# Patient Record
Sex: MALE | Race: WHITE | NOT HISPANIC OR LATINO | Employment: FULL TIME | URBAN - METROPOLITAN AREA
[De-identification: names, ages, dates, MRNs, and addresses within clinical notes are randomized per-mention and may not be internally consistent; named-entity substitution may affect disease eponyms.]

---

## 2019-05-21 ENCOUNTER — OFFICE VISIT (OUTPATIENT)
Dept: FAMILY MEDICINE CLINIC | Facility: HOSPITAL | Age: 49
End: 2019-05-21
Payer: COMMERCIAL

## 2019-05-21 VITALS
HEART RATE: 80 BPM | DIASTOLIC BLOOD PRESSURE: 82 MMHG | TEMPERATURE: 98 F | BODY MASS INDEX: 39.71 KG/M2 | SYSTOLIC BLOOD PRESSURE: 132 MMHG | WEIGHT: 262 LBS | HEIGHT: 68 IN

## 2019-05-21 DIAGNOSIS — E78.1 HYPERTRIGLYCERIDEMIA: ICD-10-CM

## 2019-05-21 DIAGNOSIS — R91.1 LUNG NODULE: ICD-10-CM

## 2019-05-21 DIAGNOSIS — E11.65 UNCONTROLLED TYPE 2 DIABETES MELLITUS WITH HYPERGLYCEMIA (HCC): ICD-10-CM

## 2019-05-21 DIAGNOSIS — Z12.11 COLON CANCER SCREENING: ICD-10-CM

## 2019-05-21 DIAGNOSIS — J45.30 MILD PERSISTENT ASTHMA WITHOUT COMPLICATION: ICD-10-CM

## 2019-05-21 DIAGNOSIS — K21.9 GASTROESOPHAGEAL REFLUX DISEASE WITHOUT ESOPHAGITIS: ICD-10-CM

## 2019-05-21 DIAGNOSIS — G43.009 MIGRAINE WITHOUT AURA AND WITHOUT STATUS MIGRAINOSUS, NOT INTRACTABLE: ICD-10-CM

## 2019-05-21 DIAGNOSIS — E03.9 HYPOTHYROIDISM, UNSPECIFIED TYPE: ICD-10-CM

## 2019-05-21 DIAGNOSIS — I10 ESSENTIAL HYPERTENSION: ICD-10-CM

## 2019-05-21 DIAGNOSIS — L40.9 PSORIASIS: ICD-10-CM

## 2019-05-21 DIAGNOSIS — F41.9 ANXIETY: ICD-10-CM

## 2019-05-21 DIAGNOSIS — G47.33 OBSTRUCTIVE SLEEP APNEA SYNDROME: ICD-10-CM

## 2019-05-21 DIAGNOSIS — Z79.4 CONTROLLED TYPE 2 DIABETES MELLITUS WITHOUT COMPLICATION, WITH LONG-TERM CURRENT USE OF INSULIN (HCC): Primary | ICD-10-CM

## 2019-05-21 DIAGNOSIS — E11.9 CONTROLLED TYPE 2 DIABETES MELLITUS WITHOUT COMPLICATION, WITH LONG-TERM CURRENT USE OF INSULIN (HCC): Primary | ICD-10-CM

## 2019-05-21 DIAGNOSIS — E55.9 VITAMIN D DEFICIENCY: ICD-10-CM

## 2019-05-21 PROBLEM — J30.2 SEASONAL ALLERGIES: Status: ACTIVE | Noted: 2019-05-21

## 2019-05-21 PROBLEM — J45.909 ASTHMA: Status: ACTIVE | Noted: 2019-05-21

## 2019-05-21 PROCEDURE — 3079F DIAST BP 80-89 MM HG: CPT | Performed by: INTERNAL MEDICINE

## 2019-05-21 PROCEDURE — 3008F BODY MASS INDEX DOCD: CPT | Performed by: INTERNAL MEDICINE

## 2019-05-21 PROCEDURE — 3075F SYST BP GE 130 - 139MM HG: CPT | Performed by: INTERNAL MEDICINE

## 2019-05-21 PROCEDURE — 99204 OFFICE O/P NEW MOD 45 MIN: CPT | Performed by: INTERNAL MEDICINE

## 2019-05-21 RX ORDER — FLUOXETINE 20 MG/1
20 TABLET, FILM COATED ORAL DAILY
COMMUNITY
Start: 2019-03-27 | End: 2019-09-04 | Stop reason: SDUPTHER

## 2019-05-21 RX ORDER — ALBUTEROL SULFATE 90 UG/1
1 AEROSOL, METERED RESPIRATORY (INHALATION) EVERY 4 HOURS PRN
COMMUNITY
Start: 2018-12-10

## 2019-05-21 RX ORDER — MOMETASONE FUROATE 50 UG/1
100 SPRAY, METERED NASAL DAILY
COMMUNITY
Start: 2018-07-05 | End: 2019-09-30 | Stop reason: SDUPTHER

## 2019-05-21 RX ORDER — TRIAMCINOLONE ACETONIDE 1 MG/G
CREAM TOPICAL
COMMUNITY
Start: 2018-10-11 | End: 2021-08-12

## 2019-05-21 RX ORDER — MECLIZINE HYDROCHLORIDE 25 MG/1
25 TABLET ORAL 3 TIMES DAILY PRN
COMMUNITY
Start: 2019-03-18 | End: 2021-08-12

## 2019-05-21 RX ORDER — ALCLOMETASONE DIPROPIONATE 0.5 MG/G
CREAM TOPICAL
COMMUNITY
Start: 2018-10-11 | End: 2021-11-01

## 2019-05-21 RX ORDER — SUMATRIPTAN 100 MG/1
100 TABLET, FILM COATED ORAL ONCE AS NEEDED
COMMUNITY
End: 2021-11-01

## 2019-05-21 RX ORDER — SUCRALFATE 1 G/1
TABLET ORAL
COMMUNITY
Start: 2018-10-12 | End: 2019-05-21

## 2019-05-21 RX ORDER — IBUPROFEN 600 MG/1
600 TABLET ORAL EVERY 4 HOURS PRN
COMMUNITY
End: 2021-08-12

## 2019-05-21 RX ORDER — CETIRIZINE HYDROCHLORIDE 10 MG/1
1 TABLET ORAL DAILY PRN
COMMUNITY

## 2019-05-21 RX ORDER — ESOMEPRAZOLE MAGNESIUM 40 MG/1
40 CAPSULE, DELAYED RELEASE ORAL DAILY
COMMUNITY
Start: 2018-03-20 | End: 2019-10-11

## 2019-05-21 RX ORDER — GLIPIZIDE 10 MG/1
10 TABLET, FILM COATED, EXTENDED RELEASE ORAL DAILY
COMMUNITY
Start: 2018-08-06 | End: 2020-01-14 | Stop reason: SDUPTHER

## 2019-05-21 RX ORDER — LEVOTHYROXINE SODIUM 0.05 MG/1
50 TABLET ORAL DAILY
COMMUNITY
Start: 2018-12-13 | End: 2019-07-16 | Stop reason: SDUPTHER

## 2019-05-21 RX ORDER — BUTALBITAL, ACETAMINOPHEN AND CAFFEINE 300; 40; 50 MG/1; MG/1; MG/1
1 CAPSULE ORAL EVERY 6 HOURS PRN
COMMUNITY
Start: 2019-03-07 | End: 2019-12-27 | Stop reason: SDUPTHER

## 2019-06-15 ENCOUNTER — OFFICE VISIT (OUTPATIENT)
Dept: URGENT CARE | Facility: CLINIC | Age: 49
End: 2019-06-15
Payer: COMMERCIAL

## 2019-06-15 VITALS
RESPIRATION RATE: 16 BRPM | TEMPERATURE: 98.5 F | WEIGHT: 260 LBS | OXYGEN SATURATION: 96 % | DIASTOLIC BLOOD PRESSURE: 84 MMHG | SYSTOLIC BLOOD PRESSURE: 126 MMHG | HEART RATE: 74 BPM | HEIGHT: 68 IN | BODY MASS INDEX: 39.4 KG/M2

## 2019-06-15 DIAGNOSIS — J02.9 SORE THROAT: Primary | ICD-10-CM

## 2019-06-15 LAB — S PYO AG THROAT QL: NEGATIVE

## 2019-06-15 PROCEDURE — 99213 OFFICE O/P EST LOW 20 MIN: CPT | Performed by: NURSE PRACTITIONER

## 2019-06-15 PROCEDURE — 87880 STREP A ASSAY W/OPTIC: CPT | Performed by: NURSE PRACTITIONER

## 2019-06-15 RX ORDER — AMOXICILLIN 875 MG/1
875 TABLET, COATED ORAL 2 TIMES DAILY
Qty: 14 TABLET | Refills: 0 | Status: SHIPPED | OUTPATIENT
Start: 2019-06-15 | End: 2019-06-22

## 2019-06-17 LAB — B-HEM STREP SPEC QL CULT: NEGATIVE

## 2019-07-13 LAB
25(OH)D3+25(OH)D2 SERPL-MCNC: 31.8 NG/ML (ref 30–100)
ALBUMIN SERPL-MCNC: 4 G/DL (ref 3.5–5.5)
ALBUMIN/CREAT UR: 7.1 MG/G CREAT (ref 0–30)
ALBUMIN/GLOB SERPL: 1.5 {RATIO} (ref 1.2–2.2)
ALP SERPL-CCNC: 101 IU/L (ref 39–117)
ALT SERPL-CCNC: 35 IU/L (ref 0–44)
AST SERPL-CCNC: 32 IU/L (ref 0–40)
BASOPHILS # BLD AUTO: 0.1 X10E3/UL (ref 0–0.2)
BASOPHILS NFR BLD AUTO: 1 %
BILIRUB SERPL-MCNC: 0.4 MG/DL (ref 0–1.2)
BUN SERPL-MCNC: 13 MG/DL (ref 6–24)
BUN/CREAT SERPL: 14 (ref 9–20)
CALCIUM SERPL-MCNC: 9.1 MG/DL (ref 8.7–10.2)
CHLORIDE SERPL-SCNC: 104 MMOL/L (ref 96–106)
CO2 SERPL-SCNC: 26 MMOL/L (ref 20–29)
CREAT SERPL-MCNC: 0.9 MG/DL (ref 0.76–1.27)
CREAT UR-MCNC: 114.1 MG/DL
EOSINOPHIL # BLD AUTO: 0.2 X10E3/UL (ref 0–0.4)
EOSINOPHIL NFR BLD AUTO: 3 %
ERYTHROCYTE [DISTWIDTH] IN BLOOD BY AUTOMATED COUNT: 14.8 % (ref 12.3–15.4)
EST. AVERAGE GLUCOSE BLD GHB EST-MCNC: 154 MG/DL
GLOBULIN SER-MCNC: 2.6 G/DL (ref 1.5–4.5)
GLUCOSE SERPL-MCNC: 142 MG/DL (ref 65–99)
HBA1C MFR BLD: 7 % (ref 4.8–5.6)
HCT VFR BLD AUTO: 47.6 % (ref 37.5–51)
HGB BLD-MCNC: 15.6 G/DL (ref 13–17.7)
IMM GRANULOCYTES # BLD: 0 X10E3/UL (ref 0–0.1)
IMM GRANULOCYTES NFR BLD: 0 %
LYMPHOCYTES # BLD AUTO: 1.4 X10E3/UL (ref 0.7–3.1)
LYMPHOCYTES NFR BLD AUTO: 22 %
MCH RBC QN AUTO: 29 PG (ref 26.6–33)
MCHC RBC AUTO-ENTMCNC: 32.8 G/DL (ref 31.5–35.7)
MCV RBC AUTO: 89 FL (ref 79–97)
MICROALBUMIN UR-MCNC: 8.1 UG/ML
MONOCYTES # BLD AUTO: 0.6 X10E3/UL (ref 0.1–0.9)
MONOCYTES NFR BLD AUTO: 9 %
NEUTROPHILS # BLD AUTO: 4.4 X10E3/UL (ref 1.4–7)
NEUTROPHILS NFR BLD AUTO: 65 %
PLATELET # BLD AUTO: 298 X10E3/UL (ref 150–450)
POTASSIUM SERPL-SCNC: 4.9 MMOL/L (ref 3.5–5.2)
PROT SERPL-MCNC: 6.6 G/DL (ref 6–8.5)
PSA SERPL-MCNC: 0.1 NG/ML (ref 0–4)
RBC # BLD AUTO: 5.38 X10E6/UL (ref 4.14–5.8)
SL AMB EGFR AFRICAN AMERICAN: 114 ML/MIN/1.73
SL AMB EGFR NON AFRICAN AMERICAN: 99 ML/MIN/1.73
SL AMB REFLEX CRITERIA: NORMAL
SL AMB T4, FREE (DIRECT): 0.89 NG/DL (ref 0.82–1.77)
SODIUM SERPL-SCNC: 145 MMOL/L (ref 134–144)
TSH SERPL DL<=0.005 MIU/L-ACNC: 4.65 UIU/ML (ref 0.45–4.5)
WBC # BLD AUTO: 6.7 X10E3/UL (ref 3.4–10.8)

## 2019-07-13 PROCEDURE — 3045F PR MOST RECENT HEMOGLOBIN A1C LEVEL 7.0-9.0%: CPT | Performed by: INTERNAL MEDICINE

## 2019-07-16 ENCOUNTER — OFFICE VISIT (OUTPATIENT)
Dept: FAMILY MEDICINE CLINIC | Facility: HOSPITAL | Age: 49
End: 2019-07-16
Payer: COMMERCIAL

## 2019-07-16 VITALS
WEIGHT: 260 LBS | DIASTOLIC BLOOD PRESSURE: 88 MMHG | TEMPERATURE: 97.5 F | HEIGHT: 68 IN | HEART RATE: 82 BPM | SYSTOLIC BLOOD PRESSURE: 142 MMHG | BODY MASS INDEX: 39.4 KG/M2

## 2019-07-16 DIAGNOSIS — E03.9 HYPOTHYROIDISM, UNSPECIFIED TYPE: Primary | ICD-10-CM

## 2019-07-16 DIAGNOSIS — L40.9 PSORIASIS: ICD-10-CM

## 2019-07-16 DIAGNOSIS — E55.9 VITAMIN D DEFICIENCY: ICD-10-CM

## 2019-07-16 DIAGNOSIS — E11.65 UNCONTROLLED TYPE 2 DIABETES MELLITUS WITH HYPERGLYCEMIA (HCC): Primary | ICD-10-CM

## 2019-07-16 DIAGNOSIS — I10 ESSENTIAL HYPERTENSION: ICD-10-CM

## 2019-07-16 DIAGNOSIS — E03.9 HYPOTHYROIDISM, UNSPECIFIED TYPE: ICD-10-CM

## 2019-07-16 PROCEDURE — 3008F BODY MASS INDEX DOCD: CPT | Performed by: INTERNAL MEDICINE

## 2019-07-16 PROCEDURE — 99214 OFFICE O/P EST MOD 30 MIN: CPT | Performed by: INTERNAL MEDICINE

## 2019-07-16 RX ORDER — MELATONIN
1000 DAILY
Start: 2019-07-16

## 2019-07-16 RX ORDER — RISANKIZUMAB-RZAA 75 MG/0.83
KIT SUBCUTANEOUS
COMMUNITY
Start: 2019-06-20 | End: 2020-03-16

## 2019-07-16 RX ORDER — LEVOTHYROXINE SODIUM 0.05 MG/1
50 TABLET ORAL DAILY
Qty: 90 TABLET | Refills: 3 | Status: SHIPPED | OUTPATIENT
Start: 2019-07-16 | End: 2019-07-18 | Stop reason: SDUPTHER

## 2019-07-16 NOTE — ASSESSMENT & PLAN NOTE
TSH mildly elevated but FT4 was wnl, pt is taking his levothyroxine daily but does take with food - proper administration of med was reviewed - con't current regimen and take later in am after breakfast (1 hr) and other meds (2 hrs), recheck TFT's in 3 mos - if still elevated will need to increase levothryoxine

## 2019-07-16 NOTE — PROGRESS NOTES
Assessment/Plan:    Uncontrolled type 2 diabetes mellitus with hyperglycemia (HCC)  Lab Results   Component Value Date    HGBA1C 7 0 (H) 07/11/2019       No results for input(s): POCGLU in the last 72 hours  Blood Sugar Average: Last 72 hrs: 150's  DM type 2 - borderline uncontrolled with A1C 7 0 - encouraged to con't healthy diet/exercise/wgt loss, con't current meds, recheck BW in 3-4 mos - order given, UTD on foot exam (2/19), have to get copy of eye exam from Dr Iban Lezama as pt states he has had one recently (note sent was NOT a diabetic exam in April 2019), not on ACE/ARB or statin      Hypothyroidism  TSH mildly elevated but FT4 was wnl, pt is taking his levothyroxine daily but does take with food - proper administration of med was reviewed - con't current regimen and take later in am after breakfast (1 hr) and other meds (2 hrs), recheck TFT's in 3 mos - if still elevated will need to increase levothryoxine    Essential hypertension  Bp elevated on presentation but again better by end of appt - urged to con't watching diet and keep active and get wgt down, would benefit from ACE/ARB d/t his DM if BP was elevated    Vitamin D deficiency  Low nml - start Vit D 1000 IU 1 tab daily - recheck in 4-6 mos - will order at next appt    Psoriasis  Siliq stopped and Skyrizi started by Tamika Bridges - con't meds and f/u as per Derm - med list updated       Diagnoses and all orders for this visit:    Uncontrolled type 2 diabetes mellitus with hyperglycemia ([address] )  -     Glucose, fasting; Future  -     Hemoglobin A1C; Future  -     Glucose, fasting  -     Hemoglobin A1C    Hypothyroidism, unspecified type  -     TSH, 3rd generation with Free T4 reflex; Future  -     TSH, 3rd generation with Free T4 reflex    Vitamin D deficiency  -     cholecalciferol (VITAMIN D3) 1,000 units tablet;  Take 1 tablet (1,000 Units total) by mouth daily    Essential hypertension    Psoriasis    Other orders  -     SKYRIZI 150 DOSE 75 MG/0 83ML PSKT Pt still has not done a baseline colonoscopy -number for GI and screening recommendations were reviewed at last appt    Subjective:      Patient ID: Alexandria Wang is a 44 y o  male  HPI Pt here for follow up appt and BW results    BW results were d/w pt in detail: FBS/A1C 142/7 0, rest of CMP/CBC/urine micralbumin:cr ratio/PSA were wnl, TSH was a bit elevated at 4 650 but FT4 was wnl, Vit D low nml at 31 8  Def of controlled vs uncontrolled DM was reviewed  Diet was reviewed - wgt down 2 lbs from May 2019  He is taking his DM meds as directed  He does not check his BS daily but does check it a few times a week  He feels his average BS is 150's  He is UTD on foot (2/19) and eye exam (no record received yet)  He is NOT on a statin or and  ARB/ACE  Goal TSH was reviewed  He is taking his levothyroxine every am but usually with food  He does not take it with other meds  He notes feeling more tired recently  He has had no issues with C/D/tremor/palp/hair loss/skin changes/intolerance to heat or cold  Goal Vit d was reviewed  He is currently not taking any Vit d supplement but is taking a MVI with Vit d in it  Bp above goal on presentation but better by end of appt - pt currently not taking any BP meds  Benefit of ACE/ARB with DM even w/o HTN was reviewed  He has no microalbuminuria  Pt still has not done a baseline colonoscopy -number for GI and screening recommendations were reviewed at last appt    Review of Systems   Constitutional: Positive for fatigue  Negative for chills and fever  HENT: Negative for congestion and sinus pain  Eyes: Negative for pain and visual disturbance  Respiratory: Positive for shortness of breath  Negative for cough and chest tightness  SOB is primarily with exertion   Cardiovascular: Negative for chest pain, palpitations and leg swelling  Gastrointestinal: Negative for abdominal pain, constipation, diarrhea, nausea and vomiting  Endocrine: Negative for polydipsia and polyuria  Genitourinary: Negative for difficulty urinating and dysuria  Musculoskeletal: Positive for arthralgias  Negative for myalgias  Skin: Negative for wound  Started on new medication for his psoriasis with Derm - med list updated   Neurological: Positive for dizziness  Negative for headaches  Hematological: Does not bruise/bleed easily  Psychiatric/Behavioral: Negative for behavioral problems and confusion  Objective:    /88 (BP Location: Right arm, Patient Position: Sitting, Cuff Size: Standard)   Pulse 82   Temp 97 5 °F (36 4 °C)   Ht 5' 8" (1 727 m)   Wt 118 kg (260 lb)   BMI 39 53 kg/m²      Physical Exam   Constitutional: He appears well-developed and well-nourished  No distress  HENT:   Head: Normocephalic and atraumatic  Mouth/Throat: No oropharyngeal exudate  Eyes: Conjunctivae are normal  Right eye exhibits no discharge  Left eye exhibits no discharge  Neck: Neck supple  No tracheal deviation present  Cardiovascular: Normal rate, regular rhythm and normal heart sounds  No murmur heard  Pulmonary/Chest: Effort normal and breath sounds normal  No respiratory distress  He has no wheezes  He has no rales  Abdominal: Soft  He exhibits no distension  There is no tenderness  Musculoskeletal: He exhibits no deformity  Neurological: He is alert  He exhibits normal muscle tone  Skin: Skin is warm and dry  No rash noted  Psychiatric: He has a normal mood and affect  His behavior is normal    Nursing note and vitals reviewed

## 2019-07-16 NOTE — ASSESSMENT & PLAN NOTE
Lab Results   Component Value Date    HGBA1C 7 0 (H) 07/11/2019       No results for input(s): POCGLU in the last 72 hours      Blood Sugar Average: Last 72 hrs: 150's  DM type 2 - borderline uncontrolled with A1C 7 0 - encouraged to con't healthy diet/exercise/wgt loss, con't current meds, recheck BW in 3-4 mos - order given, UTD on foot exam (2/19), have to get copy of eye exam from Dr Robert Queen as pt states he has had one recently (note sent was NOT a diabetic exam in April 2019), not on ACE/ARB or statin

## 2019-07-16 NOTE — ASSESSMENT & PLAN NOTE
Bp elevated on presentation but again better by end of appt - urged to con't watching diet and keep active and get wgt down, would benefit from ACE/ARB d/t his DM if BP was elevated

## 2019-07-16 NOTE — ASSESSMENT & PLAN NOTE
Nba stopped and Mason Auguste started by Tamika Bridges - con't meds and f/u as per Tamika Bridges - med list updated

## 2019-07-18 ENCOUNTER — PATIENT MESSAGE (OUTPATIENT)
Dept: FAMILY MEDICINE CLINIC | Facility: HOSPITAL | Age: 49
End: 2019-07-18

## 2019-07-18 DIAGNOSIS — E03.9 HYPOTHYROIDISM, UNSPECIFIED TYPE: ICD-10-CM

## 2019-07-18 RX ORDER — LEVOTHYROXINE SODIUM 0.05 MG/1
50 TABLET ORAL DAILY
Qty: 90 TABLET | Refills: 3 | Status: SHIPPED | OUTPATIENT
Start: 2019-07-18 | End: 2020-03-03 | Stop reason: SDUPTHER

## 2019-07-18 NOTE — TELEPHONE ENCOUNTER
From: Maciej Grajeda  Sent: 7/18/2019 11:29 AM EDT  To: Aretha Hinds  Subject: RE: Prescription Question    Please let me know if this was done correctly? It should be sent to:    ePrescribe: For fastest service ask your doctor to submit prescriptions electronically to the [facility]   Online/Mobile [facility]: Log in to [facility]  [facility] or the [facility], choose the medicine you want  delivered, add it to your cart, then check out  Fax: Have your doctor call  for faxing instructions  (Faxes can only be accepted from a doctors office )  Phone: Call [employer] at the toll-free number on the back of your ID card for assistance in switching to home delivery  Mail: Complete the order form and send to [employer] along with prescriptions and payment  ----- Message -----  From: Aretha Hinds  Sent: 7/18/2019 11:19 AM EDT  To: Maciej Grajeda  Subject: RE: Prescription Question  It looks like it was sent to [facility]  in [place] on Tuesday  Is this correct?      ----- Message -----   From: Maciej Grajeda   Sent: 7/18/2019 10:32 AM EDT   To: Daily Perkins DO  Subject: Prescription Question    Have you sent the prescription to [facility] for L-THYROXINE (SYNTHROID) TABS? Please let me know  Thanks!     Sarita Salinas

## 2019-08-29 LAB
LEFT EYE DIABETIC RETINOPATHY: NORMAL
RIGHT EYE DIABETIC RETINOPATHY: NORMAL

## 2019-09-04 ENCOUNTER — PATIENT MESSAGE (OUTPATIENT)
Dept: FAMILY MEDICINE CLINIC | Facility: HOSPITAL | Age: 49
End: 2019-09-04

## 2019-09-04 DIAGNOSIS — F41.9 ANXIETY: Primary | ICD-10-CM

## 2019-09-04 RX ORDER — FLUOXETINE 20 MG/1
20 TABLET, FILM COATED ORAL DAILY
Qty: 90 TABLET | Refills: 2 | Status: SHIPPED | OUTPATIENT
Start: 2019-09-04 | End: 2020-05-12 | Stop reason: SDUPTHER

## 2019-09-04 NOTE — TELEPHONE ENCOUNTER
From: Malinda Minaya  To: Radha Phan DO  Sent: 9/4/2019 3:00 PM EDT  Subject: Prescription Question    I need a re-fill of the medicine below sent to [facility]  Thanks!     FLUoxetine 20 MG tablet

## 2019-09-30 ENCOUNTER — PATIENT MESSAGE (OUTPATIENT)
Dept: FAMILY MEDICINE CLINIC | Facility: HOSPITAL | Age: 49
End: 2019-09-30

## 2019-09-30 ENCOUNTER — IMMUNIZATIONS (OUTPATIENT)
Dept: FAMILY MEDICINE CLINIC | Facility: HOSPITAL | Age: 49
End: 2019-09-30
Payer: COMMERCIAL

## 2019-09-30 DIAGNOSIS — J30.2 SEASONAL ALLERGIES: Primary | ICD-10-CM

## 2019-09-30 DIAGNOSIS — Z23 ENCOUNTER FOR IMMUNIZATION: Primary | ICD-10-CM

## 2019-09-30 PROCEDURE — 90682 RIV4 VACC RECOMBINANT DNA IM: CPT | Performed by: FAMILY MEDICINE

## 2019-09-30 PROCEDURE — 90471 IMMUNIZATION ADMIN: CPT | Performed by: FAMILY MEDICINE

## 2019-09-30 RX ORDER — MOMETASONE FUROATE 50 UG/1
2 SPRAY, METERED NASAL DAILY
Qty: 17 G | Refills: 3 | Status: SHIPPED | OUTPATIENT
Start: 2019-09-30 | End: 2019-11-25

## 2019-09-30 NOTE — TELEPHONE ENCOUNTER
From: Nancy Boone  To: Amando Campos DO  Sent: 9/30/2019 10:14 AM EDT  Subject: Prescription Question    Please re-fill my Mometasone Furoate Ns [id number] 50mcg 90-day supply  Please send to [facility]

## 2019-10-11 ENCOUNTER — OFFICE VISIT (OUTPATIENT)
Dept: GASTROENTEROLOGY | Facility: CLINIC | Age: 49
End: 2019-10-11
Payer: COMMERCIAL

## 2019-10-11 VITALS
DIASTOLIC BLOOD PRESSURE: 88 MMHG | HEART RATE: 86 BPM | WEIGHT: 258 LBS | BODY MASS INDEX: 39.1 KG/M2 | HEIGHT: 68 IN | SYSTOLIC BLOOD PRESSURE: 128 MMHG

## 2019-10-11 DIAGNOSIS — K21.9 GASTROESOPHAGEAL REFLUX DISEASE, ESOPHAGITIS PRESENCE NOT SPECIFIED: ICD-10-CM

## 2019-10-11 DIAGNOSIS — Z12.11 COLON CANCER SCREENING: Primary | ICD-10-CM

## 2019-10-11 DIAGNOSIS — G47.33 OBSTRUCTIVE SLEEP APNEA: ICD-10-CM

## 2019-10-11 DIAGNOSIS — R10.13 EPIGASTRIC PAIN: Primary | ICD-10-CM

## 2019-10-11 DIAGNOSIS — Z12.11 COLON CANCER SCREENING: ICD-10-CM

## 2019-10-11 PROCEDURE — 99244 OFF/OP CNSLTJ NEW/EST MOD 40: CPT | Performed by: INTERNAL MEDICINE

## 2019-10-11 RX ORDER — RANITIDINE 150 MG/1
150 TABLET ORAL 2 TIMES DAILY PRN
COMMUNITY
End: 2019-11-26

## 2019-10-11 NOTE — PATIENT INSTRUCTIONS
Gastroesophageal Reflux Disease   WHAT YOU NEED TO KNOW:   Gastroesophageal reflux occurs when acid and food in the stomach back up into the esophagus  Gastroesophageal reflux disease (GERD) is reflux that occurs more than twice a week for a few weeks  It usually causes heartburn and other symptoms  GERD can cause other health problems over time if it is not treated  DISCHARGE INSTRUCTIONS:   Return to the emergency department if:   · You feel full and cannot burp or vomit  · You have severe chest pain and sudden trouble breathing  · Your bowel movements are black, bloody, or tarry-looking  · Your vomit looks like coffee grounds or has blood in it  Contact your healthcare provider if:   · You vomit large amounts, or you vomit often  · You have trouble breathing after you vomit  · You have trouble swallowing, or pain with swallowing  · You are losing weight without trying  · Your symptoms get worse or do not improve with treatment  · You have questions or concerns about your condition or care  Medicines:   · Medicines  are used to decrease stomach acid  Medicine may also be used to help your lower esophageal sphincter and stomach contract (tighten) more  · Take your medicine as directed  Contact your healthcare provider if you think your medicine is not helping or if you have side effects  Tell him of her if you are allergic to any medicine  Keep a list of the medicines, vitamins, and herbs you take  Include the amounts, and when and why you take them  Bring the list or the pill bottles to follow-up visits  Carry your medicine list with you in case of an emergency  Manage GERD:   · Do not have foods or drinks that may increase heartburn  These include chocolate, peppermint, fried or fatty foods, drinks that contain caffeine, or carbonated drinks (soda)  Other foods include spicy foods, onions, tomatoes, and tomato-based foods   Do not have foods or drinks that can irritate your esophagus, such as citrus fruits, juices, and alcohol  · Do not eat large meals  When you eat a lot of food at one time, your stomach needs more acid to digest it  Eat 6 small meals each day instead of 3 large ones, and eat slowly  Do not eat meals 2 to 3 hours before bedtime  · Elevate the head of your bed  Place 6-inch blocks under the head of your bed frame  You may also use more than one pillow under your head and shoulders while you sleep  · Maintain a healthy weight  If you are overweight, weight loss may help relieve symptoms of GERD  · Do not smoke  Smoking weakens the lower esophageal sphincter and increases the risk of GERD  Ask your healthcare provider for information if you currently smoke and need help to quit  E-cigarettes or smokeless tobacco still contain nicotine  Talk to your healthcare provider before you use these products  · Do not wear clothing that is tight around your waist   Tight clothing can put pressure on your stomach and cause or worsen GERD symptoms  Follow up with your healthcare provider as directed:  Write down your questions so you remember to ask them during your visits  © 2017 [facility] Information is for End User's use only and may not be sold, redistributed or otherwise used for commercial purposes  All illustrations and images included in CareNotes® are the copyrighted property of [employer]  or [employer]  The above information is an  only  It is not intended as medical advice for individual conditions or treatments  Talk to your doctor, nurse or pharmacist before following any medical regimen to see if it is safe and effective for you

## 2019-10-11 NOTE — PROGRESS NOTES
[facility] - Outpatient Consultation  Kinjal Raman 44 y o  male MRN: [id number]  Encounter: [id number]    ASSESSMENT AND PLAN:      1  Epigastric pain  42-year-old male referred to us as a new patient today by Dr Leona Last for reflux and colon cancer screening  Patient has been on and off Nexium for many years  Currently not taking anything  However has been having some epigastric sharp pains with eating for the past couple days  Taking Zantac as needed  Differential diagnosis includes reflux, esophagitis, peptic ulcer disease     - okay to do Zantac as needed  - limit the Motrin  - Schedule EGD @ [address] [place] [address]  2  Gastroesophageal reflux disease, esophagitis presence not specified  GERD lifestyle modifications and weight loss discussed    3  Colon cancer screening  Had a colonoscopy many decades ago  Due for colon cancer screening  We will schedule today  - Schedule colonoscopy @ [address] [place] [address]  4  Obstructive sleep apnea  Noncompliant with his CPAP  I advised that he follows up with his PCP regarding this  Followup Appointment:  3 month  ______________________________________________________________________    Chief Complaint   Patient presents with    Due for colon screening     Referred by Dr Graff Alu Epigastric Pain     Referred by Dr Leona Last       HPI:   Kinjal Raman is a 44y o  year old male who presents today as a new patient at the request of his PCP for reflux and colon cancer screening  His only real complaint today is that he has been having some epigastric discomfort for the past couple days  Patient states that he has had reflux for a long time and has been on and off Nexium for the past several years  Currently, he has not been taking any Nexium for the past several months  However, the pain kind of came on suddenly  No nausea or vomiting or dysphagia associated with it    No diarrhea or changes in bowel habits associated with it  No fevers or chills  The pain is epigastric, sharp, nonradiating, and has been improving for the past 24 hours  Appetite is good  Never had EGD for Clarke's surveillance  Regarding his colon cancer screening, he is average risk  No family history of colon polyps or colon cancer  He has no GI bleeding      Historical Information   Past Medical History:   Diagnosis Date    Anxiety 2/22/2019    Asthma 5/21/2019    Controlled type 2 diabetes mellitus without complication, with long-term current use of insulin ([address] ) 5/21/2019    Sees Dr Hannah Madrigal- Dr Latasha Rendon GERD (gastroesophageal reflux disease) 12/19/2013    Hypertension     Hypertriglyceridemia 7/8/2015    Hypothyroidism     Migraine without aura and without status migrainosus, not intractable 5/21/2019    Obstructive sleep apnea syndrome 05/21/2019    noncompliant with CPAP    Psoriasis     Seasonal allergies 5/21/2019    Dr Destiny Ortiz Vitamin D deficiency      Past Surgical History:   Procedure Laterality Date    CATARACT EXTRACTION, BILATERAL      CHOLECYSTECTOMY      UNDESCENDED TESTICLE EXPLORATION       Social History     Substance and Sexual Activity   Alcohol Use Not Currently     Social History     Substance and Sexual Activity   Drug Use Never     Social History     Tobacco Use   Smoking Status Never Smoker   Smokeless Tobacco Never Used     Family History   Problem Relation Age of Onset    Diabetes Mother     Thyroid disease Mother     Stroke Mother     Thyroid disease Father     Coronary artery disease Father     Colon cancer Neg Hx     Colon polyps Neg Hx        Meds/Allergies     Current Outpatient Medications:     albuterol (PROVENTIL HFA) 90 mcg/act inhaler    Butalbital-APAP-Caffeine -40 MG CAPS    canagliflozin (INVOKANA) 100 mg    cetirizine (ZyrTEC) 10 mg tablet    cholecalciferol (VITAMIN D3) 1,000 units tablet    FLUoxetine (PROzac) 20 MG tablet   fluticasone-salmeterol (ADVAIR DISKUS) 500-50 mcg/dose inhaler    glipiZIDE (GLUCOTROL XL) 10 mg 24 hr tablet    ibuprofen (MOTRIN) 600 mg tablet    insulin glargine (LANTUS SOLOSTAR) 100 units/mL injection pen    Insulin Pen Needle (BD PEN NEEDLE ESTHER U/F) 32G X 4 MM MISC    levothyroxine 50 mcg tablet    meclizine (ANTIVERT) 25 mg tablet    metFORMIN (GLUCOPHAGE) 1000 MG tablet    mometasone (NASONEX) 50 mcg/act nasal spray    ranitidine (ZANTAC) 150 mg tablet    SKYRIZI 150 DOSE 75 MG/0 83ML PSKT    SUMAtriptan (IMITREX) 100 mg tablet    triamcinolone (KENALOG) 0 1 % cream    alclomethasone (ACLOVATE) 0 05 % cream    No Known Allergies    PHYSICAL EXAM:    Blood pressure 128/88, pulse 86, height 5' 8" (1 727 m), weight 117 kg (258 lb)  Body mass index is 39 23 kg/m²  General Appearance: NAD, cooperative, alert, morbid obesity  Eyes: Anicteric, PERRLA, EOMI  ENT:  Normocephalic, atraumatic, normal mucosa  Mallampati airway class 1  Neck:  Supple, symmetrical, trachea midline,   Resp:  Clear to auscultation bilaterally; no rales, rhonchi or wheezing; respirations unlabored   CV:  S1 S2, Regular rate and rhythm; no murmur, rub, or gallop  GI:  Soft, non-tender, non-distended; normal bowel sounds; no masses, no organomegaly  although exam limited by body habitus  Rectal: Deferred  Musculoskeletal: No cyanosis, clubbing or edema  Normal ROM    Skin:  No jaundice, rashes, or lesions   Heme/Lymph: No palpable cervical lymphadenopathy  Psych: Normal affect, good eye contact  Neuro: No gross deficits, AAOx3    Lab Results:   Lab Results   Component Value Date    WBC 6 7 07/11/2019    HGB 15 6 07/11/2019    HCT 47 6 07/11/2019    MCV 89 07/11/2019     07/11/2019     Lab Results   Component Value Date    K 4 9 07/11/2019     07/11/2019    CO2 26 07/11/2019    BUN 13 07/11/2019    CREATININE 0 90 07/11/2019    AST 32 07/11/2019    ALT 35 07/11/2019     No results found for: IRON, TIBC, FERRITIN  No results found for: LIPASE    Radiology Results:   No results found  REVIEW OF SYSTEMS:    CONSTITUTIONAL: Denies any fever, chills, rigors, and weight loss  HEENT: No earache or tinnitus  Denies hearing loss or visual disturbances  CARDIOVASCULAR: No chest pain or palpitations  RESPIRATORY: Denies any cough, hemoptysis, shortness of breath  Occasional dyspnea on exertion  GASTROINTESTINAL: As noted in the History of Present Illness  GENITOURINARY: No problems with urination  Denies any hematuria or dysuria  NEUROLOGIC: No dizziness or vertigo, denies headaches  MUSCULOSKELETAL: Denies any muscle or joint pain  SKIN: Denies skin rashes or itching  ENDOCRINE: Denies excessive thirst  Denies intolerance to heat or cold  PSYCHOSOCIAL: Denies depression or anxiety  Denies any recent memory loss

## 2019-10-11 NOTE — LETTER
October 11, 2019     Lauri Dover, [address] [place] [address] 305  [address] [address]    Patient: Dana Tripathi   YOB: 1970   Date of Visit: 10/11/2019       Dear Dr Rm Gurrola: Thank you for referring Dana Tripathi to me for evaluation  Below are my notes for this consultation  If you have questions, please do not hesitate to call me  I look forward to following your patient along with you  Sincerely,        Milli Magaña MD        CC: No Recipients  Milli Magaña MD  10/11/2019  4:47 PM  Sign at close encounter    [facility] - Outpatient Consultation  Dana Tripathi 44 y o  male MRN: [id number]  Encounter: [id number]    ASSESSMENT AND PLAN:      1  Epigastric pain  69-year-old male referred to us as a new patient today by Dr Rm Gurrola for reflux and colon cancer screening  Patient has been on and off Nexium for many years  Currently not taking anything  However has been having some epigastric sharp pains with eating for the past couple days  Taking Zantac as needed  Differential diagnosis includes reflux, esophagitis, peptic ulcer disease     - okay to do Zantac as needed  - limit the Motrin  - Schedule EGD @ [address] [place] [address]  2  Gastroesophageal reflux disease, esophagitis presence not specified  GERD lifestyle modifications and weight loss discussed    3  Colon cancer screening  Had a colonoscopy many decades ago  Due for colon cancer screening  We will schedule today  - Schedule colonoscopy @ [address] [place] [address]  4  Obstructive sleep apnea  Noncompliant with his CPAP  I advised that he follows up with his PCP regarding this        Followup Appointment:  3 month  ______________________________________________________________________    Chief Complaint   Patient presents with    Due for colon screening     Referred by Dr Kerri Barba Epigastric Pain     Referred by Dr Rm Gurrola       HPI:   Danasusana Tripathi is a 44y o  year old male who presents today as a new patient at the request of his PCP for reflux and colon cancer screening  His only real complaint today is that he has been having some epigastric discomfort for the past couple days  Patient states that he has had reflux for a long time and has been on and off Nexium for the past several years  Currently, he has not been taking any Nexium for the past several months  However, the pain kind of came on suddenly  No nausea or vomiting or dysphagia associated with it  No diarrhea or changes in bowel habits associated with it  No fevers or chills  The pain is epigastric, sharp, nonradiating, and has been improving for the past 24 hours  Appetite is good  Never had EGD for Clarke's surveillance  Regarding his colon cancer screening, he is average risk  No family history of colon polyps or colon cancer  He has no GI bleeding      Historical Information   Past Medical History:   Diagnosis Date    Anxiety 2/22/2019    Asthma 5/21/2019    Controlled type 2 diabetes mellitus without complication, with long-term current use of insulin ([facility] [address] ) 5/21/2019    Sees Dr Mary Hilton- Dr Jay Cardona GERD (gastroesophageal reflux disease) 12/19/2013    Hypertension     Hypertriglyceridemia 7/8/2015    Hypothyroidism     Migraine without aura and without status migrainosus, not intractable 5/21/2019    Obstructive sleep apnea syndrome 05/21/2019    noncompliant with CPAP    Psoriasis     Seasonal allergies 5/21/2019    Dr Tyler Colunga Vitamin D deficiency      Past Surgical History:   Procedure Laterality Date    CATARACT EXTRACTION, BILATERAL      CHOLECYSTECTOMY      UNDESCENDED TESTICLE EXPLORATION       Social History     Substance and Sexual Activity   Alcohol Use Not Currently     Social History     Substance and Sexual Activity   Drug Use Never     Social History     Tobacco Use   Smoking Status Never Smoker   Smokeless Tobacco Never Used     Family History   Problem Relation Age of Onset    Diabetes Mother     Thyroid disease Mother     Stroke Mother     Thyroid disease Father     Coronary artery disease Father     Colon cancer Neg Hx     Colon polyps Neg Hx        Meds/Allergies     Current Outpatient Medications:     albuterol (PROVENTIL HFA) 90 mcg/act inhaler    Butalbital-APAP-Caffeine -40 MG CAPS    canagliflozin (INVOKANA) 100 mg    cetirizine (ZyrTEC) 10 mg tablet    cholecalciferol (VITAMIN D3) 1,000 units tablet    FLUoxetine (PROzac) 20 MG tablet    fluticasone-salmeterol (ADVAIR DISKUS) 500-50 mcg/dose inhaler    glipiZIDE (GLUCOTROL XL) 10 mg 24 hr tablet    ibuprofen (MOTRIN) 600 mg tablet    insulin glargine (LANTUS SOLOSTAR) 100 units/mL injection pen    Insulin Pen Needle (BD PEN NEEDLE ESTHER U/F) 32G X 4 MM MISC    levothyroxine 50 mcg tablet    meclizine (ANTIVERT) 25 mg tablet    metFORMIN (GLUCOPHAGE) 1000 MG tablet    mometasone (NASONEX) 50 mcg/act nasal spray    ranitidine (ZANTAC) 150 mg tablet    SKYRIZI 150 DOSE 75 MG/0 83ML PSKT    SUMAtriptan (IMITREX) 100 mg tablet    triamcinolone (KENALOG) 0 1 % cream    alclomethasone (ACLOVATE) 0 05 % cream    No Known Allergies    PHYSICAL EXAM:    Blood pressure 128/88, pulse 86, height 5' 8" (1 727 m), weight 117 kg (258 lb)  Body mass index is 39 23 kg/m²  General Appearance: NAD, cooperative, alert, morbid obesity  Eyes: Anicteric, PERRLA, EOMI  ENT:  Normocephalic, atraumatic, normal mucosa  Mallampati airway class 1  Neck:  Supple, symmetrical, trachea midline,   Resp:  Clear to auscultation bilaterally; no rales, rhonchi or wheezing; respirations unlabored   CV:  S1 S2, Regular rate and rhythm; no murmur, rub, or gallop  GI:  Soft, non-tender, non-distended; normal bowel sounds; no masses, no organomegaly   although exam limited by body habitus  Rectal: Deferred  Musculoskeletal: No cyanosis, clubbing or edema  Normal ROM    Skin: No jaundice, rashes, or lesions   Heme/Lymph: No palpable cervical lymphadenopathy  Psych: Normal affect, good eye contact  Neuro: No gross deficits, AAOx3    Lab Results:   Lab Results   Component Value Date    WBC 6 7 07/11/2019    HGB 15 6 07/11/2019    HCT 47 6 07/11/2019    MCV 89 07/11/2019     07/11/2019     Lab Results   Component Value Date    K 4 9 07/11/2019     07/11/2019    CO2 26 07/11/2019    BUN 13 07/11/2019    CREATININE 0 90 07/11/2019    AST 32 07/11/2019    ALT 35 07/11/2019     No results found for: IRON, TIBC, FERRITIN  No results found for: LIPASE    Radiology Results:   No results found  REVIEW OF SYSTEMS:    CONSTITUTIONAL: Denies any fever, chills, rigors, and weight loss  HEENT: No earache or tinnitus  Denies hearing loss or visual disturbances  CARDIOVASCULAR: No chest pain or palpitations  RESPIRATORY: Denies any cough, hemoptysis, shortness of breath  Occasional dyspnea on exertion  GASTROINTESTINAL: As noted in the History of Present Illness  GENITOURINARY: No problems with urination  Denies any hematuria or dysuria  NEUROLOGIC: No dizziness or vertigo, denies headaches  MUSCULOSKELETAL: Denies any muscle or joint pain  SKIN: Denies skin rashes or itching  ENDOCRINE: Denies excessive thirst  Denies intolerance to heat or cold  PSYCHOSOCIAL: Denies depression or anxiety  Denies any recent memory loss

## 2019-10-14 ENCOUNTER — TELEPHONE (OUTPATIENT)
Dept: GASTROENTEROLOGY | Facility: CLINIC | Age: 49
End: 2019-10-14

## 2019-10-23 DIAGNOSIS — K21.9 GASTROESOPHAGEAL REFLUX DISEASE, ESOPHAGITIS PRESENCE NOT SPECIFIED: Primary | ICD-10-CM

## 2019-10-23 NOTE — TELEPHONE ENCOUNTER
Post endo order  Need to clarify mail order pharmacy  Rx written [facility], in [facility] mail order is [facility] in [place]

## 2019-10-30 RX ORDER — PANTOPRAZOLE SODIUM 40 MG/1
40 TABLET, DELAYED RELEASE ORAL DAILY
Qty: 90 TABLET | Refills: 0 | Status: SHIPPED | OUTPATIENT
Start: 2019-10-30 | End: 2020-01-10

## 2019-11-10 ENCOUNTER — OFFICE VISIT (OUTPATIENT)
Dept: URGENT CARE | Facility: CLINIC | Age: 49
End: 2019-11-10
Payer: COMMERCIAL

## 2019-11-10 VITALS
OXYGEN SATURATION: 97 % | TEMPERATURE: 101.4 F | RESPIRATION RATE: 18 BRPM | SYSTOLIC BLOOD PRESSURE: 116 MMHG | HEIGHT: 68 IN | BODY MASS INDEX: 38.95 KG/M2 | WEIGHT: 257 LBS | HEART RATE: 100 BPM | DIASTOLIC BLOOD PRESSURE: 86 MMHG

## 2019-11-10 DIAGNOSIS — R30.0 DYSURIA: ICD-10-CM

## 2019-11-10 DIAGNOSIS — N30.01 ACUTE CYSTITIS WITH HEMATURIA: Primary | ICD-10-CM

## 2019-11-10 LAB
SL AMB  POCT GLUCOSE, UA: 2000
SL AMB LEUKOCYTE ESTERASE,UA: NORMAL
SL AMB POCT BILIRUBIN,UA: NORMAL
SL AMB POCT BLOOD,UA: NORMAL
SL AMB POCT CLARITY,UA: CLEAR
SL AMB POCT COLOR,UA: YELLOW
SL AMB POCT KETONES,UA: NORMAL
SL AMB POCT NITRITE,UA: NORMAL
SL AMB POCT PH,UA: 5
SL AMB POCT SPECIFIC GRAVITY,UA: 1.01
SL AMB POCT URINE PROTEIN: NORMAL
SL AMB POCT UROBILINOGEN: 0.2

## 2019-11-10 PROCEDURE — 99213 OFFICE O/P EST LOW 20 MIN: CPT | Performed by: PHYSICIAN ASSISTANT

## 2019-11-10 PROCEDURE — 81002 URINALYSIS NONAUTO W/O SCOPE: CPT | Performed by: PHYSICIAN ASSISTANT

## 2019-11-10 RX ORDER — SULFAMETHOXAZOLE AND TRIMETHOPRIM 800; 160 MG/1; MG/1
1 TABLET ORAL 2 TIMES DAILY
Qty: 14 TABLET | Refills: 0 | Status: SHIPPED | OUTPATIENT
Start: 2019-11-10 | End: 2019-11-18

## 2019-11-10 NOTE — PROGRESS NOTES
NAME: Dana Tripathi is a 44 y o  male  : 1970    MRN: [id number]      Assessment and Plan   Acute cystitis with hematuria [N30 01]  1  Acute cystitis with hematuria  sulfamethoxazole-trimethoprim (BACTRIM DS) 800-160 mg per tablet   2  Dysuria  POCT urine dip    Urine culture   Exam findings are consistent with UTI  Bactrim sent to Pharmacy  Take medication as noted  Increase fluids  If symptoms persist the next 2-3 days Pt should follow-up with PCP  Patient understands and agrees with treatment plans  Daniel Gillette was seen today for possible uti  Diagnoses and all orders for this visit:    Acute cystitis with hematuria  -     sulfamethoxazole-trimethoprim (BACTRIM DS) 800-160 mg per tablet; Take 1 tablet by mouth 2 (two) times a day for 7 days smx-tmp DS (BACTRIM) 800-160 mg tabs (1tab q12 D10)    Dysuria  -     POCT urine dip  -     Urine culture        Patient Instructions   There are no Patient Instructions on file for this visit  Proceed to ER if symptoms worsen  Chief Complaint     Chief Complaint   Patient presents with    Possible UTI     Started thursday with urinary pressure, pain, incontinence  Denies flank pain  History of Present Illness      44Year old Pt admits to possible UTI x -4  days  Admits to burning sensation, urinary frequency, urgency  Denies lower abdominal pressure  Denies hematuria  Denies fever, n/v/d/c  Denies low back pain  Denies History of UTIs  Denies Penile discharge, itching, rash  Denies concern for STDs  Review of Systems   Review of Systems   Constitutional: Negative  Respiratory: Negative  Cardiovascular: Negative  Gastrointestinal: Negative  Genitourinary: Positive for dysuria, frequency and urgency  Negative for decreased urine volume, discharge, flank pain, hematuria, penile pain, penile swelling, scrotal swelling and testicular pain           Current Medications       Current Outpatient Medications:     albuterol (PROVENTIL HFA) 90 mcg/act inhaler, Inhale 1 puff every 4 (four) hours as needed, Disp: , Rfl:     alclomethasone (ACLOVATE) 6 26 % cream, 1 APPLICATION APPLY ON THE SKIN TWICE A DAY; APPLY TO FACE AS NEEDED FOR FLARES, Disp: , Rfl:     Butalbital-APAP-Caffeine -40 MG CAPS, Take 1 capsule by mouth every 6 (six) hours as needed, Disp: , Rfl:     canagliflozin (INVOKANA) 100 mg, Take 200 mg by mouth daily, Disp: , Rfl:     cetirizine (ZyrTEC) 10 mg tablet, Take 1 tablet by mouth daily as needed, Disp: , Rfl:     cholecalciferol (VITAMIN D3) 1,000 units tablet, Take 1 tablet (1,000 Units total) by mouth daily, Disp: , Rfl:     FLUoxetine (PROzac) 20 MG tablet, Take 1 tablet (20 mg total) by mouth daily, Disp: 90 tablet, Rfl: 2    fluticasone-salmeterol (ADVAIR DISKUS) 500-50 mcg/dose inhaler, Inhale 1 puff 2 (two) times a day, Disp: , Rfl:     glipiZIDE (GLUCOTROL XL) 10 mg 24 hr tablet, Take 10 mg by mouth daily, Disp: , Rfl:     ibuprofen (MOTRIN) 600 mg tablet, Take 600 mg by mouth every 4 (four) hours as needed, Disp: , Rfl:     insulin glargine (LANTUS SOLOSTAR) 100 units/mL injection pen, Inject 20 Units under the skin daily, Disp: , Rfl:     Insulin Pen Needle (BD PEN NEEDLE ESTHER U/F) 32G X 4 MM MISC, Use daily, Disp: , Rfl:     levothyroxine 50 mcg tablet, Take 1 tablet (50 mcg total) by mouth daily, Disp: 90 tablet, Rfl: 3    meclizine (ANTIVERT) 25 mg tablet, Take 25 mg by mouth 3 (three) times a day as needed, Disp: , Rfl:     metFORMIN (GLUCOPHAGE) 1000 MG tablet, Take 1,000 mg by mouth 2 (two) times a day, Disp: , Rfl:     mometasone (NASONEX) 50 mcg/act nasal spray, 2 sprays into each nostril daily, Disp: 17 g, Rfl: 3    Na Sulfate-K Sulfate-Mg Sulf (SUPREP BOWEL PREP KIT) 17 5-3 13-1 6 GM/177ML SOLN, As directed, Disp: 2 Bottle, Rfl: 0    pantoprazole (PROTONIX) 40 mg tablet, Take 1 tablet (40 mg total) by mouth daily, Disp: 90 tablet, Rfl: 0    ranitidine (ZANTAC) 150 mg tablet, Take 150 mg by mouth 2 (two) times a day as needed, Disp: , Rfl:     SKYRIZI 150 DOSE 75 MG/0 83ML PSKT, , Disp: , Rfl:     sulfamethoxazole-trimethoprim (BACTRIM DS) 800-160 mg per tablet, Take 1 tablet by mouth 2 (two) times a day for 7 days smx-tmp DS (BACTRIM) 800-160 mg tabs (1tab q12 D10), Disp: 14 tablet, Rfl: 0    SUMAtriptan (IMITREX) 100 mg tablet, Take 100 mg by mouth once as needed, Disp: , Rfl:     triamcinolone (KENALOG) 0 1 % cream, 1 APPLICATION APPLY ON THE SKIN TWICE A DAY; APPLY TO BODY AS NEEDED FOR FLARES, Disp: , Rfl:     Current Allergies     Allergies as of 11/10/2019    (No Known Allergies)              Past Medical History:   Diagnosis Date    Anxiety 2/22/2019    Asthma 5/21/2019    Controlled type 2 diabetes mellitus without complication, with long-term current use of insulin ([facility] [address] ) 5/21/2019    Sees Dr Rigo Burton Ophthalmologist- Dr Pete Cook GERD (gastroesophageal reflux disease) 12/19/2013    Hypertension     Hypertriglyceridemia 7/8/2015    Hypothyroidism     Migraine without aura and without status migrainosus, not intractable 5/21/2019    Obstructive sleep apnea syndrome 05/21/2019    noncompliant with CPAP    Psoriasis     Seasonal allergies 5/21/2019    Dr Pema Aguirre Vitamin D deficiency        Past Surgical History:   Procedure Laterality Date    CATARACT EXTRACTION, BILATERAL      CHOLECYSTECTOMY      UNDESCENDED TESTICLE EXPLORATION         Family History   Problem Relation Age of Onset    Diabetes Mother     Thyroid disease Mother     Stroke Mother     Thyroid disease Father     Coronary artery disease Father     Colon cancer Neg Hx     Colon polyps Neg Hx          Medications have been verified      The following portions of the patient's history were reviewed and updated as appropriate: allergies, current medications, past family history, past medical history, past social history, past surgical history and problem list     Objective   /86 Pulse 100   Temp (!) 101 4 °F (38 6 °C)   Resp 18   Ht 5' 8" (1 727 m)   Wt 117 kg (257 lb)   SpO2 97%   BMI 39 08 kg/m²      Physical Exam     Physical Exam   Constitutional: He is oriented to person, place, and time  He appears well-developed  No distress  Cardiovascular: Normal rate, regular rhythm, normal heart sounds and intact distal pulses  Exam reveals no gallop and no friction rub  No murmur heard  Pulmonary/Chest: Effort normal and breath sounds normal  No stridor  No respiratory distress  He has no wheezes  He has no rales  He exhibits no tenderness  Abdominal: Soft  Bowel sounds are normal  He exhibits no distension and no mass  There is no tenderness  There is no rigidity, no rebound, no guarding and no CVA tenderness  No hernia  Neurological: He is alert and oriented to person, place, and time  Skin: He is not diaphoretic  Nursing note and vitals reviewed        Suma Calderon PA-C

## 2019-11-13 LAB
BACTERIA UR CULT: ABNORMAL
Lab: ABNORMAL
SL AMB ANTIMICROBIAL SUSCEPTIBILITY: ABNORMAL

## 2019-11-14 ENCOUNTER — TELEPHONE (OUTPATIENT)
Dept: URGENT CARE | Facility: CLINIC | Age: 49
End: 2019-11-14

## 2019-11-14 LAB
EST. AVERAGE GLUCOSE BLD GHB EST-MCNC: 183 MG/DL
GLUCOSE SERPL-MCNC: 156 MG/DL (ref 65–99)
HBA1C MFR BLD: 8 % (ref 4.8–5.6)
TSH SERPL DL<=0.005 MIU/L-ACNC: 3.85 UIU/ML (ref 0.45–4.5)

## 2019-11-14 PROCEDURE — 3052F HG A1C>EQUAL 8.0%<EQUAL 9.0%: CPT | Performed by: INTERNAL MEDICINE

## 2019-11-18 ENCOUNTER — OFFICE VISIT (OUTPATIENT)
Dept: FAMILY MEDICINE CLINIC | Facility: HOSPITAL | Age: 49
End: 2019-11-18
Payer: COMMERCIAL

## 2019-11-18 VITALS
HEART RATE: 70 BPM | BODY MASS INDEX: 39.4 KG/M2 | HEIGHT: 68 IN | TEMPERATURE: 97.8 F | SYSTOLIC BLOOD PRESSURE: 132 MMHG | WEIGHT: 260 LBS | DIASTOLIC BLOOD PRESSURE: 82 MMHG

## 2019-11-18 DIAGNOSIS — E11.65 UNCONTROLLED TYPE 2 DIABETES MELLITUS WITH HYPERGLYCEMIA (HCC): Primary | ICD-10-CM

## 2019-11-18 DIAGNOSIS — I10 ESSENTIAL HYPERTENSION: ICD-10-CM

## 2019-11-18 DIAGNOSIS — E03.9 HYPOTHYROIDISM, UNSPECIFIED TYPE: ICD-10-CM

## 2019-11-18 PROCEDURE — 4010F ACE/ARB THERAPY RXD/TAKEN: CPT | Performed by: INTERNAL MEDICINE

## 2019-11-18 PROCEDURE — 99214 OFFICE O/P EST MOD 30 MIN: CPT | Performed by: INTERNAL MEDICINE

## 2019-11-18 RX ORDER — LISINOPRIL 5 MG/1
5 TABLET ORAL DAILY
Qty: 30 TABLET | Refills: 0 | Status: SHIPPED | OUTPATIENT
Start: 2019-11-18 | End: 2019-12-13 | Stop reason: SDUPTHER

## 2019-11-18 RX ORDER — ATORVASTATIN CALCIUM 10 MG/1
10 TABLET, FILM COATED ORAL DAILY
Qty: 30 TABLET | Refills: 0 | Status: SHIPPED | OUTPATIENT
Start: 2019-11-18 | End: 2019-12-13 | Stop reason: SDUPTHER

## 2019-11-18 NOTE — ASSESSMENT & PLAN NOTE
Clinically euthyroid - con't current levothyroxine - importance of taking med ALONE first thing in am again reviewed Closed head injury, initial encounter

## 2019-11-18 NOTE — ASSESSMENT & PLAN NOTE
Lab Results   Component Value Date    HGBA1C 8 0 (H) 11/13/2019   Dm type 2 with hyperglycemia - uncontrolled with a1C up at 8 0 - urged to get on track with diet and start regular exercise and get wgt down, pt is interested in seeing DM nutritionist - order given, he has increased his Lantus himself to 34 U and still notes no symptoms of low sugars BUT IS NOT CHECKING HIS SUGARS AT HOME - urged to stay at 35 U of Lantus and start checking at least once a week, recheck BW in 3 mo - order given, start ACE and statin, UTD on foot ( 2/19) and eye exam (8/19)

## 2019-11-18 NOTE — ASSESSMENT & PLAN NOTE
Bp at goal but upper end of goal, benefit of ACE reviewed with DM and pt agreeable to start low dose Lisinopril 5 mg 1 tab PO q day - check BMP with next labs, SE reviewed - call if SE occur

## 2019-11-18 NOTE — PROGRESS NOTES
Assessment/Plan:    Uncontrolled type 2 diabetes mellitus with hyperglycemia (HCC)    Lab Results   Component Value Date    HGBA1C 8 0 (H) 11/13/2019   Dm type 2 with hyperglycemia - uncontrolled with a1C up at 8 0 - urged to get on track with diet and start regular exercise and get wgt down, pt is interested in seeing DM nutritionist - order given, he has increased his Lantus himself to 34 U and still notes no symptoms of low sugars BUT IS NOT CHECKING HIS SUGARS AT HOME - urged to stay at 35 U of Lantus and start checking at least once a week, recheck BW in 3 mo - order given, start ACE and statin, UTD on foot ( 2/19) and eye exam (8/19)    Hypothyroidism  Clinically euthyroid - con't current levothyroxine - importance of taking med ALONE first thing in am again reviewed    Essential hypertension  Bp at goal but upper end of goal, benefit of ACE reviewed with DM and pt agreeable to start low dose Lisinopril 5 mg 1 tab PO q day - check BMP with next labs, SE reviewed - call if SE occur       Diagnoses and all orders for this visit:    Uncontrolled type 2 diabetes mellitus with hyperglycemia ([facility] [address] )  -     Ambulatory referral to medical nutrition therapy for diabetes; Future  -     Hemoglobin A1C; Future  -     Basic metabolic panel; Future  -     Hemoglobin A1C  -     Basic metabolic panel  -     atorvastatin (LIPITOR) 10 mg tablet; Take 1 tablet (10 mg total) by mouth daily  -     Hepatic function panel; Future  -     Hepatic function panel    Hypothyroidism, unspecified type    Essential hypertension  -     lisinopril (ZESTRIL) 5 mg tablet; Take 1 tablet (5 mg total) by mouth daily      Colonoscopy 10/19 - 10 yrs      Subjective:      Patient ID: Js Sherman is a 44 y o  male  HPI Pt here for follow up appt and BW results    BW results were d/w pt in detail: FBS/A1C 156/8 0, TSH wnl  Def of controlled vs uncontrolled DM was reviewed  Diet was reviewed - wgt unchanged from July 2019    He does not check his sugars at home but has a glucometer  He states he has increased his carbs/fats  He has never seen DM nutritionist   He does no formal exercise  He is taking his DM meds as directed  He is UTD on foot (2/19)  and eye exam (8/19)  He is not on a statin or ACE  Nml TFT's were reviewed  He is taking his Levothyroxine in the am   He has had no significant wgt changes/fatigue/C/D/hair loss or skin changes/tremor/palp  BP at goal today and meds were reviewed and no changes have occurred  He denies missing doses of meds or SE with the meds  He does not check his BP outside the office  He notes no frequent HA's/dizziness/double vision/CP  RF for CVD were reviewed  Pt is agreeable to ACE and statin  Diet/exercise/wgt loss encouraged  Colonoscopy 10/19 - 10 yrs      Review of Systems   Constitutional: Negative for chills and fever  HENT: Negative for congestion and sinus pain  Eyes: Negative for pain and visual disturbance  Respiratory: Negative for cough and shortness of breath  Cardiovascular: Negative for chest pain, palpitations and leg swelling  Gastrointestinal: Negative for abdominal pain, constipation, diarrhea, nausea and vomiting  Endocrine: Negative for polydipsia and polyuria  Genitourinary: Negative for difficulty urinating and dysuria  Musculoskeletal: Negative for arthralgias and myalgias  Skin: Negative for rash and wound  Neurological: Negative for dizziness and headaches  Hematological: Does not bruise/bleed easily  Psychiatric/Behavioral: Negative for behavioral problems and confusion  Objective:    /82 (BP Location: Right arm, Patient Position: Sitting, Cuff Size: Standard)   Pulse 70   Temp 97 8 °F (36 6 °C)   Ht 5' 8" (1 727 m)   Wt 118 kg (260 lb)   BMI 39 53 kg/m²      Physical Exam   Constitutional: He appears well-developed and well-nourished  No distress  HENT:   Head: Normocephalic and atraumatic     Mouth/Throat: No oropharyngeal exudate  Eyes: Conjunctivae are normal  Right eye exhibits no discharge  Left eye exhibits no discharge  Neck: Neck supple  No tracheal deviation present  Cardiovascular: Normal rate, regular rhythm and normal heart sounds  No murmur heard  Pulmonary/Chest: Effort normal and breath sounds normal  No respiratory distress  He has no wheezes  He has no rales  Abdominal: Soft  He exhibits no distension  There is no tenderness  Musculoskeletal: He exhibits no deformity  Neurological: He is alert  He exhibits normal muscle tone  Skin: Skin is warm and dry  No rash noted  Psychiatric: He has a normal mood and affect  His behavior is normal    Nursing note and vitals reviewed

## 2019-11-19 ENCOUNTER — TELEPHONE (OUTPATIENT)
Dept: FAMILY MEDICINE CLINIC | Facility: HOSPITAL | Age: 49
End: 2019-11-19

## 2019-11-19 NOTE — TELEPHONE ENCOUNTER
----- Message from Sam Cardoza sent at 11/19/2019  8:29 AM EST -----  Regarding: Test Results Question  Contact: [phone]  Hi Doctor,    Attached are my test results      Darwin Primrose

## 2019-11-21 ENCOUNTER — OFFICE VISIT (OUTPATIENT)
Dept: DIABETES SERVICES | Facility: HOSPITAL | Age: 49
End: 2019-11-21
Payer: COMMERCIAL

## 2019-11-21 VITALS — BODY MASS INDEX: 39.4 KG/M2 | WEIGHT: 260 LBS | HEIGHT: 68 IN

## 2019-11-21 DIAGNOSIS — E11.65 UNCONTROLLED TYPE 2 DIABETES MELLITUS WITH HYPERGLYCEMIA (HCC): Primary | ICD-10-CM

## 2019-11-21 PROCEDURE — 97802 MEDICAL NUTRITION INDIV IN: CPT | Performed by: DIETITIAN, REGISTERED

## 2019-11-21 NOTE — PROGRESS NOTES
Medical Nutrition Therapy     Assessment    Visit Type: Initial visit  Chief complaint:  Type 2     HPI:  Met with Pako Lovett for MNT initial not testing, he tested it today 145 today  This was the first time in about a month  States A1C had been running good so no need to test, but now is 8%  Diabetes >15 years, on basal insulin  Food recall shows primary issues: no consistency in his intake of carbs  Breakfast and morning snack are very high carb, and then high carb bedtime snacking  These are the places he will target for making changes  The rest of the day looks good unless he goes to fast food for lunch, discussed better choices  Discussed the need to start some testing, I asked if he could start pair testing, before a meal and 2hr after the same meal, rotating between meals  Together we discussed what foods contain CHO, reading a food label, serving sizes, and set a carb goal of 45g CHO/meal to promote weight loss with 15g snacks  Put together sample meals for Venu's reference and evaluated Venu's current eating plan  Good understanding, Pako Lovett will call with questions or for more education  Follow up as needed if not improving  Ht Readings from Last 1 Encounters:   11/21/19 5' 8" (1 727 m)     Wt Readings from Last 3 Encounters:   11/21/19 118 kg (260 lb)   11/18/19 118 kg (260 lb)   11/10/19 117 kg (257 lb)        Body mass index is 39 53 kg/m²       Lab Results   Component Value Date    HGBA1C 8 0 (H) 11/13/2019    HGBA1C 7 0 (H) 07/11/2019       No results found for: CHOL  No results found for: HDL  No results found for: LDLCALC  No results found for: TRIG  No results found for: CHOLHDL    Weight Change: No    Medical Diagnosis/ICD 10 Code:  E11 65    Barriers to Learning: no barriers    Do you follow any special diet presently?: No- lives with spouse and 15year old  Who shops: spouse  Who cooks: spouse    Food Log: Completed via the method of food recall-      Breakfast: up around 6am, cereal today special K was on the almond and honey or raisin bran crunch, was drinking lactaid milk and just switched to almond  Coffee 1 a day with splenda and milk, eggs and toast on the weekend  Morning Snack:yogurt with fruit and yogurt parfait, bagel and cream cheese, [place]ian, and extra coffee   Lunch:12pm: comes home sandwich on wheat bread and fruit and diet iced tea  Fast food quarter pounder meal or marvin itailin hoagie classic  Hot meal at work lasagna  Afternoon Snack: not much, more coffee  Dinner: not much, they eat early  6-6:30pm  5pm  Steak veggie string bean  Meal starch veggie  Soup  Rice and beans and plantains  Evening Snack: ice cream sugar free and cereal at night  9pm eats, bed 10-11pm    Beverages: water a lot, coffee with cream and splenda at work will have flavored creamer, no mixes, no power drinks, juice sugar free cranberry, iced tea diet, milk in sugar, no soda diet  Eating out/Take out: no much once a week   Exercise ADL, nothing structured  Nutrition Diagnosis:  Food and nutrition related knowledge deficit  related to Lack of prior exposure to accurate nutrition related information as evidenced by Verbalizes inaccurate or incomplete information    Intervention: plate method, label reading, behavior modification strategies, carbohydrate counting, meal timing, individualized meal plan and monitoring portion control     Treatment Goals: Patient understands education and recommendations    Education Material Given  Jovan Hutchison was provided the Portion Booklet and Planning Healthy Meals     Monitoring and evaluation:    Term code indicator  FH 1 6 3 Carbohydrate Intake Criteria: 45g CHO per meal, 15g CHO snacks    Patients Response to Instruction:  Comprehensiongood  Motivationgood  Expected Compliancegood    Thank you for coming to the [facility] for education today  Please feel free to call with any questions or concerns      Lucia Bwoers, RD  [facility] [address] [place] [address]

## 2019-11-21 NOTE — PATIENT INSTRUCTIONS
45 carbs per meal, 15g snacks   Work on changing breakfast and morning snack, and no more cereal at night    Get more exercise!!! Start testing blood sugars

## 2019-11-25 DIAGNOSIS — J30.2 SEASONAL ALLERGIES: Primary | ICD-10-CM

## 2019-11-25 RX ORDER — FLUTICASONE PROPIONATE 50 MCG
2 SPRAY, SUSPENSION (ML) NASAL DAILY
Qty: 3 BOTTLE | Refills: 3 | Status: SHIPPED | OUTPATIENT
Start: 2019-11-25 | End: 2020-11-01

## 2019-11-26 ENCOUNTER — OFFICE VISIT (OUTPATIENT)
Dept: FAMILY MEDICINE CLINIC | Facility: HOSPITAL | Age: 49
End: 2019-11-26
Payer: COMMERCIAL

## 2019-11-26 VITALS
DIASTOLIC BLOOD PRESSURE: 80 MMHG | BODY MASS INDEX: 39.25 KG/M2 | TEMPERATURE: 97.8 F | HEIGHT: 68 IN | HEART RATE: 75 BPM | WEIGHT: 259 LBS | SYSTOLIC BLOOD PRESSURE: 130 MMHG

## 2019-11-26 DIAGNOSIS — R10.11 RIGHT UPPER QUADRANT ABDOMINAL PAIN: Primary | ICD-10-CM

## 2019-11-26 DIAGNOSIS — E11.65 UNCONTROLLED TYPE 2 DIABETES MELLITUS WITH HYPERGLYCEMIA (HCC): ICD-10-CM

## 2019-11-26 DIAGNOSIS — R19.7 DIARRHEA, UNSPECIFIED TYPE: ICD-10-CM

## 2019-11-26 DIAGNOSIS — R30.0 DYSURIA: ICD-10-CM

## 2019-11-26 LAB
SL AMB  POCT GLUCOSE, UA: 1000
SL AMB LEUKOCYTE ESTERASE,UA: ABNORMAL
SL AMB POCT BILIRUBIN,UA: ABNORMAL
SL AMB POCT BLOOD,UA: ABNORMAL
SL AMB POCT CLARITY,UA: CLEAR
SL AMB POCT COLOR,UA: YELLOW
SL AMB POCT KETONES,UA: ABNORMAL
SL AMB POCT NITRITE,UA: ABNORMAL
SL AMB POCT PH,UA: 5
SL AMB POCT SPECIFIC GRAVITY,UA: 1.01
SL AMB POCT URINE PROTEIN: ABNORMAL
SL AMB POCT UROBILINOGEN: ABNORMAL

## 2019-11-26 PROCEDURE — 99214 OFFICE O/P EST MOD 30 MIN: CPT | Performed by: INTERNAL MEDICINE

## 2019-11-26 PROCEDURE — 81002 URINALYSIS NONAUTO W/O SCOPE: CPT | Performed by: INTERNAL MEDICINE

## 2019-11-26 RX ORDER — CIPROFLOXACIN 500 MG/1
500 TABLET, FILM COATED ORAL EVERY 12 HOURS SCHEDULED
Qty: 14 TABLET | Refills: 0 | Status: SHIPPED | OUTPATIENT
Start: 2019-11-26 | End: 2019-12-03

## 2019-11-26 NOTE — ASSESSMENT & PLAN NOTE
Lab Results   Component Value Date    HGBA1C 8 0 (H) 11/13/2019   Has had benefit with nutritionist - sugars low 100's in am and 150's later in day, has DM labs ordered, do BW and con't meds as directed

## 2019-11-26 NOTE — PROGRESS NOTES
Assessment/Plan:    Uncontrolled type 2 diabetes mellitus with hyperglycemia (HCC)    Lab Results   Component Value Date    HGBA1C 8 0 (H) 11/13/2019   Has had benefit with nutritionist - sugars low 100's in am and 150's later in day, has DM labs ordered, do BW and con't meds as directed       Diagnoses and all orders for this visit:    Right upper quadrant abdominal pain  Comments: On exam has tenderness at LUQ, symptoms better today - poss related to new dairy product - cont bland diet and keep hydrated, on PPI, call with recurrent/new/wores symptoms    Diarrhea, unspecified type  Comments:  Improved with Imodium and Pepto - no current symptoms, advised bland diet and call with recurrent/new/worse symptoms/F/blood in stool    Dysuria  Comments:  Symptoms c/w UTI and urine dip with nitrites and blood but no leukocytes, previous UC reviewed - rx for Cipro sent, will send for culture, keep hydrated, call with new/worse symptoms/F/flank pain or if not better by end of abx  Orders:  -     ciprofloxacin (CIPRO) 500 mg tablet; Take 1 tablet (500 mg total) by mouth every 12 (twelve) hours for 7 days  -     Urine culture; Future  -     POCT urine dip    Uncontrolled type 2 diabetes mellitus with hyperglycemia (HCC)          Subjective:      Patient ID: James Michelle is a 44 y o  male  HPI  Pt here with with  C/o GI upset x 1 wk  He states middle of last week he started to have R upper abd discomfort  He states he felt "gasy" and abd was making a lot of noise and then had loose stools after that  He took Imodium x1 and that seemed to help  Over the weekend he seemed to have recurrent symptoms and started Pepto and it has improved as of this am  Currently he notes no abd pain/N/V/D/blood in stool/black stools/F/C  He states his appetite was slightly decreased last week but seems back to normal   He is on a PPI daily  He did see the nutritionist and did go on a low sugar different mild product    He stopped the product right after symptoms started  He has been seeing the nutritionist for his type 2 diabetes and feels she has given him a few good pointers with his diet  He states his sugars are currently averaging 150's at bedtime and low 100's in the am    Wgt down 1 lb from July 2019  He notes some symptoms of burning with urination the past week  He notes symptoms seemed more last week but have improved but not resolved  He notes no low abd pain/bladder or flank pain  He notes no F/C/N/V/blood in the urine  He had a UTI mid Nov and was tx with Bactrim  He is not sure symptoms every completely resolved  UC results reviewed  [facility] and EGD 10/19     Review of Systems   Constitutional: Negative for chills and fever  HENT: Negative for congestion and sinus pain  Eyes: Negative for pain and visual disturbance  Respiratory: Negative for cough and shortness of breath  Cardiovascular: Negative for chest pain and palpitations  Gastrointestinal: Positive for abdominal pain and diarrhea  Negative for blood in stool, constipation, nausea and vomiting  Genitourinary: Positive for dysuria  Negative for difficulty urinating and frequency  Musculoskeletal: Negative for arthralgias and myalgias  Skin: Negative for rash and wound  Neurological: Negative for dizziness and headaches  Hematological: Does not bruise/bleed easily  Psychiatric/Behavioral: Negative for behavioral problems and confusion  Objective:    /80 (BP Location: Right arm, Patient Position: Sitting, Cuff Size: Standard)   Pulse 75   Temp 97 8 °F (36 6 °C)   Ht 5' 8" (1 727 m)   Wt 117 kg (259 lb)   BMI 39 38 kg/m²      Physical Exam   Constitutional: He appears well-developed and well-nourished  No distress  HENT:   Head: Normocephalic and atraumatic  Mouth/Throat: No oropharyngeal exudate  Eyes: Conjunctivae are normal  Right eye exhibits no discharge  Left eye exhibits no discharge  Neck: Neck supple   No tracheal deviation present  Cardiovascular: Normal rate, regular rhythm and normal heart sounds  No murmur heard  Pulmonary/Chest: Effort normal and breath sounds normal  No respiratory distress  He has no wheezes  He has no rales  Abdominal: Soft  He exhibits no distension  There is no rebound and no guarding  Mild tenderness LUQ, neg CVA tenderness B/L   Musculoskeletal: He exhibits no deformity  Neurological: He is alert  He exhibits normal muscle tone  Skin: Skin is warm and dry  No rash noted  Psychiatric: He has a normal mood and affect  His behavior is normal    Nursing note and vitals reviewed

## 2019-12-02 LAB
BACTERIA UR CULT: ABNORMAL
Lab: ABNORMAL
SL AMB ANTIMICROBIAL SUSCEPTIBILITY: ABNORMAL

## 2019-12-13 DIAGNOSIS — E11.65 UNCONTROLLED TYPE 2 DIABETES MELLITUS WITH HYPERGLYCEMIA (HCC): ICD-10-CM

## 2019-12-13 DIAGNOSIS — I10 ESSENTIAL HYPERTENSION: ICD-10-CM

## 2019-12-13 PROCEDURE — 4010F ACE/ARB THERAPY RXD/TAKEN: CPT | Performed by: INTERNAL MEDICINE

## 2019-12-13 RX ORDER — ATORVASTATIN CALCIUM 10 MG/1
TABLET, FILM COATED ORAL
Qty: 30 TABLET | Refills: 5 | Status: SHIPPED | OUTPATIENT
Start: 2019-12-13 | End: 2020-12-22

## 2019-12-13 RX ORDER — LISINOPRIL 5 MG/1
TABLET ORAL
Qty: 30 TABLET | Refills: 5 | Status: SHIPPED | OUTPATIENT
Start: 2019-12-13 | End: 2020-03-16 | Stop reason: SDUPTHER

## 2019-12-16 LAB
LEFT EYE DIABETIC RETINOPATHY: NORMAL
RIGHT EYE DIABETIC RETINOPATHY: NORMAL

## 2019-12-27 ENCOUNTER — OFFICE VISIT (OUTPATIENT)
Dept: FAMILY MEDICINE CLINIC | Facility: HOSPITAL | Age: 49
End: 2019-12-27
Payer: COMMERCIAL

## 2019-12-27 VITALS
HEIGHT: 68 IN | HEART RATE: 84 BPM | SYSTOLIC BLOOD PRESSURE: 132 MMHG | BODY MASS INDEX: 38.8 KG/M2 | DIASTOLIC BLOOD PRESSURE: 82 MMHG | WEIGHT: 256 LBS | TEMPERATURE: 98 F

## 2019-12-27 DIAGNOSIS — E11.65 UNCONTROLLED TYPE 2 DIABETES MELLITUS WITH HYPERGLYCEMIA (HCC): ICD-10-CM

## 2019-12-27 DIAGNOSIS — G43.009 MIGRAINE WITHOUT AURA AND WITHOUT STATUS MIGRAINOSUS, NOT INTRACTABLE: Primary | ICD-10-CM

## 2019-12-27 PROCEDURE — 99214 OFFICE O/P EST MOD 30 MIN: CPT | Performed by: INTERNAL MEDICINE

## 2019-12-27 RX ORDER — BUTALBITAL, ACETAMINOPHEN AND CAFFEINE 300; 40; 50 MG/1; MG/1; MG/1
1 CAPSULE ORAL EVERY 8 HOURS PRN
Qty: 30 CAPSULE | Refills: 0 | Status: SHIPPED | OUTPATIENT
Start: 2019-12-27 | End: 2020-11-21 | Stop reason: SDUPTHER

## 2019-12-27 NOTE — ASSESSMENT & PLAN NOTE
Lab Results   Component Value Date    HGBA1C 8 0 (H) 11/13/2019   D/w pt that prednisone can be used if migraine does not improve but going to defer at this time d/t his uncontrolled DM, call if not better, Lantus does updated, con't meds and labs as previously directed

## 2019-12-27 NOTE — ASSESSMENT & PLAN NOTE
Currently with acute migraine episode - no known trigger noted - advised to con't Ibuprofen 600 mg every 8 hrs prn and will refill Butalbital-APAP as well - rx sent, SE reviewed with pt, red flag neuro symptoms reviewed and urged to call asap if they occur, encouraged to keep hydrated as well

## 2019-12-30 ENCOUNTER — TELEPHONE (OUTPATIENT)
Dept: OTHER | Facility: OTHER | Age: 49
End: 2019-12-30

## 2019-12-31 ENCOUNTER — OFFICE VISIT (OUTPATIENT)
Dept: FAMILY MEDICINE CLINIC | Facility: HOSPITAL | Age: 49
End: 2019-12-31
Payer: COMMERCIAL

## 2019-12-31 VITALS
HEIGHT: 68 IN | SYSTOLIC BLOOD PRESSURE: 130 MMHG | OXYGEN SATURATION: 96 % | WEIGHT: 258.4 LBS | BODY MASS INDEX: 39.16 KG/M2 | TEMPERATURE: 97.3 F | DIASTOLIC BLOOD PRESSURE: 80 MMHG | HEART RATE: 90 BPM

## 2019-12-31 DIAGNOSIS — R30.0 DYSURIA: Primary | ICD-10-CM

## 2019-12-31 LAB
SL AMB  POCT GLUCOSE, UA: 1000
SL AMB LEUKOCYTE ESTERASE,UA: ABNORMAL
SL AMB POCT BILIRUBIN,UA: ABNORMAL
SL AMB POCT BLOOD,UA: ABNORMAL
SL AMB POCT CLARITY,UA: CLEAR
SL AMB POCT COLOR,UA: YELLOW
SL AMB POCT KETONES,UA: ABNORMAL
SL AMB POCT NITRITE,UA: ABNORMAL
SL AMB POCT PH,UA: 5
SL AMB POCT SPECIFIC GRAVITY,UA: 1.01
SL AMB POCT URINE PROTEIN: ABNORMAL
SL AMB POCT UROBILINOGEN: NORMAL

## 2019-12-31 PROCEDURE — 81002 URINALYSIS NONAUTO W/O SCOPE: CPT | Performed by: NURSE PRACTITIONER

## 2019-12-31 PROCEDURE — 99213 OFFICE O/P EST LOW 20 MIN: CPT | Performed by: NURSE PRACTITIONER

## 2019-12-31 NOTE — PROGRESS NOTES
Assessment/Plan:     Dysuria with negative urine dip except trace blood which has been present in previous dips  Send urine for culture to confirm-pt is at higher risk for UTI given Invokana use  Instructed to increase water intake  Call with fever,chills, abdominal or back pain or worsening symptoms  Diagnoses and all orders for this visit:    Dysuria  -     Urine culture          Subjective:     Patient ID: Sheri Raman is a 44 y o  male  Burning with urination for 3 days  Denies frequency  Some urgency  No blood in urine  No back or abdominal pain  No fever or chills  No penile d/c or itching  On Invokana and had recent UTI  About 1 month ago  Review of Systems   Constitutional: Negative for chills and fever  Gastrointestinal: Negative for abdominal pain  Genitourinary: Positive for dysuria and urgency  Negative for difficulty urinating, discharge, flank pain, frequency and hematuria  Musculoskeletal: Negative for back pain  The following portions of the patient's history were reviewed and updated as appropriate: allergies, current medications, past family history, past medical history, past social history, past surgical history and problem list     Objective:  Vitals:    12/31/19 0739   BP: 130/80   Pulse: 90   Temp: (!) 97 3 °F (36 3 °C)   SpO2: 96%      Physical Exam   Constitutional: He appears well-developed and well-nourished  Cardiovascular: Normal rate, regular rhythm and normal heart sounds  No murmur heard  Pulses:       Carotid pulses are 2+ on the right side, and 2+ on the left side  Pulmonary/Chest: Effort normal and breath sounds normal    Abdominal: Soft  Normal appearance and bowel sounds are normal  There is no tenderness  There is no CVA tenderness  Skin: Skin is warm and dry  Capillary refill takes less than 2 seconds  Psychiatric: He has a normal mood and affect   His behavior is normal  Judgment and thought content normal

## 2020-01-03 LAB
BACTERIA UR CULT: ABNORMAL
Lab: ABNORMAL
SL AMB ANTIMICROBIAL SUSCEPTIBILITY: ABNORMAL

## 2020-01-05 DIAGNOSIS — N30.01 ACUTE CYSTITIS WITH HEMATURIA: Primary | ICD-10-CM

## 2020-01-05 RX ORDER — CEPHALEXIN 500 MG/1
500 CAPSULE ORAL EVERY 6 HOURS SCHEDULED
Qty: 28 CAPSULE | Refills: 0 | Status: SHIPPED | OUTPATIENT
Start: 2020-01-05 | End: 2020-01-12

## 2020-01-10 DIAGNOSIS — K21.9 GASTROESOPHAGEAL REFLUX DISEASE, ESOPHAGITIS PRESENCE NOT SPECIFIED: ICD-10-CM

## 2020-01-10 RX ORDER — PANTOPRAZOLE SODIUM 40 MG/1
TABLET, DELAYED RELEASE ORAL
Qty: 90 TABLET | Refills: 0 | Status: SHIPPED | OUTPATIENT
Start: 2020-01-10 | End: 2020-04-09

## 2020-01-14 DIAGNOSIS — E11.65 UNCONTROLLED TYPE 2 DIABETES MELLITUS WITH HYPERGLYCEMIA (HCC): Primary | ICD-10-CM

## 2020-01-14 RX ORDER — GLIPIZIDE 10 MG/1
10 TABLET, FILM COATED, EXTENDED RELEASE ORAL DAILY
Qty: 90 TABLET | Refills: 1 | Status: SHIPPED | OUTPATIENT
Start: 2020-01-14 | End: 2020-07-12

## 2020-03-03 DIAGNOSIS — E03.9 HYPOTHYROIDISM, UNSPECIFIED TYPE: ICD-10-CM

## 2020-03-03 RX ORDER — LEVOTHYROXINE SODIUM 0.05 MG/1
50 TABLET ORAL DAILY
Qty: 90 TABLET | Refills: 1 | Status: SHIPPED | OUTPATIENT
Start: 2020-03-03 | End: 2020-03-16 | Stop reason: SDUPTHER

## 2020-03-09 ENCOUNTER — PATIENT MESSAGE (OUTPATIENT)
Dept: FAMILY MEDICINE CLINIC | Facility: HOSPITAL | Age: 50
End: 2020-03-09

## 2020-03-12 LAB
ALBUMIN SERPL-MCNC: 3.8 G/DL (ref 3.8–4.9)
ALP SERPL-CCNC: 103 IU/L (ref 39–117)
ALT SERPL-CCNC: 21 IU/L (ref 0–44)
AST SERPL-CCNC: 20 IU/L (ref 0–40)
BILIRUB DIRECT SERPL-MCNC: 0.06 MG/DL (ref 0–0.4)
BILIRUB SERPL-MCNC: 0.3 MG/DL (ref 0–1.2)
BUN SERPL-MCNC: 15 MG/DL (ref 6–24)
BUN/CREAT SERPL: 17 (ref 9–20)
CALCIUM SERPL-MCNC: 9.3 MG/DL (ref 8.7–10.2)
CHLORIDE SERPL-SCNC: 102 MMOL/L (ref 96–106)
CO2 SERPL-SCNC: 27 MMOL/L (ref 20–29)
CREAT SERPL-MCNC: 0.9 MG/DL (ref 0.76–1.27)
EST. AVERAGE GLUCOSE BLD GHB EST-MCNC: 140 MG/DL
GLUCOSE SERPL-MCNC: 160 MG/DL (ref 65–99)
HBA1C MFR BLD: 6.5 % (ref 4.8–5.6)
POTASSIUM SERPL-SCNC: 4.6 MMOL/L (ref 3.5–5.2)
PROT SERPL-MCNC: 6.4 G/DL (ref 6–8.5)
SL AMB EGFR AFRICAN AMERICAN: 114 ML/MIN/1.73
SL AMB EGFR NON AFRICAN AMERICAN: 99 ML/MIN/1.73
SODIUM SERPL-SCNC: 141 MMOL/L (ref 134–144)

## 2020-03-14 ENCOUNTER — TELEPHONE (OUTPATIENT)
Dept: FAMILY MEDICINE CLINIC | Facility: HOSPITAL | Age: 50
End: 2020-03-14

## 2020-03-16 ENCOUNTER — OFFICE VISIT (OUTPATIENT)
Dept: FAMILY MEDICINE CLINIC | Facility: HOSPITAL | Age: 50
End: 2020-03-16
Payer: COMMERCIAL

## 2020-03-16 VITALS
DIASTOLIC BLOOD PRESSURE: 82 MMHG | TEMPERATURE: 98 F | HEART RATE: 77 BPM | HEIGHT: 68 IN | BODY MASS INDEX: 38.95 KG/M2 | SYSTOLIC BLOOD PRESSURE: 138 MMHG | WEIGHT: 257 LBS

## 2020-03-16 DIAGNOSIS — E78.1 HYPERTRIGLYCERIDEMIA: ICD-10-CM

## 2020-03-16 DIAGNOSIS — F41.9 ANXIETY: ICD-10-CM

## 2020-03-16 DIAGNOSIS — G47.33 OBSTRUCTIVE SLEEP APNEA: ICD-10-CM

## 2020-03-16 DIAGNOSIS — E11.65 UNCONTROLLED TYPE 2 DIABETES MELLITUS WITH HYPERGLYCEMIA (HCC): Primary | ICD-10-CM

## 2020-03-16 DIAGNOSIS — E66.01 SEVERE OBESITY (BMI 35.0-39.9) WITH COMORBIDITY (HCC): ICD-10-CM

## 2020-03-16 DIAGNOSIS — E03.9 HYPOTHYROIDISM, UNSPECIFIED TYPE: ICD-10-CM

## 2020-03-16 DIAGNOSIS — I10 ESSENTIAL HYPERTENSION: ICD-10-CM

## 2020-03-16 PROCEDURE — 2022F DILAT RTA XM EVC RTNOPTHY: CPT | Performed by: INTERNAL MEDICINE

## 2020-03-16 PROCEDURE — 3075F SYST BP GE 130 - 139MM HG: CPT | Performed by: INTERNAL MEDICINE

## 2020-03-16 PROCEDURE — 3008F BODY MASS INDEX DOCD: CPT | Performed by: INTERNAL MEDICINE

## 2020-03-16 PROCEDURE — 1036F TOBACCO NON-USER: CPT | Performed by: INTERNAL MEDICINE

## 2020-03-16 PROCEDURE — 3079F DIAST BP 80-89 MM HG: CPT | Performed by: INTERNAL MEDICINE

## 2020-03-16 PROCEDURE — 4010F ACE/ARB THERAPY RXD/TAKEN: CPT | Performed by: INTERNAL MEDICINE

## 2020-03-16 PROCEDURE — 99214 OFFICE O/P EST MOD 30 MIN: CPT | Performed by: INTERNAL MEDICINE

## 2020-03-16 RX ORDER — LISINOPRIL 10 MG/1
5 TABLET ORAL DAILY
Qty: 30 TABLET | Refills: 0 | Status: SHIPPED | OUTPATIENT
Start: 2020-03-16 | End: 2020-04-16

## 2020-03-16 RX ORDER — LEVOTHYROXINE SODIUM 0.05 MG/1
50 TABLET ORAL DAILY
Qty: 90 TABLET | Refills: 2 | Status: SHIPPED | OUTPATIENT
Start: 2020-03-16 | End: 2020-11-27

## 2020-03-16 NOTE — PROGRESS NOTES
Assessment/Plan:    Uncontrolled type 2 diabetes mellitus with hyperglycemia (HCC)    Lab Results   Component Value Date    HGBA1C 6 5 (H) 03/11/2020   DM type 2 with neuropathy - controlled with A1C 6 5 - home readings still 150's so encouraged to con't with healthy diet/exercise/wgt loss, con't current DM  Meds, recheck BW in 6 mo - order given, foot exam done today, eye exam done 12/19, on ACE and statin    Essential hypertension  BP not at goal today - increase lisinopril from 5 mg to 10 mg 1 tab PO q day, con't watching sodium in diet/exercise/wgt loss, recheck BP in 4 wks    Anxiety  Mood well controlled with current Prozac, con't current meds as pt wishes for no changes, call with any new/worse mood    Hypertriglyceridemia  FLP due in Sept - order given, con't current diet/exercise/wgt loss and statin    Hypothyroidism  TFT's due in Sept - order given, con't current levothyroxine for now       Diagnoses and all orders for this visit:    Uncontrolled type 2 diabetes mellitus with hyperglycemia (HCC)  -     insulin glargine (Lantus SoloStar) 100 units/mL injection pen; Inject 30 Units under the skin daily  -     CBC and differential; Future  -     Comprehensive metabolic panel; Future  -     Hemoglobin A1C; Future  -     Lipid panel; Future  -     Microalbumin / creatinine urine ratio; Future  -     TSH, 3rd generation; Future  -     T4, free; Future  -     PSA Total (Reflex To Free); Future  -     CBC and differential  -     Comprehensive metabolic panel  -     Hemoglobin A1C  -     Lipid panel  -     Microalbumin / creatinine urine ratio  -     TSH, 3rd generation  -     T4, free  -     PSA Total (Reflex To Free)    Essential hypertension  -     CBC and differential; Future  -     Comprehensive metabolic panel; Future  -     Hemoglobin A1C; Future  -     Lipid panel; Future  -     Microalbumin / creatinine urine ratio; Future  -     TSH, 3rd generation; Future  -     T4, free;  Future  -     PSA Total (Reflex To Free); Future  -     CBC and differential  -     Comprehensive metabolic panel  -     Hemoglobin A1C  -     Lipid panel  -     Microalbumin / creatinine urine ratio  -     TSH, 3rd generation  -     T4, free  -     PSA Total (Reflex To Free)  -     lisinopril (ZESTRIL) 10 mg tablet; Take 0 5 tablets (5 mg total) by mouth daily  -     Basic metabolic panel; Future  -     Basic metabolic panel    Severe obesity (BMI 35 0-39  9) with comorbidity (HCC)    BMI 39 0-39 9,adult  Comments:  Diet/exercise/wgt loss encouraged    Anxiety    Hypothyroidism, unspecified type  -     levothyroxine 50 mcg tablet; Take 1 tablet (50 mcg total) by mouth daily  -     CBC and differential; Future  -     Comprehensive metabolic panel; Future  -     Hemoglobin A1C; Future  -     Lipid panel; Future  -     Microalbumin / creatinine urine ratio; Future  -     TSH, 3rd generation; Future  -     T4, free; Future  -     PSA Total (Reflex To Free); Future  -     CBC and differential  -     Comprehensive metabolic panel  -     Hemoglobin A1C  -     Lipid panel  -     Microalbumin / creatinine urine ratio  -     TSH, 3rd generation  -     T4, free  -     PSA Total (Reflex To Free)    Hypertriglyceridemia  -     CBC and differential; Future  -     Comprehensive metabolic panel; Future  -     Hemoglobin A1C; Future  -     Lipid panel; Future  -     Microalbumin / creatinine urine ratio; Future  -     TSH, 3rd generation; Future  -     T4, free; Future  -     PSA Total (Reflex To Free); Future  -     CBC and differential  -     Comprehensive metabolic panel  -     Hemoglobin A1C  -     Lipid panel  -     Microalbumin / creatinine urine ratio  -     TSH, 3rd generation  -     T4, free  -     PSA Total (Reflex To Free)    Obstructive sleep apnea  -     CBC and differential; Future  -     Comprehensive metabolic panel; Future  -     Hemoglobin A1C; Future  -     Lipid panel; Future  -     Microalbumin / creatinine urine ratio;  Future  -     TSH, 3rd generation; Future  -     T4, free; Future  -     PSA Total (Reflex To Free); Future  -     CBC and differential  -     Comprehensive metabolic panel  -     Hemoglobin A1C  -     Lipid panel  -     Microalbumin / creatinine urine ratio  -     TSH, 3rd generation  -     T4, free  -     PSA Total (Reflex To Free)      Colonoscopy 10/19 - 10 yrs        Subjective:      Patient ID: Aracelis Vee is a 44 y o  male  HPI Pt here for follow up appt and BW results    BW results were d/w pt in detail: FBS/A1C 160/6 5, rest of BMP/LFT's were wnl  Def of controlled vs uncontrolled DM was reviewed  Diet was reviewed - wgt down 3 lbs from July 2019  He con't to state his diet is not "too bad"  He is still not exercising  He is taking his DM meds as directed  He is due for a foot exam (2/19) and is UTD on his eye exam (12/19)  He notes no numbness/tingling/pain/burning/sores/ulcers with his feet  He is on statin and ACE  BP above goal today and meds were reviewed and no changes have occurred  He denies missing doses of meds or SE with the meds  He does not check his BP outside the office  He notes no frequent Ha's/dizziness/double vision/CP  Pt notes his anxiety is OK  He is taking his Prozac daily w/o SE  He notes no down/depressed sad mood  He notes some irritability with his son but not abnormal  He is sleeping well  Colonoscopy 10/19 - 10 yrs      Review of Systems   Constitutional: Negative for chills, fatigue and fever  HENT: Negative for congestion and sinus pain  Eyes: Negative for pain and visual disturbance  Respiratory: Negative for cough and shortness of breath  Cardiovascular: Negative for chest pain, palpitations and leg swelling  Gastrointestinal: Negative for abdominal pain, blood in stool, constipation, diarrhea, nausea and vomiting  Endocrine: Negative for polydipsia and polyuria  Genitourinary: Negative for difficulty urinating and dysuria     Musculoskeletal: Negative for arthralgias and myalgias  Skin: Positive for rash  Negative for wound  Neurological: Negative for dizziness and headaches  Hematological: Does not bruise/bleed easily  Psychiatric/Behavioral: Negative for behavioral problems and confusion  Objective:    /82 (BP Location: Right arm, Patient Position: Sitting, Cuff Size: Standard)   Pulse 77   Temp 98 °F (36 7 °C)   Ht 5' 8" (1 727 m)   Wt 117 kg (257 lb)   BMI 39 08 kg/m²      Physical Exam   Constitutional: He appears well-developed and well-nourished  No distress  HENT:   Head: Normocephalic and atraumatic  Mouth/Throat: No oropharyngeal exudate  Eyes: Conjunctivae are normal  Right eye exhibits no discharge  Left eye exhibits no discharge  Neck: Neck supple  No tracheal deviation present  Cardiovascular: Normal rate, regular rhythm and normal heart sounds  Pulses are no weak pulses  No murmur heard  Pulses:       Dorsalis pedis pulses are 2+ on the right side, and 2+ on the left side  Pulmonary/Chest: Effort normal and breath sounds normal  No respiratory distress  He has no wheezes  He has no rales  Abdominal: Soft  He exhibits no distension  There is no tenderness  There is no rebound and no guarding  Musculoskeletal: He exhibits no deformity  Feet:    Feet:   Right Foot:   Skin Integrity: Negative for ulcer, skin breakdown, erythema, warmth, callus or dry skin  Left Foot:   Skin Integrity: Negative for ulcer, skin breakdown, erythema, warmth, callus or dry skin  Neurological: He is alert  He exhibits normal muscle tone  Skin: Skin is warm and dry  Diffuse rash c/w psoriasis   Psychiatric: He has a normal mood and affect  His behavior is normal    Nursing note and vitals reviewed  Patient's shoes and socks removed  Right Foot/Ankle   Right Foot Inspection  Skin Exam: skin normal and skin intact no dry skin, no warmth, no callus, no erythema, no maceration, no abnormal color, no pre-ulcer, no ulcer and no callus                          Toe Exam: ROM and strength within normal limits  Sensory   Vibration: intact    Monofilament testing: diminished  Vascular    The right DP pulse is 2+  Left Foot/Ankle  Left Foot Inspection  Skin Exam: skin normal and skin intactno dry skin, no warmth, no erythema, no maceration, normal color, no pre-ulcer, no ulcer and no callus                         Toe Exam: ROM and strength within normal limits                   Sensory   Vibration: intact    Monofilament: diminished  Vascular    The left DP pulse is 2+  Assign Risk Category:  No deformity present; No loss of protective sensation; No weak pulses       Risk: 1      BMI Counseling: Body mass index is 39 08 kg/m²  The BMI is above normal  Nutrition recommendations include reducing portion sizes, 3-5 servings of fruits/vegetables daily, consuming healthier snacks, moderation in carbohydrate intake, increasing intake of lean protein and reducing intake of saturated fat and trans fat  Exercise recommendations include exercising 3-5 times per week

## 2020-03-16 NOTE — PATIENT INSTRUCTIONS
Obesity   AMBULATORY CARE:   Obesity  is when your body mass index (BMI) is greater than 30  Your healthcare provider will use your height and weight to measure your BMI  The risks of obesity include  many health problems, such as injuries or physical disability  You may need tests to check for the following:  · Diabetes     · High blood pressure or high cholesterol     · Heart disease     · Gallbladder or liver disease     · Cancer of the colon, breast, prostate, liver, or kidney     · Sleep apnea     · Arthritis or gout  Seek care immediately if:   · You have a severe headache, confusion, or difficulty speaking  · You have weakness on one side of your body  · You have chest pain, sweating, or shortness of breath  Contact your healthcare provider if:   · You have symptoms of gallbladder or liver disease, such as pain in your upper abdomen  · You have knee or hip pain and discomfort while walking  · You have symptoms of diabetes, such as intense hunger and thirst, and frequent urination  · You have symptoms of sleep apnea, such as snoring or daytime sleepiness  · You have questions or concerns about your condition or care  Treatment for obesity  focuses on helping you lose weight to improve your health  Even a small decrease in BMI can reduce the risk for many health problems  Your healthcare provider will help you set a weight-loss goal   · Lifestyle changes  are the first step in treating obesity  These include making healthy food choices and getting regular physical activity  Your healthcare provider may suggest a weight-loss program that involves coaching, education, and therapy  · Medicine  may help you lose weight when it is used with a healthy diet and physical activity  · Surgery  can help you lose weight if you are very obese and have other health problems  There are several types of weight-loss surgery  Ask your healthcare provider for more information    Be successful losing weight:   · Set small, realistic goals  An example of a small goal is to walk for 20 minutes 5 days a week  Anther goal is to lose 5% of your body weight  · Tell friends, family members, and coworkers about your goals  and ask for their support  Ask a friend to lose weight with you, or join a weight-loss support group  · Identify foods or triggers that may cause you to overeat , and find ways to avoid them  Remove tempting high-calorie foods from your home and workplace  Place a bowl of fresh fruit on your kitchen counter  If stress causes you to eat, then find other ways to cope with stress  · Keep a diary to track what you eat and drink  Also write down how many minutes of physical activity you do each day  Weigh yourself once a week and record it in your diary  Eating changes: You will need to eat 500 to 1,000 fewer calories each day than you currently eat to lose 1 to 2 pounds a week  The following changes will help you cut calories:  · Eat smaller portions  Use small plates, no larger than 9 inches in diameter  Fill your plate half full of fruits and vegetables  Measure your food using measuring cups until you know what a serving size looks like  · Eat 3 meals and 1 or 2 snacks each day  Plan your meals in advance  Angie  and eat at home most of the time  Eat slowly  · Eat fruits and vegetables at every meal   They are low in calories and high in fiber, which makes you feel full  Do not add butter, margarine, or cream sauce to vegetables  Use herbs to season steamed vegetables  · Eat less fat and fewer fried foods  Eat more baked or grilled chicken and fish  These protein sources are lower in calories and fat than red meat  Limit fast food  Dress your salads with olive oil and vinegar instead of bottled dressing  · Limit the amount of sugar you eat  Do not drink sugary beverages  Limit alcohol  Activity changes:  Physical activity is good for your body in many ways   It helps you burn calories and build strong muscles  It decreases stress and depression, and improves your mood  It can also help you sleep better  Talk to your healthcare provider before you begin an exercise program   · Exercise for at least 30 minutes 5 days a week  Start slowly  Set aside time each day for physical activity that you enjoy and that is convenient for you  It is best to do both weight training and an activity that increases your heart rate, such as walking, bicycling, or swimming  · Find ways to be more active  Do yard work and housecleaning  Walk up the stairs instead of using elevators  Spend your leisure time going to events that require walking, such as outdoor festivals or fairs  This extra physical activity can help you lose weight and keep it off  Follow up with your healthcare provider as directed: You may need to meet with a dietitian  Write down your questions so you remember to ask them during your visits  © 2017 [employer] Information is for End User's use only and may not be sold, redistributed or otherwise used for commercial purposes  All illustrations and images included in CareNotes® are the copyrighted property of [employer]  or [employer]  The above information is an  only  It is not intended as medical advice for individual conditions or treatments  Talk to your doctor, nurse or pharmacist before following any medical regimen to see if it is safe and effective for you  Weight Management   AMBULATORY CARE:   Why it is important to manage your weight:  Being overweight increases your risk of health conditions such as heart disease, high blood pressure, type 2 diabetes, and certain types of cancer  It can also increase your risk for osteoarthritis, sleep apnea, and other respiratory problems  Aim for a slow, steady weight loss  Even a small amount of weight loss can lower your risk of health problems    How to lose weight safely:  A safe and healthy way to lose weight is to eat fewer calories and get regular exercise  You can lose up about 1 pound a week by decreasing the number of calories you eat by 500 calories each day  You can decrease calories by eating smaller portion sizes or by cutting out high-calorie foods  Read labels to find out how many calories are in the foods you eat  You can also burn calories with exercise such as walking, swimming, or biking  You will be more likely to keep weight off if you make these changes part of your lifestyle  Healthy meal plan for weight management:  A healthy meal plan includes a variety of foods, contains fewer calories, and helps you stay healthy  A healthy meal plan includes the following:  · Eat whole-grain foods more often  A healthy meal plan should contain fiber  Fiber is the part of grains, fruits, and vegetables that is not broken down by your body  Whole-grain foods are healthy and provide extra fiber in your diet  Some examples of whole-grain foods are whole-wheat breads and pastas, oatmeal, brown rice, and bulgur  · Eat a variety of vegetables every day  Include dark, leafy greens such as spinach, kale, lynne greens, and mustard greens  Eat yellow and orange vegetables such as carrots, sweet potatoes, and winter squash  · Eat a variety of fruits every day  Choose fresh or canned fruit (canned in its own juice or light syrup) instead of juice  Fruit juice has very little or no fiber  · Eat low-fat dairy foods  Drink fat-free (skim) milk or 1% milk  Eat fat-free yogurt and low-fat cottage cheese  Try low-fat cheeses such as mozzarella and other reduced-fat cheeses  · Choose meat and other protein foods that are low in fat  Choose beans or other legumes such as split peas or lentils  Choose fish, skinless poultry (chicken or turkey), or lean cuts of red meat (beef or pork)  Before you cook meat or poultry, cut off any visible fat  · Use less fat and oil  Try baking foods instead of frying them  Add less fat, such as margarine, sour cream, regular salad dressing and mayonnaise to foods  Eat fewer high-fat foods  Some examples of high-fat foods include french fries, doughnuts, ice cream, and cakes  · Eat fewer sweets  Limit foods and drinks that are high in sugar  This includes candy, cookies, regular soda, and sweetened drinks  Ways to decrease calories:   · Eat smaller portions  ¨ Use a small plate with smaller servings  ¨ Do not eat second helpings  ¨ When you eat at a restaurant, ask for a box and place half of your meal in the box before you eat  ¨ Share an entrée with someone else  · Replace high-calorie snacks with healthy, low-calorie snacks  ¨ Choose fresh fruit, vegetables, fat-free rice cakes, or air-popped popcorn instead of potato chips, nuts, or chocolate  ¨ Choose water or calorie-free drinks instead of soda or sweetened drinks  · Eat regular meals  Skipping meals can lead to overeating later in the day  Eat a healthy snack in place of a meal if you do not have time to eat a regular meal      · Do not shop for groceries when you are hungry  You may be more likely to make unhealthy food choices  Take a grocery list of healthy foods and shop after you have eaten  Exercise:  Exercise at least 30 minutes per day on most days of the week  Some examples of exercise include walking, biking, dancing, and swimming  You can also fit in more physical activity by taking the stairs instead of the elevator or parking farther away from stores  Ask your healthcare provider about the best exercise plan for you  Other things to consider as you try to lose weight:   · Be aware of situations that may give you the urge to overeat, such as eating while watching television  Find ways to avoid these situations  For example, read a book, go for a walk, or do crafts      · Meet with a weight loss support group or friends who are also trying to lose weight  This may help you stay motivated to continue working on your weight loss goals  © 2017 [employer] Information is for End User's use only and may not be sold, redistributed or otherwise used for commercial purposes  All illustrations and images included in CareNotes® are the copyrighted property of [employer]  or [employer]  The above information is an  only  It is not intended as medical advice for individual conditions or treatments  Talk to your doctor, nurse or pharmacist before following any medical regimen to see if it is safe and effective for you  Heart Healthy Diet   AMBULATORY CARE:   A heart healthy diet  is an eating plan low in total fat, unhealthy fats, and sodium (salt)  A heart healthy diet helps decrease your risk for heart disease and stroke  Limit the amount of fat you eat to 25% to 35% of your total daily calories  Limit sodium to less than 2,300 mg each day  Healthy fats:  Healthy fats can help improve cholesterol levels  The risk for heart disease is decreased when cholesterol levels are normal  Choose healthy fats, such as the following:  · Unsaturated fat  is found in foods such as soybean, canola, olive, corn, and safflower oils  It is also found in soft tub margarine that is made with liquid vegetable oil  · Omega-3 fat  is found in certain fish, such as salmon, tuna, and trout, and in walnuts and flaxseed  Unhealthy fats:  Unhealthy fats can cause unhealthy cholesterol levels in your blood and increase your risk of heart disease  Limit unhealthy fats, such as the following:  · Cholesterol  is found in animal foods, such as eggs and lobster, and in dairy products made from whole milk  Limit cholesterol to less than 300 milligrams (mg) each day  You may need to limit cholesterol to 200 mg each day if you have heart disease  · Saturated fat  is found in meats, such as cruz and hamburger   It is also found in chicken or turkey skin, whole milk, and butter  Limit saturated fat to less than 7% of your total daily calories  Limit saturated fat to less than 6% if you have heart disease or are at increased risk for it  · Trans fat  is found in packaged foods, such as potato chips and cookies  It is also in hard margarine, some fried foods, and shortening  Avoid trans fats as much as possible    Heart healthy foods and drinks to include:  Ask your dietitian or healthcare provider how many servings to have from each of the following food groups:  · Grains:      ¨ Whole-wheat breads, cereals, and pastas, and brown rice    ¨ Low-fat, low-sodium crackers and chips    · Vegetables:      ¨ Broccoli, green beans, green peas, and spinach    ¨ Collards, kale, and lima beans    ¨ Carrots, sweet potatoes, tomatoes, and peppers    ¨ Canned vegetables with no salt added    · Fruits:      ¨ Bananas, peaches, pears, and pineapple    ¨ Grapes, raisins, and dates    ¨ Oranges, tangerines, grapefruit, orange juice, and grapefruit juice    ¨ Apricots, mangoes, melons, and papaya    ¨ Raspberries and strawberries    ¨ Canned fruit with no added sugar    · Low-fat dairy products:      ¨ Nonfat (skim) milk, 1% milk, and low-fat almond, cashew, or soy milks fortified with calcium    ¨ Low-fat cheese, regular or frozen yogurt, and cottage cheese    · Meats and proteins , such as lean cuts of beef and pork (loin, leg, round), skinless chicken and turkey, legumes, soy products, egg whites, and nuts  Foods and drinks to limit or avoid:  Ask your dietitian or healthcare provider about these and other foods that are high in unhealthy fat, sodium, and sugar:  · Snack or packaged foods , such as frozen dinners, cookies, macaroni and cheese, and cereals with more than 300 mg of sodium per serving    · Canned or dry mixes  for cakes, soups, sauces, or gravies    · Vegetables with added sodium , such as instant potatoes, vegetables with added sauces, or regular canned vegetables    · Other foods high in sodium , such as ketchup, barbecue sauce, salad dressing, pickles, olives, soy sauce, and miso    · High-fat dairy foods  such as whole or 2% milk, cream cheese, or sour cream, and cheeses     · High-fat protein foods  such as high-fat cuts of beef (T-bone steaks, ribs), chicken or turkey with skin, and organ meats, such as liver    · Cured or smoked meats , such as hot dogs, cruz, and sausage    · Unhealthy fats and oils , such as butter, stick margarine, shortening, and cooking oils such as coconut or palm oil    · Food and drinks high in sugar , such as soft drinks (soda), sports drinks, sweetened tea, candy, cake, cookies, pies, and doughnuts  Other diet guidelines to follow:   · Eat more foods containing omega-3 fats  Eat fish high in omega-3 fats at least 2 times a week  · Limit alcohol  Too much alcohol can damage your heart and raise your blood pressure  Women should limit alcohol to 1 drink a day  Men should limit alcohol to 2 drinks a day  A drink of alcohol is 12 ounces of beer, 5 ounces of wine, or 1½ ounces of liquor  · Choose low-sodium foods  High-sodium foods can lead to high blood pressure  Add little or no salt to food you prepare  Use herbs and spices in place of salt  · Eat more fiber  to help lower cholesterol levels  Eat at least 5 servings of fruits and vegetables each day  Eat 3 ounces of whole-grain foods each day  Legumes (beans) are also a good source of fiber  Lifestyle guidelines:   · Do not smoke  Nicotine and other chemicals in cigarettes and cigars can cause lung and heart damage  Ask your healthcare provider for information if you currently smoke and need help to quit  E-cigarettes or smokeless tobacco still contain nicotine  Talk to your healthcare provider before you use these products  · Exercise regularly  to help you maintain a healthy weight and improve your blood pressure and cholesterol levels   Ask your healthcare provider about the best exercise plan for you  Do not start an exercise program without asking your healthcare provider  Follow up with your healthcare provider as directed:  Write down your questions so you remember to ask them during your visits  © 2017 [employer] Information is for End User's use only and may not be sold, redistributed or otherwise used for commercial purposes  All illustrations and images included in CareNotes® are the copyrighted property of [employer]  or [employer]  The above information is an  only  It is not intended as medical advice for individual conditions or treatments  Talk to your doctor, nurse or pharmacist before following any medical regimen to see if it is safe and effective for you

## 2020-03-16 NOTE — ASSESSMENT & PLAN NOTE
Lab Results   Component Value Date    HGBA1C 6 5 (H) 03/11/2020   DM type 2 with neuropathy - controlled with A1C 6 5 - home readings still 150's so encouraged to con't with healthy diet/exercise/wgt loss, con't current DM  Meds, recheck BW in 6 mo - order given, foot exam done today, eye exam done 12/19, on ACE and statin

## 2020-03-16 NOTE — ASSESSMENT & PLAN NOTE
BP not at goal today - increase lisinopril from 5 mg to 10 mg 1 tab PO q day, con't watching sodium in diet/exercise/wgt loss, recheck BP in 4 wks

## 2020-03-16 NOTE — ASSESSMENT & PLAN NOTE
Mood well controlled with current Prozac, con't current meds as pt wishes for no changes, call with any new/worse mood

## 2020-03-26 ENCOUNTER — TELEMEDICINE (OUTPATIENT)
Dept: FAMILY MEDICINE CLINIC | Facility: HOSPITAL | Age: 50
End: 2020-03-26
Payer: COMMERCIAL

## 2020-03-26 DIAGNOSIS — Z20.828 EXPOSURE TO SARS-ASSOCIATED CORONAVIRUS: ICD-10-CM

## 2020-03-26 DIAGNOSIS — J06.9 VIRAL UPPER RESPIRATORY TRACT INFECTION WITH COUGH: Primary | ICD-10-CM

## 2020-03-26 PROCEDURE — 87635 SARS-COV-2 COVID-19 AMP PRB: CPT

## 2020-03-26 PROCEDURE — 99213 OFFICE O/P EST LOW 20 MIN: CPT | Performed by: NURSE PRACTITIONER

## 2020-03-26 RX ORDER — BENZONATATE 200 MG/1
200 CAPSULE ORAL 3 TIMES DAILY PRN
Qty: 30 CAPSULE | Refills: 0 | Status: SHIPPED | OUTPATIENT
Start: 2020-03-26 | End: 2020-04-14

## 2020-03-26 NOTE — PROGRESS NOTES
Virtual Regular Visit    Problem List Items Addressed This Visit     None      Visit Diagnoses     Viral upper respiratory tract infection with cough    -  Primary    With comorbidities and travel to high incidence area for COVID-19  Send for testing  Instructions given  Pt appears well  Relevant Medications    benzonatate (TESSALON) 200 MG capsule    Exposure to SARS-associated coronavirus        Sent for testing via mobile site  Discussed continued isolation  Call with worsneing symptoms  Relevant Orders    M[facility] TEST[facility] or [facility]               Reason for visit is cough and congestion    Encounter provider HAYDEN Pan    Provider located at [address] [facility] [place]  [address] [place] [address]      Recent Visits  No visits were found meeting these conditions  Showing recent visits within past 7 days and meeting all other requirements     Today's Visits  Date [facility]   [id number]   Showing today's visits and meeting all other requirements     Future Appointments  No visits were found meeting these conditions  Showing future appointments within next 150 days and meeting all other requirements        After connecting through [employer], the patient was identified by name and date of birth  Xena Mark was informed that this is a telemedicine visit and that the visit is being conducted through [employer] and patient was informed that this is not a secure, HIPAA-complaint platform  he agrees to proceed  which may not be secure and therefore, might not be HIPAA-compliant  My office door was closed  No one else was in the room  He acknowledged consent and understanding of privacy and security of the video platform  The patient has agreed to participate and understands they can discontinue the visit at any time      Subjective  Xena Flores is a 44 y o  male  Cough and congestion for the last 4-5 days  Cough is constant  Denies sob and wheezing  Has nasal congestion and runny nose  Also with post nasal drip and sore throat on right side  Denies fever and chills  Denies any loss of taste  Has loss of smell but feels this is r/t congestion  Pt is Type 2 Diabetic and has asthma  Denies any sick contacts  No travel in last 2 weeks although commutes to [facility] for [facility] and has not done so in 1 1/2 weeks  He has not left the house for the last 1 1/2 weeks  Wife is a healthcare worker  Wife does not have any symptoms         Past Medical History:   Diagnosis Date    Anxiety 2/22/2019    Asthma 5/21/2019    Controlled type 2 diabetes mellitus without complication, [facility]    Sees Dr Jerilyn King Ophthalmologist- Dr Deni Fuentes GERD (gastroesophageal reflux disease) 12/19/2013    Hypertriglyceridemia 7/8/2015    Hypothyroidism     Migraine without aura and without status migrainosus, not intractable 5/21/2019    Psoriasis     Seasonal allergies 5/21/2019    Dr Jorge Luis Dillard Vitamin D deficiency        Past Surgical History:   Procedure Laterality Date    CATARACT EXTRACTION, BILATERAL      CHOLECYSTECTOMY      UNDESCENDED TESTICLE EXPLORATION         Current Outpatient Medications   Medication Sig Dispense Refill    albuterol (PROVENTIL HFA) 90 mcg/act inhaler Inhale 1 puff every 4 (four) hours as needed      alclomethasone (ACLOVATE) 4 32 % cream 1 APPLICATION APPLY ON THE SKIN TWICE A DAY; APPLY TO FACE AS NEEDED FOR FLARES      atorvastatin (LIPITOR) 10 mg tablet take 1 tablet by mouth once daily 30 tablet 5    benzonatate (TESSALON) 200 MG capsule Take 1 capsule (200 mg total) by mouth 3 (three) times a day as needed for cough 30 capsule 0    Butalbital-APAP-Caffeine -40 MG CAPS Take 1 capsule by mouth every 8 (eight) hours as needed (headache) 30 capsule 0    canagliflozin (INVOKANA) 100 mg Take 200 mg by mouth daily      cetirizine (ZyrTEC) 10 mg tablet Take 1 tablet by mouth daily as needed      cholecalciferol (VITAMIN D3) 1,000 units tablet Take 1 tablet (1,000 Units total) by mouth daily      ciclopirox (PENLAC) 8 % solution Penlac 8 % topical solution   APPLY TO THE AFFECTED AREA(S) BY TOPICAL ROUTE ONCE DAILY PREFERABLY AT BEDTIME OR 8 HOURS BEFORE WASHING      FLUoxetine (PROzac) 20 MG tablet Take 1 tablet (20 mg total) by mouth daily 90 tablet 2    fluticasone (FLONASE) 50 mcg/act nasal spray 2 sprays into each nostril daily 3 Bottle 3    fluticasone-salmeterol (ADVAIR DISKUS) 500-50 mcg/dose inhaler Inhale 1 puff 2 (two) times a day      glipiZIDE (GLUCOTROL XL) 10 mg 24 hr tablet Take 1 tablet (10 mg total) by mouth daily 90 tablet 1    ibuprofen (MOTRIN) 600 mg tablet Take 600 mg by mouth every 4 (four) hours as needed      insulin glargine (Lantus SoloStar) 100 units/mL injection pen Inject 30 Units under the skin daily 5 pen 3    Insulin Pen Needle (BD PEN NEEDLE ESTHER U/F) 32G X 4 MM MISC Use daily      levothyroxine 50 mcg tablet Take 1 tablet (50 mcg total) by mouth daily 90 tablet 2    lisinopril (ZESTRIL) 10 mg tablet Take 0 5 tablets (5 mg total) by mouth daily 30 tablet 0    meclizine (ANTIVERT) 25 mg tablet Take 25 mg by mouth 3 (three) times a day as needed      metFORMIN (GLUCOPHAGE) 1000 MG tablet Take 1,000 mg by mouth 2 (two) times a day      pantoprazole (PROTONIX) 40 mg tablet TAKE 1 TABLET DAILY 90 tablet 0    SUMAtriptan (IMITREX) 100 mg tablet Take 100 mg by mouth once as needed      triamcinolone (KENALOG) 0 1 % cream 1 APPLICATION APPLY ON THE SKIN TWICE A DAY; APPLY TO BODY AS NEEDED FOR FLARES       No current facility-administered medications for this visit  No Known Allergies    Review of Systems   Constitutional: Negative for chills and fever  HENT: Positive for congestion, postnasal drip, rhinorrhea and sore throat  Respiratory: Positive for cough  Negative for shortness of breath and wheezing  Physical Exam   Constitutional: He is oriented to person, place, and time  He appears well-developed and well-nourished  No distress  Neurological: He is alert and oriented to person, place, and time  Skin: He is not diaphoretic  Psychiatric: He has a normal mood and affect  His behavior is normal  Judgment and thought content normal        I spent 15 minutes with the patient during this visit

## 2020-03-29 ENCOUNTER — TELEPHONE (OUTPATIENT)
Dept: OTHER | Facility: OTHER | Age: 50
End: 2020-03-29

## 2020-03-29 ENCOUNTER — NURSE TRIAGE (OUTPATIENT)
Dept: OTHER | Facility: OTHER | Age: 50
End: 2020-03-29

## 2020-03-29 NOTE — TELEPHONE ENCOUNTER
Regarding: Hypertension   ----- Message from Iain Ku sent at 3/29/2020 11:32 AM EDT -----  "My blood pressure is 142/90 "

## 2020-03-30 NOTE — TELEPHONE ENCOUNTER
Reason for Disposition   [7] Systolic BP  >= 367 OR Diastolic >= 80 AND [3] taking BP medications    Answer Assessment - Initial Assessment Questions  1  BLOOD PRESSURE: "What is the blood pressure?" "Did you take at least two measurements 5 minutes apart?"      140/80  2  ONSET: "When did you take your blood pressure?"      Today   3  HOW: "How did you obtain the blood pressure?" (e g , visiting nurse, automatic home BP monitor)      Home BP monitor  4  HISTORY: "Do you have a history of high blood pressure?"      Yes   5  MEDICATIONS: "Are you taking any medications for blood pressure?" "Have you missed any doses recently?"      Yes , no   6  OTHER SYMPTOMS: "Do you have any symptoms?" (e g , headache, chest pain, blurred vision, difficulty breathing, weakness)       NO ,but was tested for corona Virus and is waiting for the results   7   PREGNANCY: "Is there any chance you are pregnant?" "When was your last menstrual period?"     N/A    Protocols used: HIGH BLOOD PRESSURE-ADULT-AH

## 2020-03-30 NOTE — TELEPHONE ENCOUNTER
Regarding: irregular rates  ----- Message from [facility] sent at 3/29/2020  9:30 PM EDT -----  " My  BP is currently 141/80 Blood Sugar is 109, Heart Rate is 64 and his temperature is 97 8   He has been acting more tired than usual "

## 2020-03-30 NOTE — TELEPHONE ENCOUNTER
Spoke to patient  BP  Today 129/85  Yesterday 142/90  2 days ago 153/79  No SOB  No Chest pain  No fever  No dizziness or vision changes  Cough is better  Taking medications as prescribed  No other symptoms to report  Encouraged patient to continue monitoring bp and call back if persistently high/new symptoms

## 2020-03-31 LAB — SARS-COV-2 RNA SPEC QL NAA+PROBE: NOT DETECTED

## 2020-04-09 DIAGNOSIS — K21.9 GASTROESOPHAGEAL REFLUX DISEASE, ESOPHAGITIS PRESENCE NOT SPECIFIED: ICD-10-CM

## 2020-04-09 RX ORDER — PANTOPRAZOLE SODIUM 40 MG/1
TABLET, DELAYED RELEASE ORAL
Qty: 90 TABLET | Refills: 3 | Status: SHIPPED | OUTPATIENT
Start: 2020-04-09

## 2020-04-14 ENCOUNTER — TELEMEDICINE (OUTPATIENT)
Dept: FAMILY MEDICINE CLINIC | Facility: HOSPITAL | Age: 50
End: 2020-04-14
Payer: COMMERCIAL

## 2020-04-14 VITALS
BODY MASS INDEX: 38.95 KG/M2 | SYSTOLIC BLOOD PRESSURE: 124 MMHG | WEIGHT: 257 LBS | HEIGHT: 68 IN | DIASTOLIC BLOOD PRESSURE: 80 MMHG | HEART RATE: 74 BPM | TEMPERATURE: 98.6 F

## 2020-04-14 DIAGNOSIS — R07.89 ATYPICAL CHEST PAIN: ICD-10-CM

## 2020-04-14 DIAGNOSIS — I10 ESSENTIAL HYPERTENSION: Primary | ICD-10-CM

## 2020-04-14 DIAGNOSIS — E11.65 UNCONTROLLED TYPE 2 DIABETES MELLITUS WITH HYPERGLYCEMIA (HCC): ICD-10-CM

## 2020-04-14 PROCEDURE — 99214 OFFICE O/P EST MOD 30 MIN: CPT | Performed by: INTERNAL MEDICINE

## 2020-04-14 RX ORDER — APREMILAST 30 MG/1
TABLET, FILM COATED ORAL
COMMUNITY
Start: 2020-03-19 | End: 2020-10-13

## 2020-04-16 DIAGNOSIS — I10 ESSENTIAL HYPERTENSION: ICD-10-CM

## 2020-04-16 RX ORDER — LISINOPRIL 10 MG/1
TABLET ORAL
Qty: 30 TABLET | Refills: 5 | Status: SHIPPED | OUTPATIENT
Start: 2020-04-16 | End: 2020-04-30 | Stop reason: SDUPTHER

## 2020-04-18 ENCOUNTER — NURSE TRIAGE (OUTPATIENT)
Dept: OTHER | Facility: OTHER | Age: 50
End: 2020-04-18

## 2020-04-18 DIAGNOSIS — J45.30 MILD PERSISTENT ASTHMA WITHOUT COMPLICATION: Primary | ICD-10-CM

## 2020-04-19 ENCOUNTER — PATIENT MESSAGE (OUTPATIENT)
Dept: FAMILY MEDICINE CLINIC | Facility: HOSPITAL | Age: 50
End: 2020-04-19

## 2020-04-19 DIAGNOSIS — J45.30 MILD PERSISTENT ASTHMA WITHOUT COMPLICATION: ICD-10-CM

## 2020-04-30 DIAGNOSIS — I10 ESSENTIAL HYPERTENSION: ICD-10-CM

## 2020-04-30 RX ORDER — LISINOPRIL 10 MG/1
5 TABLET ORAL DAILY
Qty: 90 TABLET | Refills: 1 | Status: SHIPPED | OUTPATIENT
Start: 2020-04-30 | End: 2020-06-25

## 2020-05-01 ENCOUNTER — OFFICE VISIT (OUTPATIENT)
Dept: FAMILY MEDICINE CLINIC | Facility: HOSPITAL | Age: 50
End: 2020-05-01
Payer: COMMERCIAL

## 2020-05-01 VITALS
HEIGHT: 68 IN | HEART RATE: 74 BPM | SYSTOLIC BLOOD PRESSURE: 126 MMHG | WEIGHT: 252.6 LBS | OXYGEN SATURATION: 96 % | BODY MASS INDEX: 38.28 KG/M2 | DIASTOLIC BLOOD PRESSURE: 84 MMHG | TEMPERATURE: 97.8 F

## 2020-05-01 DIAGNOSIS — E11.65 UNCONTROLLED TYPE 2 DIABETES MELLITUS WITH HYPERGLYCEMIA (HCC): ICD-10-CM

## 2020-05-01 DIAGNOSIS — R07.9 CHEST PAIN, UNSPECIFIED TYPE: Primary | ICD-10-CM

## 2020-05-01 DIAGNOSIS — I10 ESSENTIAL HYPERTENSION: ICD-10-CM

## 2020-05-01 PROCEDURE — 3079F DIAST BP 80-89 MM HG: CPT | Performed by: INTERNAL MEDICINE

## 2020-05-01 PROCEDURE — 2022F DILAT RTA XM EVC RTNOPTHY: CPT | Performed by: INTERNAL MEDICINE

## 2020-05-01 PROCEDURE — 3008F BODY MASS INDEX DOCD: CPT | Performed by: INTERNAL MEDICINE

## 2020-05-01 PROCEDURE — 1036F TOBACCO NON-USER: CPT | Performed by: INTERNAL MEDICINE

## 2020-05-01 PROCEDURE — 3074F SYST BP LT 130 MM HG: CPT | Performed by: INTERNAL MEDICINE

## 2020-05-01 PROCEDURE — 93000 ELECTROCARDIOGRAM COMPLETE: CPT | Performed by: INTERNAL MEDICINE

## 2020-05-01 PROCEDURE — 99214 OFFICE O/P EST MOD 30 MIN: CPT | Performed by: INTERNAL MEDICINE

## 2020-05-09 DIAGNOSIS — I10 ESSENTIAL HYPERTENSION: ICD-10-CM

## 2020-05-11 RX ORDER — LISINOPRIL 10 MG/1
TABLET ORAL
Qty: 30 TABLET | OUTPATIENT
Start: 2020-05-11

## 2020-05-12 DIAGNOSIS — F41.9 ANXIETY: ICD-10-CM

## 2020-05-12 RX ORDER — FLUOXETINE 20 MG/1
20 TABLET, FILM COATED ORAL DAILY
Qty: 90 TABLET | Refills: 1 | Status: SHIPPED | OUTPATIENT
Start: 2020-05-12 | End: 2020-06-02 | Stop reason: SDUPTHER

## 2020-05-31 ENCOUNTER — NURSE TRIAGE (OUTPATIENT)
Dept: OTHER | Facility: OTHER | Age: 50
End: 2020-05-31

## 2020-05-31 ENCOUNTER — OFFICE VISIT (OUTPATIENT)
Dept: URGENT CARE | Facility: CLINIC | Age: 50
End: 2020-05-31
Payer: COMMERCIAL

## 2020-05-31 VITALS
DIASTOLIC BLOOD PRESSURE: 57 MMHG | WEIGHT: 252 LBS | HEIGHT: 68 IN | OXYGEN SATURATION: 97 % | RESPIRATION RATE: 20 BRPM | TEMPERATURE: 96.8 F | BODY MASS INDEX: 38.19 KG/M2 | SYSTOLIC BLOOD PRESSURE: 105 MMHG | HEART RATE: 91 BPM

## 2020-05-31 DIAGNOSIS — R06.02 SOB (SHORTNESS OF BREATH): Primary | ICD-10-CM

## 2020-05-31 PROCEDURE — 99213 OFFICE O/P EST LOW 20 MIN: CPT | Performed by: PHYSICIAN ASSISTANT

## 2020-05-31 PROCEDURE — U0003 INFECTIOUS AGENT DETECTION BY NUCLEIC ACID (DNA OR RNA); SEVERE ACUTE RESPIRATORY SYNDROME CORONAVIRUS 2 (SARS-COV-2) (CORONAVIRUS DISEASE [COVID-19]), AMPLIFIED PROBE TECHNIQUE, MAKING USE OF HIGH THROUGHPUT TECHNOLOGIES AS DESCRIBED BY CMS-2020-01-R: HCPCS | Performed by: PHYSICIAN ASSISTANT

## 2020-05-31 RX ORDER — ALBUTEROL SULFATE 2.5 MG/3ML
2.5 SOLUTION RESPIRATORY (INHALATION) EVERY 6 HOURS PRN
Qty: 12 VIAL | Refills: 0 | Status: SHIPPED | OUTPATIENT
Start: 2020-05-31 | End: 2020-06-07 | Stop reason: SDUPTHER

## 2020-06-01 LAB — SARS-COV-2 RNA RESP QL NAA+PROBE: NEGATIVE

## 2020-06-02 ENCOUNTER — OFFICE VISIT (OUTPATIENT)
Dept: FAMILY MEDICINE CLINIC | Facility: HOSPITAL | Age: 50
End: 2020-06-02
Payer: COMMERCIAL

## 2020-06-02 VITALS
TEMPERATURE: 97.9 F | DIASTOLIC BLOOD PRESSURE: 76 MMHG | WEIGHT: 248.2 LBS | HEART RATE: 77 BPM | HEIGHT: 68 IN | SYSTOLIC BLOOD PRESSURE: 122 MMHG | OXYGEN SATURATION: 94 % | BODY MASS INDEX: 37.62 KG/M2

## 2020-06-02 DIAGNOSIS — K21.9 GASTROESOPHAGEAL REFLUX DISEASE, ESOPHAGITIS PRESENCE NOT SPECIFIED: ICD-10-CM

## 2020-06-02 DIAGNOSIS — F41.9 ANXIETY: ICD-10-CM

## 2020-06-02 DIAGNOSIS — R06.02 SHORTNESS OF BREATH: Primary | ICD-10-CM

## 2020-06-02 DIAGNOSIS — R05.8 PRODUCTIVE COUGH: ICD-10-CM

## 2020-06-02 DIAGNOSIS — J30.2 SEASONAL ALLERGIES: ICD-10-CM

## 2020-06-02 DIAGNOSIS — J45.30 MILD PERSISTENT ASTHMA WITHOUT COMPLICATION: ICD-10-CM

## 2020-06-02 PROCEDURE — 2022F DILAT RTA XM EVC RTNOPTHY: CPT | Performed by: INTERNAL MEDICINE

## 2020-06-02 PROCEDURE — 99214 OFFICE O/P EST MOD 30 MIN: CPT | Performed by: INTERNAL MEDICINE

## 2020-06-02 PROCEDURE — 3074F SYST BP LT 130 MM HG: CPT | Performed by: INTERNAL MEDICINE

## 2020-06-02 PROCEDURE — 3008F BODY MASS INDEX DOCD: CPT | Performed by: INTERNAL MEDICINE

## 2020-06-02 PROCEDURE — 3078F DIAST BP <80 MM HG: CPT | Performed by: INTERNAL MEDICINE

## 2020-06-02 PROCEDURE — 1036F TOBACCO NON-USER: CPT | Performed by: INTERNAL MEDICINE

## 2020-06-02 RX ORDER — MONTELUKAST SODIUM 10 MG/1
10 TABLET ORAL
Qty: 30 TABLET | Refills: 5 | Status: SHIPPED | OUTPATIENT
Start: 2020-06-02 | End: 2021-01-02

## 2020-06-02 RX ORDER — FLUOXETINE 20 MG/1
20 TABLET, FILM COATED ORAL DAILY
Qty: 90 TABLET | Refills: 1 | Status: SHIPPED | OUTPATIENT
Start: 2020-06-02 | End: 2020-11-29

## 2020-06-07 ENCOUNTER — HOSPITAL ENCOUNTER (EMERGENCY)
Facility: HOSPITAL | Age: 50
Discharge: HOME | End: 2020-06-07
Attending: EMERGENCY MEDICINE | Admitting: EMERGENCY MEDICINE
Payer: COMMERCIAL

## 2020-06-07 ENCOUNTER — APPOINTMENT (EMERGENCY)
Dept: RADIOLOGY | Facility: HOSPITAL | Age: 50
End: 2020-06-07
Payer: COMMERCIAL

## 2020-06-07 VITALS
RESPIRATION RATE: 18 BRPM | OXYGEN SATURATION: 93 % | HEIGHT: 68 IN | WEIGHT: 249.12 LBS | DIASTOLIC BLOOD PRESSURE: 61 MMHG | HEART RATE: 91 BPM | BODY MASS INDEX: 37.76 KG/M2 | SYSTOLIC BLOOD PRESSURE: 119 MMHG | TEMPERATURE: 97.7 F

## 2020-06-07 DIAGNOSIS — R06.02 SOB (SHORTNESS OF BREATH): ICD-10-CM

## 2020-06-07 DIAGNOSIS — R06.02 SHORTNESS OF BREATH: ICD-10-CM

## 2020-06-07 DIAGNOSIS — J45.901 ASTHMA EXACERBATION: Primary | ICD-10-CM

## 2020-06-07 LAB
ATRIAL RATE: 90 BPM
P AXIS: 58 DEGREES
PR INTERVAL: 140 MS
QRS AXIS: 87 DEGREES
QRSD INTERVAL: 86 MS
QT INTERVAL: 366 MS
QTC INTERVAL: 447 MS
SARS-COV-2 RNA RESP QL NAA+PROBE: NEGATIVE
T WAVE AXIS: 62 DEGREES
VENTRICULAR RATE: 90 BPM

## 2020-06-07 PROCEDURE — 99284 EMERGENCY DEPT VISIT MOD MDM: CPT | Performed by: EMERGENCY MEDICINE

## 2020-06-07 PROCEDURE — 71045 X-RAY EXAM CHEST 1 VIEW: CPT

## 2020-06-07 PROCEDURE — 87635 SARS-COV-2 COVID-19 AMP PRB: CPT | Performed by: EMERGENCY MEDICINE

## 2020-06-07 PROCEDURE — 93005 ELECTROCARDIOGRAM TRACING: CPT

## 2020-06-07 PROCEDURE — 99285 EMERGENCY DEPT VISIT HI MDM: CPT

## 2020-06-07 PROCEDURE — 93010 ELECTROCARDIOGRAM REPORT: CPT | Performed by: INTERNAL MEDICINE

## 2020-06-07 RX ORDER — PREDNISONE 20 MG/1
40 TABLET ORAL ONCE
Status: COMPLETED | OUTPATIENT
Start: 2020-06-07 | End: 2020-06-07

## 2020-06-07 RX ORDER — ALBUTEROL SULFATE 90 UG/1
2 AEROSOL, METERED RESPIRATORY (INHALATION) ONCE
Status: COMPLETED | OUTPATIENT
Start: 2020-06-07 | End: 2020-06-07

## 2020-06-07 RX ORDER — PREDNISONE 20 MG/1
40 TABLET ORAL DAILY
Qty: 8 TABLET | Refills: 0 | Status: SHIPPED | OUTPATIENT
Start: 2020-06-08 | End: 2020-06-12

## 2020-06-07 RX ORDER — PREDNISONE 20 MG/1
40 TABLET ORAL DAILY
Qty: 4 TABLET | Refills: 0 | Status: SHIPPED | OUTPATIENT
Start: 2020-06-08 | End: 2020-06-07 | Stop reason: SDUPTHER

## 2020-06-07 RX ORDER — ALBUTEROL SULFATE 2.5 MG/3ML
2.5 SOLUTION RESPIRATORY (INHALATION) EVERY 6 HOURS PRN
Qty: 12 VIAL | Refills: 0 | Status: SHIPPED | OUTPATIENT
Start: 2020-06-07 | End: 2020-06-17 | Stop reason: SDUPTHER

## 2020-06-07 RX ADMIN — ALBUTEROL SULFATE 2 PUFF: 90 AEROSOL, METERED RESPIRATORY (INHALATION) at 15:12

## 2020-06-07 RX ADMIN — PREDNISONE 40 MG: 20 TABLET ORAL at 16:04

## 2020-06-07 RX ADMIN — IPRATROPIUM BROMIDE 2 PUFF: 17 AEROSOL, METERED RESPIRATORY (INHALATION) at 15:59

## 2020-06-09 DIAGNOSIS — G47.33 OBSTRUCTIVE SLEEP APNEA: Primary | ICD-10-CM

## 2020-06-10 ENCOUNTER — VBI (OUTPATIENT)
Dept: FAMILY MEDICINE CLINIC | Facility: HOSPITAL | Age: 50
End: 2020-06-10

## 2020-06-12 ENCOUNTER — NURSE TRIAGE (OUTPATIENT)
Dept: OTHER | Facility: OTHER | Age: 50
End: 2020-06-12

## 2020-06-12 ENCOUNTER — TELEPHONE (OUTPATIENT)
Dept: FAMILY MEDICINE CLINIC | Facility: HOSPITAL | Age: 50
End: 2020-06-12

## 2020-06-15 DIAGNOSIS — R05.9 COUGH: Primary | ICD-10-CM

## 2020-06-15 RX ORDER — BENZONATATE 100 MG/1
100 CAPSULE ORAL 3 TIMES DAILY PRN
Qty: 20 CAPSULE | Refills: 0 | Status: SHIPPED | OUTPATIENT
Start: 2020-06-15 | End: 2020-08-11

## 2020-06-17 ENCOUNTER — TELEPHONE (OUTPATIENT)
Dept: SLEEP CENTER | Facility: CLINIC | Age: 50
End: 2020-06-17

## 2020-06-17 DIAGNOSIS — R06.02 SOB (SHORTNESS OF BREATH): ICD-10-CM

## 2020-06-17 RX ORDER — ALBUTEROL SULFATE 2.5 MG/3ML
2.5 SOLUTION RESPIRATORY (INHALATION) EVERY 6 HOURS PRN
Qty: 12 VIAL | Refills: 0 | Status: SHIPPED | OUTPATIENT
Start: 2020-06-17 | End: 2020-06-19 | Stop reason: SDUPTHER

## 2020-06-18 ENCOUNTER — TRANSCRIBE ORDERS (OUTPATIENT)
Dept: SLEEP CENTER | Facility: CLINIC | Age: 50
End: 2020-06-18

## 2020-06-18 DIAGNOSIS — G47.33 OBSTRUCTIVE SLEEP APNEA: Primary | ICD-10-CM

## 2020-06-19 ENCOUNTER — HOSPITAL ENCOUNTER (OUTPATIENT)
Dept: SLEEP CENTER | Facility: HOSPITAL | Age: 50
Discharge: HOME | End: 2020-06-19
Payer: COMMERCIAL

## 2020-06-19 DIAGNOSIS — R06.02 SOB (SHORTNESS OF BREATH): ICD-10-CM

## 2020-06-19 DIAGNOSIS — G47.33 OBSTRUCTIVE SLEEP APNEA: ICD-10-CM

## 2020-06-19 PROCEDURE — G0399 HOME SLEEP TEST/TYPE 3 PORTA: HCPCS

## 2020-06-19 RX ORDER — ALBUTEROL SULFATE 2.5 MG/3ML
2.5 SOLUTION RESPIRATORY (INHALATION) EVERY 6 HOURS PRN
Qty: 12 VIAL | Refills: 0 | Status: SHIPPED | OUTPATIENT
Start: 2020-06-19 | End: 2020-06-20 | Stop reason: SDUPTHER

## 2020-06-20 DIAGNOSIS — R06.02 SOB (SHORTNESS OF BREATH): ICD-10-CM

## 2020-06-20 RX ORDER — ALBUTEROL SULFATE 2.5 MG/3ML
2.5 SOLUTION RESPIRATORY (INHALATION) EVERY 6 HOURS PRN
Qty: 120 VIAL | Refills: 2 | Status: SHIPPED | OUTPATIENT
Start: 2020-06-20 | End: 2020-06-22 | Stop reason: SDUPTHER

## 2020-06-22 ENCOUNTER — TELEPHONE (OUTPATIENT)
Dept: SLEEP CENTER | Facility: CLINIC | Age: 50
End: 2020-06-22

## 2020-06-22 DIAGNOSIS — I10 ESSENTIAL HYPERTENSION: ICD-10-CM

## 2020-06-22 DIAGNOSIS — R06.02 SOB (SHORTNESS OF BREATH): ICD-10-CM

## 2020-06-22 RX ORDER — ALBUTEROL SULFATE 2.5 MG/3ML
2.5 SOLUTION RESPIRATORY (INHALATION) EVERY 6 HOURS PRN
Qty: 120 VIAL | Refills: 2 | OUTPATIENT
Start: 2020-06-22

## 2020-06-22 RX ORDER — LISINOPRIL 10 MG/1
5 TABLET ORAL DAILY
Qty: 90 TABLET | Refills: 1 | OUTPATIENT
Start: 2020-06-22

## 2020-06-22 RX ORDER — ALBUTEROL SULFATE 2.5 MG/3ML
2.5 SOLUTION RESPIRATORY (INHALATION) EVERY 6 HOURS PRN
Qty: 120 VIAL | Refills: 2 | Status: SHIPPED | OUTPATIENT
Start: 2020-06-22 | End: 2020-06-23 | Stop reason: SDUPTHER

## 2020-06-23 DIAGNOSIS — R06.02 SOB (SHORTNESS OF BREATH): ICD-10-CM

## 2020-06-23 RX ORDER — ALBUTEROL SULFATE 2.5 MG/3ML
2.5 SOLUTION RESPIRATORY (INHALATION) EVERY 6 HOURS PRN
Qty: 120 VIAL | Refills: 2 | OUTPATIENT
Start: 2020-06-23

## 2020-06-23 RX ORDER — ALBUTEROL SULFATE 2.5 MG/3ML
2.5 SOLUTION RESPIRATORY (INHALATION) EVERY 6 HOURS PRN
Qty: 120 VIAL | Refills: 2 | Status: SHIPPED | OUTPATIENT
Start: 2020-06-23 | End: 2021-01-15 | Stop reason: SDUPTHER

## 2020-06-24 ENCOUNTER — TELEPHONE (OUTPATIENT)
Dept: FAMILY MEDICINE CLINIC | Facility: HOSPITAL | Age: 50
End: 2020-06-24

## 2020-06-24 ENCOUNTER — TRANSCRIBE ORDERS (OUTPATIENT)
Dept: ADMINISTRATIVE | Facility: HOSPITAL | Age: 50
End: 2020-06-24

## 2020-06-24 DIAGNOSIS — G47.33 OBSTRUCTIVE SLEEP APNEA (ADULT) (PEDIATRIC): Primary | ICD-10-CM

## 2020-06-24 NOTE — TELEPHONE ENCOUNTER
Pt aware  Scheduled for tomorrow, says he has a cough, but says you are aware of that with his current situation  Are you ok for him to come in to office?

## 2020-06-24 NOTE — TELEPHONE ENCOUNTER
Patient saw Dr Loan Castillo today  He was told to talk to Dr Skyler Boone about getting another round of prednisone for his asthma  He is still having trouble and doesn't have an appointment with the pulmonologist until July 27th  His oxygen levels are low, sleep study said his oxygen went down to 72 during the night  Aware Dr Holland Payment not in until Thursday, ok to wait for an answer from her

## 2020-06-24 NOTE — TELEPHONE ENCOUNTER
Honestly I'm not sure this is truly asthma - he has been tx with steroids and did not have great benefit - his sugars with his diabetes go up significantly with the steroids as well - he needs to be seen in office for re-eval

## 2020-06-25 ENCOUNTER — TELEPHONE (OUTPATIENT)
Dept: SLEEP CENTER | Facility: CLINIC | Age: 50
End: 2020-06-25

## 2020-06-25 ENCOUNTER — OFFICE VISIT (OUTPATIENT)
Dept: FAMILY MEDICINE CLINIC | Facility: HOSPITAL | Age: 50
End: 2020-06-25
Payer: COMMERCIAL

## 2020-06-25 VITALS
BODY MASS INDEX: 36.53 KG/M2 | SYSTOLIC BLOOD PRESSURE: 130 MMHG | DIASTOLIC BLOOD PRESSURE: 78 MMHG | HEART RATE: 80 BPM | OXYGEN SATURATION: 95 % | HEIGHT: 68 IN | WEIGHT: 241 LBS | TEMPERATURE: 98.5 F

## 2020-06-25 DIAGNOSIS — R13.10 DYSPHAGIA, UNSPECIFIED TYPE: ICD-10-CM

## 2020-06-25 DIAGNOSIS — R63.4 UNINTENTIONAL WEIGHT LOSS: ICD-10-CM

## 2020-06-25 DIAGNOSIS — R06.02 SOB (SHORTNESS OF BREATH): ICD-10-CM

## 2020-06-25 DIAGNOSIS — I10 ESSENTIAL HYPERTENSION: ICD-10-CM

## 2020-06-25 DIAGNOSIS — Z20.822 ENCOUNTER FOR LABORATORY TESTING FOR COVID-19 VIRUS: Primary | ICD-10-CM

## 2020-06-25 DIAGNOSIS — G47.33 OBSTRUCTIVE SLEEP APNEA: ICD-10-CM

## 2020-06-25 DIAGNOSIS — R05.9 COUGH: Primary | ICD-10-CM

## 2020-06-25 PROCEDURE — 3078F DIAST BP <80 MM HG: CPT | Performed by: INTERNAL MEDICINE

## 2020-06-25 PROCEDURE — 1036F TOBACCO NON-USER: CPT | Performed by: INTERNAL MEDICINE

## 2020-06-25 PROCEDURE — 3075F SYST BP GE 130 - 139MM HG: CPT | Performed by: INTERNAL MEDICINE

## 2020-06-25 PROCEDURE — 3008F BODY MASS INDEX DOCD: CPT | Performed by: INTERNAL MEDICINE

## 2020-06-25 PROCEDURE — 99215 OFFICE O/P EST HI 40 MIN: CPT | Performed by: INTERNAL MEDICINE

## 2020-06-25 PROCEDURE — 2022F DILAT RTA XM EVC RTNOPTHY: CPT | Performed by: INTERNAL MEDICINE

## 2020-06-29 ENCOUNTER — PATIENT MESSAGE (OUTPATIENT)
Dept: FAMILY MEDICINE CLINIC | Facility: HOSPITAL | Age: 50
End: 2020-06-29

## 2020-06-29 DIAGNOSIS — E11.65 UNCONTROLLED TYPE 2 DIABETES MELLITUS WITH HYPERGLYCEMIA (HCC): Primary | ICD-10-CM

## 2020-07-06 ENCOUNTER — TELEPHONE (OUTPATIENT)
Dept: SLEEP CENTER | Facility: CLINIC | Age: 50
End: 2020-07-06

## 2020-07-11 ENCOUNTER — NURSE TRIAGE (OUTPATIENT)
Dept: OTHER | Facility: OTHER | Age: 50
End: 2020-07-11

## 2020-07-12 DIAGNOSIS — E11.65 UNCONTROLLED TYPE 2 DIABETES MELLITUS WITH HYPERGLYCEMIA (HCC): ICD-10-CM

## 2020-07-12 RX ORDER — GLIPIZIDE 10 MG/1
TABLET, FILM COATED, EXTENDED RELEASE ORAL
Qty: 90 TABLET | Refills: 3 | Status: SHIPPED | OUTPATIENT
Start: 2020-07-12 | End: 2021-06-08

## 2020-07-12 NOTE — TELEPHONE ENCOUNTER
Regarding: questions about restarting Lisinopril  ----- Message from Chanda Smith sent at 7/11/2020 10:18 PM EDT -----  "I was taken off my Lisinopril for coughing a few days ago and my BP seems to be elevating so I may need to go back on them "

## 2020-07-12 NOTE — TELEPHONE ENCOUNTER
Reason for Disposition   Caller wants to use a complementary or alternative medicine    Answer Assessment - Initial Assessment Questions  1  NAME of MEDICATION: "What medicine are you calling about?"       Lisinopril  2  QUESTION: "What is your question?"      Should I go back on it  3  PRESCRIBING HCP: "Who prescribed it?" Reason: if prescribed by specialist, call should be referred to that group  PCP  4  SYMPTOMS: "Do you have any symptoms?"      No , cough is better  5   SEVERITY: If symptoms are present, ask "Are they mild, moderate or severe?"       -155/47-80 , denies any Symptoms    Protocols used: MEDICATION QUESTION CALL-ADULT-

## 2020-07-13 DIAGNOSIS — E11.65 UNCONTROLLED TYPE 2 DIABETES MELLITUS WITH HYPERGLYCEMIA (HCC): Primary | ICD-10-CM

## 2020-07-13 DIAGNOSIS — I10 ESSENTIAL HYPERTENSION: ICD-10-CM

## 2020-07-13 RX ORDER — OLMESARTAN MEDOXOMIL 20 MG/1
20 TABLET ORAL DAILY
Qty: 30 TABLET | Refills: 0 | Status: SHIPPED | OUTPATIENT
Start: 2020-07-13 | End: 2020-08-05

## 2020-07-13 NOTE — TELEPHONE ENCOUNTER
rx for Olmesartan 20 mg 1 tab PO q day sent to local pharmacy - if able to tolerate will then send to mail order - needs appt for BP check in 3-4 wks    TY

## 2020-07-13 NOTE — TELEPHONE ENCOUNTER
Pt states his BP has gone up  150/80 last time he took it  Can he start another BP med in place of the lisinopril?

## 2020-07-28 ENCOUNTER — TRANSCRIBE ORDERS (OUTPATIENT)
Dept: ADMINISTRATIVE | Facility: HOSPITAL | Age: 50
End: 2020-07-28

## 2020-07-28 ENCOUNTER — TELEPHONE (OUTPATIENT)
Dept: FAMILY MEDICINE CLINIC | Facility: HOSPITAL | Age: 50
End: 2020-07-28

## 2020-07-28 DIAGNOSIS — J45.40 MODERATE PERSISTENT ASTHMA, UNCOMPLICATED: Primary | ICD-10-CM

## 2020-07-28 NOTE — TELEPHONE ENCOUNTER
NEEDS SUPPORTING DOCUMENTATION FOR SLEEP STUDY/CPAP ORDER - PLEASE FAX -[phone]    PCB WITH QUESTIONS

## 2020-08-04 ENCOUNTER — PATIENT MESSAGE (OUTPATIENT)
Dept: FAMILY MEDICINE CLINIC | Facility: HOSPITAL | Age: 50
End: 2020-08-04

## 2020-08-04 DIAGNOSIS — E11.65 UNCONTROLLED TYPE 2 DIABETES MELLITUS WITH HYPERGLYCEMIA (HCC): ICD-10-CM

## 2020-08-05 DIAGNOSIS — I10 ESSENTIAL HYPERTENSION: ICD-10-CM

## 2020-08-05 DIAGNOSIS — E11.65 UNCONTROLLED TYPE 2 DIABETES MELLITUS WITH HYPERGLYCEMIA (HCC): ICD-10-CM

## 2020-08-05 RX ORDER — OLMESARTAN MEDOXOMIL 20 MG/1
TABLET ORAL
Qty: 30 TABLET | Refills: 0 | Status: SHIPPED | OUTPATIENT
Start: 2020-08-05 | End: 2020-08-06 | Stop reason: SDUPTHER

## 2020-08-05 RX ORDER — INSULIN GLARGINE 100 [IU]/ML
20 INJECTION, SOLUTION SUBCUTANEOUS DAILY
Qty: 5 PEN | Refills: 3 | Status: SHIPPED | OUTPATIENT
Start: 2020-08-05 | End: 2021-05-14

## 2020-08-05 NOTE — TELEPHONE ENCOUNTER
From: Chiquita Lehman  To: Yun Fletcher DO  Sent: 8/4/2020 10:31 PM EDT  Subject: Non-Urgent Medical Question    I need [facility] at [facility]  Thank you

## 2020-08-06 ENCOUNTER — PATIENT MESSAGE (OUTPATIENT)
Dept: FAMILY MEDICINE CLINIC | Facility: HOSPITAL | Age: 50
End: 2020-08-06

## 2020-08-06 DIAGNOSIS — I10 ESSENTIAL HYPERTENSION: ICD-10-CM

## 2020-08-06 DIAGNOSIS — E11.65 UNCONTROLLED TYPE 2 DIABETES MELLITUS WITH HYPERGLYCEMIA (HCC): ICD-10-CM

## 2020-08-06 RX ORDER — OLMESARTAN MEDOXOMIL 20 MG/1
20 TABLET ORAL DAILY
Qty: 90 TABLET | Refills: 0 | Status: SHIPPED | OUTPATIENT
Start: 2020-08-06 | End: 2020-10-19

## 2020-08-06 NOTE — TELEPHONE ENCOUNTER
From: Alli Dumont  To: Guillermina Morrison DO  Sent: 8/6/2020 9:51 AM EDT  Subject: Non-Urgent Medical Question    I need a refill on Olmesartan Medoxoml 20 mg  [facility]  Thanks!

## 2020-08-11 ENCOUNTER — OFFICE VISIT (OUTPATIENT)
Dept: FAMILY MEDICINE CLINIC | Facility: HOSPITAL | Age: 50
End: 2020-08-11
Payer: COMMERCIAL

## 2020-08-11 VITALS
SYSTOLIC BLOOD PRESSURE: 128 MMHG | HEIGHT: 68 IN | DIASTOLIC BLOOD PRESSURE: 68 MMHG | HEART RATE: 80 BPM | WEIGHT: 245.6 LBS | BODY MASS INDEX: 37.22 KG/M2 | TEMPERATURE: 97.5 F

## 2020-08-11 DIAGNOSIS — I10 ESSENTIAL HYPERTENSION: Primary | ICD-10-CM

## 2020-08-11 DIAGNOSIS — G47.33 OBSTRUCTIVE SLEEP APNEA: ICD-10-CM

## 2020-08-11 DIAGNOSIS — J45.40 MODERATE PERSISTENT ASTHMA WITHOUT COMPLICATION: ICD-10-CM

## 2020-08-11 PROCEDURE — 1036F TOBACCO NON-USER: CPT | Performed by: INTERNAL MEDICINE

## 2020-08-11 PROCEDURE — 2022F DILAT RTA XM EVC RTNOPTHY: CPT | Performed by: INTERNAL MEDICINE

## 2020-08-11 PROCEDURE — 3008F BODY MASS INDEX DOCD: CPT | Performed by: INTERNAL MEDICINE

## 2020-08-11 PROCEDURE — 3074F SYST BP LT 130 MM HG: CPT | Performed by: INTERNAL MEDICINE

## 2020-08-11 PROCEDURE — 3078F DIAST BP <80 MM HG: CPT | Performed by: INTERNAL MEDICINE

## 2020-08-11 PROCEDURE — 99214 OFFICE O/P EST MOD 30 MIN: CPT | Performed by: INTERNAL MEDICINE

## 2020-08-11 RX ORDER — IPRATROPIUM BROMIDE AND ALBUTEROL SULFATE 2.5; .5 MG/3ML; MG/3ML
SOLUTION RESPIRATORY (INHALATION)
COMMUNITY
Start: 2020-07-08 | End: 2021-12-23

## 2020-08-11 RX ORDER — TIOTROPIUM BROMIDE INHALATION SPRAY 1.56 UG/1
2 SPRAY, METERED RESPIRATORY (INHALATION) DAILY
COMMUNITY
Start: 2020-07-22 | End: 2021-05-14

## 2020-08-11 NOTE — ASSESSMENT & PLAN NOTE
Cough and breathing better after repeat Prednisone for a longer duration, has PFT's tomorrow, had Spiriva added to Advair by Pulm as well, call with new/worse resp symptoms

## 2020-08-11 NOTE — ASSESSMENT & PLAN NOTE
Has been using CPAP for a few weeks - built up to 4 hrs at night w/some benefit - less fatigued during day, con't regular use and f/u with Pulm/sleep medicine

## 2020-08-11 NOTE — PROGRESS NOTES
Assessment/Plan:    Essential hypertension  Bp much better with addition of Olmesartan, cough resolved with D/C of lisinopril and/or longer dose of Prednisone, will keep off ACE for now    Asthma  Cough and breathing better after repeat Prednisone for a longer duration, has PFT's tomorrow, had Spiriva added to Advair by Pulm as well, call with new/worse resp symptoms    Obstructive sleep apnea  Has been using CPAP for a few weeks - built up to 4 hrs at night w/some benefit - less fatigued during day, con't regular use and f/u with Pulm/sleep medicine       Diagnoses and all orders for this visit:    Essential hypertension    Moderate persistent asthma without complication    Obstructive sleep apnea    Other orders  -     Spiriva Respimat 1 25 MCG/ACT AERS inhaler; Inhale 2 puffs daily  -     ipratropium-albuterol (DUO-NEB) 0 5-2 5 mg/3 mL nebulizer solution; inhale contents of 1 vial ( 3 milliliters ) in nebulizer by mouth     (REFER TO PRESCRIPTION NOTES)  Colonoscopy 10/19 - 10 yrs    DM labs 3/20 - has lab order for 9/20  DM eye exam 12/19  DM foot exam 3/20      Subjective:      Patient ID: Tomasa Zapata is a 44 y o  male  HPI Pt here for follow up appt    Pts lisinopril was switched to Olmesartan 20 mg 7/13/20  BP at goal today and meds were reviewed and med list is UTD  He denies missing doses of meds or SE with the meds  He does check his BP outside the office and average since starting the Olmesartan mid 120's/60's  He notes no frequent Ha's/dizziness/double vision/CP  He has seen Pulm since last appt  He had a longer course of Prednisone given and that seemed to kick his cough  He is not sure if stopping the lisinopril helped that all with the cough  He had Spiriva added to his Advair  He has Duo-Neb prn but has not had to use it the past few days  He has PFT's tomorrow  He has obtained his sleep apnea machine and is building up and is at approx 4 hrs of use now    He thinks he is more alert in the am with using the mask  Colonoscopy 10/19 - 10 yrs    DM labs 3/20 - has lab order for 9/20  DM eye exam 12/19  DM foot exam 3/20      Review of Systems   Constitutional: Negative for chills and fever  HENT: Negative for congestion and sinus pain  Eyes: Negative for pain and visual disturbance  Respiratory: Negative for cough, chest tightness and shortness of breath  Cardiovascular: Negative for chest pain and palpitations  Gastrointestinal: Negative for abdominal pain, diarrhea, nausea and vomiting  Endocrine: Negative for polydipsia and polyuria  Genitourinary: Negative for difficulty urinating and dysuria  Musculoskeletal: Negative for arthralgias and myalgias  Skin: Negative for wound  Psoriasis at baseline   Neurological: Negative for dizziness and headaches  Hematological: Does not bruise/bleed easily  Psychiatric/Behavioral: Negative for behavioral problems and confusion  Objective:    /68   Pulse 80   Temp 97 5 °F (36 4 °C)   Ht 5' 8" (1 727 m)   Wt 111 kg (245 lb 9 6 oz)   BMI 37 34 kg/m²      Physical Exam  Vitals signs and nursing note reviewed  Constitutional:       General: He is not in acute distress  Appearance: He is well-developed  He is not ill-appearing  HENT:      Head: Normocephalic and atraumatic  Eyes:      General:         Right eye: No discharge  Left eye: No discharge  Conjunctiva/sclera: Conjunctivae normal    Neck:      Musculoskeletal: Neck supple  Trachea: No tracheal deviation  Cardiovascular:      Rate and Rhythm: Normal rate and regular rhythm  Heart sounds: No murmur  Pulmonary:      Effort: Pulmonary effort is normal  No respiratory distress  Breath sounds: Normal breath sounds  No wheezing, rhonchi or rales  Abdominal:      General: There is no distension  Palpations: Abdomen is soft  Tenderness: There is no abdominal tenderness   There is no guarding or rebound  Musculoskeletal:         General: No deformity  Skin:     General: Skin is warm and dry  Comments: Diffuse psoriatic plaques   Neurological:      Mental Status: He is alert  Motor: No abnormal muscle tone  Psychiatric:         Mood and Affect: Mood normal          Behavior: Behavior normal          Thought Content:  Thought content normal          Judgment: Judgment normal

## 2020-08-11 NOTE — ASSESSMENT & PLAN NOTE
Bp much better with addition of Olmesartan, cough resolved with D/C of lisinopril and/or longer dose of Prednisone, will keep off ACE for now

## 2020-08-12 ENCOUNTER — HOSPITAL ENCOUNTER (OUTPATIENT)
Dept: PULMONOLOGY | Facility: HOSPITAL | Age: 50
Discharge: HOME | End: 2020-08-12
Attending: SPECIALIST
Payer: COMMERCIAL

## 2020-08-12 DIAGNOSIS — J45.40 MODERATE PERSISTENT ASTHMA, UNCOMPLICATED: ICD-10-CM

## 2020-08-12 PROCEDURE — 94729 DIFFUSING CAPACITY: CPT

## 2020-08-12 PROCEDURE — 94729 DIFFUSING CAPACITY: CPT | Performed by: INTERNAL MEDICINE

## 2020-08-12 PROCEDURE — 94760 N-INVAS EAR/PLS OXIMETRY 1: CPT

## 2020-08-12 PROCEDURE — 94010 BREATHING CAPACITY TEST: CPT

## 2020-08-12 PROCEDURE — 94010 BREATHING CAPACITY TEST: CPT | Performed by: INTERNAL MEDICINE

## 2020-08-12 PROCEDURE — 94726 PLETHYSMOGRAPHY LUNG VOLUMES: CPT | Performed by: INTERNAL MEDICINE

## 2020-08-12 PROCEDURE — 94726 PLETHYSMOGRAPHY LUNG VOLUMES: CPT

## 2020-09-09 LAB — HCV AB SER-ACNC: <0.1

## 2020-09-12 ENCOUNTER — NURSE TRIAGE (OUTPATIENT)
Dept: OTHER | Facility: OTHER | Age: 50
End: 2020-09-12

## 2020-09-12 NOTE — TELEPHONE ENCOUNTER
Regarding: Upper back pain  ----- Message from Ivy Fernando sent at 9/12/2020  9:31 AM EDT -----  "I have been having pain in my upper back from shoulder to shoulder primarily when I get up   It has been going on for a week now "

## 2020-09-12 NOTE — TELEPHONE ENCOUNTER
Reason for Disposition   Pain also in shoulder(s) or arm(s) or jaw    Answer Assessment - Initial Assessment Questions  1  ONSET: "When did the pain begin?"       1 week ago  2  LOCATION: "Where does it hurt?" (upper, mid or lower back)      Chest upper back bilateral   3  SEVERITY: "How bad is the pain?"  (e g , Scale 1-10; mild, moderate, or severe)    - MILD (1-3): doesn't interfere with normal activities     - MODERATE (4-7): interferes with normal activities or awakens from sleep     - SEVERE (8-10): excruciating pain, unable to do any normal activities       5/10  4  PATTERN: "Is the pain constant?" (e g , yes, no; constant, intermittent)       intermittent  5  RADIATION: "Does the pain shoot into your legs or elsewhere?"      Does not radiate   6  CAUSE:  "What do you think is causing the back pain?"       Unknown   7  BACK OVERUSE:  "Any recent lifting of heavy objects, strenuous work or exercise?"      No   8  MEDICATIONS: "What have you taken so far for the pain?" (e g , nothing, acetaminophen, NSAIDS)      Talking tylenol no relief  9  NEUROLOGIC SYMPTOMS: "Do you have any weakness, numbness, or problems with bowel/bladder control?"      Denies   10  OTHER SYMPTOMS: "Do you have any other symptoms?" (e g , fever, abdominal pain, burning with urination, blood in urine)        No   11  PREGNANCY: "Is there any chance you are pregnant?" (e g , yes, no; LMP)        n/a    Answer Assessment - Initial Assessment Questions  1  LOCATION: "Where does it hurt?"        Mid chest  2  RADIATION: "Does the pain go anywhere else?" (e g , into neck, jaw, arms, back)      Upper back   3  ONSET: "When did the chest pain begin?" (Minutes, hours or days)       1 week ago   4  PATTERN "Does the pain come and go, or has it been constant since it started?"  "Does it get worse with exertion?"       Intermittent pt states yesterday he tried to cough and couldn't because of the pain   5   DURATION: "How long does it last" (e g , seconds, minutes, hours)      *No Answer*  6  SEVERITY: "How bad is the pain?"  (e g , Scale 1-10; mild, moderate, or severe)     - MILD (1-3): doesn't interfere with normal activities      - MODERATE (4-7): interferes with normal activities or awakens from sleep     - SEVERE (8-10): excruciating pain, unable to do any normal activities        5/10  7  CARDIAC RISK FACTORS: "Do you have any history of heart problems or risk factors for heart disease?" (e g , angina, prior heart attack; diabetes, high blood pressure, high cholesterol, smoker, or strong family history of heart disease)      Hx hypertension  8  PULMONARY RISK FACTORS: "Do you have any history of lung disease?"  (e g , blood clots in lung, asthma, emphysema, birth control pills)      *No Answer*  9  CAUSE: "What do you think is causing the chest pain?"      *No Answer*  10  OTHER SYMPTOMS: "Do you have any other symptoms?" (e g , dizziness, nausea, vomiting, sweating, fever, difficulty breathing, cough)        Had cough yesterday c/o also back pain   11   PREGNANCY: "Is there any chance you are pregnant?" "When was your last menstrual period?"        n/a    Protocols used: CHEST PAIN-ADULT-AH, BACK PAIN-ADULT-AH

## 2020-09-13 NOTE — TELEPHONE ENCOUNTER
Please call and check on pt and see if symptoms that he called service for over the weekend (arm and chest pain) have improved, he was referred to ED by the service but never went - needs 40 min eval in office if still persisting

## 2020-10-07 ENCOUNTER — IMMUNIZATIONS (OUTPATIENT)
Dept: FAMILY MEDICINE CLINIC | Facility: HOSPITAL | Age: 50
End: 2020-10-07
Payer: COMMERCIAL

## 2020-10-07 DIAGNOSIS — Z23 ENCOUNTER FOR IMMUNIZATION: ICD-10-CM

## 2020-10-07 PROCEDURE — 90471 IMMUNIZATION ADMIN: CPT | Performed by: FAMILY MEDICINE

## 2020-10-07 PROCEDURE — 90682 RIV4 VACC RECOMBINANT DNA IM: CPT | Performed by: FAMILY MEDICINE

## 2020-10-08 LAB
BUN SERPL-MCNC: 17 MG/DL (ref 6–24)
BUN/CREAT SERPL: 19 (ref 9–20)
CALCIUM SERPL-MCNC: 9.4 MG/DL (ref 8.7–10.2)
CHLORIDE SERPL-SCNC: 99 MMOL/L (ref 96–106)
CO2 SERPL-SCNC: 26 MMOL/L (ref 20–29)
CREAT SERPL-MCNC: 0.89 MG/DL (ref 0.76–1.27)
GLUCOSE SERPL-MCNC: 120 MG/DL (ref 65–99)
POTASSIUM SERPL-SCNC: 4.4 MMOL/L (ref 3.5–5.2)
SL AMB EGFR AFRICAN AMERICAN: 114 ML/MIN/1.73
SL AMB EGFR NON AFRICAN AMERICAN: 98 ML/MIN/1.73
SODIUM SERPL-SCNC: 140 MMOL/L (ref 134–144)

## 2020-10-09 LAB
ALBUMIN SERPL-MCNC: 4.2 G/DL (ref 3.8–4.9)
ALBUMIN/CREAT UR: 6 MG/G CREAT (ref 0–29)
ALBUMIN/GLOB SERPL: 1.7 {RATIO} (ref 1.2–2.2)
ALP SERPL-CCNC: 114 IU/L (ref 39–117)
ALT SERPL-CCNC: 26 IU/L (ref 0–44)
AST SERPL-CCNC: 21 IU/L (ref 0–40)
BASOPHILS # BLD AUTO: 0.1 X10E3/UL (ref 0–0.2)
BASOPHILS NFR BLD AUTO: 1 %
BILIRUB SERPL-MCNC: 0.5 MG/DL (ref 0–1.2)
BUN SERPL-MCNC: 17 MG/DL (ref 6–24)
BUN/CREAT SERPL: 19 (ref 9–20)
CALCIUM SERPL-MCNC: 9.2 MG/DL (ref 8.7–10.2)
CHLORIDE SERPL-SCNC: 103 MMOL/L (ref 96–106)
CHOLEST SERPL-MCNC: 117 MG/DL (ref 100–199)
CHOLEST/HDLC SERPL: 2.3 RATIO (ref 0–5)
CO2 SERPL-SCNC: 27 MMOL/L (ref 20–29)
CREAT SERPL-MCNC: 0.88 MG/DL (ref 0.76–1.27)
CREAT UR-MCNC: 145.2 MG/DL
EOSINOPHIL # BLD AUTO: 0.2 X10E3/UL (ref 0–0.4)
EOSINOPHIL NFR BLD AUTO: 3 %
ERYTHROCYTE [DISTWIDTH] IN BLOOD BY AUTOMATED COUNT: 13.7 % (ref 11.6–15.4)
EST. AVERAGE GLUCOSE BLD GHB EST-MCNC: 151 MG/DL
GLOBULIN SER-MCNC: 2.5 G/DL (ref 1.5–4.5)
GLUCOSE SERPL-MCNC: 147 MG/DL (ref 65–99)
HBA1C MFR BLD: 6.9 % (ref 4.8–5.6)
HCT VFR BLD AUTO: 46.4 % (ref 37.5–51)
HDLC SERPL-MCNC: 51 MG/DL
HGB BLD-MCNC: 15.7 G/DL (ref 13–17.7)
IMM GRANULOCYTES # BLD: 0 X10E3/UL (ref 0–0.1)
IMM GRANULOCYTES NFR BLD: 0 %
LDLC SERPL CALC-MCNC: 43 MG/DL (ref 0–99)
LYMPHOCYTES # BLD AUTO: 1.3 X10E3/UL (ref 0.7–3.1)
LYMPHOCYTES NFR BLD AUTO: 18 %
MCH RBC QN AUTO: 28.4 PG (ref 26.6–33)
MCHC RBC AUTO-ENTMCNC: 33.8 G/DL (ref 31.5–35.7)
MCV RBC AUTO: 84 FL (ref 79–97)
MICROALBUMIN UR-MCNC: 8.2 UG/ML
MONOCYTES # BLD AUTO: 0.8 X10E3/UL (ref 0.1–0.9)
MONOCYTES NFR BLD AUTO: 10 %
NEUTROPHILS # BLD AUTO: 5 X10E3/UL (ref 1.4–7)
NEUTROPHILS NFR BLD AUTO: 68 %
PLATELET # BLD AUTO: 295 X10E3/UL (ref 150–450)
POTASSIUM SERPL-SCNC: 4.7 MMOL/L (ref 3.5–5.2)
PROT SERPL-MCNC: 6.7 G/DL (ref 6–8.5)
PSA SERPL-MCNC: 0.2 NG/ML (ref 0–4)
RBC # BLD AUTO: 5.52 X10E6/UL (ref 4.14–5.8)
SL AMB EGFR AFRICAN AMERICAN: 114 ML/MIN/1.73
SL AMB EGFR NON AFRICAN AMERICAN: 99 ML/MIN/1.73
SL AMB REFLEX CRITERIA: NORMAL
SL AMB VLDL CHOLESTEROL CALC: 23 MG/DL (ref 5–40)
SODIUM SERPL-SCNC: 143 MMOL/L (ref 134–144)
T4 FREE SERPL-MCNC: 0.94 NG/DL (ref 0.82–1.77)
TRIGL SERPL-MCNC: 129 MG/DL (ref 0–149)
TSH SERPL DL<=0.005 MIU/L-ACNC: 3.1 UIU/ML (ref 0.45–4.5)
WBC # BLD AUTO: 7.3 X10E3/UL (ref 3.4–10.8)

## 2020-10-13 ENCOUNTER — OFFICE VISIT (OUTPATIENT)
Dept: FAMILY MEDICINE CLINIC | Facility: HOSPITAL | Age: 50
End: 2020-10-13
Payer: COMMERCIAL

## 2020-10-13 VITALS
SYSTOLIC BLOOD PRESSURE: 134 MMHG | HEIGHT: 68 IN | DIASTOLIC BLOOD PRESSURE: 84 MMHG | BODY MASS INDEX: 38.83 KG/M2 | HEART RATE: 68 BPM | WEIGHT: 256.2 LBS | TEMPERATURE: 96.2 F

## 2020-10-13 DIAGNOSIS — E11.9 CONTROLLED TYPE 2 DIABETES MELLITUS WITHOUT COMPLICATION, WITH LONG-TERM CURRENT USE OF INSULIN (HCC): Primary | ICD-10-CM

## 2020-10-13 DIAGNOSIS — I10 ESSENTIAL HYPERTENSION: ICD-10-CM

## 2020-10-13 DIAGNOSIS — E03.9 HYPOTHYROIDISM, UNSPECIFIED TYPE: ICD-10-CM

## 2020-10-13 DIAGNOSIS — E78.1 HYPERTRIGLYCERIDEMIA: ICD-10-CM

## 2020-10-13 DIAGNOSIS — G47.33 OSA ON CPAP: ICD-10-CM

## 2020-10-13 DIAGNOSIS — Z79.4 CONTROLLED TYPE 2 DIABETES MELLITUS WITHOUT COMPLICATION, WITH LONG-TERM CURRENT USE OF INSULIN (HCC): Primary | ICD-10-CM

## 2020-10-13 DIAGNOSIS — Z99.89 OSA ON CPAP: ICD-10-CM

## 2020-10-13 PROBLEM — E11.65 UNCONTROLLED TYPE 2 DIABETES MELLITUS WITH HYPERGLYCEMIA (HCC): Status: RESOLVED | Noted: 2019-05-21 | Resolved: 2020-10-13

## 2020-10-13 PROCEDURE — 99214 OFFICE O/P EST MOD 30 MIN: CPT | Performed by: INTERNAL MEDICINE

## 2020-10-13 RX ORDER — FLUOROMETHOLONE 0.1 %
SUSPENSION, DROPS(FINAL DOSAGE FORM)(ML) OPHTHALMIC (EYE)
COMMUNITY
Start: 2020-10-02 | End: 2021-11-01

## 2020-10-13 RX ORDER — RISANKIZUMAB-RZAA 75 MG/0.83
KIT SUBCUTANEOUS
COMMUNITY
Start: 2020-09-21

## 2020-10-17 DIAGNOSIS — E11.65 UNCONTROLLED TYPE 2 DIABETES MELLITUS WITH HYPERGLYCEMIA (HCC): ICD-10-CM

## 2020-10-17 DIAGNOSIS — I10 ESSENTIAL HYPERTENSION: ICD-10-CM

## 2020-10-19 RX ORDER — OLMESARTAN MEDOXOMIL 20 MG/1
TABLET ORAL
Qty: 90 TABLET | Refills: 1 | Status: SHIPPED | OUTPATIENT
Start: 2020-10-19 | End: 2021-04-17

## 2020-10-21 ENCOUNTER — PATIENT MESSAGE (OUTPATIENT)
Dept: FAMILY MEDICINE CLINIC | Facility: HOSPITAL | Age: 50
End: 2020-10-21

## 2020-10-21 DIAGNOSIS — E11.9 CONTROLLED TYPE 2 DIABETES MELLITUS WITHOUT COMPLICATION, WITH LONG-TERM CURRENT USE OF INSULIN (HCC): Primary | ICD-10-CM

## 2020-10-21 DIAGNOSIS — Z79.4 CONTROLLED TYPE 2 DIABETES MELLITUS WITHOUT COMPLICATION, WITH LONG-TERM CURRENT USE OF INSULIN (HCC): Primary | ICD-10-CM

## 2020-10-26 ENCOUNTER — TRANSCRIBE ORDERS (OUTPATIENT)
Dept: ADMINISTRATIVE | Facility: HOSPITAL | Age: 50
End: 2020-10-26

## 2020-10-26 DIAGNOSIS — R06.09 OTHER FORMS OF DYSPNEA: Primary | ICD-10-CM

## 2020-10-30 ENCOUNTER — HOSPITAL ENCOUNTER (OUTPATIENT)
Dept: CT IMAGING | Facility: HOSPITAL | Age: 50
Discharge: HOME | End: 2020-10-30
Attending: SPECIALIST
Payer: COMMERCIAL

## 2020-10-30 DIAGNOSIS — R06.09 OTHER FORMS OF DYSPNEA: ICD-10-CM

## 2020-10-30 PROCEDURE — G1004 CDSM NDSC: HCPCS

## 2020-10-30 PROCEDURE — 71250 CT THORAX DX C-: CPT

## 2020-11-01 DIAGNOSIS — J30.2 SEASONAL ALLERGIES: ICD-10-CM

## 2020-11-01 RX ORDER — FLUTICASONE PROPIONATE 50 MCG
SPRAY, SUSPENSION (ML) NASAL
Qty: 48 G | Refills: 3 | Status: SHIPPED | OUTPATIENT
Start: 2020-11-01 | End: 2021-10-27

## 2020-11-21 DIAGNOSIS — J45.30 MILD PERSISTENT ASTHMA WITHOUT COMPLICATION: ICD-10-CM

## 2020-11-21 DIAGNOSIS — G43.009 MIGRAINE WITHOUT AURA AND WITHOUT STATUS MIGRAINOSUS, NOT INTRACTABLE: ICD-10-CM

## 2020-11-22 DIAGNOSIS — G43.009 MIGRAINE WITHOUT AURA AND WITHOUT STATUS MIGRAINOSUS, NOT INTRACTABLE: ICD-10-CM

## 2020-11-22 RX ORDER — BUTALBITAL, ACETAMINOPHEN AND CAFFEINE 300; 40; 50 MG/1; MG/1; MG/1
1 CAPSULE ORAL EVERY 8 HOURS PRN
Qty: 30 CAPSULE | Refills: 0 | Status: SHIPPED | OUTPATIENT
Start: 2020-11-22 | End: 2022-06-29 | Stop reason: SDUPTHER

## 2020-11-22 RX ORDER — BUTALBITAL, ACETAMINOPHEN AND CAFFEINE 300; 40; 50 MG/1; MG/1; MG/1
1 CAPSULE ORAL EVERY 8 HOURS PRN
Qty: 30 CAPSULE | Refills: 0 | Status: SHIPPED | OUTPATIENT
Start: 2020-11-22 | End: 2020-11-22 | Stop reason: SDUPTHER

## 2020-11-27 DIAGNOSIS — E03.9 HYPOTHYROIDISM, UNSPECIFIED TYPE: ICD-10-CM

## 2020-11-27 RX ORDER — LEVOTHYROXINE SODIUM 0.05 MG/1
TABLET ORAL
Qty: 90 TABLET | Refills: 3 | Status: SHIPPED | OUTPATIENT
Start: 2020-11-27 | End: 2021-10-27

## 2020-11-29 DIAGNOSIS — F41.9 ANXIETY: ICD-10-CM

## 2020-11-29 RX ORDER — FLUOXETINE 20 MG/1
TABLET, FILM COATED ORAL
Qty: 90 TABLET | Refills: 3 | Status: SHIPPED | OUTPATIENT
Start: 2020-11-29 | End: 2021-10-27

## 2020-12-22 DIAGNOSIS — E11.65 UNCONTROLLED TYPE 2 DIABETES MELLITUS WITH HYPERGLYCEMIA (HCC): ICD-10-CM

## 2020-12-22 RX ORDER — ATORVASTATIN CALCIUM 10 MG/1
TABLET, FILM COATED ORAL
Qty: 30 TABLET | Refills: 5 | Status: SHIPPED | OUTPATIENT
Start: 2020-12-22 | End: 2021-01-04 | Stop reason: SDUPTHER

## 2020-12-26 DIAGNOSIS — E11.65 UNCONTROLLED TYPE 2 DIABETES MELLITUS WITH HYPERGLYCEMIA (HCC): ICD-10-CM

## 2020-12-26 RX ORDER — PEN NEEDLE, DIABETIC 32GX 5/32"
NEEDLE, DISPOSABLE MISCELLANEOUS
Qty: 90 EACH | Refills: 3 | Status: SHIPPED | OUTPATIENT
Start: 2020-12-26 | End: 2020-12-29 | Stop reason: SDUPTHER

## 2020-12-29 DIAGNOSIS — E11.65 UNCONTROLLED TYPE 2 DIABETES MELLITUS WITH HYPERGLYCEMIA (HCC): ICD-10-CM

## 2020-12-29 RX ORDER — PEN NEEDLE, DIABETIC 32GX 5/32"
NEEDLE, DISPOSABLE MISCELLANEOUS
Qty: 90 EACH | Refills: 3 | Status: SHIPPED | OUTPATIENT
Start: 2020-12-29 | End: 2021-11-29

## 2021-01-02 DIAGNOSIS — J45.30 MILD PERSISTENT ASTHMA WITHOUT COMPLICATION: ICD-10-CM

## 2021-01-02 RX ORDER — MONTELUKAST SODIUM 10 MG/1
TABLET ORAL
Qty: 30 TABLET | Refills: 5 | Status: SHIPPED | OUTPATIENT
Start: 2021-01-02

## 2021-01-03 ENCOUNTER — PATIENT MESSAGE (OUTPATIENT)
Dept: FAMILY MEDICINE CLINIC | Facility: HOSPITAL | Age: 51
End: 2021-01-03

## 2021-01-03 DIAGNOSIS — E11.65 UNCONTROLLED TYPE 2 DIABETES MELLITUS WITH HYPERGLYCEMIA (HCC): ICD-10-CM

## 2021-01-04 RX ORDER — ATORVASTATIN CALCIUM 10 MG/1
10 TABLET, FILM COATED ORAL DAILY
Qty: 90 TABLET | Refills: 1 | Status: SHIPPED | OUTPATIENT
Start: 2021-01-04 | End: 2021-06-15

## 2021-01-04 NOTE — TELEPHONE ENCOUNTER
From: Trista Schulte  To: Eileen Tate DO  Sent: 1/3/2021 11:25 AM EST  Subject: Prescription Question    Can you re-fill my prescription Atorvastatin 10 mg? [facility]  Thanks!

## 2021-01-15 DIAGNOSIS — R06.02 SOB (SHORTNESS OF BREATH): ICD-10-CM

## 2021-01-15 RX ORDER — ALBUTEROL SULFATE 2.5 MG/3ML
2.5 SOLUTION RESPIRATORY (INHALATION) EVERY 6 HOURS PRN
Qty: 120 VIAL | Refills: 2 | Status: SHIPPED | OUTPATIENT
Start: 2021-01-15

## 2021-01-21 ENCOUNTER — TELEPHONE (OUTPATIENT)
Dept: FAMILY MEDICINE CLINIC | Facility: HOSPITAL | Age: 51
End: 2021-01-21

## 2021-01-21 NOTE — TELEPHONE ENCOUNTER
Pharmacist at [facility] wants to clarify if he should be on both albuterol 0 083% nebulizer solution, as well as the duo-neb  He just had the Duo-neb filled at his local pharmacy, but we just sent the albuterol to [facility]      Please advise,       Call back [facility] at [phone] invoice # [id number]

## 2021-01-28 LAB
LEFT EYE DIABETIC RETINOPATHY: NORMAL
RIGHT EYE DIABETIC RETINOPATHY: NORMAL

## 2021-03-08 ENCOUNTER — TELEPHONE (OUTPATIENT)
Dept: FAMILY MEDICINE CLINIC | Facility: HOSPITAL | Age: 51
End: 2021-03-08

## 2021-03-08 NOTE — TELEPHONE ENCOUNTER
Complete labs were just done in Oct and only need to be done once a year - just FBS/A1C need to be done as ordered

## 2021-03-08 NOTE — TELEPHONE ENCOUNTER
Does pt need any more labs ordered than already are? Pt needs labs to be drawn before next apt   They would like copies mailed to them please

## 2021-03-10 DIAGNOSIS — Z23 ENCOUNTER FOR IMMUNIZATION: ICD-10-CM

## 2021-03-12 DIAGNOSIS — E11.9 CONTROLLED TYPE 2 DIABETES MELLITUS WITHOUT COMPLICATION, WITH LONG-TERM CURRENT USE OF INSULIN (HCC): ICD-10-CM

## 2021-03-12 DIAGNOSIS — Z79.4 CONTROLLED TYPE 2 DIABETES MELLITUS WITHOUT COMPLICATION, WITH LONG-TERM CURRENT USE OF INSULIN (HCC): ICD-10-CM

## 2021-03-12 RX ORDER — CANAGLIFLOZIN 100 MG/1
TABLET, FILM COATED ORAL
Qty: 180 TABLET | Refills: 3 | Status: SHIPPED | OUTPATIENT
Start: 2021-03-12 | End: 2021-08-02 | Stop reason: SDUPTHER

## 2021-04-04 DIAGNOSIS — K21.9 GASTROESOPHAGEAL REFLUX DISEASE: ICD-10-CM

## 2021-04-07 RX ORDER — PANTOPRAZOLE SODIUM 40 MG/1
TABLET, DELAYED RELEASE ORAL
Qty: 90 TABLET | Refills: 3 | OUTPATIENT
Start: 2021-04-07

## 2021-04-17 DIAGNOSIS — I10 ESSENTIAL HYPERTENSION: ICD-10-CM

## 2021-04-17 DIAGNOSIS — E11.65 UNCONTROLLED TYPE 2 DIABETES MELLITUS WITH HYPERGLYCEMIA (HCC): ICD-10-CM

## 2021-04-17 RX ORDER — OLMESARTAN MEDOXOMIL 20 MG/1
TABLET ORAL
Qty: 90 TABLET | Refills: 3 | Status: SHIPPED | OUTPATIENT
Start: 2021-04-17 | End: 2022-04-12

## 2021-05-11 LAB
EST. AVERAGE GLUCOSE BLD GHB EST-MCNC: 203 MG/DL
GLUCOSE SERPL-MCNC: 174 MG/DL (ref 65–99)
HBA1C MFR BLD: 8.7 % (ref 4.8–5.6)

## 2021-05-14 ENCOUNTER — OFFICE VISIT (OUTPATIENT)
Dept: FAMILY MEDICINE CLINIC | Facility: HOSPITAL | Age: 51
End: 2021-05-14
Payer: COMMERCIAL

## 2021-05-14 VITALS
SYSTOLIC BLOOD PRESSURE: 132 MMHG | TEMPERATURE: 97.1 F | WEIGHT: 269 LBS | HEIGHT: 68 IN | DIASTOLIC BLOOD PRESSURE: 86 MMHG | BODY MASS INDEX: 40.77 KG/M2 | HEART RATE: 75 BPM

## 2021-05-14 DIAGNOSIS — J45.40 MODERATE PERSISTENT ASTHMA WITHOUT COMPLICATION: ICD-10-CM

## 2021-05-14 DIAGNOSIS — I10 ESSENTIAL HYPERTENSION: ICD-10-CM

## 2021-05-14 DIAGNOSIS — E11.65 UNCONTROLLED TYPE 2 DIABETES MELLITUS WITH HYPERGLYCEMIA (HCC): ICD-10-CM

## 2021-05-14 DIAGNOSIS — E11.65 UNCONTROLLED TYPE 2 DIABETES MELLITUS WITH HYPERGLYCEMIA (HCC): Primary | ICD-10-CM

## 2021-05-14 DIAGNOSIS — G47.33 OSA ON CPAP: ICD-10-CM

## 2021-05-14 DIAGNOSIS — Z99.89 OSA ON CPAP: ICD-10-CM

## 2021-05-14 DIAGNOSIS — Z23 ENCOUNTER FOR IMMUNIZATION: ICD-10-CM

## 2021-05-14 PROBLEM — Z79.4 CONTROLLED TYPE 2 DIABETES MELLITUS WITH HYPERGLYCEMIA, WITH LONG-TERM CURRENT USE OF INSULIN (HCC): Status: RESOLVED | Noted: 2019-05-21 | Resolved: 2021-05-14

## 2021-05-14 PROCEDURE — 99214 OFFICE O/P EST MOD 30 MIN: CPT | Performed by: INTERNAL MEDICINE

## 2021-05-14 PROCEDURE — 90732 PPSV23 VACC 2 YRS+ SUBQ/IM: CPT

## 2021-05-14 PROCEDURE — 90471 IMMUNIZATION ADMIN: CPT

## 2021-05-14 RX ORDER — FLUTICASONE FUROATE, UMECLIDINIUM BROMIDE AND VILANTEROL TRIFENATATE 100; 62.5; 25 UG/1; UG/1; UG/1
1 POWDER RESPIRATORY (INHALATION) DAILY
Start: 2021-05-14

## 2021-05-14 RX ORDER — INSULIN GLARGINE 100 [IU]/ML
20 INJECTION, SOLUTION SUBCUTANEOUS DAILY
Qty: 15 ML | Refills: 3 | Status: SHIPPED | OUTPATIENT
Start: 2021-05-14 | End: 2021-05-14

## 2021-05-14 RX ORDER — INSULIN GLARGINE 100 [IU]/ML
30 INJECTION, SOLUTION SUBCUTANEOUS DAILY
Qty: 15 ML | Refills: 3
Start: 2021-05-14 | End: 2021-07-21 | Stop reason: SDUPTHER

## 2021-05-14 NOTE — PATIENT INSTRUCTIONS
Obesity   AMBULATORY CARE:   Obesity  is when your body mass index (BMI) is greater than 30  Your healthcare provider will use your height and weight to measure your BMI  The risks of obesity include  many health problems, such as injuries or physical disability  You may need tests to check for the following:  · Diabetes    · High blood pressure or high cholesterol    · Heart disease    · Gallbladder or liver disease    · Cancer of the colon, breast, prostate, liver, or kidney    · Sleep apnea    · Arthritis or gout    Seek care immediately if:   · You have a severe headache, confusion, or difficulty speaking  · You have weakness on one side of your body  · You have chest pain, sweating, or shortness of breath  Contact your healthcare provider if:   · You have symptoms of gallbladder or liver disease, such as pain in your upper abdomen  · You have knee or hip pain and discomfort while walking  · You have symptoms of diabetes, such as intense hunger and thirst, and frequent urination  · You have symptoms of sleep apnea, such as snoring or daytime sleepiness  · You have questions or concerns about your condition or care  Treatment for obesity  focuses on helping you lose weight to improve your health  Even a small decrease in BMI can reduce the risk for many health problems  Your healthcare provider will help you set a weight-loss goal   · Lifestyle changes  are the first step in treating obesity  These include making healthy food choices and getting regular physical activity  Your healthcare provider may suggest a weight-loss program that involves coaching, education, and therapy  · Medicine  may help you lose weight when it is used with a healthy diet and physical activity  · Surgery  can help you lose weight if you are very obese and have other health problems  There are several types of weight-loss surgery  Ask your healthcare provider for more information      Be successful losing weight:   · Set small, realistic goals  An example of a small goal is to walk for 20 minutes 5 days a week  Anther goal is to lose 5% of your body weight  · Tell friends, family members, and coworkers about your goals  and ask for their support  Ask a friend to lose weight with you, or join a weight-loss support group  · Identify foods or triggers that may cause you to overeat , and find ways to avoid them  Remove tempting high-calorie foods from your home and workplace  Place a bowl of fresh fruit on your kitchen counter  If stress causes you to eat, then find other ways to cope with stress  · Keep a diary to track what you eat and drink  Also write down how many minutes of physical activity you do each day  Weigh yourself once a week and record it in your diary  Eating changes: You will need to eat 500 to 1,000 fewer calories each day than you currently eat to lose 1 to 2 pounds a week  The following changes will help you cut calories:  · Eat smaller portions  Use small plates, no larger than 9 inches in diameter  Fill your plate half full of fruits and vegetables  Measure your food using measuring cups until you know what a serving size looks like  · Eat 3 meals and 1 or 2 snacks each day  Plan your meals in advance  Saundra Knox and eat at home most of the time  Eat slowly  Do not skip meals  Skipping meals can lead to overeating later in the day  This can make it harder for you to lose weight  Talk with a dietitian to help you make a meal plan and schedule that is right for you  · Eat fruits and vegetables at every meal   They are low in calories and high in fiber, which makes you feel full  Do not add butter, margarine, or cream sauce to vegetables  Use herbs to season steamed vegetables  · Eat less fat and fewer fried foods  Eat more baked or grilled chicken and fish  These protein sources are lower in calories and fat than red meat  Limit fast food   Dress your salads with olive oil and vinegar instead of bottled dressing  · Limit the amount of sugar you eat  Do not drink sugary beverages  Limit alcohol  Activity changes:  Physical activity is good for your body in many ways  It helps you burn calories and build strong muscles  It decreases stress and depression, and improves your mood  It can also help you sleep better  Talk to your healthcare provider before you begin an exercise program   · Exercise for at least 30 minutes 5 days a week  Start slowly  Set aside time each day for physical activity that you enjoy and that is convenient for you  It is best to do both weight training and an activity that increases your heart rate, such as walking, bicycling, or swimming  · Find ways to be more active  Do yard work and housecleaning  Walk up the stairs instead of using elevators  Spend your leisure time going to events that require walking, such as outdoor festivals or fairs  This extra physical activity can help you lose weight and keep it off  Follow up with your healthcare provider as directed: You may need to meet with a dietitian  Write down your questions so you remember to ask them during your visits  © Copyright [address] [place] [address] Drive Information is for End User's use only and may not be sold, redistributed or otherwise used for commercial purposes  All illustrations and images included in CareNotes® are the copyrighted property of A D [employer] M [employer]  or [address] [place] [address]  The above information is an  only  It is not intended as medical advice for individual conditions or treatments  Talk to your doctor, nurse or pharmacist before following any medical regimen to see if it is safe and effective for you  Heart Healthy Diet   AMBULATORY CARE:   A heart healthy diet  is an eating plan low in unhealthy fats and sodium (salt)  The plan is high in healthy fats and fiber   A heart healthy diet helps improve your cholesterol levels and lowers your risk for heart disease and stroke  A dietitian will teach you how to read and understand food labels  Heart healthy diet guidelines to follow:   · Choose foods that contain healthy fats  ? Unsaturated fats  include monounsaturated and polyunsaturated fats  Unsaturated fat is found in foods such as soybean, canola, olive, corn, and safflower oils  It is also found in soft tub margarine that is made with liquid vegetable oil  ? Omega-3 fat  is found in certain fish, such as salmon, tuna, and trout, and in walnuts and flaxseed  Eat fish high in omega-3 fats at least 2 times a week  · Get 20 to 30 grams of fiber each day  Fruits, vegetables, whole-grain foods, and legumes (cooked beans) are good sources of fiber  · Limit or do not have unhealthy fats  ? Cholesterol  is found in animal foods, such as eggs and lobster, and in dairy products made from whole milk  Limit cholesterol to less than 200 mg each day  ? Saturated fat  is found in meats, such as cruz and hamburger  It is also found in chicken or turkey skin, whole milk, and butter  Limit saturated fat to less than 7% of your total daily calories  ? Trans fat  is found in packaged foods, such as potato chips and cookies  It is also in hard margarine, some fried foods, and shortening  Do not eat foods that contain trans fats  · Limit sodium as directed  You may be told to limit sodium to 2,000 to 2,300 mg each day  Choose low-sodium or no-salt-added foods  Add little or no salt to food you prepare  Use herbs and spices in place of salt  Include the following in your heart healthy plan:  Ask your dietitian or healthcare provider how many servings to have from each of the following food groups:  · Grains:      ? Whole-wheat breads, cereals, and pastas, and brown rice    ? Low-fat, low-sodium crackers and chips    · Vegetables:      ? Broccoli, green beans, green peas, and spinach    ? Collards, kale, and lima beans    ?  Carrots, sweet potatoes, tomatoes, and peppers    ? Canned vegetables with no salt added    · Fruits:      ? Bananas, peaches, pears, and pineapple    ? Grapes, raisins, and dates    ? Oranges, tangerines, grapefruit, orange juice, and grapefruit juice    ? Apricots, mangoes, melons, and papaya    ? Raspberries and strawberries    ? Canned fruit with no added sugar    · Low-fat dairy:      ? Nonfat (skim) milk, 1% milk, and low-fat almond, cashew, or soy milks fortified with calcium    ? Low-fat cheese, regular or frozen yogurt, and cottage cheese    · Meats and proteins:      ? Lean cuts of beef and pork (loin, leg, round), skinless chicken and turkey    ? Legumes, soy products, egg whites, or nuts    Limit or do not include the following in your heart healthy plan:   · Unhealthy fats and oils:      ? Whole or 2% milk, cream cheese, sour cream, or cheese    ? High-fat cuts of beef (T-bone steaks, ribs), chicken or turkey with skin, and organ meats such as liver    ? Butter, stick margarine, shortening, and cooking oils such as coconut or palm oil    · Foods and liquids high in sodium:      ? Packaged foods, such as frozen dinners, cookies, macaroni and cheese, and cereals with more than 300 mg of sodium per serving    ? Vegetables with added sodium, such as instant potatoes, vegetables with added sauces, or regular canned vegetables    ? Cured or smoked meats, such as hot dogs, cruz, and sausage    ? High-sodium ketchup, barbecue sauce, salad dressing, pickles, olives, soy sauce, or miso    · Foods and liquids high in sugar:      ? Candy, cake, cookies, pies, or doughnuts    ? Soft drinks (soda), sports drinks, or sweetened tea    ? Canned or dry mixes for cakes, soups, sauces, or gravies    Other healthy heart guidelines:   · Do not smoke  Nicotine and other chemicals in cigarettes and cigars can cause lung and heart damage  Ask your healthcare provider for information if you currently smoke and need help to quit   E-cigarettes or smokeless tobacco still contain nicotine  Talk to your healthcare provider before you use these products  · Limit or do not drink alcohol as directed  Alcohol can damage your heart and raise your blood pressure  Your healthcare provider may give you specific daily and weekly limits  The general recommended limit is 1 drink a day for women 21 or older and for men 72 or older  Do not have more than 3 drinks in a day or 7 in a week  The recommended limit is 2 drinks a day for men 24to 59years of age  Do not have more than 4 drinks in a day or 14 in a week  A drink of alcohol is 12 ounces of beer, 5 ounces of wine, or 1½ ounces of liquor  · Exercise regularly  Exercise can help you maintain a healthy weight and improve your blood pressure and cholesterol levels  Regular exercise can also decrease your risk for heart problems  Ask your healthcare provider about the best exercise plan for you  Do not start an exercise program without asking your healthcare provider  Follow up with your doctor or cardiologist as directed:  Write down your questions so you remember to ask them during your visits  © Copyright [address] Information is for End User's use only and may not be sold, redistributed or otherwise used for commercial purposes  All illustrations and images included in CareNotes® are the copyrighted property of [employer] M [employer] Inc  or [address] [place] [address]  The above information is an  only  It is not intended as medical advice for individual conditions or treatments  Talk to your doctor, nurse or pharmacist before following any medical regimen to see if it is safe and effective for you

## 2021-05-14 NOTE — PROGRESS NOTES
Assessment/Plan:    Uncontrolled type 2 diabetes mellitus with hyperglycemia (HCC)    Lab Results   Component Value Date    HGBA1C 8 7 (H) 05/10/2021   DM type 2 with hyperglycemia and neuropathy - uncontrolled with A1C 8 7 - urged to get on track with diet and get back to exercise and get wgt down, increase Lantus from 26 U to 30 U, start check BS 2 times daily and call with readings in 2 wks, on ARB and statin, foot exam done today, UTD on eye exam (3/21)    Asthma  No recent exacerbations, Saw Pulm and had Advair and Spiriva changed to Trelegy, med list updated today, notes improved symptom control with new inhaler, con't meds and f/u as per Pulm    DIANA on CPAP  On CPAP 9 cm H20, admits to not using nightly but has started back on more of a regular basis recently, importance of DIANA tx reviewed, diet/exercise/wgt loss encouraged    Essential hypertension  BP at goal, con't current meds, diet/exercise/wgt loss encouraged       Diagnoses and all orders for this visit:    Uncontrolled type 2 diabetes mellitus with hyperglycemia (HCC)  -     insulin glargine (Lantus SoloStar) 100 units/mL injection pen; Inject 30 Units under the skin daily  -     Comprehensive metabolic panel; Future  -     Hemoglobin A1C; Future  -     Comprehensive metabolic panel  -     Hemoglobin A1C    Moderate persistent asthma without complication  -     fluticasone-umeclidinium-vilanterol (Trelegy Ellipta) 100-62 5-25 MCG/INH inhaler; Inhale 1 puff daily Rinse mouth after use  DIANA on CPAP    Essential hypertension  -     Comprehensive metabolic panel; Future  -     Hemoglobin A1C; Future  -     Comprehensive metabolic panel  -     Hemoglobin A1C      Colonoscopy 10/19 - 10 yrs    FLP 10/20  PSA 10/20    He has had both COVID vaccine    He is agreeable to pneumonia vaccine        Subjective:      Patient ID: Charito Delcid is a 44 y o  male      HPI Pt here for follow up appt and BW results    BW results were d/w pt in detail: FBS/A1c 174/8 7  Def of controlled vs uncontrolled DM was reviewed  Diet was reviewed - wgt up 12 lbs  BMI reviewed  He is taking his DM meds as directed - he admits he increased his Lantus from 20 U to 26 U a few mos ago  He notes no low readings  He admits he is not checking his sugars at home regularly  He is UTD on his eye exam (1/21) but is overdue for his foot exam (3/20)  Upon review of chart he had a foot exam with podiatry 3/21  He is on statin and ARB  Pt saw Pulm (Dr Niels Medellin) in March for f/u asthma and DIANA - OV note reviewed  Pt was told to stop his Advair and Spiriva and was switched to Trelegy  He is taking the Trelegy and feels it is working well  He notes some SOB with exertion  He has a very rare mild cough in the am   He notes no wheezing recently  He was told to con't his PAP tx at 9 cm H20  He is using is more regularly now "I was off it for a little while"  He was told to f/u in Oct 2021  BP at goal today and meds were reviewed and no changes have occurred  He denies missing doses of meds or SE with the meds  He does not check his BP outside the office  He notes no frequent Ha's/dizziness/double vision/CP  Colonoscopy 10/19 - 10 yrs    FLP 10/20  PSA 10/20    He has had both COVID vaccine    He is agreeable to pneumonia vaccine      Review of Systems   Constitutional: Negative for chills and fever  HENT: Negative for congestion and sinus pain  Eyes: Negative for pain and visual disturbance  Respiratory: Positive for cough and shortness of breath  Cardiovascular: Negative for chest pain, palpitations and leg swelling  Gastrointestinal: Negative for abdominal pain, blood in stool, constipation, diarrhea, nausea and vomiting  Endocrine: Negative for polydipsia and polyuria  Genitourinary: Negative for difficulty urinating and dysuria  Musculoskeletal: Positive for arthralgias  Negative for myalgias  Skin: Positive for rash  Negative for wound  Neurological: Negative for dizziness and headaches  Hematological: Does not bruise/bleed easily  Psychiatric/Behavioral: Negative for behavioral problems and confusion  Objective:    /86   Pulse 75   Temp (!) 97 1 °F (36 2 °C)   Ht 5' 8" (1 727 m)   Wt 122 kg (269 lb)   BMI 40 90 kg/m²      Physical Exam  Vitals signs and nursing note reviewed  Constitutional:       General: He is not in acute distress  Appearance: He is well-developed  He is obese  He is not ill-appearing  HENT:      Head: Normocephalic and atraumatic  Eyes:      General:         Right eye: No discharge  Left eye: No discharge  Conjunctiva/sclera: Conjunctivae normal    Neck:      Musculoskeletal: Neck supple  Trachea: No tracheal deviation  Cardiovascular:      Rate and Rhythm: Normal rate and regular rhythm  Pulses: no weak pulses          Dorsalis pedis pulses are 2+ on the right side and 2+ on the left side  Heart sounds: No murmur  Pulmonary:      Effort: Pulmonary effort is normal  No respiratory distress  Breath sounds: Normal breath sounds  No wheezing, rhonchi or rales  Abdominal:      General: There is no distension  Palpations: Abdomen is soft  Tenderness: There is no abdominal tenderness  There is no guarding or rebound  Musculoskeletal:         General: No tenderness or deformity  Feet:    Feet:      Right foot:      Skin integrity: Callus present  No ulcer, skin breakdown, erythema, warmth or dry skin  Left foot:      Skin integrity: Callus present  No ulcer, skin breakdown, erythema, warmth or dry skin  Skin:     General: Skin is warm and dry  Coloration: Skin is not pale  Comments: Diffuse generalized psoriatic plaque   Neurological:      General: No focal deficit present  Mental Status: He is alert  Mental status is at baseline  Motor: No abnormal muscle tone        Gait: Gait normal    Psychiatric:         Mood and Affect: Mood normal          Behavior: Behavior normal          Thought Content: Thought content normal          Judgment: Judgment normal        Patient's shoes and socks removed  Right Foot/Ankle   Right Foot Inspection  Skin Exam: skin normal, skin intact, callus and callus no dry skin, no warmth, no erythema, no maceration, no abnormal color, no pre-ulcer and no ulcer                          Toe Exam: ROM and strength within normal limits  Sensory   Vibration: intact    Monofilament testing: diminished  Vascular    The right DP pulse is 2+  Left Foot/Ankle  Left Foot Inspection  Skin Exam: skin normal, skin intact and callusno dry skin, no warmth, no erythema, no maceration, normal color, no pre-ulcer and no ulcer                         Toe Exam: ROM and strength within normal limits                   Sensory   Vibration: intact    Monofilament: diminished  Vascular    The left DP pulse is 2+  Assign Risk Category:  No deformity present; No loss of protective sensation; No weak pulses       Risk: 0        BMI Counseling: Body mass index is 40 9 kg/m²  The BMI is above normal  Nutrition recommendations include reducing portion sizes, 3-5 servings of fruits/vegetables daily, consuming healthier snacks, moderation in carbohydrate intake, increasing intake of lean protein and reducing intake of saturated fat and trans fat  Exercise recommendations include exercising 3-5 times per week

## 2021-06-08 DIAGNOSIS — E11.65 UNCONTROLLED TYPE 2 DIABETES MELLITUS WITH HYPERGLYCEMIA (HCC): ICD-10-CM

## 2021-06-08 RX ORDER — GLIPIZIDE 10 MG/1
TABLET, FILM COATED, EXTENDED RELEASE ORAL
Qty: 90 TABLET | Refills: 3 | Status: SHIPPED | OUTPATIENT
Start: 2021-06-08 | End: 2022-06-03

## 2021-06-15 DIAGNOSIS — E11.65 UNCONTROLLED TYPE 2 DIABETES MELLITUS WITH HYPERGLYCEMIA (HCC): ICD-10-CM

## 2021-06-15 RX ORDER — ATORVASTATIN CALCIUM 10 MG/1
TABLET, FILM COATED ORAL
Qty: 90 TABLET | Refills: 3 | Status: SHIPPED | OUTPATIENT
Start: 2021-06-15 | End: 2022-06-10

## 2021-06-27 ENCOUNTER — NURSE TRIAGE (OUTPATIENT)
Dept: OTHER | Facility: OTHER | Age: 51
End: 2021-06-27

## 2021-06-27 NOTE — TELEPHONE ENCOUNTER
Regarding: covid/ Symptomatic/ Cough  ----- Message from Brayan Bullock sent at 6/27/2021 10:29 AM EDT -----  "Lucero Weiss been having a cough for a few days, I also have a head ache, and asthma flare up "

## 2021-06-27 NOTE — TELEPHONE ENCOUNTER
Reason for Disposition   [1] Continuous (nonstop) coughing interferes with work or school AND [2] no improvement using cough treatment per protocol    Answer Assessment - Initial Assessment Questions  1  ONSET: "When did the cough begin?"       A week ago   2  SEVERITY: "How bad is the cough today?"       Persisted cough today   3  SPUTUM: "Describe the color of your sputum" (none, dry cough; clear, white, yellow, green)      Yellow color   4  HEMOPTYSIS: "Are you coughing up any blood?" If so ask: "How much?" (flecks, streaks, tablespoons, etc )      No   5  DIFFICULTY BREATHING: "Are you having difficulty breathing?" If Yes, ask: "How bad is it?" (e g , mild, moderate, severe)     - MILD: No SOB at rest, mild SOB with walking, speaks normally in sentences, can lay down, no retractions, pulse < 100      - MODERATE: SOB at rest, SOB with minimal exertion and prefers to sit, cannot lie down flat, speaks in phrases, mild retractions, audible wheezing, pulse 100-120      - SEVERE: Very SOB at rest, speaks in single words, struggling to breathe, sitting hunched forward, retractions, pulse > 120      Mild, patient has hx of asthma, using albuterol nebulizer   6  FEVER: "Do you have a fever?" If Yes, ask: "What is your temperature, how was it measured, and when did it start?"      No   7  CARDIAC HISTORY: "Do you have any history of heart disease?" (e g , heart attack, congestive heart failure)       No   8  LUNG HISTORY: "Do you have any history of lung disease?"  (e g , pulmonary embolus, asthma, emphysema)     Asthma   9  PE RISK FACTORS: "Do you have a history of blood clots?" (or: recent major surgery, recent prolonged travel, bedridden)      No   10  OTHER SYMPTOMS: "Do you have any other symptoms?" (e g , runny nose, wheezing, chest pain)        Sight wheezing relived with nebulizer treatment     12   TRAVEL: "Have you traveled out of the country in the last month?" (e g , travel history, exposures) No    Protocols used: COUGH - ACUTE NON-PRODUCTIVE-ADULT-AH

## 2021-06-28 ENCOUNTER — OFFICE VISIT (OUTPATIENT)
Dept: FAMILY MEDICINE CLINIC | Facility: HOSPITAL | Age: 51
End: 2021-06-28
Payer: COMMERCIAL

## 2021-06-28 ENCOUNTER — OFFICE VISIT (OUTPATIENT)
Dept: PODIATRY | Facility: CLINIC | Age: 51
End: 2021-06-28
Payer: COMMERCIAL

## 2021-06-28 VITALS
DIASTOLIC BLOOD PRESSURE: 70 MMHG | WEIGHT: 265.4 LBS | OXYGEN SATURATION: 95 % | SYSTOLIC BLOOD PRESSURE: 118 MMHG | HEIGHT: 68 IN | HEART RATE: 82 BPM | BODY MASS INDEX: 40.22 KG/M2 | TEMPERATURE: 98.2 F

## 2021-06-28 VITALS
HEIGHT: 68 IN | DIASTOLIC BLOOD PRESSURE: 78 MMHG | SYSTOLIC BLOOD PRESSURE: 115 MMHG | BODY MASS INDEX: 39.92 KG/M2 | HEART RATE: 78 BPM | WEIGHT: 263.4 LBS

## 2021-06-28 DIAGNOSIS — B96.89 ACUTE BACTERIAL BRONCHITIS: Primary | ICD-10-CM

## 2021-06-28 DIAGNOSIS — E11.65 UNCONTROLLED TYPE 2 DIABETES MELLITUS WITH HYPERGLYCEMIA (HCC): ICD-10-CM

## 2021-06-28 DIAGNOSIS — M21.42 PES PLANUS OF BOTH FEET: ICD-10-CM

## 2021-06-28 DIAGNOSIS — J45.40 MODERATE PERSISTENT ASTHMA WITHOUT COMPLICATION: ICD-10-CM

## 2021-06-28 DIAGNOSIS — M21.41 PES PLANUS OF BOTH FEET: ICD-10-CM

## 2021-06-28 DIAGNOSIS — J20.8 ACUTE BACTERIAL BRONCHITIS: Primary | ICD-10-CM

## 2021-06-28 DIAGNOSIS — M77.41 METATARSALGIA OF BOTH FEET: ICD-10-CM

## 2021-06-28 DIAGNOSIS — M77.42 METATARSALGIA OF BOTH FEET: ICD-10-CM

## 2021-06-28 DIAGNOSIS — E11.65 UNCONTROLLED TYPE 2 DIABETES MELLITUS WITH HYPERGLYCEMIA (HCC): Primary | ICD-10-CM

## 2021-06-28 PROCEDURE — 99204 OFFICE O/P NEW MOD 45 MIN: CPT | Performed by: PODIATRIST

## 2021-06-28 PROCEDURE — 99214 OFFICE O/P EST MOD 30 MIN: CPT | Performed by: FAMILY MEDICINE

## 2021-06-28 RX ORDER — AZITHROMYCIN 250 MG/1
TABLET, FILM COATED ORAL
Qty: 6 TABLET | Refills: 0 | Status: SHIPPED | OUTPATIENT
Start: 2021-06-28 | End: 2021-07-03

## 2021-06-28 RX ORDER — ESOMEPRAZOLE MAGNESIUM 40 MG/1
CAPSULE, DELAYED RELEASE ORAL
COMMUNITY
End: 2021-11-17 | Stop reason: ALTCHOICE

## 2021-06-28 RX ORDER — FLUTICASONE FUROATE, UMECLIDINIUM BROMIDE AND VILANTEROL TRIFENATATE 100; 62.5; 25 UG/1; UG/1; UG/1
POWDER RESPIRATORY (INHALATION)
COMMUNITY
End: 2021-06-28 | Stop reason: SDUPTHER

## 2021-06-28 RX ORDER — AZITHROMYCIN 250 MG/1
TABLET, FILM COATED ORAL
Qty: 6 TABLET | Refills: 0 | Status: SHIPPED | OUTPATIENT
Start: 2021-06-28 | End: 2021-06-28 | Stop reason: SDUPTHER

## 2021-06-28 NOTE — PROGRESS NOTES
Assessment/Plan:      Problem List Items Addressed This Visit        Endocrine    Uncontrolled type 2 diabetes mellitus with hyperglycemia (HCC)       Respiratory    Asthma    Relevant Medications    azithromycin (ZITHROMAX) 250 mg tablet      Other Visit Diagnoses     Acute bacterial bronchitis    -  Primary    Relevant Medications    azithromycin (ZITHROMAX) 250 mg tablet           Plan/Discussion:  Patient with initial improvement then recurrence o f sytmpoms consistent with acute bronchitis  With underlying diabetes and asthma  Will treat as bacterial bronchitis  Given azithromycin  Monitor for exacerbation of asthma  He will call later this week if needed  Will need to consider short course of prednisone  Diabetes  Uncontrolled but improving  Asthma  No exacerbation but need to monitor  Continue with same inhalers and prn abluterol  Subjective:   Chief Complaint   Patient presents with    Cough    Asthma        Patient ID: Martha Kang is a 46 y o  male  Patient with sick members of the household  Son tested negative for covid  He has had covid vaccine x 2  Known uncontrolled Dm but improving and moderate persistent Asthma  Started cough and cold sytmpoms > 1 week  Initial improvement  With recurrence over the past 3 days  Increase coughing and sputum production  Ongoing headache  Some sinus congestion  Mild sore throat and PND  No significant wheezing  No worsening of sob  No chest pain  The following portions of the patient's history were reviewed and updated as appropriate: allergies, current medications, past family history, past medical history, past social history, past surgical history and problem list     Review of Systems   Constitutional: Positive for activity change  Negative for appetite change, chills and diaphoresis  HENT: Positive for congestion and sinus pressure  Negative for dental problem      Respiratory: Positive for cough  Negative for apnea, chest tightness, shortness of breath and wheezing  Cardiovascular: Negative  Negative for chest pain, palpitations and leg swelling  Gastrointestinal: Negative  Negative for abdominal distention, abdominal pain, constipation, diarrhea and nausea  Genitourinary: Negative  Negative for difficulty urinating, dysuria and frequency  Objective:  Vitals:    06/28/21 1025   BP: 118/70   Pulse: 82   Temp: 98 2 °F (36 8 °C)   SpO2: 95%   Weight: 120 kg (265 lb 6 4 oz)   Height: 5' 8" (1 727 m)     BP Readings from Last 6 Encounters:   06/28/21 118/70   05/14/21 132/86   10/13/20 134/84   08/11/20 128/68   06/25/20 130/78   06/07/20 119/61      Wt Readings from Last 6 Encounters:   06/28/21 120 kg (265 lb 6 4 oz)   05/14/21 122 kg (269 lb)   10/13/20 116 kg (256 lb 3 2 oz)   08/11/20 111 kg (245 lb 9 6 oz)   06/25/20 109 kg (241 lb)   06/07/20 113 kg (249 lb 1 9 oz)             Physical Exam  Vitals and nursing note reviewed  Constitutional:       General: He is not in acute distress  Appearance: He is well-developed  He is obese  He is not ill-appearing  HENT:      Head: Normocephalic and atraumatic  Right Ear: Tympanic membrane, ear canal and external ear normal       Left Ear: Tympanic membrane, ear canal and external ear normal       Nose: Mucosal edema, congestion and rhinorrhea present  Right Sinus: No maxillary sinus tenderness or frontal sinus tenderness  Left Sinus: No maxillary sinus tenderness or frontal sinus tenderness  Mouth/Throat:      Lips: Pink  Mouth: Mucous membranes are moist       Pharynx: Oropharynx is clear  No oropharyngeal exudate, posterior oropharyngeal erythema or uvula swelling  Tonsils: No tonsillar exudate  Eyes:      Extraocular Movements: Extraocular movements intact  Conjunctiva/sclera: Conjunctivae normal       Pupils: Pupils are equal, round, and reactive to light     Cardiovascular:      Rate and Rhythm: Normal rate and regular rhythm  Heart sounds: Normal heart sounds  No murmur heard  No friction rub  No gallop  Pulmonary:      Effort: Pulmonary effort is normal  No respiratory distress  Breath sounds: Normal breath sounds  No wheezing or rales  Chest:      Chest wall: No tenderness  Abdominal:      General: Bowel sounds are normal  There is no distension  Palpations: Abdomen is soft  There is no mass  Tenderness: There is no abdominal tenderness  There is no guarding or rebound  Musculoskeletal:         General: Normal range of motion  Cervical back: Normal range of motion and neck supple  Skin:     General: Skin is warm  Capillary Refill: Capillary refill takes less than 2 seconds  Neurological:      Mental Status: He is alert and oriented to person, place, and time     Psychiatric:         Mood and Affect: Mood normal          Behavior: Behavior normal

## 2021-06-28 NOTE — PROGRESS NOTES
PATIENT:  Andrea Alvarado    1970    ASSESSMENT:     1  Uncontrolled type 2 diabetes mellitus with hyperglycemia (HCC)  Diabetic Shoe    Diabetic Shoe Inserts    Device prior authorization   2  Metatarsalgia of both feet  Diabetic Shoe    Diabetic Shoe Inserts    Device prior authorization   3  Pes planus of both feet  Diabetic Shoe    Diabetic Shoe Inserts    Device prior authorization         PLAN:  1  Patient was counseled on the condition and diagnosis  2   Educated disease prevention and risks related to diabetes  3   Educated proper daily foot care and exam   Instructed proper skin care / protection and footwear  Instructed to identify any signs of infection and related foot problem  4   Reviewed the recent note from PCP  The recent blood work was reviewed and the last HbA1c was 8 7  Discussed proper blood glucose control with diet and exercise  5   He has metatarsalgia which could be due to MPJ arthritis  It is possibly related to psoriasis  Recommended diabetic shoes  Referred him to  for the shoes  6   Will also check his insurance for possible orthotic coverage  7   The patient will return in 2 months for diabetic foot exam       Subjective:        HPI  The patient presents for diabetic foot evaluation  The patient has diabetes for 17 years  The blood glucose was up from the last blood work  It has been better  The patient denied any history of diabetic foot ulcer, related foot infection, or amputation  No significant numbness or paresthesia  Denied weakness or significant functional deficit  He complained of foot pain, left worse than right for about a couple of months  Pain is around the ball of his feet  Pain increases depending on the activity and shoes  He denies any injury to his feet    He has psoriasis and sees a dermatologist         The following portions of the patient's history were reviewed and updated as appropriate: allergies, current medications, past family history, past medical history, past social history, past surgical history and problem list   All pertinent labs and images were reviewed  Past Medical History  Past Medical History:   Diagnosis Date    Anxiety 2/22/2019    Asthma 5/21/2019    GERD (gastroesophageal reflux disease) 12/19/2013    Hypertriglyceridemia 7/8/2015    Hypothyroidism     Migraine without aura and without status migrainosus, not intractable 5/21/2019    Psoriasis     Seasonal allergies 5/21/2019    Dr Ayala Lyle Vitamin D deficiency        Past Surgical History  Past Surgical History:   Procedure Laterality Date    CATARACT EXTRACTION, BILATERAL      CHOLECYSTECTOMY      UNDESCENDED TESTICLE EXPLORATION          Allergies:  Patient has no known allergies  Medications:  Current Outpatient Medications   Medication Sig Dispense Refill    albuterol (2 5 mg/3 mL) 0 083 % nebulizer solution Take 1 vial (2 5 mg total) by nebulization every 6 (six) hours as needed for wheezing or shortness of breath 120 vial 2    albuterol (PROVENTIL HFA) 90 mcg/act inhaler Inhale 1 puff every 4 (four) hours as needed      alclomethasone (ACLOVATE) 6 37 % cream 1 APPLICATION APPLY ON THE SKIN TWICE A DAY; APPLY TO FACE AS NEEDED FOR FLARES      atorvastatin (LIPITOR) 10 mg tablet TAKE 1 TABLET DAILY 90 tablet 3    azithromycin (ZITHROMAX) 250 mg tablet Take 2 tablets (500 mg total) by mouth every 24 hours for 1 day, THEN 1 tablet (250 mg total) every 24 hours for 4 days   6 tablet 0    Butalbital-APAP-Caffeine -40 MG CAPS Take 1 capsule by mouth every 8 (eight) hours as needed (headache) 30 capsule 0    cetirizine (ZyrTEC) 10 mg tablet Take 1 tablet by mouth daily as needed      cholecalciferol (VITAMIN D3) 1,000 units tablet Take 1 tablet (1,000 Units total) by mouth daily      esomeprazole (NexIUM) 40 MG capsule esomeprazole magnesium 40 mg capsule,delayed release      FLUoxetine (PROzac) 20 MG tablet TAKE 1 TABLET DAILY 90 tablet 3    fluticasone (FLONASE) 50 mcg/act nasal spray USE 2 SPRAYS IN EACH NOSTRIL DAILY 48 g 3    fluticasone-umeclidinium-vilanterol (Trelegy Ellipta) 100-62 5-25 MCG/INH inhaler Inhale 1 puff daily Rinse mouth after use   glipiZIDE (GLUCOTROL XL) 10 mg 24 hr tablet TAKE 1 TABLET DAILY 90 tablet 3    insulin glargine (Lantus SoloStar) 100 units/mL injection pen Inject 30 Units under the skin daily 15 mL 3    Insulin Pen Needle (BD Pen Needle Shanelle U/F) 32G X 4 MM MISC Use to inject under the skin daily  90 each 3    Invokana 100 MG TAKE 2 TABLETS DAILY 180 tablet 3    ipratropium-albuterol (DUO-NEB) 0 5-2 5 mg/3 mL nebulizer solution inhale contents of 1 vial ( 3 milliliters ) in nebulizer by mouth     (REFER TO PRESCRIPTION NOTES)   levothyroxine 50 mcg tablet TAKE 1 TABLET DAILY 90 tablet 3    metFORMIN (GLUCOPHAGE) 1000 MG tablet Take 1,000 mg by mouth 2 (two) times a day      methotrexate 2 5 mg tablet take 3 tablets by mouth every week      montelukast (SINGULAIR) 10 mg tablet take 1 tablet by mouth at bedtime 30 tablet 5    olmesartan (BENICAR) 20 mg tablet TAKE 1 TABLET DAILY 90 tablet 3    pantoprazole (PROTONIX) 40 mg tablet TAKE 1 TABLET DAILY 90 tablet 3    Skyrizi, 150 MG Dose, 75 MG/0 83ML PSKT       ciclopirox (PENLAC) 8 % solution Penlac 8 % topical solution   APPLY TO THE AFFECTED AREA(S) BY TOPICAL ROUTE ONCE DAILY PREFERABLY AT BEDTIME OR 8 HOURS BEFORE WASHING (Patient not taking: Reported on 6/28/2021)      fluorometholone (FML) 0 1 % ophthalmic suspension INSTILL 1 DROP IN BOTH EYES 4 TIMES DAILY FOR 1 WEEK; 2 TIMES ALLISON     (REFER TO PRESCRIPTION NOTES)   (Patient not taking: Reported on 6/28/2021)      ibuprofen (MOTRIN) 600 mg tablet Take 600 mg by mouth every 4 (four) hours as needed (Patient not taking: Reported on 6/28/2021)      meclizine (ANTIVERT) 25 mg tablet Take 25 mg by mouth 3 (three) times a day as needed (Patient not taking: Reported on 6/28/2021)      SUMAtriptan (IMITREX) 100 mg tablet Take 100 mg by mouth once as needed (Patient not taking: Reported on 6/28/2021)      triamcinolone (KENALOG) 0 1 % cream 1 APPLICATION APPLY ON THE SKIN TWICE A DAY; APPLY TO BODY AS NEEDED FOR FLARES (Patient not taking: Reported on 6/28/2021)       No current facility-administered medications for this visit  Social History:  Social History     Socioeconomic History    Marital status: /Civil Union     Spouse name: None    Number of children: None    Years of education: None    Highest education level: None   Occupational History    None   Tobacco Use    Smoking status: Never Smoker    Smokeless tobacco: Never Used   Vaping Use    Vaping Use: Never used   Substance and Sexual Activity    Alcohol use: Not Currently    Drug use: Never    Sexual activity: None   Other Topics Concern    None   Social History Narrative    , full time employment as      Social Determinants of Health     Financial Resource Strain:     Difficulty of Paying Living Expenses:    Food Insecurity:     Worried About [address] in the Last Year:     [address] [employer] in the Last Year:    Transportation Needs:     Lack of Transportation (Medical):  Lack of Transportation (Non-Medical):    Physical Activity:     Days of Exercise per Week:     Minutes of Exercise per Session:    Stress:     Feeling of Stress :    Social Connections:     Frequency of Communication with Friends and Family:     Frequency of Social Gatherings with Friends and Family:     Attends Buddhist Services:     Active Member of Clubs or Organizations:     Attends Club or Organization Meetings:     Marital Status:    Intimate Partner Violence:     Fear of Current or Ex-Partner:     Emotionally Abused:     Physically Abused:     Sexually Abused:         Review of Systems   Constitutional: Negative for appetite change, chills and fever     HENT: Negative for sore throat  Eyes: Negative for visual disturbance  Respiratory: Negative for cough and shortness of breath  Cardiovascular: Negative for chest pain  Gastrointestinal: Negative for diarrhea, nausea and vomiting  Musculoskeletal: Negative for joint swelling  Skin: Positive for rash  Negative for wound  Neurological: Negative for weakness and numbness  Hematological: Negative  Psychiatric/Behavioral: Negative for behavioral problems and confusion  Objective:      /78   Pulse 78   Ht 5' 8" (1 727 m) Comment: verbal  Wt 119 kg (263 lb 6 4 oz)   BMI 40 05 kg/m²          Physical Exam  Vitals reviewed  Constitutional:       General: He is not in acute distress  Appearance: He is obese  He is not toxic-appearing  HENT:      Head: Normocephalic and atraumatic  Eyes:      Extraocular Movements: Extraocular movements intact  Cardiovascular:      Rate and Rhythm: Normal rate and regular rhythm  Pulses: Normal pulses  no weak pulses          Dorsalis pedis pulses are 2+ on the right side and 2+ on the left side  Posterior tibial pulses are 2+ on the right side and 2+ on the left side  Pulmonary:      Effort: Pulmonary effort is normal  No respiratory distress  Musculoskeletal:         General: Deformity present  No signs of injury  Cervical back: Normal range of motion and neck supple  Right foot: No Charcot foot or foot drop  Left foot: No Charcot foot or foot drop  Comments: Pes planus presents, left worse than right  Feet:      Right foot:      Protective Sensation: 10 sites tested  10 sites sensed  Skin integrity: Erythema present  No ulcer, skin breakdown, warmth, callus or dry skin  Left foot:      Protective Sensation: 10 sites tested  Skin integrity: Erythema present  No ulcer, skin breakdown, warmth, callus or dry skin  Skin:     General: Skin is warm        Capillary Refill: Capillary refill takes less than 2 seconds  Coloration: Skin is not cyanotic or mottled  Findings: Rash present  No abscess, ecchymosis or wound  Nails: There is no clubbing  Comments: Rashes in LE and UE from psoriasis  Neurological:      General: No focal deficit present  Mental Status: He is alert and oriented to person, place, and time  Cranial Nerves: No cranial nerve deficit  Sensory: No sensory deficit  Motor: No weakness  Coordination: Coordination normal    Psychiatric:         Mood and Affect: Mood normal          Behavior: Behavior normal          Thought Content: Thought content normal          Judgment: Judgment normal              Diabetic Foot Exam    Patient's shoes and socks removed  Right Foot/Ankle   Right Foot Inspection  Skin Exam: skin normal, skin intact and erythema no dry skin, no warmth, no callus, no maceration, no abnormal color, no pre-ulcer, no ulcer and no callus                          Toe Exam: no swelling, no tenderness, erythema and  no right toe deformity  Sensory   Vibration: intact  Proprioception: intact   Monofilament testing: intact  Vascular  Capillary refills: < 3 seconds  The right DP pulse is 2+  The right PT pulse is 2+  Left Foot/Ankle  Left Foot Inspection  Skin Exam: skin normal, skin intact and erythemano dry skin, no warmth, no maceration, normal color, no pre-ulcer, no ulcer and no callus                         Toe Exam: no swelling, no tenderness, no erythema and no left toe deformity                   Sensory   Vibration: intact  Proprioception: intact  Monofilament: intact  Vascular  Capillary refills: < 3 seconds  The left DP pulse is 2+  The left PT pulse is 2+  Assign Risk Category:  No deformity present; No loss of protective sensation;  No weak pulses       Risk: 0

## 2021-06-28 NOTE — PATIENT INSTRUCTIONS
Foot Care for People with Diabetes   AMBULATORY CARE:   What you need to know about foot care:   · Foot care helps protect your feet and prevent foot ulcers or sores  Long-term high blood sugar levels can damage the blood vessels and nerves in your legs and feet  This damage makes it hard to feel pressure, pain, temperature, and touch  You may not be able to feel a cut or sore, or shoes that are too tight  Foot care is needed to prevent serious problems, such as an infection or amputation  · Diabetes may cause your toes to become crooked or curved under  These changes may affect the way you walk and can lead to increased pressure on your foot  The pressure can decrease blood flow to your feet  Lack of blood flow increases your risk for a foot ulcer  Do not ignore small problems, such as dry skin or small wounds  These can become life-threatening over time without proper care  Call your care team provider if:   · Your feet become numb, weak, or hard to move  · You have pus draining from a sore on your foot  · You have a wound on your foot that gets bigger, deeper, or does not heal      · You see blisters, cuts, scratches, calluses, or sores on your foot  · You have a fever, and your feet become red, warm, and swollen  · Your toenails become thick, curled, or yellow  · You find it hard to check your feet because your vision is poor  · You have questions or concerns about your condition or care  How to care for your feet:   · Check your feet each day  Look at your whole foot, including the bottom, and between and under your toes  Check for wounds, corns, and calluses  Use a mirror to see the bottom of your feet  The skin on your feet may be shiny, tight, or darker than normal  Your feet may also be cold and pale  Feel your feet by running your hands along the tops, bottoms, sides, and between your toes   Redness, swelling, and warmth are signs of blood flow problems that can lead to a foot ulcer  Do not try to remove corns or calluses yourself  · Wash your feet each day with soap and warm water  Do not use hot water, because this can injure your foot  Dry your feet gently with a towel after you wash them  Dry between and under your toes  · Apply lotion or a moisturizer on your dry feet  Ask your care team provider what lotions are best to use  Do not put lotion or moisturizer between your toes  Moisture between your toes could lead to skin breakdown  · Cut your toenails correctly  File or cut your toenails straight across  Use a soft brush to clean around your toenails  If your toenails are very thick, you may need to have a care team provider or specialist cut them  · Protect your feet  Do not walk barefoot or wear your shoes without socks  Check your shoes for rocks or other objects that can hurt your feet  Wear cotton socks to help keep your feet dry  Wear socks without toe seams, or wear them with the seams inside out  Change your socks each day  Do not wear socks that are dirty or damp  · Wear shoes that fit well  Wear shoes that do not rub against any area of your feet  Your shoes should be ½ to ¾ inch (1 to 2 centimeters) longer than your feet  Your shoes should also have extra space around the widest part of your feet  Walking or athletic shoes with laces or straps that adjust are best  Ask your care team provider for help to choose shoes that fit you best  Ask him or her if you need to wear an insert, orthotic, or bandage on your feet  · Go to your follow-up visits  Your care team provider will do a foot exam at least once a year  You may need a foot exam more often if you have nerve damage, foot deformities, or ulcers  He will check for nerve damage and how well you can feel your feet  He will check your shoes to see if they fit well  · Do not smoke  Smoking can damage your blood vessels and put you at increased risk for foot ulcers   Ask your care team provider for information if you currently smoke and need help to quit  E-cigarettes or smokeless tobacco still contain nicotine  Talk to your care team provider before you use these products  Follow up with your diabetes care team provider or foot specialist as directed: You will need to have your feet checked at least once a year  You may need a foot exam more often if you have nerve damage, foot deformities, or ulcers  Write down your questions so you remember to ask them during your visits  © Copyright [address] Information is for End User's use only and may not be sold, redistributed or otherwise used for commercial purposes  All illustrations and images included in CareNotes® are the copyrighted property of A D [employer] M [employer]  or [address] [place], [place] [address]yadira   The above information is an  only  It is not intended as medical advice for individual conditions or treatments  Talk to your doctor, nurse or pharmacist before following any medical regimen to see if it is safe and effective for you

## 2021-07-21 DIAGNOSIS — E11.65 UNCONTROLLED TYPE 2 DIABETES MELLITUS WITH HYPERGLYCEMIA (HCC): ICD-10-CM

## 2021-07-21 RX ORDER — INSULIN GLARGINE 100 [IU]/ML
30 INJECTION, SOLUTION SUBCUTANEOUS DAILY
Qty: 15 ML | Refills: 3
Start: 2021-07-21 | End: 2021-08-12 | Stop reason: SDUPTHER

## 2021-08-01 ENCOUNTER — PATIENT MESSAGE (OUTPATIENT)
Dept: FAMILY MEDICINE CLINIC | Facility: HOSPITAL | Age: 51
End: 2021-08-01

## 2021-08-01 DIAGNOSIS — E11.9 CONTROLLED TYPE 2 DIABETES MELLITUS WITHOUT COMPLICATION, WITH LONG-TERM CURRENT USE OF INSULIN (HCC): ICD-10-CM

## 2021-08-01 DIAGNOSIS — Z79.4 CONTROLLED TYPE 2 DIABETES MELLITUS WITHOUT COMPLICATION, WITH LONG-TERM CURRENT USE OF INSULIN (HCC): ICD-10-CM

## 2021-08-12 ENCOUNTER — TELEPHONE (OUTPATIENT)
Dept: FAMILY MEDICINE CLINIC | Facility: HOSPITAL | Age: 51
End: 2021-08-12

## 2021-08-12 ENCOUNTER — OFFICE VISIT (OUTPATIENT)
Dept: ENDOCRINOLOGY | Facility: HOSPITAL | Age: 51
End: 2021-08-12
Payer: COMMERCIAL

## 2021-08-12 VITALS
SYSTOLIC BLOOD PRESSURE: 110 MMHG | HEART RATE: 66 BPM | HEIGHT: 68 IN | BODY MASS INDEX: 39.16 KG/M2 | DIASTOLIC BLOOD PRESSURE: 70 MMHG | WEIGHT: 258.4 LBS

## 2021-08-12 DIAGNOSIS — E78.1 HYPERTRIGLYCERIDEMIA: ICD-10-CM

## 2021-08-12 DIAGNOSIS — E11.65 UNCONTROLLED TYPE 2 DIABETES MELLITUS WITH HYPERGLYCEMIA (HCC): Primary | ICD-10-CM

## 2021-08-12 DIAGNOSIS — I10 ESSENTIAL HYPERTENSION: ICD-10-CM

## 2021-08-12 DIAGNOSIS — E11.42 DIABETIC POLYNEUROPATHY ASSOCIATED WITH TYPE 2 DIABETES MELLITUS (HCC): ICD-10-CM

## 2021-08-12 DIAGNOSIS — E03.9 ACQUIRED HYPOTHYROIDISM: ICD-10-CM

## 2021-08-12 PROCEDURE — 99205 OFFICE O/P NEW HI 60 MIN: CPT | Performed by: INTERNAL MEDICINE

## 2021-08-12 RX ORDER — CANAGLIFLOZIN 300 MG/1
300 TABLET, FILM COATED ORAL DAILY
Qty: 90 TABLET | Refills: 3 | Status: SHIPPED | OUTPATIENT
Start: 2021-08-12 | End: 2022-06-15 | Stop reason: ALTCHOICE

## 2021-08-12 RX ORDER — INSULIN GLARGINE 100 [IU]/ML
INJECTION, SOLUTION SUBCUTANEOUS
Qty: 45 ML | Refills: 3
Start: 2021-08-12 | End: 2021-08-24 | Stop reason: SDUPTHER

## 2021-08-12 NOTE — PROGRESS NOTES
8/12/2021    Assessment/Plan      Diagnoses and all orders for this visit:    Uncontrolled type 2 diabetes mellitus with hyperglycemia (HCC)  -     Canagliflozin (Invokana) 300 MG TABS; Take 1 tablet (300 mg total) by mouth daily  -     insulin glargine (Lantus SoloStar) 100 units/mL injection pen; 35 units daily, may need to use up to 50 units daily  -     HEMOGLOBIN A1C W/ EAG ESTIMATION Lab Collect; Future  -     Comprehensive metabolic panel Lab Collect; Future  -     TSH, 3rd generation Lab Collect; Future  -     T4, free Lab Collect; Future  -     Microalbumin / creatinine urine ratio Lab Collect; Future  -     CBC and differential Lab Collect; Future  -     Lipid Panel with Direct LDL reflex Lab Collect; Future  -     HEMOGLOBIN A1C W/ EAG ESTIMATION Lab Collect  -     Comprehensive metabolic panel Lab Collect  -     TSH, 3rd generation Lab Collect  -     T4, free Lab Collect  -     Microalbumin / creatinine urine ratio Lab Collect  -     CBC and differential Lab Collect  -     Lipid Panel with Direct LDL reflex Lab Collect    Acquired hypothyroidism  -     HEMOGLOBIN A1C W/ EAG ESTIMATION Lab Collect; Future  -     Comprehensive metabolic panel Lab Collect; Future  -     TSH, 3rd generation Lab Collect; Future  -     T4, free Lab Collect; Future  -     Microalbumin / creatinine urine ratio Lab Collect; Future  -     CBC and differential Lab Collect; Future  -     Lipid Panel with Direct LDL reflex Lab Collect; Future  -     HEMOGLOBIN A1C W/ EAG ESTIMATION Lab Collect  -     Comprehensive metabolic panel Lab Collect  -     TSH, 3rd generation Lab Collect  -     T4, free Lab Collect  -     Microalbumin / creatinine urine ratio Lab Collect  -     CBC and differential Lab Collect  -     Lipid Panel with Direct LDL reflex Lab Collect    Essential hypertension  -     HEMOGLOBIN A1C W/ EAG ESTIMATION Lab Collect; Future  -     Comprehensive metabolic panel Lab Collect;  Future  -     TSH, 3rd generation Lab Collect; Future  -     T4, free Lab Collect; Future  -     Microalbumin / creatinine urine ratio Lab Collect; Future  -     CBC and differential Lab Collect; Future  -     Lipid Panel with Direct LDL reflex Lab Collect; Future  -     HEMOGLOBIN A1C W/ EAG ESTIMATION Lab Collect  -     Comprehensive metabolic panel Lab Collect  -     TSH, 3rd generation Lab Collect  -     T4, free Lab Collect  -     Microalbumin / creatinine urine ratio Lab Collect  -     CBC and differential Lab Collect  -     Lipid Panel with Direct LDL reflex Lab Collect    Hypertriglyceridemia  -     HEMOGLOBIN A1C W/ EAG ESTIMATION Lab Collect; Future  -     Comprehensive metabolic panel Lab Collect; Future  -     TSH, 3rd generation Lab Collect; Future  -     T4, free Lab Collect; Future  -     Microalbumin / creatinine urine ratio Lab Collect; Future  -     CBC and differential Lab Collect; Future  -     Lipid Panel with Direct LDL reflex Lab Collect; Future  -     HEMOGLOBIN A1C W/ EAG ESTIMATION Lab Collect  -     Comprehensive metabolic panel Lab Collect  -     TSH, 3rd generation Lab Collect  -     T4, free Lab Collect  -     Microalbumin / creatinine urine ratio Lab Collect  -     CBC and differential Lab Collect  -     Lipid Panel with Direct LDL reflex Lab Collect    Diabetic polyneuropathy associated with type 2 diabetes mellitus (HCC)  -     HEMOGLOBIN A1C W/ EAG ESTIMATION Lab Collect; Future  -     Comprehensive metabolic panel Lab Collect; Future  -     TSH, 3rd generation Lab Collect; Future  -     T4, free Lab Collect; Future  -     Microalbumin / creatinine urine ratio Lab Collect; Future  -     CBC and differential Lab Collect; Future  -     Lipid Panel with Direct LDL reflex Lab Collect;  Future  -     HEMOGLOBIN A1C W/ EAG ESTIMATION Lab Collect  -     Comprehensive metabolic panel Lab Collect  -     TSH, 3rd generation Lab Collect  -     T4, free Lab Collect  -     Microalbumin / creatinine urine ratio Lab Collect  -     CBC and differential Lab Collect  -     Lipid Panel with Direct LDL reflex Lab Collect        Assessment/Plan:    1  Type 2 diabetes  Most recent hemoglobin A1c is 8 7%  This has been higher than in the past and demonstrates uncontrolled type 2 diabetes  It may be worse now because he was recently on a months worth of prednisone  Due to this, I would hold on repeating hemoglobin A1c now  Based on his blood sugar record, I have asked him to increase his Lantus insulin to 35 units a day  I will also maximize his Invokana to 300 mg a day  For now, he will continue the same metformin and glipizide  We do have some room to increase his glipizide and there is a possibility of adding a GLP 1 inhibitor  This could all be done before insulin therapy  He will continue to test his blood sugars up to 3 times a day, before and 2 hours after a meal but vary the meals and 1 other time of day and these sugars were asked to be called in in 2-3 weeks  He was going to work on increasing his activity more consistently and getting back on track with diet as that may have contributed some to his increase in hemoglobin A1c several months ago  He has been Diabetes Education within the last year and half  2  Diabetic neuropathy  He denies neuropathic symptoms but does have some neuropathy on exam as diabetic foot exam was performed in the office today  3  Hypothyroidism  Last thyroid function tests were normal and he was biochemically euthyroid  He is clinically euthyroid and will continue the same levothyroxine 50 mcg daily  I will repeat his thyroid function tests with his next visit  4  Hypertension  Blood pressure is under good control on his current dose of olmesartan 20 mg daily  5  Hyperlipidemia  He will continue the same atorvastatin 10 mg daily  I will repeat his lipids with his next visit          I have asked him to follow up in 3 months with preceding hemoglobin A1c, CMP, CBC, lipid panel, TSH, free T4, and urine microalbumin to creatinine ratio  CC: Diabetes Consult    History of Present Illness     HPI: Ziyad Pugh is a 46y o  year old male with type 2 diabetes, insulin requiring for  About 17 years, hypothyroidism, hyperlipidemia, hypertension for evaluation/consult  He was placed on metformin at 1st and at some point glipizide and Invokana was added any knows he has been on Lantus insulin for many years with an increase in dose to 30 units in spring 2021  He is on oral agents and insulin at home and takes lantus insulin 30 units daily, glipizide XL 10 mg daily, metformin 1000 mg twice a day,  and invokana 200 mg daily  He admits to some polyuria, polydipsia when sugars are over 300, and nocturia once a night  He denies polyphagia and blurry vision  He denies numbness or tingling of the feet  He does have some upper  Sternal chest pain  He denies shortness of breath at present as his asthma exacerbation is treated  He denies nephropathy, retinopathy, heart attack, stroke and claudication but does admit to neuropathy  Never went to DM classes but went to nutrition last year  Tries to follow DM diet  Hypoglycemic episodes: No never  H/o of hypoglycemia causing hospitalization or intervention such as glucagon injection  or ambulance call  No   Hypoglycemia symptoms: lightheaded  Treatment of hypoglycemia: would eat food with sugar in it  Glucagon:No   Medic alert tag: recommended,Yes  The patient's last eye exam was in jan 2021 with no retinopathy  The patient's last foot exam was in June 2021 at podiatry office visit  Sees podiatry regularly, last visit June 2021  Last A1C was   Lab Results   Component Value Date    HGBA1C 8 7 (H) 05/10/2021     Blood Sugar/Glucometer/Pump/CGM review: checks blood sugars 1-2 times a day recently, was not doing much previously  Was on prednisone for 4 weeks recently and sugars very high, stopped last week   Was on this for asthma flare up  Sugars were over 300-400  Feels recent  Very high hgba1c was poor eating caused, working on doing better  Has been walking at night 10-15 min every night  Before breakfast: 130 pre prednisone  Before lunch:   Before dinner:   Bedtime: 180-200 pre prednisone    He has hypothyroidism and takes levothyroxine 50 mcg daily  His hypothyroidism was diagnosed about 5 years ago on routine abnormal blood work  He has some difficulty falling asleep and psoriasis causing dry skin but no brittle nails or hair loss  He is fatigued  Weight is stable  He denies palpitation, tremors, anxiety or depression, diarrhea or constipation, heat or cold intolerance  He has hyperlipidemia and takes atorvastatin 10 mg daily  He denies shortness of breath but has some upper sternal chest pain  He has hypertension and takes olmesatan 20 mg daily  He denies stroke-like symptoms  He does get some headaches  Review of Systems   Constitutional: Positive for fatigue  Negative for unexpected weight change  Fatigue by late afternoon, can nap at times  HENT: Negative for hearing loss, tinnitus and trouble swallowing  No XRT to the head or neck in the past    Eyes: Negative for visual disturbance  No diplopia  Uses reading glasses  Respiratory: Negative for chest tightness and shortness of breath  Cardiovascular: Positive for chest pain  Negative for palpitations and leg swelling  Pains upper chest at times  Gastrointestinal: Positive for constipation  Negative for abdominal pain, diarrhea and nausea  Some constipation recently  Endocrine: Positive for polydipsia and polyuria  Negative for cold intolerance, heat intolerance and polyphagia  Some polyuria  Nocturia once a night  thirst with high sugars near 300   Musculoskeletal: Positive for arthralgias  Has bilateral knee and hip pain  Skin: Positive for rash  Negative for wound  Has psoriasis with dry skin   No brittle nails  No hair loss  Neurological: Positive for headaches  Negative for dizziness, tremors, weakness, light-headedness and numbness  Occasional headaches  Psychiatric/Behavioral: Positive for sleep disturbance  Negative for dysphoric mood  The patient is not nervous/anxious  Some trouble falling asleep         Historical Information   Past Medical History:   Diagnosis Date    Anxiety 2/22/2019    Asthma 5/21/2019    GERD (gastroesophageal reflux disease) 12/19/2013    Hypertriglyceridemia 7/8/2015    Hypothyroidism     Migraine without aura and without status migrainosus, not intractable 5/21/2019    Psoriasis     Seasonal allergies 5/21/2019    Dr Melecio Mgcregor    Sleep apnea     Vitamin D deficiency      Past Surgical History:   Procedure Laterality Date    CATARACT EXTRACTION, BILATERAL  2017    CHOLECYSTECTOMY  2000    lap    UNDESCENDED TESTICLE EXPLORATION Bilateral     as a child     Social History   Social History     Substance and Sexual Activity   Alcohol Use Not Currently     Social History     Substance and Sexual Activity   Drug Use Never     Social History     Tobacco Use   Smoking Status Never Smoker   Smokeless Tobacco Never Used     Family History:   Family History   Problem Relation Age of Onset    Diabetes Mother     Thyroid disease Mother         post thyroidectomy    Stroke Mother     Diabetes type II Mother     Thyroid disease Father         post thyroidectomy    Coronary artery disease Father     No Known Problems Brother     No Known Problems Brother     Colon cancer Neg Hx     Colon polyps Neg Hx        Meds/Allergies   Current Outpatient Medications   Medication Sig Dispense Refill    albuterol (2 5 mg/3 mL) 0 083 % nebulizer solution Take 1 vial (2 5 mg total) by nebulization every 6 (six) hours as needed for wheezing or shortness of breath 120 vial 2    albuterol (PROVENTIL HFA) 90 mcg/act inhaler Inhale 1 puff every 4 (four) hours as needed  atorvastatin (LIPITOR) 10 mg tablet TAKE 1 TABLET DAILY 90 tablet 3    Butalbital-APAP-Caffeine -40 MG CAPS Take 1 capsule by mouth every 8 (eight) hours as needed (headache) 30 capsule 0    cetirizine (ZyrTEC) 10 mg tablet Take 1 tablet by mouth daily as needed      cholecalciferol (VITAMIN D3) 1,000 units tablet Take 1 tablet (1,000 Units total) by mouth daily      esomeprazole (NexIUM) 40 MG capsule esomeprazole magnesium 40 mg capsule,delayed release      FLUoxetine (PROzac) 20 MG tablet TAKE 1 TABLET DAILY 90 tablet 3    fluticasone (FLONASE) 50 mcg/act nasal spray USE 2 SPRAYS IN EACH NOSTRIL DAILY 48 g 3    fluticasone-umeclidinium-vilanterol (Trelegy Ellipta) 100-62 5-25 MCG/INH inhaler Inhale 1 puff daily Rinse mouth after use   glipiZIDE (GLUCOTROL XL) 10 mg 24 hr tablet TAKE 1 TABLET DAILY 90 tablet 3    insulin glargine (Lantus SoloStar) 100 units/mL injection pen 35 units daily, may need to use up to 50 units daily 45 mL 3    Insulin Pen Needle (BD Pen Needle Shanelle U/F) 32G X 4 MM MISC Use to inject under the skin daily  90 each 3    ipratropium-albuterol (DUO-NEB) 0 5-2 5 mg/3 mL nebulizer solution inhale contents of 1 vial ( 3 milliliters ) in nebulizer by mouth     (REFER TO PRESCRIPTION NOTES)        levothyroxine 50 mcg tablet TAKE 1 TABLET DAILY 90 tablet 3    metFORMIN (GLUCOPHAGE) 1000 MG tablet Take 1,000 mg by mouth 2 (two) times a day      methotrexate 2 5 mg tablet take 3 tablets by mouth every week      montelukast (SINGULAIR) 10 mg tablet take 1 tablet by mouth at bedtime 30 tablet 5    olmesartan (BENICAR) 20 mg tablet TAKE 1 TABLET DAILY 90 tablet 3    pantoprazole (PROTONIX) 40 mg tablet TAKE 1 TABLET DAILY 90 tablet 3    Skyrizi, 150 MG Dose, 75 MG/0 83ML PSKT Every 3 months      alclomethasone (ACLOVATE) 1 19 % cream 1 APPLICATION APPLY ON THE SKIN TWICE A DAY; APPLY TO FACE AS NEEDED FOR FLARES (Patient not taking: Reported on 8/12/2021)      Canagliflozin (Invokana) 300 MG TABS Take 1 tablet (300 mg total) by mouth daily 90 tablet 3    fluorometholone (FML) 0 1 % ophthalmic suspension INSTILL 1 DROP IN BOTH EYES 4 TIMES DAILY FOR 1 WEEK; 2 TIMES ALLISON     (REFER TO PRESCRIPTION NOTES)  (Patient not taking: Reported on 6/28/2021)      SUMAtriptan (IMITREX) 100 mg tablet Take 100 mg by mouth once as needed (Patient not taking: Reported on 6/28/2021)       No current facility-administered medications for this visit  No Known Allergies    Objective   Vitals: Blood pressure 110/70, pulse 66, height 5' 8" (1 727 m), weight 117 kg (258 lb 6 4 oz)  Invasive Devices     None                 Physical Exam  Vitals reviewed  Constitutional:       Appearance: Normal appearance  He is well-developed  He is obese  HENT:      Head: Normocephalic and atraumatic  Eyes:      Extraocular Movements: Extraocular movements intact  Conjunctiva/sclera: Conjunctivae normal       Comments: No lid lag, stare, proptosis, or periorbital edema  Neck:      Thyroid: No thyromegaly  Vascular: No carotid bruit  Comments: Thyroid normal in size  No palpable thyroid nodules  No bruits over the thyroid gland  Cardiovascular:      Rate and Rhythm: Normal rate and regular rhythm  Pulses: Normal pulses  no weak pulses          Dorsalis pedis pulses are 2+ on the right side and 2+ on the left side  Posterior tibial pulses are 2+ on the right side and 2+ on the left side  Heart sounds: Normal heart sounds  No murmur heard  Pulmonary:      Effort: Pulmonary effort is normal       Breath sounds: Normal breath sounds  No wheezing  Abdominal:      General: Bowel sounds are normal       Palpations: Abdomen is soft  Tenderness: There is no abdominal tenderness  Musculoskeletal:         General: No deformity  Normal range of motion  Cervical back: Normal range of motion and neck supple  Right lower leg: No edema        Left lower leg: No edema  Comments: No ulcerations of the feet  No tremor of the outstretched hands  No spinous process tenderness  No CVA tenderness  Feet:      Right foot:      Skin integrity: No ulcer, skin breakdown, erythema, warmth, callus or dry skin  Left foot:      Skin integrity: No ulcer, skin breakdown, erythema, warmth, callus or dry skin  Lymphadenopathy:      Cervical: No cervical adenopathy  Skin:     General: Skin is warm and dry  Findings: Rash present  No erythema  Comments: Body wide Psoriasis rash  Neurological:      Mental Status: He is alert and oriented to person, place, and time  Deep Tendon Reflexes: Reflexes are normal and symmetric  Comments: Vibration sensation diminished to the 1st toe DIP joint bilaterally  Microfilament sensation intact bilateral except to the heels  Deep tendon reflexes normal      Patient's shoes and socks removed  Right Foot/Ankle   Right Foot Inspection  Skin Exam: skin normal and skin intact no dry skin, no warmth, no callus, no erythema, no maceration, no abnormal color, no pre-ulcer, no ulcer and no callus                          Toe Exam: ROM and strength within normal limitsno swelling and  no right toe deformity  Sensory   Vibration: diminished    Monofilament testing: intact  Vascular  Capillary refills: < 3 seconds  The right DP pulse is 2+  The right PT pulse is 2+  Left Foot/Ankle  Left Foot Inspection  Skin Exam: skin normal and skin intactno dry skin, no warmth, no erythema, no maceration, normal color, no pre-ulcer, no ulcer and no callus                         Toe Exam: ROM and strength within normal limitsno swelling and no left toe deformity                   Sensory   Vibration: diminished    Monofilament: intact  Vascular  Capillary refills: < 3 seconds  The left DP pulse is 2+  The left PT pulse is 2+  Assign Risk Category:  No deformity present; Loss of protective sensation;  No weak pulses       Risk: 1        The history was obtained from the review of the chart and from the patient and wife      Lab Results:    Most recent Alc is  Lab Results   Component Value Date    HGBA1C 8 7 (H) 05/10/2021             Lab Results   Component Value Date    CREATININE 0 88 10/08/2020    CREATININE 0 89 10/08/2020    CREATININE 0 90 03/11/2020    BUN 17 10/08/2020    BUN 17 10/08/2020    K 4 7 10/08/2020    K 4 4 10/08/2020     10/08/2020    CL 99 10/08/2020    CO2 27 10/08/2020    CO2 26 10/08/2020         Lab Results   Component Value Date    HDL 51 10/08/2020    TRIG 129 10/08/2020    CHOLHDL 2 3 10/08/2020       Lab Results   Component Value Date    ALT 26 10/08/2020    AST 21 10/08/2020       Lab Results   Component Value Date    TSH 3 100 10/08/2020    FREET4 0 94 10/08/2020             Future Appointments   Date Time Provider hospitals   8/26/2021  9:00 AM DO [facility]   8/30/2021  4:00 PM Albert Mcintyre DPM [facility]   11/17/2021  3:00 PM Denzel Herring MD [facility]

## 2021-08-12 NOTE — TELEPHONE ENCOUNTER
Patient has started seeing endo for dm management    Asking if he should still keep his 8/26 appt w/ you?    pcb

## 2021-08-12 NOTE — TELEPHONE ENCOUNTER
If has appt with Endo and they are taking over his DM management then he can cancel appt later this month but should reschedule for Nov as he should still be seen 2 x's a year/every 6 mos for BP and other health maintenance

## 2021-08-12 NOTE — PATIENT INSTRUCTIONS
The last hgba1c is 8 7%  this is higher than int he past and uncontrolled  I think it will be worse now since you had prednisone  Increase the lantus 35 units daily  Let's increase the invokana to 300 mg daily  Continue the same metformin and glipizide  Continue to test blood sugars up to 3 times a day,  Before and 2 hours after a meal, vary the meals  call in the sugars in 2-3 weeks  Continue to work on activity increase and diet  Follow up in 3 months with blood work

## 2021-08-18 ENCOUNTER — TELEPHONE (OUTPATIENT)
Dept: PODIATRY | Facility: CLINIC | Age: 51
End: 2021-08-18

## 2021-08-24 DIAGNOSIS — E11.65 UNCONTROLLED TYPE 2 DIABETES MELLITUS WITH HYPERGLYCEMIA (HCC): ICD-10-CM

## 2021-08-24 RX ORDER — INSULIN GLARGINE 100 [IU]/ML
INJECTION, SOLUTION SUBCUTANEOUS
Qty: 60 ML | Refills: 1 | Status: SHIPPED | OUTPATIENT
Start: 2021-08-24 | End: 2022-02-02

## 2021-08-31 ENCOUNTER — TELEPHONE (OUTPATIENT)
Dept: FAMILY MEDICINE CLINIC | Facility: HOSPITAL | Age: 51
End: 2021-08-31

## 2021-08-31 NOTE — TELEPHONE ENCOUNTER
He does qualify as he is >77 and may certainly get the shingles vaccine - 2 doses given 2 to 4 mos apart   TY

## 2021-08-31 NOTE — TELEPHONE ENCOUNTER
Patient wife called because they got a letter from [facility] in the mail about patient getting shingles shot   Patients wife wants to know from the dr if its ok if he gets the shingles shot    [phone]

## 2021-09-03 NOTE — TELEPHONE ENCOUNTER
Being on Methotrexate makes him eligible, he may sing on to MyChart and sign up for 3 dose of COVID vaccine

## 2021-09-23 ENCOUNTER — IMMUNIZATIONS (OUTPATIENT)
Dept: FAMILY MEDICINE CLINIC | Facility: HOSPITAL | Age: 51
End: 2021-09-23
Payer: COMMERCIAL

## 2021-09-23 DIAGNOSIS — Z23 ENCOUNTER FOR IMMUNIZATION: Primary | ICD-10-CM

## 2021-09-23 PROCEDURE — 90682 RIV4 VACC RECOMBINANT DNA IM: CPT

## 2021-09-23 PROCEDURE — 90471 IMMUNIZATION ADMIN: CPT

## 2021-11-01 ENCOUNTER — OFFICE VISIT (OUTPATIENT)
Dept: FAMILY MEDICINE CLINIC | Facility: HOSPITAL | Age: 51
End: 2021-11-01
Payer: COMMERCIAL

## 2021-11-01 VITALS
DIASTOLIC BLOOD PRESSURE: 72 MMHG | HEIGHT: 68 IN | TEMPERATURE: 97.2 F | WEIGHT: 265.2 LBS | HEART RATE: 82 BPM | BODY MASS INDEX: 40.19 KG/M2 | SYSTOLIC BLOOD PRESSURE: 122 MMHG

## 2021-11-01 DIAGNOSIS — J45.40 MODERATE PERSISTENT ASTHMA WITHOUT COMPLICATION: ICD-10-CM

## 2021-11-01 DIAGNOSIS — Z12.5 PROSTATE CANCER SCREENING: ICD-10-CM

## 2021-11-01 DIAGNOSIS — I10 ESSENTIAL HYPERTENSION: ICD-10-CM

## 2021-11-01 DIAGNOSIS — Z00.00 ANNUAL PHYSICAL EXAM: Primary | ICD-10-CM

## 2021-11-01 DIAGNOSIS — E11.42 DIABETIC POLYNEUROPATHY ASSOCIATED WITH TYPE 2 DIABETES MELLITUS (HCC): ICD-10-CM

## 2021-11-01 PROCEDURE — 3074F SYST BP LT 130 MM HG: CPT | Performed by: INTERNAL MEDICINE

## 2021-11-01 PROCEDURE — 3078F DIAST BP <80 MM HG: CPT | Performed by: INTERNAL MEDICINE

## 2021-11-01 PROCEDURE — 99396 PREV VISIT EST AGE 40-64: CPT | Performed by: INTERNAL MEDICINE

## 2021-11-11 LAB
PSA SERPL-MCNC: 0.2 NG/ML (ref 0–4)
SL AMB REFLEX CRITERIA: NORMAL

## 2021-11-12 LAB
ALBUMIN SERPL-MCNC: 3.9 G/DL (ref 3.8–4.9)
ALBUMIN/CREAT UR: 6 MG/G CREAT (ref 0–29)
ALBUMIN/GLOB SERPL: 1.4 {RATIO} (ref 1.2–2.2)
ALP SERPL-CCNC: 101 IU/L (ref 44–121)
ALT SERPL-CCNC: 36 IU/L (ref 0–44)
AST SERPL-CCNC: 27 IU/L (ref 0–40)
BASOPHILS # BLD AUTO: 0.1 X10E3/UL (ref 0–0.2)
BASOPHILS NFR BLD AUTO: 1 %
BILIRUB SERPL-MCNC: 0.3 MG/DL (ref 0–1.2)
BUN SERPL-MCNC: 20 MG/DL (ref 6–24)
BUN/CREAT SERPL: 22 (ref 9–20)
CALCIUM SERPL-MCNC: 9.4 MG/DL (ref 8.7–10.2)
CHLORIDE SERPL-SCNC: 102 MMOL/L (ref 96–106)
CHOLEST SERPL-MCNC: 147 MG/DL (ref 100–199)
CO2 SERPL-SCNC: 27 MMOL/L (ref 20–29)
CREAT SERPL-MCNC: 0.9 MG/DL (ref 0.76–1.27)
CREAT UR-MCNC: 85.7 MG/DL
EOSINOPHIL # BLD AUTO: 0.5 X10E3/UL (ref 0–0.4)
EOSINOPHIL NFR BLD AUTO: 6 %
ERYTHROCYTE [DISTWIDTH] IN BLOOD BY AUTOMATED COUNT: 13.5 % (ref 11.6–15.4)
EST. AVERAGE GLUCOSE BLD GHB EST-MCNC: 186 MG/DL
GLOBULIN SER-MCNC: 2.8 G/DL (ref 1.5–4.5)
GLUCOSE SERPL-MCNC: 155 MG/DL (ref 65–99)
HBA1C MFR BLD: 8.1 % (ref 4.8–5.6)
HCT VFR BLD AUTO: 46.6 % (ref 37.5–51)
HDLC SERPL-MCNC: 47 MG/DL
HGB BLD-MCNC: 15.6 G/DL (ref 13–17.7)
IMM GRANULOCYTES # BLD: 0 X10E3/UL (ref 0–0.1)
IMM GRANULOCYTES NFR BLD: 0 %
LDLC SERPL CALC-MCNC: 77 MG/DL (ref 0–99)
LDLC/HDLC SERPL: 1.6 RATIO (ref 0–3.6)
LYMPHOCYTES # BLD AUTO: 1.9 X10E3/UL (ref 0.7–3.1)
LYMPHOCYTES NFR BLD AUTO: 23 %
MCH RBC QN AUTO: 28.9 PG (ref 26.6–33)
MCHC RBC AUTO-ENTMCNC: 33.5 G/DL (ref 31.5–35.7)
MCV RBC AUTO: 86 FL (ref 79–97)
MICROALBUMIN UR-MCNC: 5.1 UG/ML
MONOCYTES # BLD AUTO: 0.7 X10E3/UL (ref 0.1–0.9)
MONOCYTES NFR BLD AUTO: 9 %
NEUTROPHILS # BLD AUTO: 4.9 X10E3/UL (ref 1.4–7)
NEUTROPHILS NFR BLD AUTO: 61 %
PLATELET # BLD AUTO: 307 X10E3/UL (ref 150–450)
POTASSIUM SERPL-SCNC: 5.2 MMOL/L (ref 3.5–5.2)
PROT SERPL-MCNC: 6.7 G/DL (ref 6–8.5)
RBC # BLD AUTO: 5.4 X10E6/UL (ref 4.14–5.8)
SL AMB EGFR AFRICAN AMERICAN: 112 ML/MIN/1.73
SL AMB EGFR NON AFRICAN AMERICAN: 97 ML/MIN/1.73
SL AMB VLDL CHOLESTEROL CALC: 23 MG/DL (ref 5–40)
SODIUM SERPL-SCNC: 140 MMOL/L (ref 134–144)
T4 FREE SERPL-MCNC: 0.97 NG/DL (ref 0.82–1.77)
TRIGL SERPL-MCNC: 132 MG/DL (ref 0–149)
TSH SERPL DL<=0.005 MIU/L-ACNC: 3.58 UIU/ML (ref 0.45–4.5)
WBC # BLD AUTO: 8 X10E3/UL (ref 3.4–10.8)

## 2021-11-12 PROCEDURE — 3061F NEG MICROALBUMINURIA REV: CPT | Performed by: INTERNAL MEDICINE

## 2021-11-12 PROCEDURE — 3052F HG A1C>EQUAL 8.0%<EQUAL 9.0%: CPT | Performed by: INTERNAL MEDICINE

## 2021-11-17 ENCOUNTER — OFFICE VISIT (OUTPATIENT)
Dept: ENDOCRINOLOGY | Facility: HOSPITAL | Age: 51
End: 2021-11-17
Payer: COMMERCIAL

## 2021-11-17 VITALS
SYSTOLIC BLOOD PRESSURE: 120 MMHG | WEIGHT: 266 LBS | HEIGHT: 68 IN | BODY MASS INDEX: 40.32 KG/M2 | HEART RATE: 78 BPM | DIASTOLIC BLOOD PRESSURE: 62 MMHG

## 2021-11-17 DIAGNOSIS — I10 ESSENTIAL HYPERTENSION: ICD-10-CM

## 2021-11-17 DIAGNOSIS — E78.1 HYPERTRIGLYCERIDEMIA: ICD-10-CM

## 2021-11-17 DIAGNOSIS — E11.65 UNCONTROLLED TYPE 2 DIABETES MELLITUS WITH HYPERGLYCEMIA (HCC): Primary | ICD-10-CM

## 2021-11-17 DIAGNOSIS — E11.42 DIABETIC POLYNEUROPATHY ASSOCIATED WITH TYPE 2 DIABETES MELLITUS (HCC): ICD-10-CM

## 2021-11-17 DIAGNOSIS — E03.9 ACQUIRED HYPOTHYROIDISM: ICD-10-CM

## 2021-11-17 PROCEDURE — 99215 OFFICE O/P EST HI 40 MIN: CPT | Performed by: INTERNAL MEDICINE

## 2021-11-17 PROCEDURE — 3008F BODY MASS INDEX DOCD: CPT | Performed by: INTERNAL MEDICINE

## 2021-11-28 DIAGNOSIS — E11.65 UNCONTROLLED TYPE 2 DIABETES MELLITUS WITH HYPERGLYCEMIA (HCC): ICD-10-CM

## 2021-11-29 RX ORDER — PEN NEEDLE, DIABETIC 32GX 5/32"
NEEDLE, DISPOSABLE MISCELLANEOUS
Qty: 90 EACH | Refills: 3 | Status: SHIPPED | OUTPATIENT
Start: 2021-11-29

## 2021-12-10 ENCOUNTER — OFFICE VISIT (OUTPATIENT)
Dept: FAMILY MEDICINE CLINIC | Facility: HOSPITAL | Age: 51
End: 2021-12-10
Payer: COMMERCIAL

## 2021-12-10 VITALS
HEIGHT: 68 IN | WEIGHT: 266.2 LBS | BODY MASS INDEX: 40.35 KG/M2 | DIASTOLIC BLOOD PRESSURE: 80 MMHG | HEART RATE: 91 BPM | SYSTOLIC BLOOD PRESSURE: 118 MMHG | TEMPERATURE: 97.6 F

## 2021-12-10 DIAGNOSIS — S90.122A CONTUSION OF TOE OF LEFT FOOT, UNSPECIFIED TOE, INITIAL ENCOUNTER: Primary | ICD-10-CM

## 2021-12-10 PROCEDURE — 99213 OFFICE O/P EST LOW 20 MIN: CPT | Performed by: FAMILY MEDICINE

## 2021-12-20 ENCOUNTER — TELEMEDICINE (OUTPATIENT)
Dept: FAMILY MEDICINE CLINIC | Facility: HOSPITAL | Age: 51
End: 2021-12-20
Payer: COMMERCIAL

## 2021-12-20 VITALS — BODY MASS INDEX: 40.16 KG/M2 | WEIGHT: 265 LBS | HEIGHT: 68 IN

## 2021-12-20 DIAGNOSIS — R20.2 NUMBNESS AND TINGLING OF LEG: Primary | ICD-10-CM

## 2021-12-20 DIAGNOSIS — R09.81 NASAL CONGESTION: ICD-10-CM

## 2021-12-20 DIAGNOSIS — R20.0 NUMBNESS AND TINGLING OF LEG: Primary | ICD-10-CM

## 2021-12-20 PROCEDURE — 99213 OFFICE O/P EST LOW 20 MIN: CPT | Performed by: NURSE PRACTITIONER

## 2021-12-21 ENCOUNTER — OFFICE VISIT (OUTPATIENT)
Dept: FAMILY MEDICINE CLINIC | Facility: HOSPITAL | Age: 51
End: 2021-12-21
Payer: COMMERCIAL

## 2021-12-21 ENCOUNTER — TELEPHONE (OUTPATIENT)
Dept: FAMILY MEDICINE CLINIC | Facility: HOSPITAL | Age: 51
End: 2021-12-21

## 2021-12-21 VITALS
OXYGEN SATURATION: 95 % | SYSTOLIC BLOOD PRESSURE: 120 MMHG | TEMPERATURE: 97.4 F | HEIGHT: 68 IN | HEART RATE: 77 BPM | DIASTOLIC BLOOD PRESSURE: 65 MMHG | WEIGHT: 265.2 LBS | BODY MASS INDEX: 40.19 KG/M2

## 2021-12-21 DIAGNOSIS — E11.42 DIABETIC POLYNEUROPATHY ASSOCIATED WITH TYPE 2 DIABETES MELLITUS (HCC): ICD-10-CM

## 2021-12-21 DIAGNOSIS — R20.2 NUMBNESS AND TINGLING OF BOTH LEGS: Primary | ICD-10-CM

## 2021-12-21 DIAGNOSIS — R20.0 NUMBNESS AND TINGLING OF BOTH LEGS: Primary | ICD-10-CM

## 2021-12-21 PROCEDURE — 3008F BODY MASS INDEX DOCD: CPT | Performed by: FAMILY MEDICINE

## 2021-12-21 PROCEDURE — 3074F SYST BP LT 130 MM HG: CPT | Performed by: NURSE PRACTITIONER

## 2021-12-21 PROCEDURE — 3078F DIAST BP <80 MM HG: CPT | Performed by: NURSE PRACTITIONER

## 2021-12-21 PROCEDURE — 99214 OFFICE O/P EST MOD 30 MIN: CPT | Performed by: NURSE PRACTITIONER

## 2021-12-23 ENCOUNTER — TELEMEDICINE (OUTPATIENT)
Dept: FAMILY MEDICINE CLINIC | Facility: CLINIC | Age: 51
End: 2021-12-23
Payer: COMMERCIAL

## 2021-12-23 DIAGNOSIS — J06.9 VIRAL URI: Primary | ICD-10-CM

## 2021-12-23 PROCEDURE — 87636 SARSCOV2 & INF A&B AMP PRB: CPT | Performed by: FAMILY MEDICINE

## 2021-12-23 PROCEDURE — 99213 OFFICE O/P EST LOW 20 MIN: CPT | Performed by: FAMILY MEDICINE

## 2022-01-21 DIAGNOSIS — E11.65 UNCONTROLLED TYPE 2 DIABETES MELLITUS WITH HYPERGLYCEMIA (HCC): Primary | ICD-10-CM

## 2022-01-21 RX ORDER — EMPAGLIFLOZIN 25 MG/1
25 TABLET, FILM COATED ORAL EVERY MORNING
Qty: 90 TABLET | Refills: 1 | Status: SHIPPED | OUTPATIENT
Start: 2022-01-21 | End: 2022-07-05

## 2022-01-21 NOTE — TELEPHONE ENCOUNTER
I received a fax from [phone] stating that the Vinnie Cirri is no longer covered under this patients insurance plan  The covered alternatives are Elgie Reel, Metformin, Steglatro, or Jardiance  Is it ok to sent in a prescription for one of those medications?

## 2022-01-25 DIAGNOSIS — F41.9 ANXIETY: ICD-10-CM

## 2022-01-25 DIAGNOSIS — E03.9 HYPOTHYROIDISM, UNSPECIFIED TYPE: ICD-10-CM

## 2022-01-25 DIAGNOSIS — J30.2 SEASONAL ALLERGIES: ICD-10-CM

## 2022-01-25 RX ORDER — FLUTICASONE PROPIONATE 50 MCG
SPRAY, SUSPENSION (ML) NASAL
Qty: 48 G | Refills: 3 | Status: SHIPPED | OUTPATIENT
Start: 2022-01-25

## 2022-01-25 RX ORDER — LEVOTHYROXINE SODIUM 0.05 MG/1
TABLET ORAL
Qty: 90 TABLET | Refills: 3 | Status: SHIPPED | OUTPATIENT
Start: 2022-01-25

## 2022-01-25 RX ORDER — FLUOXETINE 20 MG/1
TABLET, FILM COATED ORAL
Qty: 90 TABLET | Refills: 3 | Status: SHIPPED | OUTPATIENT
Start: 2022-01-25 | End: 2022-02-25 | Stop reason: SDUPTHER

## 2022-02-02 DIAGNOSIS — E11.65 UNCONTROLLED TYPE 2 DIABETES MELLITUS WITH HYPERGLYCEMIA (HCC): ICD-10-CM

## 2022-02-02 RX ORDER — INSULIN GLARGINE 100 [IU]/ML
INJECTION, SOLUTION SUBCUTANEOUS
Qty: 60 ML | Refills: 3 | Status: SHIPPED | OUTPATIENT
Start: 2022-02-02

## 2022-02-19 LAB
ALBUMIN SERPL-MCNC: 4.2 G/DL (ref 3.8–4.9)
ALBUMIN/GLOB SERPL: 1.6 {RATIO} (ref 1.2–2.2)
ALP SERPL-CCNC: 96 IU/L (ref 44–121)
ALT SERPL-CCNC: 23 IU/L (ref 0–44)
AST SERPL-CCNC: 25 IU/L (ref 0–40)
BILIRUB SERPL-MCNC: 0.4 MG/DL (ref 0–1.2)
BUN SERPL-MCNC: 17 MG/DL (ref 6–24)
BUN/CREAT SERPL: 17 (ref 9–20)
CALCIUM SERPL-MCNC: 9 MG/DL (ref 8.7–10.2)
CHLORIDE SERPL-SCNC: 103 MMOL/L (ref 96–106)
CO2 SERPL-SCNC: 26 MMOL/L (ref 20–29)
CREAT SERPL-MCNC: 1.01 MG/DL (ref 0.76–1.27)
EST. AVERAGE GLUCOSE BLD GHB EST-MCNC: 157 MG/DL
GLOBULIN SER-MCNC: 2.6 G/DL (ref 1.5–4.5)
GLUCOSE SERPL-MCNC: 158 MG/DL (ref 65–99)
HBA1C MFR BLD: 7.1 % (ref 4.8–5.6)
POTASSIUM SERPL-SCNC: 4.5 MMOL/L (ref 3.5–5.2)
PROT SERPL-MCNC: 6.8 G/DL (ref 6–8.5)
SL AMB EGFR AFRICAN AMERICAN: 98 ML/MIN/1.73
SL AMB EGFR NON AFRICAN AMERICAN: 85 ML/MIN/1.73
SODIUM SERPL-SCNC: 144 MMOL/L (ref 134–144)
T4 FREE SERPL-MCNC: 0.89 NG/DL (ref 0.82–1.77)
TSH SERPL DL<=0.005 MIU/L-ACNC: 6.76 UIU/ML (ref 0.45–4.5)

## 2022-02-19 PROCEDURE — 3051F HG A1C>EQUAL 7.0%<8.0%: CPT | Performed by: INTERNAL MEDICINE

## 2022-02-23 ENCOUNTER — OFFICE VISIT (OUTPATIENT)
Dept: ENDOCRINOLOGY | Facility: HOSPITAL | Age: 52
End: 2022-02-23
Payer: COMMERCIAL

## 2022-02-23 VITALS
DIASTOLIC BLOOD PRESSURE: 70 MMHG | SYSTOLIC BLOOD PRESSURE: 120 MMHG | WEIGHT: 263.2 LBS | HEIGHT: 68 IN | HEART RATE: 64 BPM | BODY MASS INDEX: 39.89 KG/M2

## 2022-02-23 DIAGNOSIS — I10 ESSENTIAL HYPERTENSION: ICD-10-CM

## 2022-02-23 DIAGNOSIS — E78.1 HYPERTRIGLYCERIDEMIA: ICD-10-CM

## 2022-02-23 DIAGNOSIS — E03.9 ACQUIRED HYPOTHYROIDISM: ICD-10-CM

## 2022-02-23 DIAGNOSIS — E11.65 UNCONTROLLED TYPE 2 DIABETES MELLITUS WITH HYPERGLYCEMIA (HCC): Primary | ICD-10-CM

## 2022-02-23 DIAGNOSIS — E11.42 DIABETIC POLYNEUROPATHY ASSOCIATED WITH TYPE 2 DIABETES MELLITUS (HCC): ICD-10-CM

## 2022-02-23 PROCEDURE — 3008F BODY MASS INDEX DOCD: CPT | Performed by: INTERNAL MEDICINE

## 2022-02-23 PROCEDURE — 3078F DIAST BP <80 MM HG: CPT | Performed by: INTERNAL MEDICINE

## 2022-02-23 PROCEDURE — 3074F SYST BP LT 130 MM HG: CPT | Performed by: INTERNAL MEDICINE

## 2022-02-23 PROCEDURE — 99215 OFFICE O/P EST HI 40 MIN: CPT | Performed by: INTERNAL MEDICINE

## 2022-02-23 NOTE — PATIENT INSTRUCTIONS
The hgba1c is 7 1%, this is much better and probably partly due to the ozempic  Since you are having nausea on the ozempic, try decreasing back to 0 25 mg once a week  Give it 4 weeks and call if still nauseated  Continue the same metformin, lantus insulin, and glipizide  Switch to jardiance when out off invokana  Continue to work on diet and exercise  Take the thyroid medicine on an empty stomach  Wait 30-60 min to eat  Make sure to keep the protonix or stomach medicine and vitamins 3-4 hours away  Follow up in 3 ,months with blood work

## 2022-02-23 NOTE — PROGRESS NOTES
Valerie Ford 46 y o  male MRN: [id number]    Encounter: [id number]      Assessment/Plan     Assessment/Plan: This is a 46y o -year-old male with type 2 DM with neuropathy, hypothyroidism, HLD, HTN who presents today for 3 months follow up  Endorses no immediate concerns  His neuropathy improved, and denies any worsening tingling or numbness in BLE  Patient has not been checking BG regularly but has been compliant with his medications  He will follow up with his dermatologist in the next couple weeks to discuss new treatment as he self d/c Methotrexate 1 month ago  He was advised that if he starts back on Methotrexate he will need to supplement with Folic acid 1 mg daily  1  Type 2 diabetes with long term use of insulin   HbA1c goal less than 7  Currently Hemoglobin A1c is improving and trending down from 8 1--> 7 1% on most recent blood work from February 18th  Will continue Metformin 1000 mg BID, Invokana 300 mg, glipizide XL 10 mg, and Lantus 35 U HS  Given Gi sx, will lower Ozempic from 0 5 mg-->0 25 mg once a week  Patient will robert the office in the next couple weeks to inform us if he still has GI sx and wants to try a different GP-1 agonist such as Trulicity  If he cannot at all tolerate GLP-1 agonists then will need to trial him on mealtime insulin   Renal function reviewed from February 18 th 2022 and stable  Adhere to a diabetic diet and exercise  Patient will also need to start checking his blood sugars at least once a day and bring them to next visit  Blood work to be completed prior to next appointment in 3 months      2  Diabetic Neuropathy  Stable, improving, on no pharmacotherapy  Diabetic foot exams are up-to-date      3  Hypothyroidism  Not at goal  Reviewed most recent thyroid function test from February 18th 2022: elevated TSH 6 76, and T4 0 89  Patient was counseled and educated at length to take Levothyroxine 50 mcg at least 30 min before breakfast on empty stomach   He is to avoid Fe, fiber supplements, PPI at least 3-4 hours after he takes his thyroid medication to ensure there is thyroid medication absorption   Given he is clinically euthyroid will continue with same dosage and repeat blood work in 3 months to decide on the next step for therapy        4  Hypertension  BP goal per JNC-8 criteria <140/90  Well controlled on his current dose of Olmesartan 20 mg daily  Adhere to low salt diet and exercise     5  Hyperlipidemia  Reviewed most recent lipid profile from November 2021, and it is at goal (LDL 77, )  Continue the same Atorvastatin 10 mg daily  Adhere to low fat diet and exercise       CC: Diabetes, hypothyroidism, hypertension and hyperlipidemia follow up  History of Present Illness     HPI:    History of Present Illness:   Nan Banks is a 46 y o  M with PMH of type 2 diabetes with long term use of insulin who presents today to the office for follow up  Admits to peripheral neuropathy (for 17 yrs) which has been improving recently  Denies complications of CKD, retinopathy, hx foot ulcer/PVD  Denies recent illness or hospitalizations  Denies recent severe hypoglycemic or severe hyperglycemic episodes  Compliant with current regimen  Home glucose monitoring: are not performed regularly  In the lst 3 months he checked BG 3x total       Home blood glucose readings:   Before lunch: 145     Current regimen: Metformin 1000 mg BID, Invokana 300 mg, Glipizide 10 mg XL, Ozempic 0 5 mg/weekly (on Mondays), and Lantus 35 HS  Reports sensation of vomiting with 0 5 mg Ozempic which was not as significant with 0 25 mg  He is waiting to finish Invokana until he starts on Jardiance  Last Eye Exam: January 2021  Last Foot Exam: 9 months ago  Has hypertension: Taking Olmesartan 20 mg daily  Has hyperlipidemia: Taking Lipitor 10 mg daily  Hypothyroidism  Patient takes Levothyroxine after breakfast together with all his medications   He has been also taking a multivitamin and PPI together with his thyroid medication  He mentions taking his thyroid medications this way for a long time even when his labs were normal    Patient denies increasing fatigue, double vision, hair loss, trouble or painful swallowing, brittle nails, poor energy, heat/cold intolerance, palpitations, tremors, diarrhea or constipation, anxiety or depression, or insomnia  Review of Systems   Constitutional: Positive for fatigue (at baseline, usually in the evening)  Negative for activity change, appetite change, chills, fever and unexpected weight change  HENT: Negative for sore throat and trouble swallowing  Eyes: Negative for visual disturbance  Denies double vision   Respiratory: Negative  Negative for cough and shortness of breath  Cardiovascular: Negative for chest pain, palpitations and leg swelling  Gastrointestinal: Negative  Negative for abdominal pain, blood in stool, constipation, diarrhea, nausea and vomiting  Endocrine: Negative for cold intolerance, heat intolerance, polydipsia and polyphagia  Genitourinary: Negative  Negative for dysuria and hematuria  Musculoskeletal: Positive for arthralgias  Skin: Negative for rash  Neurological: Negative for dizziness, tremors, numbness (reports improving tingling and numbness in legs) and headaches  Psychiatric/Behavioral: The patient is not nervous/anxious          Historical Information   Past Medical History:   Diagnosis Date    Anxiety 2/22/2019    Asthma 5/21/2019    GERD (gastroesophageal reflux disease) 12/19/2013    Hypertriglyceridemia 7/8/2015    Hypothyroidism     Migraine without aura and without status migrainosus, not intractable 5/21/2019    Psoriasis     Seasonal allergies 5/21/2019    Dr Yordan Horn    Sleep apnea     Vitamin D deficiency      Past Surgical History:   Procedure Laterality Date    CATARACT EXTRACTION, BILATERAL  2017    CHOLECYSTECTOMY  2000    lap    UNDESCENDED TESTICLE EXPLORATION Bilateral     as a child     Social History   Social History     Substance and Sexual Activity   Alcohol Use Not Currently     Social History     Substance and Sexual Activity   Drug Use Never     Social History     Tobacco Use   Smoking Status Never Smoker   Smokeless Tobacco Never Used     Family History:   Family History   Problem Relation Age of Onset    Diabetes Mother     Thyroid disease Mother         post thyroidectomy    Stroke Mother     Diabetes type II Mother     Thyroid disease Father         post thyroidectomy    Coronary artery disease Father     No Known Problems Brother     No Known Problems Brother     Colon cancer Neg Hx     Colon polyps Neg Hx        Meds/Allergies   Current Outpatient Medications   Medication Sig Dispense Refill    BD Pen Needle Shanelle U/F 32G X 4 MM MISC USE TO INJECT UNDER THE SKIN DAILY 90 each 3    albuterol (2 5 mg/3 mL) 0 083 % nebulizer solution Take 1 vial (2 5 mg total) by nebulization every 6 (six) hours as needed for wheezing or shortness of breath 120 vial 2    albuterol (PROVENTIL HFA) 90 mcg/act inhaler Inhale 1 puff every 4 (four) hours as needed      atorvastatin (LIPITOR) 10 mg tablet TAKE 1 TABLET DAILY 90 tablet 3    Butalbital-APAP-Caffeine -40 MG CAPS Take 1 capsule by mouth every 8 (eight) hours as needed (headache) 30 capsule 0    Canagliflozin (Invokana) 300 MG TABS Take 1 tablet (300 mg total) by mouth daily 90 tablet 3    cetirizine (ZyrTEC) 10 mg tablet Take 1 tablet by mouth daily as needed      cholecalciferol (VITAMIN D3) 1,000 units tablet Take 1 tablet (1,000 Units total) by mouth daily      Empagliflozin (Jardiance) 25 MG TABS Take 1 tablet (25 mg total) by mouth every morning 90 tablet 1    FLUoxetine (PROzac) 20 MG tablet TAKE 1 TABLET DAILY 90 tablet 3    fluticasone (FLONASE) 50 mcg/act nasal spray USE 2 SPRAYS IN EACH NOSTRIL DAILY 48 g 3    fluticasone-umeclidinium-vilanterol (Trelegy Ellipta) 100-62 5-25 MCG/INH inhaler Inhale 1 puff daily Rinse mouth after use   glipiZIDE (GLUCOTROL XL) 10 mg 24 hr tablet TAKE 1 TABLET DAILY 90 tablet 3    insulin glargine (Lantus SoloStar) 100 units/mL injection pen INJECT UP TO 60 UNITS DAILY (DOSE INCREASE) 60 mL 3    levothyroxine 50 mcg tablet TAKE 1 TABLET DAILY 90 tablet 3    metFORMIN (GLUCOPHAGE) 1000 MG tablet Take 1,000 mg by mouth 2 (two) times a day      methotrexate 2 5 mg tablet take 3 tablets by mouth every week      montelukast (SINGULAIR) 10 mg tablet take 1 tablet by mouth at bedtime 30 tablet 5    olmesartan (BENICAR) 20 mg tablet TAKE 1 TABLET DAILY 90 tablet 3    pantoprazole (PROTONIX) 40 mg tablet TAKE 1 TABLET DAILY 90 tablet 3    Skyrizi, 150 MG Dose, 75 MG/0 83ML PSKT Every 3 months       No current facility-administered medications for this visit  No Known Allergies    Objective   Vitals: Height 5' 8" (1 727 m)  Physical Exam  Vitals and nursing note reviewed  Constitutional:       General: He is not in acute distress  Appearance: Normal appearance  He is well-developed  He is obese  He is not ill-appearing, toxic-appearing or diaphoretic  HENT:      Head: Normocephalic and atraumatic  Nose: Nose normal    Eyes:      General:         Right eye: No discharge  Left eye: No discharge  Extraocular Movements: Extraocular movements intact  Neck:      Comments: No thyromegaly, thyroid nodules or bruits ausculted  Cardiovascular:      Rate and Rhythm: Normal rate and regular rhythm  Heart sounds: Normal heart sounds  Pulmonary:      Effort: Pulmonary effort is normal  No respiratory distress  Breath sounds: Normal breath sounds  Abdominal:      Palpations: Abdomen is soft  Tenderness: There is no abdominal tenderness  Musculoskeletal:         General: No tenderness  Normal range of motion  Cervical back: Normal range of motion and neck supple  Right lower leg: No edema  Left lower leg: No edema  Skin:     General: Skin is warm  Findings: Erythema and rash (generalized plaque like rash consistent with hx of psoriasis) present  Neurological:      Mental Status: He is alert and oriented to person, place, and time  Mental status is at baseline  Deep Tendon Reflexes:      Reflex Scores:       Patellar reflexes are 2+ on the right side and 2+ on the left side  Psychiatric:         Mood and Affect: Mood normal          Behavior: Behavior normal          Thought Content: Thought content normal          Judgment: Judgment normal          The history was obtained from the review of the chart, patient  Lab Results:   Lab Results   Component Value Date/Time    Hemoglobin A1C 7 1 (H) 02/18/2022 07:44 AM    Hemoglobin A1C 8 1 (H) 11/11/2021 07:53 AM    Hemoglobin A1C 8 7 (H) 05/10/2021 06:57 AM    White Blood Cell Count 8 0 11/11/2021 07:53 AM    Hemoglobin 15 6 11/11/2021 07:53 AM    HCT 46 6 11/11/2021 07:53 AM    MCV 86 11/11/2021 07:53 AM    Platelet Count 770 57/67/2269 07:53 AM    BUN 17 02/18/2022 07:44 AM    BUN 20 11/11/2021 07:53 AM    Potassium 4 5 02/18/2022 07:44 AM    Potassium 5 2 11/11/2021 07:53 AM    Chloride 103 02/18/2022 07:44 AM    Chloride 102 11/11/2021 07:53 AM    CO2 26 02/18/2022 07:44 AM    CO2 27 11/11/2021 07:53 AM    Creatinine 1 01 02/18/2022 07:44 AM    Creatinine 0 90 11/11/2021 07:53 AM    AST 25 02/18/2022 07:44 AM    AST 27 11/11/2021 07:53 AM    ALT 23 02/18/2022 07:44 AM    ALT 36 11/11/2021 07:53 AM    Albumin 4 2 02/18/2022 07:44 AM    Albumin 3 9 11/11/2021 07:53 AM    Globulin, Total 2 6 02/18/2022 07:44 AM    Globulin, Total 2 8 11/11/2021 07:53 AM    HDL 47 11/11/2021 07:53 AM    Triglycerides 132 11/11/2021 07:53 AM       Imaging Studies: I have personally reviewed pertinent reports  Portions of the record may have been created with voice recognition software   Occasional wrong word or "sound a like" substitutions may have occurred due to the inherent limitations of voice recognition software  Read the chart carefully and recognize, using context, where substitutions have occurred

## 2022-02-25 ENCOUNTER — TELEPHONE (OUTPATIENT)
Dept: FAMILY MEDICINE CLINIC | Facility: HOSPITAL | Age: 52
End: 2022-02-25

## 2022-02-25 NOTE — TELEPHONE ENCOUNTER
I don't usually like to adjust mood meds over the phone but will do this once - increase Prozac from 20 mg 1 tab daily to 20 mg 2 tab daily to equal total of 40 mg a day    Needs to make an appt in 4-5 wks for a mood/med check

## 2022-03-08 ENCOUNTER — PROCEDURE VISIT (OUTPATIENT)
Dept: NEUROLOGY | Facility: CLINIC | Age: 52
End: 2022-03-08
Payer: COMMERCIAL

## 2022-03-08 DIAGNOSIS — R20.0 NUMBNESS AND TINGLING OF BOTH LEGS: ICD-10-CM

## 2022-03-08 DIAGNOSIS — R20.2 NUMBNESS AND TINGLING OF BOTH LEGS: ICD-10-CM

## 2022-03-08 PROCEDURE — 95909 NRV CNDJ TST 5-6 STUDIES: CPT | Performed by: PHYSICAL MEDICINE & REHABILITATION

## 2022-03-08 PROCEDURE — 95886 MUSC TEST DONE W/N TEST COMP: CPT | Performed by: PHYSICAL MEDICINE & REHABILITATION

## 2022-03-08 NOTE — PROGRESS NOTES
EMG 2 limb lower extremity     Date/Time 3/8/2022 1:36 PM     Performed by  Vishnu Abarca MD     Authorized by Rashaun Cano [place]                [facility] AT [address]  [phone]    Electromyography & Nerve Conduction Studies Report          Full Name: Anamika Weller Gender: Male  MRN: [id number] YOB: 1970      Visit Date: 3/8/2022 1:09 PM  Age: 46 Years  Examining Physician: Dr CRISTI King  Referring Physician: Dr Elen Armstrong History: 46 y old with insulin dependent DM presents with shooting pains in both thighs on the outer aspect, more pronounced on the right  States his symptoms improved since his visit to the doctor  Denies back pain  Denies any tingling or numbness in both legs  Temperature 32 degrees  Sensory Nerve Conduction Study       Nerve / Sites Rec  Site Onset Lat Peak Lat  Amp Segments Distance Velocity     ms ms µV  cm m/s   R Sural - (Antidromic)      Calf Ankle 2 2 2 9 4 0 Calf - Ankle 14 63      Ref  ?4 4 ? 6 0 Ref  ?40   R Superficial peroneal - (Antidromic)      Lat leg Ankle 3 2 4 0 6 3 Lat leg - Ankle 14 44      Ref  ?4 4 ? 6 0 Ref  ?40       Motor Nerve Conduction Study       Nerve / Sites Muscle Latency Ref  Amplitude Ref  Segments Distance Lat Diff Velocity Ref  ms ms mV mV  cm ms m/s m/s   R Peroneal - EDB      Ankle EDB 4 9 ?6 5 3 5 ?2 0 Ankle - EDB 9         B  Fib Head EDB 11 7  4 8  B  Fib Head - Ankle 32 6 79 47 ?44      A  Fib Head EDB 13 8  4 5  A  Fib Head - B  Fib Head 10 2 08 48 ?44   R Tibial - AH      Ankle AH 5 3 ?5 8 12 9 ?4 0 Ankle - AH 9         Knee AH 13 3  11 0  Knee - Ankle 36 7 96 45 ?41       F Waves       Nerve F Latency Ref  ms ms   R Peroneal - EDB 53 1 ? 56 0   R Tibial - AH 48 4 ?56 0       H Reflex       Nerve H Latency    ms   R Tibial - Soleus 33 4   L Tibial - Soleus 34  0       EMG Summary Table     Spontaneous MUAP Recruitment Muscle Nerve Roots IA Fib PSW Fasc H F  Dur  Amp PPP Config Pattern   L  Extensor digitorum brevis Tibial L5-S1 NL None None None None NL NL None NL NL   L  Tibialis anterior Deep peroneal (Fibular) L4-L5 NL None None None None NL NL None NL NL   L  Lumbar paraspinals Spinal L1-L5 NL None None None None NL NL None NL NL   L  Gastrocnemius (Medial head) Tibial S1-S2 NL None None None None NL NL None NL NL   L  Gluteus medius Superior gluteal L4-S1 NL None None None None NL NL None NL NL   L  Quadriceps Femoral L2-L4 NL None None None None NL NL None NL NL                       Summary        Motor and sensory conduction studies were performed on the right peroneal, tibial and sural nerves  The distal motor latencies were normal  Motor action potential amplitudes were normal  Motor conduction studies were normal including conduction of the peroneal nerve across the fibular head  The peroneal and tibial F waves were normal     The  sural distal sensory latency was normal with a low sensory action potential amplitude  The superficial peroneal sensory action potential was normal     H  reflexes were normal bilaterally  Patient refused testing of the left lower extremity  Concentric needle EMG was performed on the right EDB, tibialis anterior, medial gastrocnemius, vastus lateralis, gluteus medius and the lumbar paraspinal region  There was no evidence of spontaneous activity seen  The compound motor unit potentials were of normal configuration and interference patterns were full were full for effort           Impression:        Normal study of the right lower extremity  Patient refused testing of the left lower extremity  The low sural sensory amplitude on the right is of questionable significance in the absence of other findings  It may be secondary to a technical error

## 2022-03-25 ENCOUNTER — OFFICE VISIT (OUTPATIENT)
Dept: FAMILY MEDICINE CLINIC | Facility: HOSPITAL | Age: 52
End: 2022-03-25
Payer: COMMERCIAL

## 2022-03-25 VITALS
HEIGHT: 68 IN | WEIGHT: 259.4 LBS | HEART RATE: 78 BPM | TEMPERATURE: 97.3 F | BODY MASS INDEX: 39.31 KG/M2 | SYSTOLIC BLOOD PRESSURE: 122 MMHG | DIASTOLIC BLOOD PRESSURE: 78 MMHG

## 2022-03-25 DIAGNOSIS — E78.1 HYPERTRIGLYCERIDEMIA: ICD-10-CM

## 2022-03-25 DIAGNOSIS — E66.01 SEVERE OBESITY (BMI 35.0-39.9) WITH COMORBIDITY (HCC): ICD-10-CM

## 2022-03-25 DIAGNOSIS — Z79.899 ON METHOTREXATE THERAPY: ICD-10-CM

## 2022-03-25 DIAGNOSIS — F41.9 ANXIETY: ICD-10-CM

## 2022-03-25 DIAGNOSIS — E11.65 UNCONTROLLED TYPE 2 DIABETES MELLITUS WITH HYPERGLYCEMIA (HCC): Primary | ICD-10-CM

## 2022-03-25 DIAGNOSIS — E03.9 ACQUIRED HYPOTHYROIDISM: ICD-10-CM

## 2022-03-25 DIAGNOSIS — J45.40 MODERATE PERSISTENT ASTHMA WITHOUT COMPLICATION: ICD-10-CM

## 2022-03-25 PROCEDURE — 3078F DIAST BP <80 MM HG: CPT | Performed by: INTERNAL MEDICINE

## 2022-03-25 PROCEDURE — 99214 OFFICE O/P EST MOD 30 MIN: CPT | Performed by: INTERNAL MEDICINE

## 2022-03-25 PROCEDURE — 3008F BODY MASS INDEX DOCD: CPT | Performed by: INTERNAL MEDICINE

## 2022-03-25 PROCEDURE — 3074F SYST BP LT 130 MM HG: CPT | Performed by: INTERNAL MEDICINE

## 2022-03-25 PROCEDURE — 3725F SCREEN DEPRESSION PERFORMED: CPT | Performed by: INTERNAL MEDICINE

## 2022-03-25 RX ORDER — FOLIC ACID 1 MG/1
1 TABLET ORAL DAILY
Qty: 90 TABLET | Refills: 2 | Status: SHIPPED | OUTPATIENT
Start: 2022-03-25

## 2022-03-25 RX ORDER — FOLIC ACID 1 MG/1
1 TABLET ORAL DAILY
COMMUNITY
End: 2022-03-25 | Stop reason: SDUPTHER

## 2022-03-25 NOTE — ASSESSMENT & PLAN NOTE
No recent flares during winter mos, on Trelegy as directed by [facility], call with resp complaints

## 2022-03-25 NOTE — ASSESSMENT & PLAN NOTE
Mood better with increase in Prozac, could still be better but deferring med changes for now, interested in seeing a therapist, urged to reach out to insurance and get a list of in-network providers and to call, if has ANY difficulty urged to call and we can try and assist, call with new/worse mood, re-eval in in 6 mos

## 2022-03-25 NOTE — PROGRESS NOTES
Assessment/Plan:    Uncontrolled type 2 diabetes mellitus with hyperglycemia (HCC)    Lab Results   Component Value Date    HGBA1C 7 1 (H) 02/18/2022   DM type 2 with hyperglycemia and polyneuropathy - uncontrolled but A1C improved at 7 1 - con't meds and labs and f/u as per Endo, diet/exercise/wgt loss encouraged, UTD on foot exam (8/21) and eye exam (1/21 - 2 yrs), on ARB and statin ,will follow as well    Hypothyroidism  TSH was elevated -  Pt was taking thyroid meds with food - now taking before eating in am, cont labs/meds as per Endo    Anxiety  Mood better with increase in Prozac, could still be better but deferring med changes for now, interested in seeing a therapist, urged to reach out to insurance and get a list of in-network providers and to call, if has ANY difficulty urged to call and we can try and assist, call with new/worse mood, re-eval in in 6 mos    Hypertriglyceridemia  FLP at goal, con't current statin, diet/exercise/wgt loss encouraged    Asthma  No recent flares during winter mos, on Trelegy as directed by Pulm, call with resp complaints       Diagnoses and all orders for this visit:    Uncontrolled type 2 diabetes mellitus with hyperglycemia ([facility] )  -     Ambulatory Referral to Podiatry; Future    Hypertriglyceridemia    Acquired hypothyroidism    Anxiety    On methotrexate therapy  Comments:  Counseled on taking folic acid - rx sent  Orders:  -     folic acid (FOLVITE) 1 mg tablet; Take 1 tablet (1 mg total) by mouth daily    Moderate persistent asthma without complication    Severe obesity (BMI 35 0-39  9) with comorbidity ([facility] )  Comments:  Diet/exercise/wgt loss encouraged    BMI 39 0-39 9,adult    Other orders  -     Discontinue: folic acid (FOLVITE) 1 mg tablet; Take 1 mg by mouth daily      Colonoscopy 10/19 - 10 yrs        Subjective:      Patient ID: Gardenia Schlatter is a 46 y o  male      HPI  Pt here for follow up appt    He had DM labs done for Endo in Feb - results reviewed by myself in detail  FBS/A1C 158/7 1, TSH was a bit elevated but FT4 and FLP was wnl     Def of controlled vs uncontrolled DM was reviewed  Diet was reviewed - wgt down 6 lb from Nov 2021  He is taking his DM meds as directed  He saw Endo 11/17/21 and 2/23/22 - OV note reviewed  His Ozempic was decreased d/t nausea  He is UTD on DM foot exam (8/21) and eye exam (1/21 - 2 yrs)  He is on statin and ARB  Goal FLP was d/w pt in detail  Diet/exercise reviewed as noted above  He is taking his Atorvastatin daily as directed w/o Se  He notes no stroke/TIA symptoms/CP  Pt is taking their thyroid medication daily but was eating when took meds  Endo counseled on appropriate administration of the thyroid medication  No meds were adjusted at that visit in Feb   They deny any significant wgt changes/fatigue/C/D/tremor/palp/hair loss or skin changes  Pt notes anxiety is better with the increase in Prozoc to 40 mg a day  Rx was increased 2/25/22  He feels it has helped but mood could still be better  He notes sometimes feeling down and sad  He feels anxious and irritable but it has improved still  He is sleeping well  He notes no SI/panic attacks  He notes no issues with his asthma over the winter mos  He is using Trelegy every day as directed  He has not had to use his rescue inhaler or nebulizer recently  He notes no chronic cough/SOB/wheezing  Review of Systems   Constitutional: Negative for chills and fever  HENT: Negative for congestion and sore throat  Eyes: Negative for pain and visual disturbance  Respiratory: Negative for cough, shortness of breath and wheezing  Cardiovascular: Negative for chest pain and palpitations  Gastrointestinal: Negative for abdominal pain, diarrhea, nausea and vomiting  Genitourinary: Negative for difficulty urinating and dysuria  Musculoskeletal: Negative for arthralgias and myalgias  Skin: Negative for rash and wound     Neurological: Negative for dizziness and headaches  Hematological: Does not bruise/bleed easily  Psychiatric/Behavioral: Positive for dysphoric mood  The patient is nervous/anxious  Objective:    /78   Pulse 78   Temp (!) 97 3 °F (36 3 °C) (Tympanic)   Ht 5' 8" (1 727 m)   Wt 118 kg (259 lb 6 4 oz)   BMI 39 44 kg/m²      Physical Exam  Vitals and nursing note reviewed  Constitutional:       General: He is not in acute distress  Appearance: He is well-developed  He is obese  He is not ill-appearing  HENT:      Head: Normocephalic and atraumatic  Eyes:      General:         Right eye: No discharge  Left eye: No discharge  Conjunctiva/sclera: Conjunctivae normal    Neck:      Trachea: No tracheal deviation  Cardiovascular:      Rate and Rhythm: Normal rate and regular rhythm  Heart sounds: No murmur heard  Pulmonary:      Effort: Pulmonary effort is normal  No respiratory distress  Breath sounds: Normal breath sounds  No wheezing, rhonchi or rales  Abdominal:      General: There is no distension  Palpations: Abdomen is soft  Tenderness: There is no abdominal tenderness  There is no guarding or rebound  Musculoskeletal:         General: No deformity or signs of injury  Cervical back: Neck supple  Lymphadenopathy:      Cervical: No cervical adenopathy  Skin:     General: Skin is warm and dry  Coloration: Skin is not pale  Findings: No bruising  Neurological:      General: No focal deficit present  Mental Status: He is alert  Mental status is at baseline  Motor: No abnormal muscle tone  Gait: Gait normal    Psychiatric:         Mood and Affect: Mood normal          Behavior: Behavior normal          Thought Content: Thought content normal          Judgment: Judgment normal          BMI Counseling: Body mass index is 39 44 kg/m²   The BMI is above normal  Nutrition recommendations include reducing portion sizes, 3-5 servings of fruits/vegetables daily, consuming healthier snacks, moderation in carbohydrate intake, increasing intake of lean protein and reducing intake of saturated fat and trans fat  Exercise recommendations include exercising 3-5 times per week

## 2022-03-25 NOTE — ASSESSMENT & PLAN NOTE
Lab Results   Component Value Date    HGBA1C 7 1 (H) 02/18/2022   DM type 2 with hyperglycemia and polyneuropathy - uncontrolled but A1C improved at 7 1 - con't meds and labs and f/u as per Endo, diet/exercise/wgt loss encouraged, UTD on foot exam (8/21) and eye exam (1/21 - 2 yrs), on ARB and statin ,will follow as well

## 2022-03-25 NOTE — ASSESSMENT & PLAN NOTE
TSH was elevated -  Pt was taking thyroid meds with food - now taking before eating in am, cont labs/meds as per Endo

## 2022-03-25 NOTE — PATIENT INSTRUCTIONS
Obesity   AMBULATORY CARE:   Obesity  means your body mass index (BMI) is greater than 30  Your healthcare provider will use your height and weight to measure your BMI  The risks of obesity include  many health problems, including injuries or physical disability  · Diabetes (high blood sugar level)    · High blood pressure or high cholesterol    · Heart disease    · Stroke    · Gallbladder or liver disease    · Cancer of the colon, breast, prostate, liver, or kidney    · Sleep apnea    · Arthritis or gout    Screening  is done to check for health conditions before you have signs or symptoms  If you are 28to 79years old, your blood sugar level may be checked every 3 years for signs of prediabetes or diabetes  Your healthcare provider will check your blood pressure at each visit  High blood pressure can lead to a stroke or other problems  Your provider may check for signs of heart disease, cancer, or other health problems  Seek care immediately if:   · You have a severe headache, confusion, or difficulty speaking  · You have weakness on one side of your body  · You have chest pain, sweating, or shortness of breath  Call your doctor if:   · You have symptoms of gallbladder or liver disease, such as pain in your upper abdomen  · You have knee or hip pain and discomfort while walking  · You have symptoms of diabetes, such as intense hunger and thirst, and frequent urination  · You have symptoms of sleep apnea, such as snoring or daytime sleepiness  · You have questions or concerns about your condition or care  Treatment for obesity  focuses on helping you lose weight to improve your health  Even a small decrease in BMI can reduce the risk for many health problems  Your healthcare provider will help you set a weight-loss goal   · Lifestyle changes  are the first step in treating obesity  These include making healthy food choices and getting regular physical activity   Your healthcare provider may suggest a weight-loss program that involves coaching, education, and therapy  · Medicine  may help you lose weight when it is used with a healthy foods and physical activity  · Surgery  can help you lose weight if you are very obese and have other health problems  There are several types of weight-loss surgery  Ask your healthcare provider for more information  Tips for safe weight loss:   · Set small, realistic goals  An example of a small goal is to walk for 20 minutes 5 days a week  Anther goal is to lose 5% of your body weight  · Tell friends, family members, and coworkers about your goals  and ask for their support  Ask a friend to lose weight with you, or join a weight-loss support group  · Identify foods or triggers that may cause you to overeat , and find ways to avoid them  Remove tempting high-calorie foods from your home and workplace  Place a bowl of fresh fruit on your kitchen counter  If stress causes you to eat, then find other ways to cope with stress  A counselor or therapist may be able to help you  · Keep a diary to track what you eat and drink  Also write down how many minutes of physical activity you do each day  Weigh yourself once a week and record it in your diary  Eating changes: You will need to eat 500 to 1,000 fewer calories each day than you currently eat to lose 1 to 2 pounds a week  The following changes will help you cut calories:  · Eat smaller portions  Use small plates, no larger than 9 inches in diameter  Fill your plate half full of fruits and vegetables  Measure your food using measuring cups until you know what a serving size looks like  · Eat 3 meals and 1 or 2 snacks each day  Plan your meals in advance  [place] and eat at home most of the time  Eat slowly  Do not skip meals  Skipping meals can lead to overeating later in the day  This can make it harder for you to lose weight   Talk with a dietitian to help you make a meal plan and schedule that is right for you  · Eat fruits and vegetables at every meal   They are low in calories and high in fiber, which makes you feel full  Do not add butter, margarine, or cream sauce to vegetables  Use herbs to season steamed vegetables  · Eat less fat and fewer fried foods  Eat more baked or grilled chicken and fish  These protein sources are lower in calories and fat than red meat  Limit fast food  Dress your salads with olive oil and vinegar instead of bottled dressing  · Limit the amount of sugar you eat  Do not drink sugary beverages  Limit alcohol  Activity changes:  Physical activity is good for your body in many ways  It helps you burn calories and build strong muscles  It decreases stress and depression, and improves your mood  It can also help you sleep better  Talk to your healthcare provider before you begin an exercise program   · Exercise for at least 30 minutes 5 days a week  Start slowly  Set aside time each day for physical activity that you enjoy and that is convenient for you  It is best to do both weight training and an activity that increases your heart rate, such as walking, bicycling, or swimming  · Find ways to be more active  Do yard work and housecleaning  Walk up the stairs instead of using elevators  Spend your leisure time going to events that require walking, such as outdoor festivals or fairs  This extra physical activity can help you lose weight and keep it off  Follow up with your doctor as directed: You may need to meet with a dietitian  Write down your questions so you remember to ask them during your visits  © Copyright [employer] 2022 Information is for End User's use only and may not be sold, redistributed or otherwise used for commercial purposes  All illustrations and images included in CareNotes® are the copyrighted property of A D A M [employer] Inc  or Haim Rey   The above information is an  only   It is not intended as medical advice for individual conditions or treatments  Talk to your doctor, nurse or pharmacist before following any medical regimen to see if it is safe and effective for you  Heart Healthy Diet   AMBULATORY CARE:   A heart healthy diet  is an eating plan low in unhealthy fats and sodium (salt)  The plan is high in healthy fats and fiber  A heart healthy diet helps improve your cholesterol levels and lowers your risk for heart disease and stroke  A dietitian will teach you how to read and understand food labels  Heart healthy diet guidelines to follow:   · Choose foods that contain healthy fats  ? Unsaturated fats  include monounsaturated and polyunsaturated fats  Unsaturated fat is found in foods such as soybean, canola, olive, corn, and safflower oils  It is also found in soft tub margarine that is made with liquid vegetable oil  ? Omega-3 fat  is found in certain fish, such as salmon, tuna, and trout, and in walnuts and flaxseed  Eat fish high in omega-3 fats at least 2 times a week  · Get 20 to 30 grams of fiber each day  Fruits, vegetables, whole-grain foods, and legumes (cooked beans) are good sources of fiber  · Limit or do not have unhealthy fats  ? Cholesterol  is found in animal foods, such as eggs and lobster, and in dairy products made from whole milk  Limit cholesterol to less than 200 mg each day  ? Saturated fat  is found in meats, such as cruz and hamburger  It is also found in chicken or turkey skin, whole milk, and butter  Limit saturated fat to less than 7% of your total daily calories  ? Trans fat  is found in packaged foods, such as potato chips and cookies  It is also in hard margarine, some fried foods, and shortening  Do not eat foods that contain trans fats  · Limit sodium as directed  You may be told to limit sodium to 2,000 to 2,300 mg each day  Choose low-sodium or no-salt-added foods  Add little or no salt to food you prepare   Use herbs and spices in place of salt  Include the following in your heart healthy plan:  Ask your dietitian or healthcare provider how many servings to have from each of the following food groups:  · Grains:      ? Whole-wheat breads, cereals, and pastas, and brown rice    ? Low-fat, low-sodium crackers and chips    · Vegetables:      ? Broccoli, green beans, green peas, and spinach    ? Collards, kale, and lima beans    ? Carrots, sweet potatoes, tomatoes, and peppers    ? Canned vegetables with no salt added    · Fruits:      ? Bananas, peaches, pears, and pineapple    ? Grapes, raisins, and dates    ? Oranges, tangerines, grapefruit, orange juice, and grapefruit juice    ? Apricots, mangoes, melons, and papaya    ? Raspberries and strawberries    ? Canned fruit with no added sugar    · Low-fat dairy:      ? Nonfat (skim) milk, 1% milk, and low-fat almond, cashew, or soy milks fortified with calcium    ? Low-fat cheese, regular or frozen yogurt, and cottage cheese    · Meats and proteins:      ? Lean cuts of beef and pork (loin, leg, round), skinless chicken and turkey    ? Legumes, soy products, egg whites, or nuts    Limit or do not include the following in your heart healthy plan:   · Unhealthy fats and oils:      ? Whole or 2% milk, cream cheese, sour cream, or cheese    ? High-fat cuts of beef (T-bone steaks, ribs), chicken or turkey with skin, and organ meats such as liver    ? Butter, stick margarine, shortening, and cooking oils such as coconut or palm oil    · Foods and liquids high in sodium:      ? Packaged foods, such as frozen dinners, cookies, macaroni and cheese, and cereals with more than 300 mg of sodium per serving    ? Vegetables with added sodium, such as instant potatoes, vegetables with added sauces, or regular canned vegetables    ? Cured or smoked meats, such as hot dogs, cruz, and sausage    ?  High-sodium ketchup, barbecue sauce, salad dressing, pickles, olives, soy sauce, or miso    · Foods and liquids high in sugar:      ? Candy, cake, cookies, pies, or doughnuts    ? Soft drinks (soda), sports drinks, or sweetened tea    ? Canned or dry mixes for cakes, soups, sauces, or gravies    Other healthy heart guidelines:   · Do not smoke  Nicotine and other chemicals in cigarettes and cigars can cause lung and heart damage  Ask your healthcare provider for information if you currently smoke and need help to quit  E-cigarettes or smokeless tobacco still contain nicotine  Talk to your healthcare provider before you use these products  · Limit or do not drink alcohol as directed  Alcohol can damage your heart and raise your blood pressure  Your healthcare provider may give you specific daily and weekly limits  The general recommended limit is 1 drink a day for women 21 or older and for men 72 or older  Do not have more than 3 drinks in a day or 7 in a week  The recommended limit is 2 drinks a day for men 24to 59years of age  Do not have more than 4 drinks in a day or 14 in a week  A drink of alcohol is 12 ounces of beer, 5 ounces of wine, or 1½ ounces of liquor  · Exercise regularly  Exercise can help you maintain a healthy weight and improve your blood pressure and cholesterol levels  Regular exercise can also decrease your risk for heart problems  Ask your healthcare provider about the best exercise plan for you  Do not start an exercise program without asking your healthcare provider  Follow up with your doctor or cardiologist as directed:  Write down your questions so you remember to ask them during your visits  © Copyright [employer] 2022 Information is for End User's use only and may not be sold, redistributed or otherwise used for commercial purposes  All illustrations and images included in CareNotes® are the copyrighted property of [employer]  or [facility] Aaliyah Rey   The above information is an  only   It is not intended as medical advice for individual conditions or treatments  Talk to your doctor, nurse or pharmacist before following any medical regimen to see if it is safe and effective for you

## 2022-04-12 DIAGNOSIS — I10 ESSENTIAL HYPERTENSION: ICD-10-CM

## 2022-04-12 DIAGNOSIS — E11.65 UNCONTROLLED TYPE 2 DIABETES MELLITUS WITH HYPERGLYCEMIA (HCC): ICD-10-CM

## 2022-04-12 PROCEDURE — 4010F ACE/ARB THERAPY RXD/TAKEN: CPT | Performed by: INTERNAL MEDICINE

## 2022-04-12 RX ORDER — OLMESARTAN MEDOXOMIL 20 MG/1
TABLET ORAL
Qty: 90 TABLET | Refills: 3 | Status: SHIPPED | OUTPATIENT
Start: 2022-04-12

## 2022-05-02 DIAGNOSIS — E11.65 UNCONTROLLED TYPE 2 DIABETES MELLITUS WITH HYPERGLYCEMIA (HCC): Primary | ICD-10-CM

## 2022-06-03 DIAGNOSIS — E11.65 UNCONTROLLED TYPE 2 DIABETES MELLITUS WITH HYPERGLYCEMIA (HCC): ICD-10-CM

## 2022-06-03 RX ORDER — GLIPIZIDE 10 MG/1
TABLET, FILM COATED, EXTENDED RELEASE ORAL
Qty: 90 TABLET | Refills: 3 | Status: SHIPPED | OUTPATIENT
Start: 2022-06-03

## 2022-06-10 DIAGNOSIS — E11.65 UNCONTROLLED TYPE 2 DIABETES MELLITUS WITH HYPERGLYCEMIA (HCC): ICD-10-CM

## 2022-06-10 RX ORDER — ATORVASTATIN CALCIUM 10 MG/1
TABLET, FILM COATED ORAL
Qty: 90 TABLET | Refills: 3 | Status: SHIPPED | OUTPATIENT
Start: 2022-06-10

## 2022-06-11 LAB
ALBUMIN SERPL-MCNC: 4.2 G/DL (ref 3.8–4.9)
ALBUMIN/GLOB SERPL: 1.7 {RATIO} (ref 1.2–2.2)
ALP SERPL-CCNC: 103 IU/L (ref 44–121)
ALT SERPL-CCNC: 29 IU/L (ref 0–44)
AST SERPL-CCNC: 26 IU/L (ref 0–40)
BILIRUB SERPL-MCNC: 0.5 MG/DL (ref 0–1.2)
BUN SERPL-MCNC: 17 MG/DL (ref 6–24)
BUN/CREAT SERPL: 20 (ref 9–20)
CALCIUM SERPL-MCNC: 9.5 MG/DL (ref 8.7–10.2)
CHLORIDE SERPL-SCNC: 101 MMOL/L (ref 96–106)
CO2 SERPL-SCNC: 25 MMOL/L (ref 20–29)
CREAT SERPL-MCNC: 0.86 MG/DL (ref 0.76–1.27)
EGFR: 103 ML/MIN/1.73
EST. AVERAGE GLUCOSE BLD GHB EST-MCNC: 160 MG/DL
GLOBULIN SER-MCNC: 2.5 G/DL (ref 1.5–4.5)
GLUCOSE SERPL-MCNC: 130 MG/DL (ref 65–99)
HBA1C MFR BLD: 7.2 % (ref 4.8–5.6)
POTASSIUM SERPL-SCNC: 4.8 MMOL/L (ref 3.5–5.2)
PROT SERPL-MCNC: 6.7 G/DL (ref 6–8.5)
SODIUM SERPL-SCNC: 141 MMOL/L (ref 134–144)
T4 FREE SERPL-MCNC: 1.05 NG/DL (ref 0.82–1.77)
TSH SERPL DL<=0.005 MIU/L-ACNC: 3.81 UIU/ML (ref 0.45–4.5)

## 2022-06-11 PROCEDURE — 3051F HG A1C>EQUAL 7.0%<8.0%: CPT | Performed by: NURSE PRACTITIONER

## 2022-06-15 ENCOUNTER — OFFICE VISIT (OUTPATIENT)
Dept: ENDOCRINOLOGY | Facility: HOSPITAL | Age: 52
End: 2022-06-15
Payer: COMMERCIAL

## 2022-06-15 VITALS
SYSTOLIC BLOOD PRESSURE: 134 MMHG | WEIGHT: 258 LBS | HEIGHT: 68 IN | HEART RATE: 82 BPM | BODY MASS INDEX: 39.1 KG/M2 | DIASTOLIC BLOOD PRESSURE: 92 MMHG

## 2022-06-15 DIAGNOSIS — E03.9 ACQUIRED HYPOTHYROIDISM: ICD-10-CM

## 2022-06-15 DIAGNOSIS — E78.1 HYPERTRIGLYCERIDEMIA: ICD-10-CM

## 2022-06-15 DIAGNOSIS — I10 ESSENTIAL HYPERTENSION: ICD-10-CM

## 2022-06-15 DIAGNOSIS — E11.65 UNCONTROLLED TYPE 2 DIABETES MELLITUS WITH HYPERGLYCEMIA (HCC): Primary | ICD-10-CM

## 2022-06-15 DIAGNOSIS — E11.42 DIABETIC POLYNEUROPATHY ASSOCIATED WITH TYPE 2 DIABETES MELLITUS (HCC): ICD-10-CM

## 2022-06-15 PROCEDURE — 99215 OFFICE O/P EST HI 40 MIN: CPT | Performed by: INTERNAL MEDICINE

## 2022-06-15 NOTE — PROGRESS NOTES
6/15/2022    Assessment/Plan      Diagnoses and all orders for this visit:    Uncontrolled type 2 diabetes mellitus with hyperglycemia ([facility] [address] )  -     HEMOGLOBIN A1C W/ EAG ESTIMATION Lab Collect; Future  -     Comprehensive metabolic panel Lab Collect; Future  -     CBC and differential Lab Collect; Future  -     TSH, 3rd generation Lab Collect; Future  -     T4, free Lab Collect; Future  -     HEMOGLOBIN A1C W/ EAG ESTIMATION Lab Collect  -     Comprehensive metabolic panel Lab Collect  -     CBC and differential Lab Collect  -     TSH, 3rd generation Lab Collect  -     T4, free Lab Collect    Diabetic polyneuropathy associated with type 2 diabetes mellitus (HCC)  -     HEMOGLOBIN A1C W/ EAG ESTIMATION Lab Collect; Future  -     Comprehensive metabolic panel Lab Collect; Future  -     CBC and differential Lab Collect; Future  -     TSH, 3rd generation Lab Collect; Future  -     T4, free Lab Collect; Future  -     HEMOGLOBIN A1C W/ EAG ESTIMATION Lab Collect  -     Comprehensive metabolic panel Lab Collect  -     CBC and differential Lab Collect  -     TSH, 3rd generation Lab Collect  -     T4, free Lab Collect    Acquired hypothyroidism  -     HEMOGLOBIN A1C W/ EAG ESTIMATION Lab Collect; Future  -     Comprehensive metabolic panel Lab Collect; Future  -     CBC and differential Lab Collect; Future  -     TSH, 3rd generation Lab Collect; Future  -     T4, free Lab Collect; Future  -     HEMOGLOBIN A1C W/ EAG ESTIMATION Lab Collect  -     Comprehensive metabolic panel Lab Collect  -     CBC and differential Lab Collect  -     TSH, 3rd generation Lab Collect  -     T4, free Lab Collect    Essential hypertension  -     HEMOGLOBIN A1C W/ EAG ESTIMATION Lab Collect; Future  -     Comprehensive metabolic panel Lab Collect; Future  -     CBC and differential Lab Collect; Future  -     TSH, 3rd generation Lab Collect; Future  -     T4, free Lab Collect;  Future  -     HEMOGLOBIN A1C W/ EAG ESTIMATION Lab Collect  - Comprehensive metabolic panel Lab Collect  -     CBC and differential Lab Collect  -     TSH, 3rd generation Lab Collect  -     T4, free Lab Collect    Hypertriglyceridemia  -     HEMOGLOBIN A1C W/ EAG ESTIMATION Lab Collect; Future  -     Comprehensive metabolic panel Lab Collect; Future  -     CBC and differential Lab Collect; Future  -     TSH, 3rd generation Lab Collect; Future  -     T4, free Lab Collect; Future  -     HEMOGLOBIN A1C W/ EAG ESTIMATION Lab Collect  -     Comprehensive metabolic panel Lab Collect  -     CBC and differential Lab Collect  -     TSH, 3rd generation Lab Collect  -     T4, free Lab Collect        Assessment/Plan:  1  Type 2 diabetes  Hemoglobin A1c is 7 2%  This is slightly higher than last visit but still quite good  He will continue the same metformin, Jardiance, glipizide, and Lantus insulin  He is going to try slightly increasing the Ozempic dosage to a notch between 0 25 and 0 5 mg once a week  He will continue to work on diet, exercise, and weight loss  2  Diabetic neuropathy  He denies neuropathic symptoms  Diabetic foot exams are up-to-date  3  Hypothyroidism  Thyroid function tests are normal   He is biochemically euthyroid and will continue the same levothyroxine 50 mcg daily  4  Hypertension  He is close to normal blood pressure in the office on his current dose of olmesartan daily  5  Hyperlipidemia  He will continue the same atorvastatin 10 mg daily  I have asked him to follow up in 3 months with preceding hemoglobin A1c, CMP, CBC, TSH, and free T4       CC: Diabetes 2, thyroid, blood pressure, lipid follow-up    History of Present Illness     HPI: Nicole Segovia is a 45y o  year old male with type 2 diabetes, insulin requiring with neuropathy for about 17 years, hypothyroidism, hypertension, hyperlipidemia for follow-up visit    He is on oral agents and insulin at home and takes Jardiance 25 mg daily, metformin 1000 mg twice a day, Ozempic 0 25 mg once a week, glipizide XL 10 mg daily, and Lantus insulin 35 units daily  Ozempic was decreased last visit for significant nausea  Nausea is improved with lower dose ozempic  He denies any polyuria, polydipsia, nocturia, polyphagia, and blurry vision  He denies numbness or tingling of the feet  He denies chest pain or shortness of breath  He denies nephropathy, retinopathy, heart attack, stroke and claudication but does admit to neuropathy  Hypoglycemic episodes: No never  The patient's last eye exam was in jan 2021  The patient's last foot exam was in April 2022 at PCP  Last A1C was   Lab Results   Component Value Date    HGBA1C 7 2 (H) 06/10/2022     Blood Sugar/Glucometer/Pump/CGM review: Does not check blood sugars  He has hyperlipidemia hyper triglyceridemia and takes atorvastatin 10 mg daily  He denies chest pain or shortness of breath  He has hypertension and takes olmesartan 20 mg daily  He denies headache or stroke-like symptoms but can have occasional lightheadedness  He has hypothyroidism and takes levothyroxine 50 mcg daily  He denies heat or cold intolerance, palpitation, tremors, fatigue  Weight is 5 lb less than February 2022  He denies insomnia, diarrhea or constipation  Review of Systems   Constitutional: Negative for fatigue and unexpected weight change  Weight 5 lbs less than feb 2022  HENT: Negative for trouble swallowing  Eyes: Negative for visual disturbance  Respiratory: Negative for chest tightness and shortness of breath  Cardiovascular: Negative for chest pain and palpitations  Gastrointestinal: Negative for abdominal pain, constipation, diarrhea and nausea  Endocrine: Negative for cold intolerance, heat intolerance, polydipsia, polyphagia and polyuria  Nocturia if wife wakes up  Some episodes of feeling colder than in the past     Skin: Positive for rash  Negative for wound  Neurological: Positive for light-headedness  Negative for dizziness, tremors, weakness, numbness and headaches  Occasionally lightheaded and headaches  Psychiatric/Behavioral: Negative for sleep disturbance         Historical Information   Past Medical History:   Diagnosis Date    Anxiety 2/22/2019    Asthma 5/21/2019    GERD (gastroesophageal reflux disease) 12/19/2013    Hypertriglyceridemia 7/8/2015    Hypothyroidism     Migraine without aura and without status migrainosus, not intractable 5/21/2019    Psoriasis     Seasonal allergies 5/21/2019    Dr Ham Isabel    Sleep apnea     Vitamin D deficiency      Past Surgical History:   Procedure Laterality Date    CATARACT EXTRACTION, BILATERAL  2017    CHOLECYSTECTOMY  2000    lap    UNDESCENDED TESTICLE EXPLORATION Bilateral     as a child     Social History   Social History     Substance and Sexual Activity   Alcohol Use Not Currently     Social History     Substance and Sexual Activity   Drug Use Never     Social History     Tobacco Use   Smoking Status Never Smoker   Smokeless Tobacco Never Used     Family History:   Family History   Problem Relation Age of Onset    Diabetes Mother     Thyroid disease Mother         post thyroidectomy    Stroke Mother     Diabetes type II Mother     Thyroid disease Father         post thyroidectomy    Coronary artery disease Father     No Known Problems Brother     No Known Problems Brother     Colon cancer Neg Hx     Colon polyps Neg Hx        Meds/Allergies   Current Outpatient Medications   Medication Sig Dispense Refill    albuterol (2 5 mg/3 mL) 0 083 % nebulizer solution Take 1 vial (2 5 mg total) by nebulization every 6 (six) hours as needed for wheezing or shortness of breath 120 vial 2    albuterol (PROVENTIL HFA,VENTOLIN HFA) 90 mcg/act inhaler Inhale 1 puff every 4 (four) hours as needed      atorvastatin (LIPITOR) 10 mg tablet TAKE 1 TABLET DAILY 90 tablet 3    BD Pen Needle Shanelle U/F 32G X 4 MM MISC USE TO INJECT UNDER THE SKIN DAILY 90 each 3    Butalbital-APAP-Caffeine -40 MG CAPS Take 1 capsule by mouth every 8 (eight) hours as needed (headache) 30 capsule 0    cetirizine (ZyrTEC) 10 mg tablet Take 1 tablet by mouth daily as needed      cholecalciferol (VITAMIN D3) 1,000 units tablet Take 1 tablet (1,000 Units total) by mouth daily      Empagliflozin (Jardiance) 25 MG TABS Take 1 tablet (25 mg total) by mouth every morning 90 tablet 1    FLUoxetine (PROzac) 20 MG tablet Take 2 tablets (40 mg total) by mouth daily 180 tablet 0    fluticasone (FLONASE) 50 mcg/act nasal spray USE 2 SPRAYS IN EACH NOSTRIL DAILY 48 g 3    fluticasone-umeclidinium-vilanterol (Trelegy Ellipta) 100-62 5-25 MCG/INH inhaler Inhale 1 puff daily Rinse mouth after use   folic acid (FOLVITE) 1 mg tablet Take 1 tablet (1 mg total) by mouth daily 90 tablet 2    glipiZIDE (GLUCOTROL XL) 10 mg 24 hr tablet TAKE 1 TABLET DAILY 90 tablet 3    insulin glargine (Lantus SoloStar) 100 units/mL injection pen INJECT UP TO 60 UNITS DAILY (DOSE INCREASE) (Patient taking differently: INJECT UP TO 35 UNITS DAILY (DOSE INCREASE)) 60 mL 3    levothyroxine 50 mcg tablet TAKE 1 TABLET DAILY 90 tablet 3    metFORMIN (GLUCOPHAGE) 1000 MG tablet Take 1,000 mg by mouth 2 (two) times a day      methotrexate 2 5 mg tablet take 3 tablets by mouth every week      montelukast (SINGULAIR) 10 mg tablet take 1 tablet by mouth at bedtime 30 tablet 5    olmesartan (BENICAR) 20 mg tablet TAKE 1 TABLET DAILY 90 tablet 3    pantoprazole (PROTONIX) 40 mg tablet TAKE 1 TABLET DAILY 90 tablet 3    Semaglutide,0 25 or 0 5MG/DOS, 2 MG/1 5ML SOPN Inject 0 25 mg under the skin once a week 4 5 mL 1    Skyrizi, 150 MG Dose, 75 MG/0 83ML PSKT Every 3 months       No current facility-administered medications for this visit  No Known Allergies    Objective   Vitals: Blood pressure 134/92, pulse 82, height 5' 8" (1 727 m), weight 117 kg (258 lb)    Invasive Devices  Report    None Physical Exam  Vitals reviewed  Constitutional:       Appearance: Normal appearance  He is well-developed  He is obese  HENT:      Head: Normocephalic and atraumatic  Eyes:      Extraocular Movements: Extraocular movements intact  Conjunctiva/sclera: Conjunctivae normal       Comments: No lid lag, stare, proptosis, or periorbital edema  Neck:      Thyroid: No thyromegaly  Vascular: No carotid bruit  Comments: Thyroid normal in size  No palpable thyroid nodules  Cardiovascular:      Rate and Rhythm: Normal rate and regular rhythm  Heart sounds: Normal heart sounds  No murmur heard  Pulmonary:      Effort: Pulmonary effort is normal       Breath sounds: Normal breath sounds  No wheezing  Abdominal:      Palpations: Abdomen is soft  Musculoskeletal:         General: No deformity  Normal range of motion  Cervical back: Normal range of motion and neck supple  Right lower leg: No edema  Left lower leg: No edema  Comments: No tremor of the outstretched hands  Lymphadenopathy:      Cervical: No cervical adenopathy  Skin:     General: Skin is warm and dry  Findings: Rash present  No erythema  Comments: Psoriatic rash extensive on the skin  Neurological:      Mental Status: He is alert and oriented to person, place, and time  Deep Tendon Reflexes: Reflexes are normal and symmetric  The history was obtained from the review of the chart and from the patient      Lab Results:    Most recent Alc is  Lab Results   Component Value Date    HGBA1C 7 2 (H) 06/10/2022           Blood work done on 06/10/2022 showed a CMP with a glucose of 130 fasting but was otherwise normal     Lab Results   Component Value Date    CREATININE 0 86 06/10/2022    CREATININE 1 01 02/18/2022    CREATININE 0 90 11/11/2021    BUN 17 06/10/2022    K 4 8 06/10/2022     06/10/2022    CO2 25 06/10/2022     eGFR   Date Value Ref Range Status   06/10/2022 103 >59 mL/min/1 73 Final       Lab Results   Component Value Date    HDL 47 11/11/2021    TRIG 132 11/11/2021    CHOLHDL 2 3 10/08/2020       Lab Results   Component Value Date    ALT 29 06/10/2022    AST 26 06/10/2022       Lab Results   Component Value Date    TSH 3 810 06/10/2022    FREET4 1 05 06/10/2022       Future Appointments   Date Time Provider [facility]   9/22/2022  4:20 PM Dilma Verdugo DO [facility] [address] [facility]   10/13/2022  3:20 PM Agapito Whitley MD [facility]

## 2022-06-15 NOTE — PATIENT INSTRUCTIONS
Hgba1c is 7 2%  this is still good  It is ok to try higher ozempic dose  Continue all the other drugs the same  Keep working on [employer], exercise, and weight loss  The thyroid is normal/     Continue the same levothyroxine 50 mcg daily  Follow up in 3 months with blood work

## 2022-06-21 DIAGNOSIS — Z79.4 TYPE 2 DIABETES MELLITUS WITH HYPERGLYCEMIA, WITH LONG-TERM CURRENT USE OF INSULIN (HCC): Primary | ICD-10-CM

## 2022-06-21 DIAGNOSIS — E11.65 TYPE 2 DIABETES MELLITUS WITH HYPERGLYCEMIA, WITH LONG-TERM CURRENT USE OF INSULIN (HCC): Primary | ICD-10-CM

## 2022-06-28 ENCOUNTER — TELEPHONE (OUTPATIENT)
Dept: FAMILY MEDICINE CLINIC | Facility: HOSPITAL | Age: 52
End: 2022-06-28

## 2022-06-28 ENCOUNTER — OFFICE VISIT (OUTPATIENT)
Dept: FAMILY MEDICINE CLINIC | Facility: HOSPITAL | Age: 52
End: 2022-06-28
Payer: COMMERCIAL

## 2022-06-28 VITALS
OXYGEN SATURATION: 98 % | HEART RATE: 76 BPM | HEIGHT: 68 IN | TEMPERATURE: 97.9 F | BODY MASS INDEX: 39.74 KG/M2 | SYSTOLIC BLOOD PRESSURE: 140 MMHG | DIASTOLIC BLOOD PRESSURE: 80 MMHG | WEIGHT: 262.2 LBS

## 2022-06-28 DIAGNOSIS — J01.40 ACUTE NON-RECURRENT PANSINUSITIS: Primary | ICD-10-CM

## 2022-06-28 DIAGNOSIS — G43.009 MIGRAINE WITHOUT AURA AND WITHOUT STATUS MIGRAINOSUS, NOT INTRACTABLE: ICD-10-CM

## 2022-06-28 PROCEDURE — 3008F BODY MASS INDEX DOCD: CPT | Performed by: NURSE PRACTITIONER

## 2022-06-28 PROCEDURE — 99214 OFFICE O/P EST MOD 30 MIN: CPT | Performed by: NURSE PRACTITIONER

## 2022-06-28 PROCEDURE — 3077F SYST BP >= 140 MM HG: CPT | Performed by: NURSE PRACTITIONER

## 2022-06-28 PROCEDURE — 3079F DIAST BP 80-89 MM HG: CPT | Performed by: NURSE PRACTITIONER

## 2022-06-28 RX ORDER — AMOXICILLIN AND CLAVULANATE POTASSIUM 875; 125 MG/1; MG/1
1 TABLET, FILM COATED ORAL EVERY 12 HOURS SCHEDULED
Qty: 20 TABLET | Refills: 0 | Status: SHIPPED | OUTPATIENT
Start: 2022-06-28 | End: 2022-07-08

## 2022-06-28 NOTE — PROGRESS NOTES
Assessment/Plan:     Given longevity of symptoms along with comorbidity will treat with antibiotic  Call if not improving or worsening  Diagnoses and all orders for this visit:    Acute non-recurrent pansinusitis  -     amoxicillin-clavulanate (AUGMENTIN) 875-125 mg per tablet; Take 1 tablet by mouth every 12 (twelve) hours for 10 days          Subjective:     Patient ID: Sheri Raman is a 45 y o  male  Feeling lousy  Headaches, eyes watering  Right sided facial discomfort  Stress in shoulder  Has been sneezing, runny nose  Started 2 weeks ago  Comes and goes but hasn't been getting any better  Has sinus pressure  No fever or chills  No cough  Took COVID test today and was negative  Vaccinated and booster  Taking antihistamine daily  Feels allergies have been good up to this point  Has asthma  Has not needed nebulizer  Slight wheezing  Review of Systems   Constitutional: Positive for fatigue  HENT: Positive for congestion, rhinorrhea, sinus pressure, sinus pain and sneezing  Eyes: Positive for discharge  Respiratory: Positive for wheezing  Negative for cough and shortness of breath  Musculoskeletal: Positive for myalgias  Neurological: Positive for headaches  The following portions of the patient's history were reviewed and updated as appropriate: allergies, current medications, past family history, past medical history, past social history, past surgical history and problem list     Objective:  Vitals:    06/28/22 1839   BP: 140/80   Pulse: 76   Temp: 97 9 °F (36 6 °C)   SpO2: 98%      Physical Exam  Vitals reviewed  Constitutional:       General: He is not in acute distress  Appearance: Normal appearance  He is obese  He is not ill-appearing  HENT:      Right Ear: There is impacted cerumen  Left Ear: There is impacted cerumen  Mouth/Throat:      Mouth: Mucous membranes are moist       Pharynx: Oropharynx is clear     Cardiovascular:      Rate and Rhythm: Normal rate and regular rhythm  Heart sounds: Normal heart sounds  No murmur heard  Pulmonary:      Effort: Pulmonary effort is normal       Breath sounds: Normal breath sounds  Skin:     General: Skin is warm and dry  Neurological:      Mental Status: He is alert and oriented to person, place, and time  Psychiatric:         Mood and Affect: Mood normal          Behavior: Behavior normal          Thought Content:  Thought content normal          Judgment: Judgment normal

## 2022-06-28 NOTE — TELEPHONE ENCOUNTER
Achy shoulders, runny nose, head pressure, advised to take covid test  Pt is going to call back with results

## 2022-06-28 NOTE — TELEPHONE ENCOUNTER
PATIENT STATES THAT HIS ALLERGIES ARE REALLY BAD RIGHT NOW - HIS SINUSES ARE KILLING HIM - ASKING TO BE SEEN - VIRTUAL OR IN PERSON?   PCB

## 2022-06-29 DIAGNOSIS — G43.009 MIGRAINE WITHOUT AURA AND WITHOUT STATUS MIGRAINOSUS, NOT INTRACTABLE: ICD-10-CM

## 2022-06-29 RX ORDER — BUTALBITAL, ACETAMINOPHEN AND CAFFEINE 300; 40; 50 MG/1; MG/1; MG/1
1 CAPSULE ORAL EVERY 8 HOURS PRN
Qty: 30 CAPSULE | Refills: 0 | Status: SHIPPED | OUTPATIENT
Start: 2022-06-29

## 2022-07-04 DIAGNOSIS — E11.65 UNCONTROLLED TYPE 2 DIABETES MELLITUS WITH HYPERGLYCEMIA (HCC): ICD-10-CM

## 2022-07-05 RX ORDER — EMPAGLIFLOZIN 25 MG/1
TABLET, FILM COATED ORAL
Qty: 90 TABLET | Refills: 3 | Status: SHIPPED | OUTPATIENT
Start: 2022-07-05

## 2022-08-10 LAB
LEFT EYE DIABETIC RETINOPATHY: NORMAL
RIGHT EYE DIABETIC RETINOPATHY: NORMAL

## 2022-08-10 PROCEDURE — 2023F DILAT RTA XM W/O RTNOPTHY: CPT | Performed by: REGISTERED NURSE

## 2022-08-22 DIAGNOSIS — K21.9 GASTROESOPHAGEAL REFLUX DISEASE: ICD-10-CM

## 2022-08-22 RX ORDER — PANTOPRAZOLE SODIUM 40 MG/1
40 TABLET, DELAYED RELEASE ORAL DAILY
Qty: 90 TABLET | Refills: 1 | Status: SHIPPED | OUTPATIENT
Start: 2022-08-22

## 2022-08-26 ENCOUNTER — CONSULT (OUTPATIENT)
Dept: GASTROENTEROLOGY | Facility: CLINIC | Age: 52
End: 2022-08-26
Payer: COMMERCIAL

## 2022-08-26 VITALS
DIASTOLIC BLOOD PRESSURE: 80 MMHG | BODY MASS INDEX: 39.25 KG/M2 | HEIGHT: 68 IN | WEIGHT: 259 LBS | SYSTOLIC BLOOD PRESSURE: 128 MMHG | HEART RATE: 65 BPM

## 2022-08-26 DIAGNOSIS — Z12.11 SCREENING FOR COLON CANCER: ICD-10-CM

## 2022-08-26 DIAGNOSIS — K21.9 GASTROESOPHAGEAL REFLUX DISEASE, UNSPECIFIED WHETHER ESOPHAGITIS PRESENT: Primary | ICD-10-CM

## 2022-08-26 PROCEDURE — 3074F SYST BP LT 130 MM HG: CPT | Performed by: REGISTERED NURSE

## 2022-08-26 PROCEDURE — 99213 OFFICE O/P EST LOW 20 MIN: CPT | Performed by: REGISTERED NURSE

## 2022-08-26 PROCEDURE — 3079F DIAST BP 80-89 MM HG: CPT | Performed by: REGISTERED NURSE

## 2022-08-26 NOTE — PROGRESS NOTES
[id number] - Outpatient Follow-up Note  Ina Hodgkin 45 y o  male MRN: [id number]  Encounter: [id number]    ASSESSMENT AND PLAN:      1  Gastroesophageal reflux disease, unspecified whether esophagitis present  Increase in heartburn over the past week or 2  He did have pretty significant asthma and was treated with steroids and antibiotics  Significant cough and possible bronchitis as well  I believe this increase in his symptoms is likely related to irritation from the cough  We discussed restarting PPI  If no improvement in 2 weeks he will give me a call  If minimal improvement at follow-up office visit will schedule EGD  -pantoprazole 40 mg daily    2  Screening for colon cancer  Previous colonoscopy in 2019 with a 10 year recall  Next colonoscopy due in 2029  Followup Appointment: 2 months  ______________________________________________________________________    Chief Complaint   Patient presents with    GERD     HPI:  57-year-old male presents with complaints of GERD  He states that over the past 3 or 4 weeks he was having severe asthma and was treated with 2 courses of prednisone as well as antibiotics and albuterol nebulizer Q 4 p r n     The asthma improved however he was still coughing he was told he had possible bronchitis and was given a 2nd antibiotic  Cough has still been improving however is still there  Now, over the past week or 2 he has had increase in heartburn symptoms  He has been taking Gaviscon almost daily after dinner  Symptoms do not present until later in the day any has associated bloating  Denies any dysphagia or water brash  Denies any nausea or vomiting  He did have an EGD in 2019 that showed grade a esophagitis as well as nonerosive gastritis  He had been on a PPI however has not taken in over a year  He states now that he is feeling better his appetite is improved and his weight has remained stable    Denies any lower GI complaints such as change in bowel habits or rectal bleeding      Historical Information   Past Medical History:   Diagnosis Date    Anxiety 2/22/2019    Asthma 5/21/2019    GERD (gastroesophageal reflux disease) 12/19/2013    Hypertriglyceridemia 7/8/2015    Hypothyroidism     Migraine without aura and without status migrainosus, not intractable 5/21/2019    Psoriasis     Seasonal allergies 5/21/2019    Dr Fco Champagne    Sleep apnea     Vitamin D deficiency      Past Surgical History:   Procedure Laterality Date    CATARACT EXTRACTION, BILATERAL  2017    CHOLECYSTECTOMY  2000    lap    UNDESCENDED TESTICLE EXPLORATION Bilateral     as a child     Social History     Substance and Sexual Activity   Alcohol Use Not Currently     Social History     Substance and Sexual Activity   Drug Use Never     Social History     Tobacco Use   Smoking Status Never Smoker   Smokeless Tobacco Never Used     Family History   Problem Relation Age of Onset    Diabetes Mother     Thyroid disease Mother         post thyroidectomy    Stroke Mother     Diabetes type II Mother     Thyroid disease Father         post thyroidectomy    Coronary artery disease Father     No Known Problems Brother     No Known Problems Brother     Colon cancer Neg Hx     Colon polyps Neg Hx          Current Outpatient Medications:     albuterol (2 5 mg/3 mL) 0 083 % nebulizer solution    albuterol (PROVENTIL HFA,VENTOLIN HFA) 90 mcg/act inhaler    atorvastatin (LIPITOR) 10 mg tablet    BD Pen Needle Shanelle U/F 32G X 4 MM MISC    Butalbital-APAP-Caffeine -40 MG CAPS    cetirizine (ZyrTEC) 10 mg tablet    cholecalciferol (VITAMIN D3) 1,000 units tablet    FLUoxetine (PROzac) 20 MG tablet    fluticasone (FLONASE) 50 mcg/act nasal spray    fluticasone-umeclidinium-vilanterol (Trelegy Ellipta) 100-62 5-25 MCG/INH inhaler    folic acid (FOLVITE) 1 mg tablet    glipiZIDE (GLUCOTROL XL) 10 mg 24 hr tablet    insulin glargine (Lantus SoloStar) 100 units/mL injection pen    Jardiance 25 MG TABS    levothyroxine 50 mcg tablet    metFORMIN (GLUCOPHAGE) 1000 MG tablet    methotrexate 2 5 mg tablet    montelukast (SINGULAIR) 10 mg tablet    olmesartan (BENICAR) 20 mg tablet    pantoprazole (PROTONIX) 40 mg tablet    Skyrizi, 150 MG Dose, 75 MG/0 83ML PSKT    Semaglutide,0 25 or 0 5MG/DOS, 2 MG/1 5ML SOPN  No Known Allergies  Reviewed medications and allergies and updated as indicated    PHYSICAL EXAM:    Blood pressure 128/80, pulse 65, height 5' 8" (1 727 m), weight 117 kg (259 lb)  Body mass index is 39 38 kg/m²  General Appearance: NAD, cooperative, alert  Eyes: Anicteric, PERRLA, EOMI  ENT:  Normocephalic, atraumatic, normal mucosa  Neck:  Supple, symmetrical, trachea midline  Resp:  Clear to auscultation bilaterally; no rales, rhonchi or wheezing; respirations unlabored   CV:  S1 S2, Regular rate and rhythm; no murmur, rub, or gallop  GI:  Soft, non-tender, non-distended; normal bowel sounds; no masses, no organomegaly   Rectal: Deferred  Musculoskeletal: No cyanosis, clubbing or edema  Normal ROM  Skin:  No jaundice, rashes, or lesions   Heme/Lymph: No palpable cervical lymphadenopathy  Psych: Normal affect, good eye contact  Neuro: No gross deficits, AAOx3    Lab Results:   Lab Results   Component Value Date    WBC 8 0 11/11/2021    HGB 15 6 11/11/2021    HCT 46 6 11/11/2021    MCV 86 11/11/2021     11/11/2021     Lab Results   Component Value Date    K 4 8 06/10/2022     06/10/2022    CO2 25 06/10/2022    BUN 17 06/10/2022    CREATININE 0 86 06/10/2022    AST 26 06/10/2022    ALT 29 06/10/2022    EGFR 103 06/10/2022     No results found for: IRON, TIBC, FERRITIN  No results found for: LIPASE    Radiology Results:   No results found

## 2022-10-06 ENCOUNTER — NURSE TRIAGE (OUTPATIENT)
Dept: OTHER | Facility: OTHER | Age: 52
End: 2022-10-06

## 2022-10-07 ENCOUNTER — OFFICE VISIT (OUTPATIENT)
Dept: FAMILY MEDICINE CLINIC | Facility: HOSPITAL | Age: 52
End: 2022-10-07
Payer: COMMERCIAL

## 2022-10-07 VITALS
SYSTOLIC BLOOD PRESSURE: 136 MMHG | BODY MASS INDEX: 39.31 KG/M2 | TEMPERATURE: 96.5 F | HEIGHT: 68 IN | HEART RATE: 62 BPM | WEIGHT: 259.4 LBS | DIASTOLIC BLOOD PRESSURE: 94 MMHG

## 2022-10-07 DIAGNOSIS — Z12.5 PROSTATE CANCER SCREENING: ICD-10-CM

## 2022-10-07 DIAGNOSIS — E11.65 UNCONTROLLED TYPE 2 DIABETES MELLITUS WITH HYPERGLYCEMIA (HCC): ICD-10-CM

## 2022-10-07 DIAGNOSIS — E78.1 HYPERTRIGLYCERIDEMIA: ICD-10-CM

## 2022-10-07 DIAGNOSIS — M54.2 CERVICALGIA: Primary | ICD-10-CM

## 2022-10-07 PROCEDURE — 99214 OFFICE O/P EST MOD 30 MIN: CPT | Performed by: INTERNAL MEDICINE

## 2022-10-07 RX ORDER — CYCLOBENZAPRINE HCL 5 MG
5 TABLET ORAL 3 TIMES DAILY PRN
Qty: 30 TABLET | Refills: 0 | Status: SHIPPED | OUTPATIENT
Start: 2022-10-07

## 2022-10-07 NOTE — TELEPHONE ENCOUNTER
Reason for Disposition   [1] MODERATE neck pain (e g , interferes with normal activities AND [2] present > 3 days    Answer Assessment - Initial Assessment Questions  1  ONSET: "When did the pain begin?"       Started over the last few weeks; lifted some bottled water and now the pain on his neck is worse  Patient stated that there is no pain when sitting still, but when turning head or neck it hurts  2  LOCATION: "Where does it hurt?"      Right side of neck  3  PATTERN "Does the pain come and go, or has it been constant since it started?"       Constant since lifting the water  4  SEVERITY: "How bad is the pain?"  (Scale 1-10; or mild, moderate, severe)    - NO PAIN (0): no pain or only slight stiffness     - MILD (1-3): doesn't interfere with normal activities     - MODERATE (4-7): interferes with normal activities or awakens from sleep     - SEVERE (8-10):  excruciating pain, unable to do any normal activities       Between 6-10 depending on movement  5  RADIATION: "Does the pain go anywhere else, shoot into your arms?"      Denies  6  CORD SYMPTOMS: "Any weakness or numbness of the arms or legs?"      Denies  7  CAUSE: "What do you think is causing the neck pain?"      Unsure  8  NECK OVERUSE: "Any recent activities that involved turning or twisting the neck?"      Unsure if due to lifting water  9  OTHER SYMPTOMS: "Do you have any other symptoms?" (e g , headache, fever, chest pain, difficulty breathing, neck swelling)      Denies headache, denies fever, over the last few weeks has been wheezing; has been using albuterol inhaler (patient stated he has had symptoms for that in the past and uses inhaler)  Denies any swelling to neck      Protocols used: NECK PAIN OR STIFFNESS-ADULT-

## 2022-10-07 NOTE — TELEPHONE ENCOUNTER
Patient declined ED visit tonight; wanted to manage and home and have appointment with PCP tomorrow  Scheduled patient appt with PCP; went over home care advice and when to seek ED treatment  Patient verbalized understanding  Asked patient to call back with any further questions or concerns

## 2022-10-07 NOTE — PROGRESS NOTES
Name: Adama Esparza      : 1970      MRN: [id number]  Encounter Provider: Nan Argueta DO  Encounter Date: 10/7/2022   Encounter department: [phone]     Assessment & Plan     1  Cervicalgia  Comments:  Reassured pt that symptoms/location/exam c/w muscular etiology /cervicalgia, no indication for imaging at this time, urged heat/massage/stretches and PT, con't ibuprofen prn, call with persistent symptoms after 4-6 wks OR with new/worse symptoms, radicular symptoms reviewed and urged to call if they occur  Orders:  -     Ambulatory Referral to Physical Therapy; Future  -     cyclobenzaprine (FLEXERIL) 5 mg tablet; Take 1 tablet (5 mg total) by mouth 3 (three) times a day as needed for muscle spasms    2  Hypertriglyceridemia  Comments:  FLP due in Nov - order given, will follow  Orders:  -     Lipid panel    3  Uncontrolled type 2 diabetes mellitus with hyperglycemia ([facility] [address] )  Comments:  has appt with Endo in Nov, needs urine micro  in Nov - order given today  Orders:  -     Microalbumin / creatinine urine ratio    4  Prostate cancer screening  Comments:  Due in Nov - order placed  Orders:  -     PSA Total (Reflex To Free); Future  -     PSA Total (Reflex To Free)           Subjective      HPI Pt here for an acute visit    Pt noting 3-4 weeks of chronic R sided neck pain  Pain was mild and intermittent and he relates no known provoking fall/injury/new activity  Yesterday he was lifting a case of water and felt sudden sharp pain in the R lower neck region  The pain was sharp and radiated up the neck  Pain has been intermittent since then and was better when he went to bed last night  Pain is worse with moving or if puts pressure and pushes up off his arms to stand  Pain does not radiate into the UE and he notes no weakness/dropping objects/numbness or tingling  He notes no h/o neck problems or surgery  He has tried OTC Ibuprofen w/o great benefit    He notes no actual R shoulder pain and denies redness/warmth/swelling  Seeing Endo in Nov  Asking if any additional labs need to be ordered  Due for PSA/FLP/urine microalb in Nov - BW order given      Review of Systems   Constitutional: Negative for chills and fever  Respiratory: Positive for wheezing  Negative for cough and shortness of breath  Gastrointestinal: Negative for diarrhea, nausea and vomiting  Musculoskeletal: Positive for neck pain  Skin: Positive for rash  Negative for color change  Neurological: Negative for dizziness and headaches  Current Outpatient Medications on File Prior to Visit   Medication Sig    albuterol (2 5 mg/3 mL) 0 083 % nebulizer solution Take 1 vial (2 5 mg total) by nebulization every 6 (six) hours as needed for wheezing or shortness of breath    albuterol (PROVENTIL HFA,VENTOLIN HFA) 90 mcg/act inhaler Inhale 1 puff every 4 (four) hours as needed    atorvastatin (LIPITOR) 10 mg tablet TAKE 1 TABLET DAILY    BD Pen Needle Shanelle U/F 32G X 4 MM MISC USE TO INJECT UNDER THE SKIN DAILY    Butalbital-APAP-Caffeine -40 MG CAPS Take 1 capsule by mouth every 8 (eight) hours as needed (headache)    cetirizine (ZyrTEC) 10 mg tablet Take 1 tablet by mouth daily as needed    cholecalciferol (VITAMIN D3) 1,000 units tablet Take 1 tablet (1,000 Units total) by mouth daily    FLUoxetine (PROzac) 20 MG tablet Take 2 tablets (40 mg total) by mouth daily    fluticasone (FLONASE) 50 mcg/act nasal spray USE 2 SPRAYS IN EACH NOSTRIL DAILY    fluticasone-umeclidinium-vilanterol (Trelegy Ellipta) 100-62 5-25 MCG/INH inhaler Inhale 1 puff daily Rinse mouth after use      folic acid (FOLVITE) 1 mg tablet Take 1 tablet (1 mg total) by mouth daily    glipiZIDE (GLUCOTROL XL) 10 mg 24 hr tablet TAKE 1 TABLET DAILY    insulin glargine (Lantus SoloStar) 100 units/mL injection pen INJECT UP TO 60 UNITS DAILY (DOSE INCREASE) (Patient taking differently: INJECT UP TO 35 UNITS DAILY (DOSE INCREASE))    Jardiance 25 MG TABS TAKE 1 TABLET DAILY IN THE MORNING    levothyroxine 50 mcg tablet TAKE 1 TABLET DAILY    metFORMIN (GLUCOPHAGE) 1000 MG tablet Take 1 tablet (1,000 mg total) by mouth 2 (two) times a day    methotrexate 2 5 mg tablet take 3 tablets by mouth every week    montelukast (SINGULAIR) 10 mg tablet take 1 tablet by mouth at bedtime    olmesartan (BENICAR) 20 mg tablet TAKE 1 TABLET DAILY    pantoprazole (PROTONIX) 40 mg tablet Take 1 tablet (40 mg total) by mouth daily    Semaglutide,0 25 or 0 5MG/DOS, 2 MG/1 5ML SOPN Inject 0 25 mg under the skin once a week (Patient not taking: No sig reported)    Skyrizi, 150 MG Dose, 75 MG/0 83ML PSKT Every 3 months       Objective     /94   Pulse 62   Temp (!) 96 5 °F (35 8 °C) (Tympanic)   Ht 5' 8" (1 727 m)   Wt 118 kg (259 lb 6 4 oz)   BMI 39 44 kg/m²     Physical Exam  Vitals and nursing note reviewed  Constitutional:       General: He is not in acute distress  Appearance: He is well-developed  He is obese  He is not ill-appearing  HENT:      Head: Normocephalic and atraumatic  Eyes:      General:         Right eye: No discharge  Left eye: No discharge  Conjunctiva/sclera: Conjunctivae normal    Neck:      Trachea: No tracheal deviation  Cardiovascular:      Rate and Rhythm: Normal rate and regular rhythm  Heart sounds: No murmur heard  Pulmonary:      Effort: Pulmonary effort is normal  No respiratory distress  Breath sounds: Wheezing present  No rhonchi or rales  Comments: Faint wheeze RUL  Musculoskeletal:         General: No swelling, tenderness, deformity or signs of injury  Cervical back: Neck supple        Comments: Cervical spine: no pain with palp of spinous processes of cervical spine, some hypertonicity to R trapezius/paraspinal musculature, nml flex/ext of C-spine, decrease in rotation B/L, 5/5 B/L UE muscle strength   Lymphadenopathy:      Cervical: No cervical adenopathy  Skin:     General: Skin is warm and dry  Coloration: Skin is not pale  Findings: Rash present  Comments: Wide spread psoriatic plaques   Neurological:      General: No focal deficit present  Mental Status: He is alert  Sensory: No sensory deficit  Motor: No weakness or abnormal muscle tone  Gait: Gait normal    Psychiatric:         Mood and Affect: Mood normal          Behavior: Behavior normal          Thought Content:  Thought content normal          Judgment: Judgment normal        Rankin Angelucci, DO

## 2022-10-07 NOTE — TELEPHONE ENCOUNTER
Regarding: neck pain   ----- Message from Jeff Francis sent at 10/6/2022  9:05 PM EDT -----  "I've had pain in the right side of my neck, today I lifted something heavy and it made it worse negra   When I try to move my left arm"

## 2022-10-18 ENCOUNTER — IMMUNIZATIONS (OUTPATIENT)
Dept: FAMILY MEDICINE CLINIC | Facility: HOSPITAL | Age: 52
End: 2022-10-18
Payer: COMMERCIAL

## 2022-10-18 DIAGNOSIS — Z23 ENCOUNTER FOR IMMUNIZATION: Primary | ICD-10-CM

## 2022-10-18 PROCEDURE — 90682 RIV4 VACC RECOMBINANT DNA IM: CPT | Performed by: INTERNAL MEDICINE

## 2022-10-18 PROCEDURE — 90471 IMMUNIZATION ADMIN: CPT | Performed by: INTERNAL MEDICINE

## 2022-11-12 LAB
ALBUMIN SERPL-MCNC: 4.2 G/DL (ref 3.8–4.9)
ALBUMIN/CREAT UR: 5 MG/G CREAT (ref 0–29)
ALBUMIN/GLOB SERPL: 2 {RATIO} (ref 1.2–2.2)
ALP SERPL-CCNC: 102 IU/L (ref 44–121)
ALT SERPL-CCNC: 27 IU/L (ref 0–44)
AST SERPL-CCNC: 23 IU/L (ref 0–40)
BASOPHILS # BLD AUTO: 0.1 X10E3/UL (ref 0–0.2)
BASOPHILS NFR BLD AUTO: 1 %
BILIRUB SERPL-MCNC: 0.3 MG/DL (ref 0–1.2)
BUN SERPL-MCNC: 15 MG/DL (ref 6–24)
BUN/CREAT SERPL: 18 (ref 9–20)
CALCIUM SERPL-MCNC: 9.3 MG/DL (ref 8.7–10.2)
CHLORIDE SERPL-SCNC: 101 MMOL/L (ref 96–106)
CHOLEST SERPL-MCNC: 126 MG/DL (ref 100–199)
CHOLEST/HDLC SERPL: 2.9 RATIO (ref 0–5)
CO2 SERPL-SCNC: 25 MMOL/L (ref 20–29)
CREAT SERPL-MCNC: 0.82 MG/DL (ref 0.76–1.27)
CREAT UR-MCNC: 170.7 MG/DL
EGFR: 104 ML/MIN/1.73
EOSINOPHIL # BLD AUTO: 0.3 X10E3/UL (ref 0–0.4)
EOSINOPHIL NFR BLD AUTO: 4 %
ERYTHROCYTE [DISTWIDTH] IN BLOOD BY AUTOMATED COUNT: 13 % (ref 11.6–15.4)
EST. AVERAGE GLUCOSE BLD GHB EST-MCNC: 163 MG/DL
GLOBULIN SER-MCNC: 2.1 G/DL (ref 1.5–4.5)
GLUCOSE SERPL-MCNC: 169 MG/DL (ref 70–99)
HBA1C MFR BLD: 7.3 % (ref 4.8–5.6)
HCT VFR BLD AUTO: 45.2 % (ref 37.5–51)
HDLC SERPL-MCNC: 43 MG/DL
HGB BLD-MCNC: 15.5 G/DL (ref 13–17.7)
IMM GRANULOCYTES # BLD: 0 X10E3/UL (ref 0–0.1)
IMM GRANULOCYTES NFR BLD: 0 %
LDLC SERPL CALC-MCNC: 59 MG/DL (ref 0–99)
LYMPHOCYTES # BLD AUTO: 1.9 X10E3/UL (ref 0.7–3.1)
LYMPHOCYTES NFR BLD AUTO: 26 %
MCH RBC QN AUTO: 29.2 PG (ref 26.6–33)
MCHC RBC AUTO-ENTMCNC: 34.3 G/DL (ref 31.5–35.7)
MCV RBC AUTO: 85 FL (ref 79–97)
MICROALBUMIN UR-MCNC: 8.7 UG/ML
MONOCYTES # BLD AUTO: 0.6 X10E3/UL (ref 0.1–0.9)
MONOCYTES NFR BLD AUTO: 8 %
NEUTROPHILS # BLD AUTO: 4.4 X10E3/UL (ref 1.4–7)
NEUTROPHILS NFR BLD AUTO: 61 %
PLATELET # BLD AUTO: 291 X10E3/UL (ref 150–450)
POTASSIUM SERPL-SCNC: 5 MMOL/L (ref 3.5–5.2)
PROT SERPL-MCNC: 6.3 G/DL (ref 6–8.5)
PSA SERPL-MCNC: 0.2 NG/ML (ref 0–4)
RBC # BLD AUTO: 5.3 X10E6/UL (ref 4.14–5.8)
SL AMB REFLEX CRITERIA: NORMAL
SL AMB VLDL CHOLESTEROL CALC: 24 MG/DL (ref 5–40)
SODIUM SERPL-SCNC: 142 MMOL/L (ref 134–144)
T4 FREE SERPL-MCNC: 0.92 NG/DL (ref 0.82–1.77)
TRIGL SERPL-MCNC: 135 MG/DL (ref 0–149)
TSH SERPL DL<=0.005 MIU/L-ACNC: 4.8 UIU/ML (ref 0.45–4.5)
WBC # BLD AUTO: 7.3 X10E3/UL (ref 3.4–10.8)

## 2022-11-17 ENCOUNTER — OFFICE VISIT (OUTPATIENT)
Dept: ENDOCRINOLOGY | Facility: HOSPITAL | Age: 52
End: 2022-11-17

## 2022-11-17 VITALS
HEART RATE: 75 BPM | HEIGHT: 68 IN | SYSTOLIC BLOOD PRESSURE: 130 MMHG | DIASTOLIC BLOOD PRESSURE: 82 MMHG | WEIGHT: 264 LBS | BODY MASS INDEX: 40.01 KG/M2 | OXYGEN SATURATION: 96 %

## 2022-11-17 DIAGNOSIS — E11.65 TYPE 2 DIABETES MELLITUS WITH HYPERGLYCEMIA, WITH LONG-TERM CURRENT USE OF INSULIN (HCC): Primary | ICD-10-CM

## 2022-11-17 DIAGNOSIS — Z79.4 TYPE 2 DIABETES MELLITUS WITH HYPERGLYCEMIA, WITH LONG-TERM CURRENT USE OF INSULIN (HCC): Primary | ICD-10-CM

## 2022-11-17 DIAGNOSIS — I10 ESSENTIAL HYPERTENSION: ICD-10-CM

## 2022-11-17 DIAGNOSIS — E78.1 HYPERTRIGLYCERIDEMIA: ICD-10-CM

## 2022-11-17 DIAGNOSIS — E03.9 ACQUIRED HYPOTHYROIDISM: ICD-10-CM

## 2022-11-17 DIAGNOSIS — E11.42 DIABETIC POLYNEUROPATHY ASSOCIATED WITH TYPE 2 DIABETES MELLITUS (HCC): ICD-10-CM

## 2022-11-17 NOTE — PATIENT INSTRUCTIONS
Be mindful of diet  Stay active and stay hydrated  Increase Ozempic to full 0 5 mg weekly  Otherwise continue current regimen  Check blood sugars regularly, twice daily at alternating times  Continue levothyroxine at current dose  Please take your vitamins after lunch or in the evening  Wait at least 30 minutes to eat after Levothyroxine

## 2022-11-17 NOTE — PROGRESS NOTES
Jordy Hallmark 45 y o  male MRN: [id number]    Encounter: [id number]      Assessment/Plan     Assessment: This is a 45y o -year-old male with type 2 diabetes with neuropathy, hypothyroidism, hypertension and hyperlipidemia  Plan:  1  Type 2 diabetes  Hemoglobin A1c is 7 3%  This is slightly higher than last visit but still quite good  He will continue the same metformin, Jardiance, glipizide, and Lantus insulin  Will increase his Ozempic to a full 0 5 mg dose once a week  He will continue to work on diet, exercise, and weight loss  I have asked him to check his blood sugars twice daily at alternating times and send a record to the office in 2 weeks for review  Check hemoglobin A1c prior to next visit      2  Diabetic neuropathy  He denies neuropathic symptoms  Diabetic foot exams are up-to-date      3  Hypothyroidism  TSH is slightly elevated with a normal free T4  Discussed the proper process for taking his levothyroxine as he is taking it with his vitamins in the morning, missing doses at times and eating within 15 minutes  He will rectify these activities and recheck his TSH and free T4 in 6 weeks to reassess      4  Hypertension  He is normotensive in the office today  Continue current regimen  Check comprehensive metabolic panel prior to next visit      5  Hyperlipidemia  Stable  Continue  atorvastatin 10 mg daily  CC:  Type 2 diabetes follow-up    History of Present Illness     HPI:  45y o  year old male with type 2 diabetes, insulin requiring with neuropathy for about 17 years, hypothyroidism, hypertension, hyperlipidemia for follow-up visit  He is on oral agents and insulin at home and takes Jardiance 25 mg daily, metformin 1000 mg twice a day, Ozempic 0 25 mg once a week, glipizide XL 10 mg daily, and Lantus insulin 35 units daily  His most recent hemoglobin A1c from November 11, 2022 is 7 3  Ozempic was decreased last visit for significant nausea   Nausea is improved with lower dose ozempic  He denies any polyuria, polydipsia, nocturia, polyphagia, and blurry vision  He denies numbness or tingling of the feet  He denies chest pain or shortness of breath  He denies nephropathy, retinopathy, heart attack, stroke and claudication but does admit to neuropathy        Hypoglycemic episodes:  none recently      The patient's last eye exam was in jan 2021  The patient's last foot exam was in April 2022 at PCP       Blood Sugar/Glucometer/Pump/CGM review: Does not check blood sugars regularly      He has hyperlipidemia hyper triglyceridemia and takes atorvastatin 10 mg daily  He denies chest pain or shortness of breath        He has hypertension and takes olmesartan 20 mg daily  He denies headache or stroke-like symptoms but can have occasional lightheadedness      He has hypothyroidism and takes levothyroxine 50 mcg daily  His most recent TSH from November 11, 2022 is 4 800 with free T4 of 0 92  He denies heat or cold intolerance, palpitation, tremors, fatigue  Weight is 5 lb less than February 2022  He denies insomnia, diarrhea or constipation  Review of Systems   Constitutional: Negative  Negative for chills, fatigue and fever  HENT: Negative  Negative for trouble swallowing and voice change  Eyes: Negative for photophobia, pain, discharge, redness, itching and visual disturbance  Respiratory: Negative for cough and shortness of breath  Cardiovascular: Negative for chest pain and palpitations  Gastrointestinal: Negative for abdominal pain, constipation, diarrhea, nausea and vomiting  Endocrine: Negative for cold intolerance, heat intolerance, polydipsia, polyphagia and polyuria  Genitourinary: Negative  Musculoskeletal: Negative  Skin: Negative  Allergic/Immunologic: Negative  Neurological: Negative for dizziness, syncope, light-headedness and headaches  Hematological: Negative  Psychiatric/Behavioral: Negative      All other systems reviewed and are negative        Historical Information   Past Medical History:   Diagnosis Date   • Anxiety 2/22/2019   • Asthma 5/21/2019   • GERD (gastroesophageal reflux disease) 12/19/2013   • Hypertriglyceridemia 7/8/2015   • Hypothyroidism    • Migraine without aura and without status migrainosus, not intractable 5/21/2019   • Psoriasis    • Seasonal allergies 5/21/2019    Dr Praful Copeland   • Vitamin D deficiency      Past Surgical History:   Procedure Laterality Date   • CATARACT EXTRACTION, BILATERAL  2017   • CHOLECYSTECTOMY  2000    lap   • UNDESCENDED TESTICLE EXPLORATION Bilateral     as a child     Social History   Social History     Substance and Sexual Activity   Alcohol Use Not Currently     Social History     Substance and Sexual Activity   Drug Use Never     Social History     Tobacco Use   Smoking Status Never   Smokeless Tobacco Never     Family History:   Family History   Problem Relation Age of Onset   • Diabetes Mother    • Thyroid disease Mother         post thyroidectomy   • Stroke Mother    • Diabetes type II Mother    • Thyroid disease Father         post thyroidectomy   • Coronary artery disease Father    • No Known Problems Brother    • No Known Problems Brother    • Colon cancer Neg Hx    • Colon polyps Neg Hx        Meds/Allergies   Current Outpatient Medications   Medication Sig Dispense Refill   • albuterol (2 5 mg/3 mL) 0 083 % nebulizer solution Take 1 vial (2 5 mg total) by nebulization every 6 (six) hours as needed for wheezing or shortness of breath 120 vial 2   • albuterol (PROVENTIL HFA,VENTOLIN HFA) 90 mcg/act inhaler Inhale 1 puff every 4 (four) hours as needed     • atorvastatin (LIPITOR) 10 mg tablet TAKE 1 TABLET DAILY 90 tablet 3   • BD Pen Needle Shanelle U/F 32G X 4 MM MISC USE TO INJECT UNDER THE SKIN DAILY 90 each 3   • Butalbital-APAP-Caffeine -40 MG CAPS Take 1 capsule by mouth every 8 (eight) hours as needed (headache) 30 capsule 0   • cetirizine (ZyrTEC) 10 mg tablet Take 1 tablet by mouth daily as needed     • cholecalciferol (VITAMIN D3) 1,000 units tablet Take 1 tablet (1,000 Units total) by mouth daily     • cyclobenzaprine (FLEXERIL) 5 mg tablet Take 1 tablet (5 mg total) by mouth 3 (three) times a day as needed for muscle spasms 30 tablet 0   • FLUoxetine (PROzac) 20 MG tablet Take 2 tablets (40 mg total) by mouth daily 180 tablet 0   • fluticasone (FLONASE) 50 mcg/act nasal spray USE 2 SPRAYS IN EACH NOSTRIL DAILY 48 g 3   • fluticasone-umeclidinium-vilanterol (Trelegy Ellipta) 100-62 5-25 MCG/INH inhaler Inhale 1 puff daily Rinse mouth after use  • folic acid (FOLVITE) 1 mg tablet Take 1 tablet (1 mg total) by mouth daily 90 tablet 2   • glipiZIDE (GLUCOTROL XL) 10 mg 24 hr tablet TAKE 1 TABLET DAILY 90 tablet 3   • insulin glargine (Lantus SoloStar) 100 units/mL injection pen INJECT UP TO 60 UNITS DAILY (DOSE INCREASE) (Patient taking differently: INJECT UP TO 35 UNITS DAILY (DOSE INCREASE)) 60 mL 3   • Jardiance 25 MG TABS TAKE 1 TABLET DAILY IN THE MORNING 90 tablet 3   • levothyroxine 50 mcg tablet TAKE 1 TABLET DAILY 90 tablet 3   • metFORMIN (GLUCOPHAGE) 1000 MG tablet Take 1 tablet (1,000 mg total) by mouth 2 (two) times a day 180 tablet 1   • methotrexate 2 5 mg tablet take 3 tablets by mouth every week     • montelukast (SINGULAIR) 10 mg tablet take 1 tablet by mouth at bedtime 30 tablet 5   • olmesartan (BENICAR) 20 mg tablet TAKE 1 TABLET DAILY 90 tablet 3   • pantoprazole (PROTONIX) 40 mg tablet Take 1 tablet (40 mg total) by mouth daily 90 tablet 1   • Semaglutide,0 25 or 0 5MG/DOS, 2 MG/1 5ML SOPN Inject 0 25 mg under the skin once a week 4 5 mL 1   • Skyrizi, 150 MG Dose, 75 MG/0 83ML PSKT Every 3 months       No current facility-administered medications for this visit  No Known Allergies    Objective   Vitals: Blood pressure 130/82, pulse 75, height 5' 8" (1 727 m), weight 120 kg (264 lb), SpO2 96 %  Physical Exam  Vitals reviewed  Constitutional:       Appearance: He is well-developed and well-nourished  HENT:      Head: Normocephalic and atraumatic  Nose: Nose normal       Mouth/Throat:      Mouth: Oropharynx is clear and moist    Eyes:      Extraocular Movements: EOM normal       Conjunctiva/sclera: Conjunctivae normal       Pupils: Pupils are equal, round, and reactive to light  Cardiovascular:      Rate and Rhythm: Normal rate and regular rhythm  Pulses: Intact distal pulses  Heart sounds: Normal heart sounds  Pulmonary:      Effort: Pulmonary effort is normal       Breath sounds: Normal breath sounds  Abdominal:      General: Bowel sounds are normal       Palpations: Abdomen is soft  Musculoskeletal:         General: Normal range of motion  Cervical back: Normal range of motion and neck supple  Skin:     General: Skin is warm and dry  Neurological:      Mental Status: He is alert and oriented to person, place, and time  Psychiatric:         Mood and Affect: Mood and affect normal          Behavior: Behavior normal          Thought Content:  Thought content normal          Judgment: Judgment normal        Lab Results:   Lab Results   Component Value Date/Time    Hemoglobin A1C 7 3 (H) 11/11/2022 08:11 AM    Hemoglobin A1C 7 2 (H) 06/10/2022 08:05 AM    Hemoglobin A1C 7 1 (H) 02/18/2022 07:44 AM    White Blood Cell Count 7 3 11/11/2022 08:11 AM    Hemoglobin 15 5 11/11/2022 08:11 AM    HCT 45 2 11/11/2022 08:11 AM    MCV 85 11/11/2022 08:11 AM    Platelet Count 950 36/36/4068 08:11 AM    BUN 15 11/11/2022 08:11 AM    BUN 17 06/10/2022 08:05 AM    BUN 17 02/18/2022 07:44 AM    Potassium 5 0 11/11/2022 08:11 AM    Potassium 4 8 06/10/2022 08:05 AM    Potassium 4 5 02/18/2022 07:44 AM    Chloride 101 11/11/2022 08:11 AM    Chloride 101 06/10/2022 08:05 AM    Chloride 103 02/18/2022 07:44 AM    CO2 25 11/11/2022 08:11 AM    CO2 25 06/10/2022 08:05 AM    CO2 26 02/18/2022 07:44 AM    Creatinine 0 82 11/11/2022 08:11 AM    Creatinine 0 86 06/10/2022 08:05 AM    Creatinine 1 01 02/18/2022 07:44 AM    AST 23 11/11/2022 08:11 AM    AST 26 06/10/2022 08:05 AM    AST 25 02/18/2022 07:44 AM    ALT 27 11/11/2022 08:11 AM    ALT 29 06/10/2022 08:05 AM    ALT 23 02/18/2022 07:44 AM    Albumin 4 2 11/11/2022 08:11 AM    Albumin 4 2 06/10/2022 08:05 AM    Albumin 4 2 02/18/2022 07:44 AM    Globulin, Total 2 1 11/11/2022 08:11 AM    Globulin, Total 2 5 06/10/2022 08:05 AM    Globulin, Total 2 6 02/18/2022 07:44 AM    HDL 43 11/11/2022 08:12 AM    Triglycerides 135 11/11/2022 08:12 AM     Portions of the record may have been created with voice recognition software  Occasional wrong word or "sound a like" substitutions may have occurred due to the inherent limitations of voice recognition software  Read the chart carefully and recognize, using context, where substitutions have occurred

## 2022-11-23 DIAGNOSIS — E11.65 UNCONTROLLED TYPE 2 DIABETES MELLITUS WITH HYPERGLYCEMIA (HCC): ICD-10-CM

## 2022-11-23 RX ORDER — PEN NEEDLE, DIABETIC 32GX 5/32"
NEEDLE, DISPOSABLE MISCELLANEOUS
Qty: 90 EACH | Refills: 3 | Status: SHIPPED | OUTPATIENT
Start: 2022-11-23

## 2022-11-28 ENCOUNTER — TELEPHONE (OUTPATIENT)
Dept: FAMILY MEDICINE CLINIC | Facility: HOSPITAL | Age: 52
End: 2022-11-28

## 2022-11-28 DIAGNOSIS — J01.00 ACUTE MAXILLARY SINUSITIS, RECURRENCE NOT SPECIFIED: Primary | ICD-10-CM

## 2022-11-28 RX ORDER — DOXYCYCLINE HYCLATE 100 MG
100 TABLET ORAL 2 TIMES DAILY
Qty: 14 TABLET | Refills: 0 | Status: SHIPPED | OUTPATIENT
Start: 2022-11-28 | End: 2022-12-05

## 2022-11-28 NOTE — TELEPHONE ENCOUNTER
Pts son was sick last week with the same symptoms  Pt states he started with this on Wednesday  Pt is asking if there is anything we can send in for pt for a possible sinus infection  Please advise

## 2022-12-02 DIAGNOSIS — Z79.631 ON METHOTREXATE THERAPY: ICD-10-CM

## 2022-12-02 RX ORDER — FOLIC ACID 1 MG/1
TABLET ORAL
Qty: 90 TABLET | Refills: 3 | Status: SHIPPED | OUTPATIENT
Start: 2022-12-02

## 2022-12-19 DIAGNOSIS — E11.65 TYPE 2 DIABETES MELLITUS WITH HYPERGLYCEMIA, WITH LONG-TERM CURRENT USE OF INSULIN (HCC): ICD-10-CM

## 2022-12-19 DIAGNOSIS — Z79.4 TYPE 2 DIABETES MELLITUS WITH HYPERGLYCEMIA, WITH LONG-TERM CURRENT USE OF INSULIN (HCC): ICD-10-CM

## 2023-01-20 DIAGNOSIS — E03.9 HYPOTHYROIDISM, UNSPECIFIED TYPE: ICD-10-CM

## 2023-01-20 RX ORDER — LEVOTHYROXINE SODIUM 0.05 MG/1
TABLET ORAL
Qty: 90 TABLET | Refills: 3 | Status: SHIPPED | OUTPATIENT
Start: 2023-01-20

## 2023-01-23 DIAGNOSIS — Z79.4 TYPE 2 DIABETES MELLITUS WITH HYPERGLYCEMIA, WITH LONG-TERM CURRENT USE OF INSULIN (HCC): Primary | ICD-10-CM

## 2023-01-23 DIAGNOSIS — E11.65 TYPE 2 DIABETES MELLITUS WITH HYPERGLYCEMIA, WITH LONG-TERM CURRENT USE OF INSULIN (HCC): Primary | ICD-10-CM

## 2023-01-23 NOTE — TELEPHONE ENCOUNTER
Received fax request from [facility] requesting script for covered alternative of Semglee in place of Lantus, is this okay to order?

## 2023-01-24 RX ORDER — INSULIN GLARGINE 100 [IU]/ML
INJECTION, SOLUTION SUBCUTANEOUS
Qty: 60 ML | Refills: 2 | Status: SHIPPED | OUTPATIENT
Start: 2023-01-24

## 2023-02-17 LAB
ALBUMIN SERPL-MCNC: 4 G/DL (ref 3.8–4.9)
ALBUMIN/GLOB SERPL: 1.6 {RATIO} (ref 1.2–2.2)
ALP SERPL-CCNC: 91 IU/L (ref 44–121)
ALT SERPL-CCNC: 23 IU/L (ref 0–44)
AST SERPL-CCNC: 24 IU/L (ref 0–40)
BASOPHILS # BLD AUTO: 0.1 X10E3/UL (ref 0–0.2)
BASOPHILS NFR BLD AUTO: 1 %
BILIRUB SERPL-MCNC: 0.5 MG/DL (ref 0–1.2)
BUN SERPL-MCNC: 12 MG/DL (ref 6–24)
BUN/CREAT SERPL: 14 (ref 9–20)
CALCIUM SERPL-MCNC: 9.1 MG/DL (ref 8.7–10.2)
CHLORIDE SERPL-SCNC: 102 MMOL/L (ref 96–106)
CO2 SERPL-SCNC: 27 MMOL/L (ref 20–29)
CREAT SERPL-MCNC: 0.85 MG/DL (ref 0.76–1.27)
EGFR: 103 ML/MIN/1.73
EOSINOPHIL # BLD AUTO: 0.3 X10E3/UL (ref 0–0.4)
EOSINOPHIL NFR BLD AUTO: 5 %
ERYTHROCYTE [DISTWIDTH] IN BLOOD BY AUTOMATED COUNT: 13.5 % (ref 11.6–15.4)
EST. AVERAGE GLUCOSE BLD GHB EST-MCNC: 174 MG/DL
GLOBULIN SER-MCNC: 2.5 G/DL (ref 1.5–4.5)
GLUCOSE SERPL-MCNC: 112 MG/DL (ref 70–99)
HBA1C MFR BLD: 7.7 % (ref 4.8–5.6)
HCT VFR BLD AUTO: 46 % (ref 37.5–51)
HGB BLD-MCNC: 15.6 G/DL (ref 13–17.7)
IMM GRANULOCYTES # BLD: 0 X10E3/UL (ref 0–0.1)
IMM GRANULOCYTES NFR BLD: 0 %
LYMPHOCYTES # BLD AUTO: 1.9 X10E3/UL (ref 0.7–3.1)
LYMPHOCYTES NFR BLD AUTO: 27 %
MCH RBC QN AUTO: 28.7 PG (ref 26.6–33)
MCHC RBC AUTO-ENTMCNC: 33.9 G/DL (ref 31.5–35.7)
MCV RBC AUTO: 85 FL (ref 79–97)
MONOCYTES # BLD AUTO: 0.7 X10E3/UL (ref 0.1–0.9)
MONOCYTES NFR BLD AUTO: 9 %
NEUTROPHILS # BLD AUTO: 4 X10E3/UL (ref 1.4–7)
NEUTROPHILS NFR BLD AUTO: 58 %
PLATELET # BLD AUTO: 273 X10E3/UL (ref 150–450)
POTASSIUM SERPL-SCNC: 4.7 MMOL/L (ref 3.5–5.2)
PROT SERPL-MCNC: 6.5 G/DL (ref 6–8.5)
RBC # BLD AUTO: 5.44 X10E6/UL (ref 4.14–5.8)
SODIUM SERPL-SCNC: 142 MMOL/L (ref 134–144)
T4 FREE SERPL-MCNC: 0.92 NG/DL (ref 0.82–1.77)
TSH SERPL DL<=0.005 MIU/L-ACNC: 7.34 UIU/ML (ref 0.45–4.5)
WBC # BLD AUTO: 6.9 X10E3/UL (ref 3.4–10.8)

## 2023-02-20 DIAGNOSIS — K21.9 GASTROESOPHAGEAL REFLUX DISEASE: ICD-10-CM

## 2023-02-20 RX ORDER — PANTOPRAZOLE SODIUM 40 MG/1
TABLET, DELAYED RELEASE ORAL
Qty: 90 TABLET | Refills: 3 | Status: SHIPPED | OUTPATIENT
Start: 2023-02-20

## 2023-02-21 ENCOUNTER — OFFICE VISIT (OUTPATIENT)
Dept: ENDOCRINOLOGY | Facility: HOSPITAL | Age: 53
End: 2023-02-21

## 2023-02-21 VITALS
SYSTOLIC BLOOD PRESSURE: 126 MMHG | BODY MASS INDEX: 39.1 KG/M2 | HEART RATE: 82 BPM | HEIGHT: 68 IN | WEIGHT: 258 LBS | DIASTOLIC BLOOD PRESSURE: 70 MMHG

## 2023-02-21 DIAGNOSIS — E03.9 ACQUIRED HYPOTHYROIDISM: ICD-10-CM

## 2023-02-21 DIAGNOSIS — I10 ESSENTIAL HYPERTENSION: ICD-10-CM

## 2023-02-21 DIAGNOSIS — E11.65 UNCONTROLLED TYPE 2 DIABETES MELLITUS WITH HYPERGLYCEMIA (HCC): ICD-10-CM

## 2023-02-21 DIAGNOSIS — Z79.4 TYPE 2 DIABETES MELLITUS WITH HYPERGLYCEMIA, WITH LONG-TERM CURRENT USE OF INSULIN (HCC): Primary | ICD-10-CM

## 2023-02-21 DIAGNOSIS — E11.42 DIABETIC POLYNEUROPATHY ASSOCIATED WITH TYPE 2 DIABETES MELLITUS (HCC): ICD-10-CM

## 2023-02-21 DIAGNOSIS — E11.65 TYPE 2 DIABETES MELLITUS WITH HYPERGLYCEMIA, WITH LONG-TERM CURRENT USE OF INSULIN (HCC): Primary | ICD-10-CM

## 2023-02-21 DIAGNOSIS — E78.1 HYPERTRIGLYCERIDEMIA: ICD-10-CM

## 2023-02-21 NOTE — PATIENT INSTRUCTIONS
Be mindful of diet  Stay active and stay hydrated  Increase Ozempic to 1 mg weekly  Otherwise continue current regimen  Check blood sugars regularly, twice daily at alternating times  Increase levothyroxine to 75 mcg daily (1 5 tablets)  Please take your vitamins after lunch or in the evening  Wait at least 30 minutes to eat after Levothyroxine

## 2023-02-21 NOTE — PROGRESS NOTES
Charito Man 45 y o  male MRN: [id number]    Encounter: [id number]      Assessment/Plan     Assessment: This is a 45y o -year-old male with type 2 diabetes with neuropathy, hypothyroidism, hypertension and hyperlipidemia  Plan:  1  Type 2 diabetes   Hemoglobin A1c is elevated to 7 7%   He will continue the same metformin, Jardiance, glipizide, and Lantus insulin  Will increase his Ozempic to 1 mg dose once a week  Clemente Prakash will continue to work on diet, exercise, and weight loss  I have asked him to check his blood sugars twice daily at alternating times and send a record to the office in 2 weeks for review  Check hemoglobin A1c prior to next visit      2  Diabetic neuropathy   He denies neuropathic symptoms   Diabetic foot exams are up-to-date      3  Hypothyroidism  TSH is elevated with a normal free T4  He will increase his Levothyroxine to 75 mcg daily  Discussed the proper process for taking his levothyroxine as he is taking it with his vitamins in the morning, missing doses at times and eating within 15 minutes  Recheck his TSH and free T4 in 6 weeks to reassess      4  Hypertension  He is normotensive in the office today  Continue current regimen  Check comprehensive metabolic panel prior to next visit      5  Hyperlipidemia  Continue atorvastatin 10 mg daily  Check fasting lipid panel prior to next visit  CC: Type II diabetes follow-up    History of Present Illness     HPI:  45 y  o  year old male with type 2 diabetes, insulin requiring with neuropathy for about 17 years, hypothyroidism, hypertension, hyperlipidemia for follow-up visit   He is on oral agents and insulin at home and takes Jardiance 25 mg daily, metformin 1000 mg twice a day, Ozempic 0 5 mg once a week, glipizide XL 10 mg daily, and Lantus insulin 35 units daily    His most recent hemoglobin A1c from February 16, 2023 is 7 7   He denies any polyuria, polydipsia, nocturia, polyphagia, and blurry vision   He denies numbness or tingling of the feet   He denies chest pain or shortness of breath   He denies nephropathy, retinopathy, heart attack, stroke and claudication but does admit to neuropathy        Hypoglycemic episodes:  none recently      The patient's last eye exam was in jan 2021  [facility] patient's last foot exam was in April 2022 at PCP       Blood Sugar/Glucometer/Pump/CGM review: Does not check blood sugars regularly      He has hyperlipidemia hyper triglyceridemia and takes atorvastatin 10 mg daily   He denies chest pain or shortness of breath        He has hypertension and takes olmesartan 20 mg daily   He denies headache or stroke-like symptoms but can have occasional lightheadedness      He has hypothyroidism and takes levothyroxine 50 mcg daily  His most recent TSH from February 16, 2023 is 7 340 with free T4 of 0 92  He denies heat or cold intolerance, palpitation, tremors, fatigue   He denies insomnia, diarrhea or constipation  Review of Systems   Constitutional: Negative  Negative for chills, fatigue and fever  HENT: Negative  Negative for trouble swallowing and voice change  Eyes: Negative for photophobia, pain, discharge, redness, itching and visual disturbance  Respiratory: Negative for cough and shortness of breath  Cardiovascular: Negative for chest pain and palpitations  Gastrointestinal: Negative for abdominal pain, constipation, diarrhea, nausea and vomiting  Endocrine: Negative for cold intolerance, heat intolerance, polydipsia, polyphagia and polyuria  Genitourinary: Negative  Musculoskeletal: Negative  Skin: Negative  Allergic/Immunologic: Negative  Neurological: Negative for dizziness, syncope, light-headedness and headaches  Hematological: Negative  Psychiatric/Behavioral: Negative  All other systems reviewed and are negative        Historical Information   Past Medical History:   Diagnosis Date   • Anxiety 2/22/2019   • Asthma 5/21/2019   • GERD (gastroesophageal reflux disease) 12/19/2013   • Hypertriglyceridemia 7/8/2015   • Hypothyroidism    • Migraine without aura and without status migrainosus, not intractable 5/21/2019   • Psoriasis    • Seasonal allergies 5/21/2019    Dr Nakita Cabral   • Vitamin D deficiency      Past Surgical History:   Procedure Laterality Date   • CATARACT EXTRACTION, BILATERAL  2017   • CHOLECYSTECTOMY  2000    lap   • UNDESCENDED TESTICLE EXPLORATION Bilateral     as a child     Social History   Social History     Substance and Sexual Activity   Alcohol Use Not Currently     Social History     Substance and Sexual Activity   Drug Use Never     Social History     Tobacco Use   Smoking Status Never   Smokeless Tobacco Never     Family History:   Family History   Problem Relation Age of Onset   • Diabetes Mother    • Thyroid disease Mother         post thyroidectomy   • Stroke Mother    • Diabetes type II Mother    • Thyroid disease Father         post thyroidectomy   • Coronary artery disease Father    • No Known Problems Brother    • No Known Problems Brother    • Colon cancer Neg Hx    • Colon polyps Neg Hx        Meds/Allergies   Current Outpatient Medications   Medication Sig Dispense Refill   • albuterol (2 5 mg/3 mL) 0 083 % nebulizer solution Take 1 vial (2 5 mg total) by nebulization every 6 (six) hours as needed for wheezing or shortness of breath 120 vial 2   • albuterol (PROVENTIL HFA,VENTOLIN HFA) 90 mcg/act inhaler Inhale 1 puff every 4 (four) hours as needed     • atorvastatin (LIPITOR) 10 mg tablet TAKE 1 TABLET DAILY 90 tablet 3   • BD Pen Needle Shanelle U/F 32G X 4 MM MISC USE TO INJECT UNDER THE SKIN DAILY 90 each 3   • Butalbital-APAP-Caffeine -40 MG CAPS Take 1 capsule by mouth every 8 (eight) hours as needed (headache) 30 capsule 0   • cetirizine (ZyrTEC) 10 mg tablet Take 1 tablet by mouth daily as needed     • cholecalciferol (VITAMIN D3) 1,000 units tablet Take 1 tablet (1,000 Units total) by mouth daily     • cyclobenzaprine (FLEXERIL) 5 mg tablet Take 1 tablet (5 mg total) by mouth 3 (three) times a day as needed for muscle spasms 30 tablet 0   • FLUoxetine (PROzac) 20 MG tablet Take 2 tablets (40 mg total) by mouth daily 180 tablet 0   • fluticasone (FLONASE) 50 mcg/act nasal spray USE 2 SPRAYS IN EACH NOSTRIL DAILY 48 g 3   • fluticasone-umeclidinium-vilanterol (Trelegy Ellipta) 100-62 5-25 MCG/INH inhaler Inhale 1 puff daily Rinse mouth after use  • folic acid (FOLVITE) 1 mg tablet TAKE 1 TABLET DAILY 90 tablet 3   • glipiZIDE (GLUCOTROL XL) 10 mg 24 hr tablet TAKE 1 TABLET DAILY 90 tablet 3   • insulin glargine (Semglee) 100 units/mL subcutaneous injection Inject up to 60 unites daily 60 mL 2   • Jardiance 25 MG TABS TAKE 1 TABLET DAILY IN THE MORNING 90 tablet 3   • levothyroxine 50 mcg tablet TAKE 1 TABLET DAILY 90 tablet 3   • metFORMIN (GLUCOPHAGE) 1000 MG tablet TAKE 1 TABLET TWICE A  tablet 3   • methotrexate 2 5 mg tablet take 3 tablets by mouth every week     • montelukast (SINGULAIR) 10 mg tablet take 1 tablet by mouth at bedtime 30 tablet 5   • olmesartan (BENICAR) 20 mg tablet TAKE 1 TABLET DAILY 90 tablet 3   • pantoprazole (PROTONIX) 40 mg tablet TAKE 1 TABLET DAILY 90 tablet 3   • Semaglutide,0 25 or 0 5MG/DOS, 2 MG/1 5ML SOPN Inject 0 25 mg under the skin once a week 4 5 mL 1   • Skyrizi, 150 MG Dose, 75 MG/0 83ML PSKT Every 3 months       No current facility-administered medications for this visit  No Known Allergies    Objective   Vitals: Height 5' 8" (1 727 m), weight 117 kg (258 lb)  Physical Exam  Vitals reviewed  Constitutional:       Appearance: He is well-developed  He is obese  HENT:      Head: Normocephalic and atraumatic  Nose: Nose normal    Eyes:      Conjunctiva/sclera: Conjunctivae normal       Pupils: Pupils are equal, round, and reactive to light  Cardiovascular:      Rate and Rhythm: Normal rate and regular rhythm  Heart sounds: Normal heart sounds  Pulmonary:      Effort: Pulmonary effort is normal       Breath sounds: Normal breath sounds  Abdominal:      General: Bowel sounds are normal       Palpations: Abdomen is soft  Musculoskeletal:         General: Normal range of motion  Cervical back: Normal range of motion and neck supple  Skin:     General: Skin is warm and dry  Neurological:      Mental Status: He is alert and oriented to person, place, and time  Psychiatric:         Behavior: Behavior normal          Thought Content:  Thought content normal          Judgment: Judgment normal        Lab Results:   Lab Results   Component Value Date/Time    Hemoglobin A1C 7 7 (H) 02/16/2023 06:52 AM    Hemoglobin A1C 7 3 (H) 11/11/2022 08:11 AM    Hemoglobin A1C 7 2 (H) 06/10/2022 08:05 AM    White Blood Cell Count 6 9 02/16/2023 06:52 AM    White Blood Cell Count 7 3 11/11/2022 08:11 AM    Hemoglobin 15 6 02/16/2023 06:52 AM    Hemoglobin 15 5 11/11/2022 08:11 AM    HCT 46 0 02/16/2023 06:52 AM    HCT 45 2 11/11/2022 08:11 AM    MCV 85 02/16/2023 06:52 AM    MCV 85 11/11/2022 08:11 AM    Platelet Count 008 52/15/4916 06:52 AM    Platelet Count 692 61/48/2772 08:11 AM    BUN 12 02/16/2023 06:52 AM    BUN 15 11/11/2022 08:11 AM    BUN 17 06/10/2022 08:05 AM    Potassium 4 7 02/16/2023 06:52 AM    Potassium 5 0 11/11/2022 08:11 AM    Potassium 4 8 06/10/2022 08:05 AM    Chloride 102 02/16/2023 06:52 AM    Chloride 101 11/11/2022 08:11 AM    Chloride 101 06/10/2022 08:05 AM    CO2 27 02/16/2023 06:52 AM    CO2 25 11/11/2022 08:11 AM    CO2 25 06/10/2022 08:05 AM    Creatinine 0 85 02/16/2023 06:52 AM    Creatinine 0 82 11/11/2022 08:11 AM    Creatinine 0 86 06/10/2022 08:05 AM    AST 24 02/16/2023 06:52 AM    AST 23 11/11/2022 08:11 AM    AST 26 06/10/2022 08:05 AM    ALT 23 02/16/2023 06:52 AM    ALT 27 11/11/2022 08:11 AM    ALT 29 06/10/2022 08:05 AM    Albumin 4 0 02/16/2023 06:52 AM    Albumin 4 2 11/11/2022 08:11 AM    Albumin 4 2 06/10/2022 08:05 AM    Globulin, Total 2 5 02/16/2023 06:52 AM    Globulin, Total 2 1 11/11/2022 08:11 AM    Globulin, Total 2 5 06/10/2022 08:05 AM    HDL 43 11/11/2022 08:12 AM    Triglycerides 135 11/11/2022 08:12 AM     Portions of the record may have been created with voice recognition software  Occasional wrong word or "sound a like" substitutions may have occurred due to the inherent limitations of voice recognition software  Read the chart carefully and recognize, using context, where substitutions have occurred

## 2023-02-28 ENCOUNTER — OFFICE VISIT (OUTPATIENT)
Dept: FAMILY MEDICINE CLINIC | Facility: HOSPITAL | Age: 53
End: 2023-02-28

## 2023-02-28 VITALS
BODY MASS INDEX: 38.55 KG/M2 | SYSTOLIC BLOOD PRESSURE: 146 MMHG | DIASTOLIC BLOOD PRESSURE: 92 MMHG | WEIGHT: 254.4 LBS | HEIGHT: 68 IN | TEMPERATURE: 97.9 F | HEART RATE: 84 BPM

## 2023-02-28 DIAGNOSIS — E11.65 UNCONTROLLED TYPE 2 DIABETES MELLITUS WITH HYPERGLYCEMIA (HCC): ICD-10-CM

## 2023-02-28 DIAGNOSIS — G47.33 OSA ON CPAP: ICD-10-CM

## 2023-02-28 DIAGNOSIS — E03.9 ACQUIRED HYPOTHYROIDISM: ICD-10-CM

## 2023-02-28 DIAGNOSIS — I10 ESSENTIAL HYPERTENSION: ICD-10-CM

## 2023-02-28 DIAGNOSIS — E66.01 SEVERE OBESITY (BMI 35.0-39.9) WITH COMORBIDITY (HCC): ICD-10-CM

## 2023-02-28 DIAGNOSIS — Z23 ENCOUNTER FOR IMMUNIZATION: ICD-10-CM

## 2023-02-28 DIAGNOSIS — Z00.00 ANNUAL PHYSICAL EXAM: Primary | ICD-10-CM

## 2023-02-28 DIAGNOSIS — Z99.89 OSA ON CPAP: ICD-10-CM

## 2023-02-28 NOTE — PROGRESS NOTES
[facility] [facility] [address]     NAME: Karla Barry  AGE: 45 y o  SEX: male  : 1970     DATE: 2023     Assessment and Plan:     Problem List Items Addressed This Visit        Endocrine    Uncontrolled type 2 diabetes mellitus with hyperglycemia ([facility] [address] )       Lab Results   Component Value Date    HGBA1C 7 7 (H) 2023   DM type 2 with hyperglycemia/polyneuropathy and insulin dependence - uncontrolled with A1C 7 7 - uncontrolled DM and SE and complications reviewed, urged to get on track with diet and regular exercise, con't meds and labs and f/u as per Endo, UTD on DM foot exam (10/22) and eye exam (), on an ARB and statin         Hypothyroidism     TSH was a bit high recently - Endo just increased levothyroxine, med list reviewed and thyroid med UTD, con't meds/labs and f/u as per Endo            Respiratory    DIANA on CPAP     NOT adeherent to PAP tx, SE of untx DIANA reviewed, urged discussion with Pulm/sleep medicine about other mask options/tx options, diet/exercise/wgt loss encouraged            Cardiovascular and Mediastinum    Essential hypertension     Bp high today and not better by end of appt, was good at Endo appt and has been better in past at our appt as well, likely related to disagreement with wife (wife crying and upset during his appt), con't current BP meds for now, urged diet/exercise/wgt loss and improtance of tx of DIANA reviewed, recheck BP in 3 mos - if still elevated will increase Benicar, urged to check BP at home and to call if consistently > 140/90        Other Visit Diagnoses     Annual physical exam    -  Primary    Severe obesity (BMI 35 0-39  9) with comorbidity ([facility] [address] )        Diet/exercise/wgt loss encouraged    BMI 38 0-38 9,adult              Immunizations and preventive care screenings were discussed with patient today   Appropriate education was printed on patient's after visit summary  Discussed risks and benefits of prostate cancer screening  We discussed the controversial history of PSA screening for prostate cancer in the [place] as well as the risk of over detection and over treatment of prostate cancer by way of PSA screening  The patient understands that PSA blood testing is an imperfect way to screen for prostate cancer and that elevated PSA levels in the blood may also be caused by infection, inflammation, prostatic trauma or manipulation, urological procedures, or by benign prostatic enlargement  The role of the digital rectal examination in prostate cancer screening was also discussed and I discussed with him that there is large interobserver variability in the findings of digital rectal examination  Counseling:  Alcohol/drug use: discussed moderation in alcohol intake, the recommendations for healthy alcohol use, and avoidance of illicit drug use  Dental Health: discussed importance of regular tooth brushing, flossing, and dental visits  Injury prevention: discussed safety/seat belts, safety helmets, smoke detectors, carbon dioxide detectors, and smoking near bedding or upholstery  · Exercise: the importance of regular exercise/physical activity was discussed  Recommend exercise 3-5 times per week for at least 30 minutes  · Prostate cancer screening: PSA 11/22  · Colon cancer screening: 10/19 - 10 yrs    BMI Counseling: Body mass index is 38 68 kg/m²  The BMI is above normal  Nutrition recommendations include decreasing portion sizes, encouraging healthy choices of fruits and vegetables, consuming healthier snacks, moderation in carbohydrate intake, increasing intake of lean protein, reducing intake of saturated and trans fat and reducing intake of cholesterol  Exercise recommendations include exercising 3-5 times per week  No pharmacotherapy was ordered  Rationale for BMI follow-up plan is due to patient being overweight or obese       Depression Screening and Follow-up Plan: Patient was screened for depression during today's encounter  They screened negative with a PHQ-2 score of 0  Pt agreeable to Prevnar 20    Return in about 3 months (around 5/29/2023) for BP check  Chief Complaint:     Chief Complaint   Patient presents with   • Follow-up      History of Present Illness:     Adult Annual Physical   Patient here for a comprehensive physical exam  The patient reports no problems  Pt con't to follow with Endo (Luiz Chan) for his type 2 uncontrolled DM and hypothyroidism - last OV note from 2/21/23 was reviewed  Pts most recent A1C 2/16/23 was 7 7 (up from 7 3)  His Ozempic was increased to 1 mg once weekly  He is not checking his sugars regularly as he does not like to prick his fingers  He is UTD on DM foot exam (10/22) and eye exam (8/22)  He had his microalbumin done in 11/22  He is on an ARB and a statin  His TSH was above goal and his levothyroxine was subsequently increased to 75 mcg daily  BP above goal today on presentation and meds were reviewed and no changes have occurred  He denies missing doses of meds or SE with the meds  He does not check his BP outside the office  He notes no frequent HA's/dizziness/double vision/CP  He is not using his CPAP mask  He states he was not able to tolerate the mask  SE of untx DIANA reviewed  Pts wife upset in room and crying  They are going through a lot of things at home right now with their son and issues with work  Diet and Physical Activity  · Diet/Nutrition: diabetic diet, limited fruits/vegetables and states his diet is up and down  · Exercise: no formal exercise  · BMI reviewed     Depression Screening  PHQ-2/9 Depression Screening    Little interest or pleasure in doing things: 0 - not at all  Feeling down, depressed, or hopeless: 0 - not at all  PHQ-2 Score: 0  PHQ-2 Interpretation: Negative depression screen       General Health  · Sleep: sleeps well     · Hearing: normal - bilateral   · Vision: no vision problems, goes for regular eye exams, most recent eye exam <1 year ago and wears glasses  · Dental: regular dental visits and brushes teeth once daily   Health  · Symptoms include: none     Review of Systems:     Review of Systems   Constitutional: Negative for chills, fever and unexpected weight change  HENT: Negative for congestion and trouble swallowing  Eyes: Negative for pain and visual disturbance  Respiratory: Negative for cough and shortness of breath  Cardiovascular: Negative for chest pain, palpitations and leg swelling  Gastrointestinal: Negative for abdominal pain, blood in stool, constipation, diarrhea, nausea and vomiting  Genitourinary: Negative for difficulty urinating and dysuria  Musculoskeletal: Negative for arthralgias and myalgias  Skin: Negative for rash and wound  Started on a new rx by Derm for his psoriasis - does not know name and not on med list- and has had great benefit   Neurological: Negative for dizziness and headaches  Hematological: Does not bruise/bleed easily  Psychiatric/Behavioral: Negative for dysphoric mood and sleep disturbance        Past Medical History:     Past Medical History:   Diagnosis Date   • Anxiety 2/22/2019   • Asthma 5/21/2019   • GERD (gastroesophageal reflux disease) 12/19/2013   • Hypertriglyceridemia 7/8/2015   • Hypothyroidism    • Migraine without aura and without status migrainosus, not intractable 5/21/2019   • Psoriasis    • Seasonal allergies 5/21/2019    Dr Jeffrey Bautista   • Vitamin D deficiency       Past Surgical History:     Past Surgical History:   Procedure Laterality Date   • CATARACT EXTRACTION, BILATERAL  2017   • CHOLECYSTECTOMY  2000    lap   • UNDESCENDED TESTICLE EXPLORATION Bilateral     as a child      Family History:     Family History   Problem Relation Age of Onset   • Diabetes Mother    • Thyroid disease Mother         post thyroidectomy   • Stroke Mother    • Diabetes type II Mother    • Thyroid disease Father         post thyroidectomy   • Coronary artery disease Father    • No Known Problems Brother    • No Known Problems Brother    • Colon cancer Neg Hx    • Colon polyps Neg Hx       Social History:     Social History     Socioeconomic History   • Marital status: /Civil Union     Spouse name: None   • Number of children: None   • Years of education: None   • Highest education level: None   Occupational History   • Occupation:    Tobacco Use   • Smoking status: Never   • Smokeless tobacco: Never   Vaping Use   • Vaping Use: Never used   Substance and Sexual Activity   • Alcohol use: Not Currently   • Drug use: Never   • Sexual activity: None   Other Topics Concern   • None   Social History Narrative    , full time employment as      Social Determinants of Health     Financial Resource Strain: Not on file   Food Insecurity: Not on file   Transportation Needs: Not on file   Physical Activity: Not on file   Stress: Not on file   Social Connections: Not on file   Intimate Partner Violence: Not on file   Housing Stability: Not on file      Current Medications:     Current Outpatient Medications   Medication Sig Dispense Refill   • albuterol (2 5 mg/3 mL) 0 083 % nebulizer solution Take 1 vial (2 5 mg total) by nebulization every 6 (six) hours as needed for wheezing or shortness of breath 120 vial 2   • albuterol (PROVENTIL HFA,VENTOLIN HFA) 90 mcg/act inhaler Inhale 1 puff every 4 (four) hours as needed     • atorvastatin (LIPITOR) 10 mg tablet TAKE 1 TABLET DAILY 90 tablet 3   • BD Pen Needle Shanelle U/F 32G X 4 MM MISC USE TO INJECT UNDER THE SKIN DAILY 90 each 3   • Butalbital-APAP-Caffeine -40 MG CAPS Take 1 capsule by mouth every 8 (eight) hours as needed (headache) 30 capsule 0   • cetirizine (ZyrTEC) 10 mg tablet Take 1 tablet by mouth daily as needed     • cholecalciferol (VITAMIN D3) 1,000 units tablet Take 1 tablet (1,000 Units total) by mouth daily     • FLUoxetine (PROzac) 20 MG tablet Take 2 tablets (40 mg total) by mouth daily 180 tablet 0   • fluticasone (FLONASE) 50 mcg/act nasal spray USE 2 SPRAYS IN EACH NOSTRIL DAILY 48 g 3   • fluticasone-umeclidinium-vilanterol (Trelegy Ellipta) 100-62 5-25 MCG/INH inhaler Inhale 1 puff daily Rinse mouth after use  • glipiZIDE (GLUCOTROL XL) 10 mg 24 hr tablet TAKE 1 TABLET DAILY 90 tablet 3   • insulin glargine (Semglee) 100 units/mL subcutaneous injection Inject up to 60 unites daily 60 mL 2   • Jardiance 25 MG TABS TAKE 1 TABLET DAILY IN THE MORNING 90 tablet 3   • levothyroxine 50 mcg tablet TAKE 1 TABLET DAILY 90 tablet 3   • metFORMIN (GLUCOPHAGE) 1000 MG tablet TAKE 1 TABLET TWICE A  tablet 3   • montelukast (SINGULAIR) 10 mg tablet take 1 tablet by mouth at bedtime 30 tablet 5   • olmesartan (BENICAR) 20 mg tablet TAKE 1 TABLET DAILY (Patient not taking: Reported on 2/21/2023) 90 tablet 3   • pantoprazole (PROTONIX) 40 mg tablet TAKE 1 TABLET DAILY 90 tablet 3   • semaglutide, 1 mg/dose, (Ozempic, 1 MG/DOSE,) 4 mg/3 mL injection pen Inject 0 75 mL (1 mg total) under the skin every 7 days 9 mL 3   • Skyrizi, 150 MG Dose, 75 MG/0 83ML PSKT Every 3 months       No current facility-administered medications for this visit  Allergies:     No Known Allergies   Physical Exam:     /92   Pulse 84   Temp 97 9 °F (36 6 °C) (Tympanic)   Ht 5' 8" (1 727 m)   Wt 115 kg (254 lb 6 4 oz)   BMI 38 68 kg/m²     Physical Exam  Vitals and nursing note reviewed  Constitutional:       General: He is not in acute distress  Appearance: He is well-developed  He is obese  He is not ill-appearing  HENT:      Head: Normocephalic and atraumatic  Right Ear: Tympanic membrane and external ear normal       Left Ear: Tympanic membrane and external ear normal    Eyes:      General:         Right eye: No discharge  Left eye: No discharge        Conjunctiva/sclera: Conjunctivae normal  Neck:      Trachea: No tracheal deviation  Cardiovascular:      Rate and Rhythm: Normal rate and regular rhythm  Heart sounds: Normal heart sounds  No murmur heard  Pulmonary:      Effort: Pulmonary effort is normal  No respiratory distress  Breath sounds: Normal breath sounds  No wheezing, rhonchi or rales  Abdominal:      General: There is no distension  Palpations: Abdomen is soft  Tenderness: There is no abdominal tenderness  There is no guarding or rebound  Musculoskeletal:      Cervical back: Neck supple  Right lower leg: No edema  Left lower leg: No edema  Lymphadenopathy:      Cervical: No cervical adenopathy  Skin:     General: Skin is warm and dry  Coloration: Skin is not pale  Findings: No bruising  Neurological:      General: No focal deficit present  Mental Status: He is alert  Motor: No abnormal muscle tone        Gait: Gait normal    Psychiatric:         Mood and Affect: Mood normal          Behavior: Behavior normal       Comments: Seemingly poor insight and laughing medical conditions off when discussing uncontrolled DM and untreated DIANA SE Kimberlee Baker, DO  [id number] [place]

## 2023-02-28 NOTE — PATIENT INSTRUCTIONS
Wellness Visit for Adults   AMBULATORY CARE:   A wellness visit  is when you see your healthcare provider to get screened for health problems  Your healthcare provider will also give you advice on how to stay healthy  Write down your questions so you remember to ask them  Ask your healthcare provider how often you should have a wellness visit  What happens at a wellness visit:  Your healthcare provider will ask about your health, and your family history of health problems  This includes high blood pressure, heart disease, and cancer  He or she will ask if you have symptoms that concern you, if you smoke, and about your mood  You may also be asked about your intake of medicines, supplements, food, and alcohol  Any of the following may be done:  • Your weight  will be checked  Your height may also be checked so your body mass index (BMI) can be calculated  Your BMI shows if you are at a healthy weight  • Your blood pressure  and heart rate will be checked  Your temperature may also be checked  • Blood and urine tests  may be done  Blood tests may be done to check your cholesterol levels  Abnormal cholesterol levels increase your risk for heart disease and stroke  You may also need a blood or urine test to check for diabetes if you are at increased risk  Urine tests may be done to look for signs of an infection or kidney disease  • A physical exam  includes checking your heartbeat and lungs with a stethoscope  Your healthcare provider may also check your skin to look for sun damage  • Screening tests  may be recommended  A screening test is done to check for diseases that may not cause symptoms  The screening tests you may need depend on your age, gender, family history, and lifestyle habits  For example, colorectal screening may be recommended if you are 48years old or older  Screening tests you need if you are a woman:   • A Pap smear  is used to screen for cervical cancer   Pap smears are usually done every 3 to 5 years depending on your age  You may need them more often if you have had abnormal Pap smear test results in the past  Ask your healthcare provider how often you should have a Pap smear  • A mammogram  is an x-ray of your breasts to screen for breast cancer  Experts recommend mammograms every 2 years starting at age 48 years  You may need a mammogram at age 52 years or younger if you have an increased risk for breast cancer  Talk to your healthcare provider about when you should start having mammograms and how often you need them  Vaccines you may need:   • Get an influenza vaccine  every year  The influenza vaccine protects you from the flu  Several types of viruses cause the flu  The viruses change over time, so new vaccines are made each year  • Get a tetanus-diphtheria (Td) booster vaccine  every 10 years  This vaccine protects you against tetanus and diphtheria  Tetanus is a severe infection that may cause painful muscle spasms and lockjaw  Diphtheria is a severe bacterial infection that causes a thick covering in the back of your mouth and throat  • Get a human papillomavirus (HPV) vaccine  if you are female and aged 23 to 32 or male 23 to 24 and never received it  This vaccine protects you from HPV infection  HPV is the most common infection spread by sexual contact  HPV may also cause vaginal, penile, and anal cancers  • Get a pneumococcal vaccine  if you are aged 72 years or older  The pneumococcal vaccine is an injection given to protect you from pneumococcal disease  Pneumococcal disease is an infection caused by pneumococcal bacteria  The infection may cause pneumonia, meningitis, or an ear infection  • Get a shingles vaccine  if you are 60 or older, even if you have had shingles before  The shingles vaccine is an injection to protect you from the varicella-zoster virus  This is the same virus that causes chickenpox   Shingles is a painful rash that develops in people who had chickenpox or have been exposed to the virus  How to eat healthy:  My Plate is a model for planning healthy meals  It shows the types and amounts of foods that should go on your plate  Fruits and vegetables make up about half of your plate, and grains and protein make up the other half  A serving of dairy is included on the side of your plate  The amount of calories and serving sizes you need depends on your age, gender, weight, and height  Examples of healthy foods are listed below:  • Eat a variety of vegetables  such as dark green, red, and orange vegetables  You can also include canned vegetables low in sodium (salt) and frozen vegetables without added butter or sauces  • Eat a variety of fresh fruits , canned fruit in 100% juice, frozen fruit, and dried fruit  • Include whole grains  At least half of the grains you eat should be whole grains  Examples include whole-wheat bread, wheat pasta, brown rice, and whole-grain cereals such as oatmeal     • Eat a variety of protein foods such as seafood (fish and shellfish), lean meat, and poultry without skin (turkey and chicken)  Examples of lean meats include pork leg, shoulder, or tenderloin, and beef round, sirloin, tenderloin, and extra lean ground beef  Other protein foods include eggs and egg substitutes, beans, peas, soy products, nuts, and seeds  • Choose low-fat dairy products such as skim or 1% milk or low-fat yogurt, cheese, and cottage cheese  • Limit unhealthy fats  such as butter, hard margarine, and shortening  Exercise:  Exercise at least 30 minutes per day on most days of the week  Some examples of exercise include walking, biking, dancing, and swimming  You can also fit in more physical activity by taking the stairs instead of the elevator or parking farther away from stores  Include muscle strengthening activities 2 days each week  Regular exercise provides many health benefits   It helps you manage your weight, and decreases your risk for type 2 diabetes, heart disease, stroke, and high blood pressure  Exercise can also help improve your mood  Ask your healthcare provider about the best exercise plan for you  General health and safety guidelines:   • Do not smoke  Nicotine and other chemicals in cigarettes and cigars can cause lung damage  Ask your healthcare provider for information if you currently smoke and need help to quit  E-cigarettes or smokeless tobacco still contain nicotine  Talk to your healthcare provider before you use these products  • Limit alcohol  A drink of alcohol is 12 ounces of beer, 5 ounces of wine, or 1½ ounces of liquor  • Lose weight, if needed  Being overweight increases your risk of certain health conditions  These include heart disease, high blood pressure, type 2 diabetes, and certain types of cancer  • Protect your skin  Do not sunbathe or use tanning beds  Use sunscreen with a SPF 15 or higher  Apply sunscreen at least 15 minutes before you go outside  Reapply sunscreen every 2 hours  Wear protective clothing, hats, and sunglasses when you are outside  • Drive safely  Always wear your seatbelt  Make sure everyone in your car wears a seatbelt  A seatbelt can save your life if you are in an accident  Do not use your cell phone when you are driving  This could distract you and cause an accident  Pull over if you need to make a call or send a text message  • Practice safe sex  Use latex condoms if are sexually active and have more than one partner  Your healthcare provider may recommend screening tests for sexually transmitted infections (STIs)  • Wear helmets, lifejackets, and protective gear  Always wear a helmet when you ride a bike or motorcycle, go skiing, or play sports that could cause a head injury  Wear protective equipment when you play sports  Wear a lifejacket when you are on a boat or doing water sports      © Copyright [employer] 2022 Information is for End User's use only and may not be sold, redistributed or otherwise used for commercial purposes  The above information is an  only  It is not intended as medical advice for individual conditions or treatments  Talk to your doctor, nurse or pharmacist before following any medical regimen to see if it is safe and effective for you  Obesity   AMBULATORY CARE:   Obesity  means your body mass index (BMI) is greater than 30  Your healthcare provider will use your age, height, and weight to measure your BMI  The risks of obesity include  many health problems, including injuries or physical disability  • Diabetes (high blood sugar level)    • High blood pressure or high cholesterol    • Heart disease    • Stroke    • Gallbladder or liver disease    • Cancer of the colon, breast, prostate, liver, or kidney    • Sleep apnea    • Arthritis or gout    Screening  is done to check for health conditions before you have signs or symptoms  If you are 28to 79years old, your blood sugar level may be checked every 3 years for signs of prediabetes or diabetes  Your healthcare provider will check your blood pressure at each visit  High blood pressure can lead to a stroke or other problems  Your provider may check for signs of heart disease, cancer, or other health problems  Seek care immediately if:   • You have a severe headache, confusion, or difficulty speaking  • You have weakness on one side of your body  • You have chest pain, sweating, or shortness of breath  Call your doctor if:   • You have symptoms of gallbladder or liver disease, such as pain in your upper abdomen  • You have knee or hip pain and discomfort while walking  • You have symptoms of diabetes, such as intense hunger and thirst, and frequent urination  • You have symptoms of sleep apnea, such as snoring or daytime sleepiness  • You have questions or concerns about your condition or care      Treatment for obesity  focuses on helping you lose weight to improve your health  Even a small decrease in BMI can reduce the risk for many health problems  Your healthcare provider will help you set a weight-loss goal   • Lifestyle changes  are the first step in treating obesity  These include making healthy food choices and getting regular physical activity  Your healthcare provider may suggest a weight-loss program that involves coaching, education, and therapy  • Medicine  may help you lose weight when it is used with a healthy foods and physical activity  • Surgery  can help you lose weight if you have obesity along with other health problems  Several types of weight-loss surgery are available  Ask your healthcare provider for more information  Tips for safe weight loss:   • Set small, realistic goals  An example of a small goal is to walk for 20 minutes 5 days a week  Anther goal is to lose 5% of your body weight  • Ask for support  Tell friends, family members, and coworkers about your goals  Ask someone to lose weight with you  You may also want to join a weight-loss support group  • Identify foods or triggers that may cause you to overeat  Remove tempting high-calorie foods from your home and workplace  Place a bowl of fresh fruit on your kitchen counter  If stress causes you to eat, find other ways to cope with stress  A counselor or therapist may be able to help you  • Track your daily calories and activity  Write down what you eat and drink  Also write down how many minutes of physical activity you do each day  • Track your weekly weight  Weigh yourself in the morning, before you eat or drink anything but after you use the bathroom  Use the same scale, in the same place, and in similar clothing each time  Only weigh yourself 1 to 2 times each week, or as directed  You may become discouraged if you weigh yourself every day  Eating changes:   You will need to eat 500 to 1,000 fewer calories each day than you currently eat to lose 1 to 2 pounds a week  The following changes will help you cut calories:  • Eat smaller portions  Use small plates, no larger than 9 inches in diameter  Fill your plate half full of fruits and vegetables  Measure your food using measuring cups until you know what a serving size looks like  • Eat 3 meals and 1 or 2 snacks each day  Plan your meals in advance  Murrel Salt and eat at home most of the time  Eat slowly  Do not skip meals  Skipping meals can lead to overeating later in the day  This can make it harder for you to lose weight  Talk with a dietitian to help you make a meal plan and schedule that is right for you  • Eat fruits and vegetables at every meal   They are low in calories and high in fiber, which makes you feel full  Do not add butter, margarine, or cream sauce to vegetables  Use herbs to season steamed vegetables  • Eat less fat and fewer fried foods  Eat more baked or grilled chicken and fish  These protein sources are lower in calories and fat than red meat  Limit fast food  Dress your salads with olive oil and vinegar instead of bottled dressing  • Limit the amount of sugar you eat  Do not drink sugary beverages  Limit alcohol  Activity changes:  Physical activity is good for your body in many ways  It helps you burn calories and build strong muscles  It decreases stress and depression, and improves your mood  It can also help you sleep better  Talk to your healthcare provider before you begin an exercise program   • Exercise for at least 30 minutes 5 days a week  Start slowly  Set aside time each day for physical activity that you enjoy and that is convenient for you  It is best to do both weight training and an activity that increases your heart rate, such as walking, bicycling, or swimming  • Find ways to be more active  Do yard work and housecleaning  Walk up the stairs instead of using elevators   Spend your leisure time going to events that require walking, such as outdoor festivals or fairs  This extra physical activity can help you lose weight and keep it off  Follow up with your doctor as directed: You may need to meet with a dietitian  Write down your questions so you remember to ask them during your visits  © Copyright [employer] 2022 Information is for End User's use only and may not be sold, redistributed or otherwise used for commercial purposes  The above information is an  only  It is not intended as medical advice for individual conditions or treatments  Talk to your doctor, nurse or pharmacist before following any medical regimen to see if it is safe and effective for you  Cholesterol and Your Health   AMBULATORY CARE:   Cholesterol  is a waxy, fat-like substance  Your body uses cholesterol to make hormones and new cells, and to protect nerves  Cholesterol is made by your body  It also comes from certain foods you eat, such as meat and dairy products  Your healthcare provider can help you set goals for your cholesterol levels  He or she can help you create a plan to meet your goals  Cholesterol level goals: Your cholesterol level goals depend on your risk for heart disease, your age, and your other health conditions  The following are general guidelines:  • Total cholesterol  includes low-density lipoprotein (LDL), high-density lipoprotein (HDL), and triglyceride levels  The total cholesterol level should be lower than 200 mg/dL and is best at about 150 mg/dL  • LDL cholesterol  is called bad cholesterol  because it forms plaque in your arteries  As plaque builds up, your arteries become narrow, and less blood flows through  When plaque decreases blood flow to your heart, you may have chest pain  If plaque completely blocks an artery that brings blood to your heart, you may have a heart attack  Plaque can break off and form blood clots  Blood clots may block arteries in your brain and cause a stroke   The level should be less than 130 mg/dL and is best at about 100 mg/dL  • HDL cholesterol  is called good cholesterol  because it helps remove LDL cholesterol from your arteries  It does this by attaching to LDL cholesterol and carrying it to your liver  Your liver breaks down LDL cholesterol so your body can get rid of it  High levels of HDL cholesterol can help prevent a heart attack and stroke  Low levels of HDL cholesterol can increase your risk for heart disease, heart attack, and stroke  The level should be 60 mg/dL or higher  • Triglycerides  are a type of fat that store energy from foods you eat  High levels of triglycerides also cause plaque buildup  This can increase your risk for a heart attack or stroke  If your triglyceride level is high, your LDL cholesterol level may also be high  The level should be less than 150 mg/dL  Any of the following can increase your risk for high cholesterol:   • Smoking cigarettes    • Being overweight or obese, or not getting enough exercise    • Drinking large amounts of alcohol    • A medical condition such as hypertension (high blood pressure) or diabetes    • Certain genes passed from your parents to you    • Age older than 65 years    What you need to know about having your cholesterol levels checked: Adults 21to 39years of age should have their cholesterol levels checked every 4 to 6 years  Adults 45 years or older should have their cholesterol checked every 1 to 2 years  You may need your cholesterol checked more often, or at a younger age, if you have risk factors for heart disease  You may also need to have your cholesterol checked more often if you have other health conditions, such as diabetes  Blood tests are used to check cholesterol levels  Blood tests measure your levels of triglycerides, LDL cholesterol, and HDL cholesterol    How healthy fats affect your cholesterol levels:  Healthy fats, also called unsaturated fats, help lower LDL cholesterol and triglyceride levels  Healthy fats include the following:  • Monounsaturated fats  are found in foods such as olive oil, canola oil, avocado, nuts, and olives  • Polyunsaturated fats,  such as omega 3 fats, are found in fish, such as salmon, trout, and tuna  They can also be found in plant foods such as flaxseed, walnuts, and soybeans  How unhealthy fats affect your cholesterol levels:  Unhealthy fats increase LDL cholesterol and triglyceride levels  They are found in foods high in cholesterol, saturated fat, and trans fat:  • Cholesterol  is found in eggs, dairy, and meat  • Saturated fat  is found in butter, cheese, ice cream, whole milk, and coconut oil  Saturated fat is also found in meat, such as sausage, hot dogs, and bologna  • Trans fat  is found in liquid oils and is used in fried and baked foods  Foods that contain trans fats include chips, crackers, muffins, sweet rolls, microwave popcorn, and cookies  Treatment  for high cholesterol will also decrease your risk of heart disease, heart attack, and stroke  Treatment may include any of the following:  • Lifestyle changes  may include food, exercise, weight loss, and quitting smoking  You may also need to decrease the amount of alcohol you drink  Your healthcare provider will want you to start with lifestyle changes  Other treatment may be added if lifestyle changes are not enough  Your healthcare provider may recommend you work with a team to manage hyperlipidemia  The team may include medical experts such as a dietitian, an exercise or physical therapist, and a behavior therapist  Your family members may be included in helping you create lifestyle changes  • Medicines  may be given to lower your LDL cholesterol, triglyceride levels, or total cholesterol level  You may need medicines to lower your cholesterol if any of the following is true:    ? You have a history of stroke, TIA, unstable angina, or a heart attack  ?  Your LDL cholesterol level is 190 mg/dL or higher  ? You are age 36 to 76 years, have diabetes or heart disease risk factors, and your LDL cholesterol is 70 mg/dL or higher  • Supplements  include fish oil, red yeast rice, and garlic  Fish oil may help lower your triglyceride and LDL cholesterol levels  It may also increase your HDL cholesterol level  Red yeast rice may help decrease your total cholesterol level and LDL cholesterol level  Garlic may help lower your total cholesterol level  Do not take any supplements without talking to your healthcare provider  Food changes you can make to lower your cholesterol levels:  A dietitian can help you create a healthy eating plan  He or she can show you how to read food labels and choose foods low in saturated fat, trans fats, and cholesterol  • Decrease the total amount of fat you eat  Choose lean meats, fat-free or 1% fat milk, and low-fat dairy products, such as yogurt and cheese  Try to limit or avoid red meats  Limit or do not eat fried foods or baked goods, such as cookies  • Replace unhealthy fats with healthy fats  Cook foods in olive oil or canola oil  Choose soft margarines that are low in saturated fat and trans fat  Seeds, nuts, and avocados are other examples of healthy fats  • Eat foods with omega-3 fats  Examples include salmon, tuna, mackerel, walnuts, and flaxseed  Eat fish 2 times per week  Pregnant women should not eat fish that have high levels of mercury, such as shark, swordfish, and geri mackerel  • Increase the amount of high-fiber foods you eat  High-fiber foods can help lower your LDL cholesterol  Aim to get between 20 and 30 grams of fiber each day  Fruits and vegetables are high in fiber  Eat at least 5 servings each day  Other high-fiber foods are whole-grain or whole-wheat breads, pastas, or cereals, and brown rice  Eat 3 ounces of whole-grain foods each day  Increase fiber slowly   You may have abdominal discomfort, bloating, and gas if you add fiber to your diet too quickly  • Eat healthy protein foods  Examples include low-fat dairy products, skinless chicken and turkey, fish, and nuts  • Limit foods and drinks that are high in sugar  Your dietitian or healthcare provider can help you create daily limits for high-sugar foods and drinks  The limit may be lower if you have diabetes or another health condition  Limits can also help you lose weight if needed  Lifestyle changes you can make to lower your cholesterol levels:   • Maintain a healthy weight  Ask your healthcare provider what a healthy weight is for you  Ask him or her to help you create a weight loss plan if needed  Weight loss can decrease your total cholesterol and triglyceride levels  Weight loss may also help keep your blood pressure at a healthy level  • Be physically active throughout the day  Physical activity, such as exercise, can help lower your total cholesterol level and maintain a healthy weight  Physical activity can also help increase your HDL cholesterol level  Work with your healthcare provider to create an program that is right for you  Get at least 30 to 40 minutes of moderate physical activity most days of the week  Examples of exercise include brisk walking, swimming, or biking  Also include strength training at least 2 times each week  Your healthcare providers can help you create a physical activity plan  • Do not smoke  Nicotine and other chemicals in cigarettes and cigars can raise your cholesterol levels  Ask your healthcare provider for information if you currently smoke and need help to quit  E-cigarettes or smokeless tobacco still contain nicotine  Talk to your healthcare provider before you use these products  • Limit or do not drink alcohol  Alcohol can increase your triglyceride levels  Ask your healthcare provider before you drink alcohol   Ask how much is okay for you to drink in 24 hours or 1 week     Follow up with your doctor as directed:  Write down your questions so you remember to ask them during your visits  © Copyright Pr[employer] 2022 Information is for End User's use only and may not be sold, redistributed or otherwise used for commercial purposes  The above information is an  only  It is not intended as medical advice for individual conditions or treatments  Talk to your doctor, nurse or pharmacist before following any medical regimen to see if it is safe and effective for you  Type 2 Diabetes Management for Adults   AMBULATORY CARE:   Type 2 diabetes  is a disease that affects how your body uses glucose (sugar)  Either your body cannot make enough insulin, or it cannot use the insulin correctly  It is important to keep diabetes controlled to prevent damage to your heart, blood vessels, and other organs  Management will help you feel well and enjoy your daily activities  Your diabetes care team providers can help you make a plan to fit diabetes care into your schedule  Your plan can change over time to fit your needs and your family's needs  Have someone call your local emergency number (911 in the [address] [facility]) if:   • You cannot be woken  • You have signs of diabetic ketoacidosis:     ? confusion, fatigue    ? vomiting    ? rapid heartbeat    ? fruity smelling breath    ? extreme thirst    ? dry mouth and skin    • You have any of the following signs of a heart attack:      ? Squeezing, pressure, or pain in your chest    ? You may  also have any of the following:     - Discomfort or pain in your back, neck, jaw, stomach, or arm    - Shortness of breath    - Nausea or vomiting    - Lightheadedness or a sudden cold sweat    • You have any of the following signs of a stroke:      ? Numbness or drooping on one side of your face     ? Weakness in an arm or leg    ? Confusion or difficulty speaking    ?  Dizziness, a severe headache, or vision loss    Call your doctor or diabetes care team provider if:   • You have a sore or wound that will not heal     • You have a change in the amount you urinate  • Your blood sugar levels are higher than your target goals  • You often have lower blood sugar levels than your target goals  • Your skin is red, dry, warm, or swollen  • You have trouble coping with diabetes, or you feel anxious or depressed  • You have questions or concerns about your condition or care  What you need to know about high blood sugar levels:  High blood sugar levels may not cause any symptoms  You may feel more thirsty or urinate more often than usual  Over time, high blood sugar levels can damage your nerves, blood vessels, tissues, and organs  The following can increase your blood sugar levels:  • Large meals or large amounts of carbohydrates at one time    • Less physical activity    • Stress    • Illness    • A lower dose of diabetes medicine or insulin, or a late dose    What you need to know about low blood sugar levels:  Symptoms include feeling shaky, dizzy, irritable, or confused  You can prevent symptoms by keeping your blood sugar levels from going too low  • Treat a low blood sugar level right away:      ? Drink 4 ounces of juice or have 1 tube of glucose gel  ? Check your blood sugar level again 10 to 15 minutes later  ? When the level goes back to normal, eat a meal or snack to prevent another decrease  • Keep glucose gel, raisins, or hard candy with you at all times to treat a low blood sugar level  • Your blood sugar level can get too low if you take diabetes medicine or insulin and do not eat enough food  • If you use insulin, check your blood sugar level before you exercise  ? If your blood sugar level is below 100 mg/dL, eat 4 crackers or 2 ounces of raisins, or drink 4 ounces of juice  ? Check your level every 30 minutes if you exercise longer than 1 hour  ?  You may need a snack during or after exercise  What you can do to manage your blood sugar levels:   • Check your blood sugar levels as directed and as needed  Several items are available to use to check your levels  You may need to check by testing a drop of blood in a glucose monitor  You may instead be given a continuous glucose monitoring (CGM) device  The device is worn at all times  The CGM checks your blood sugar level every 5 minutes  It sends results to an electronic device such as a smart phone  A CGM can be used with or without an insulin pump  You and your diabetes care team providers will decide on the best method for you  The goal for blood sugar levels before meals  is between 80 and 130 mg/dL and 2 hours after eating  is lower than 180 mg/dL  • Make healthy food choices  Work with a dietitian to develop a meal plan that works for you and your schedule  A dietitian can help you learn how to eat the right amount of carbohydrates during your meals and snacks  Carbohydrates can raise your blood sugar level if you eat too many at one time  Examples of foods that contain carbohydrates are breads, cereals, rice, pasta, and sweets  • Eat high-fiber foods as directed  Fiber helps improve blood sugar levels  Fiber also lowers your risk for heart disease and other problems diabetes can cause  Examples of high-fiber foods include vegetables, whole-grain bread, and beans such as marie beans  Your dietitian can tell you how much fiber to have each day  • Get regular physical activity  Physical activity can help you get to your target blood sugar level goal and manage your weight  Get at least 150 minutes of moderate to vigorous aerobic physical activity each week  Do not miss more than 2 days in a row  Do not sit longer than 30 minutes at a time  Your healthcare provider can help you create an activity plan  The plan can include the best activities for you and can help you build your strength and endurance  • Maintain a healthy weight  Ask your team what a healthy weight is for you  A healthy weight can help you control diabetes and prevent heart disease  Ask your team to help you create a weight loss plan, if needed  Weight loss can help make a difference in managing diabetes  Your team will help you set a weight-loss goal, such as 10 to 15 pounds, or 5% of your extra weight  Together you and your team can set manageable weight loss goals  • Take your diabetes medicine or insulin as directed  You may need diabetes medicine, insulin, or both to help control your blood sugar levels  Your healthcare provider will teach you how and when to take your diabetes medicine or insulin  You will also be taught about side effects oral diabetes medicine can cause  Insulin may be injected or given through a pump or pen  You and your providers will decide on the best method for you:    ? An insulin pump  is an implanted device that gives your insulin 24 hours a day  An insulin pump prevents the need for multiple insulin injections in a day  ? An insulin pen  is a device prefilled with the right amount of insulin  ? You and your family members will be taught how to draw up and give insulin  if this is the best method for you  Your providers will also teach you how to dispose of needles and syringes  ? You will learn how much insulin you need  and when to give it  You will be taught when not to give insulin  You will also be taught what to do if your blood sugar level drops too low  This may happen if you take insulin and do not eat the right amount of carbohydrates  More ways to manage type 2 diabetes:   • Wear medical alert identification  Wear medical alert jewelry or carry a card that says you have diabetes  Ask your provider where to get these items  • Do not smoke  Nicotine and other chemicals in cigarettes and cigars can cause lung and blood vessel damage   It also makes it more difficult to manage your diabetes  Ask your provider for information if you currently smoke and need help to quit  Do not use e-cigarettes or smokeless tobacco in place of cigarettes or to help you quit  They still contain nicotine  • Check your feet each day for cuts, scratches, calluses, or other wounds  Look for redness and swelling, and feel for warmth  Wear shoes that fit well  Check your shoes for rocks or other objects that can hurt your feet  Do not walk barefoot or wear shoes without socks  Wear cotton socks to help keep your feet dry  • Ask about vaccines you may need  You have a higher risk for serious illness if you get the flu, pneumonia, COVID-19, or hepatitis  Ask your provider if you should get vaccines to prevent these or other diseases, and when to get the vaccines  • Talk to your provider if you become stressed about diabetes care  Sometimes being able to fit diabetes care into your life can cause increased stress  The stress can cause you not to take care of yourself properly  Your care team providers can help by offering tips about self-care  Your providers may suggest you talk to a mental health provider who can listen and offer help with self-care issues  • Have your A1c checked as directed  Your provider may check your A1c every 3 months, or 2 times each year if your diabetes is controlled  An A1c test shows the average amount of sugar in your blood over the past 2 to 3 months  Your provider will tell you what your A1c level should be  • Have screening tests as directed  Your provider may recommend screening for complications of diabetes and other conditions that may develop  Some screenings may begin right away and some may happen within the first 5 years of diagnosis:    ? Examples of diabetes complications  include kidney problems, high cholesterol, high blood pressure, blood vessel problems, eye problems, and sleep apnea  ?  You may be screened for a low vitamin B level  if you take oral diabetes medicine for a long time  ? Women of childbearing years may be screened  for polycystic ovarian syndrome (PCOS)  Follow up with your doctor or diabetes care team providers as directed: You may need to have blood tests done before your follow-up visit  The test results will show if changes need to be made in your treatment or self-care  Talk to your provider if you cannot afford your medicine  Write down your questions so you remember to ask them during your visits  © Copyright Pet[employer] Harder 2022 Information is for End User's use only and may not be sold, redistributed or otherwise used for commercial purposes  The above information is an  only  It is not intended as medical advice for individual conditions or treatments  Talk to your doctor, nurse or pharmacist before following any medical regimen to see if it is safe and effective for you  Type 2 Diabetes Management for Adults   AMBULATORY CARE:   Type 2 diabetes  is a disease that affects how your body uses glucose (sugar)  Either your body cannot make enough insulin, or it cannot use the insulin correctly  It is important to keep diabetes controlled to prevent damage to your heart, blood vessels, and other organs  Management will help you feel well and enjoy your daily activities  Your diabetes care team providers can help you make a plan to fit diabetes care into your schedule  Your plan can change over time to fit your needs and your family's needs  Have someone call your local emergency number (911 in the [address] [facility]) if:   • You cannot be woken  • You have signs of diabetic ketoacidosis:     ? confusion, fatigue    ? vomiting    ? rapid heartbeat    ? fruity smelling breath    ? extreme thirst    ? dry mouth and skin    • You have any of the following signs of a heart attack:      ?  Squeezing, pressure, or pain in your chest    ? You may  also have any of the following:     - Discomfort or pain in your back, neck, jaw, stomach, or arm    - Shortness of breath    - Nausea or vomiting    - Lightheadedness or a sudden cold sweat    • You have any of the following signs of a stroke:      ? Numbness or drooping on one side of your face     ? Weakness in an arm or leg    ? Confusion or difficulty speaking    ? Dizziness, a severe headache, or vision loss    Call your doctor or diabetes care team provider if:   • You have a sore or wound that will not heal     • You have a change in the amount you urinate  • Your blood sugar levels are higher than your target goals  • You often have lower blood sugar levels than your target goals  • Your skin is red, dry, warm, or swollen  • You have trouble coping with diabetes, or you feel anxious or depressed  • You have questions or concerns about your condition or care  What you need to know about high blood sugar levels:  High blood sugar levels may not cause any symptoms  You may feel more thirsty or urinate more often than usual  Over time, high blood sugar levels can damage your nerves, blood vessels, tissues, and organs  The following can increase your blood sugar levels:  • Large meals or large amounts of carbohydrates at one time    • Less physical activity    • Stress    • Illness    • A lower dose of diabetes medicine or insulin, or a late dose    What you need to know about low blood sugar levels:  Symptoms include feeling shaky, dizzy, irritable, or confused  You can prevent symptoms by keeping your blood sugar levels from going too low  • Treat a low blood sugar level right away:      ? Drink 4 ounces of juice or have 1 tube of glucose gel  ? Check your blood sugar level again 10 to 15 minutes later  ? When the level goes back to normal, eat a meal or snack to prevent another decrease  • Keep glucose gel, raisins, or hard candy with you at all times to treat a low blood sugar level       • Your blood sugar level can get too low if you take diabetes medicine or insulin and do not eat enough food  • If you use insulin, check your blood sugar level before you exercise  ? If your blood sugar level is below 100 mg/dL, eat 4 crackers or 2 ounces of raisins, or drink 4 ounces of juice  ? Check your level every 30 minutes if you exercise longer than 1 hour  ? You may need a snack during or after exercise  What you can do to manage your blood sugar levels:   • Check your blood sugar levels as directed and as needed  Several items are available to use to check your levels  You may need to check by testing a drop of blood in a glucose monitor  You may instead be given a continuous glucose monitoring (CGM) device  The device is worn at all times  The CGM checks your blood sugar level every 5 minutes  It sends results to an electronic device such as a smart phone  A CGM can be used with or without an insulin pump  You and your diabetes care team providers will decide on the best method for you  The goal for blood sugar levels before meals  is between 80 and 130 mg/dL and 2 hours after eating  is lower than 180 mg/dL  • Make healthy food choices  Work with a dietitian to develop a meal plan that works for you and your schedule  A dietitian can help you learn how to eat the right amount of carbohydrates during your meals and snacks  Carbohydrates can raise your blood sugar level if you eat too many at one time  Examples of foods that contain carbohydrates are breads, cereals, rice, pasta, and sweets  • Eat high-fiber foods as directed  Fiber helps improve blood sugar levels  Fiber also lowers your risk for heart disease and other problems diabetes can cause  Examples of high-fiber foods include vegetables, whole-grain bread, and beans such as marie beans  Your dietitian can tell you how much fiber to have each day  • Get regular physical activity    Physical activity can help you get to your target blood sugar level goal and manage your weight  Get at least 150 minutes of moderate to vigorous aerobic physical activity each week  Do not miss more than 2 days in a row  Do not sit longer than 30 minutes at a time  Your healthcare provider can help you create an activity plan  The plan can include the best activities for you and can help you build your strength and endurance  • Maintain a healthy weight  Ask your team what a healthy weight is for you  A healthy weight can help you control diabetes and prevent heart disease  Ask your team to help you create a weight loss plan, if needed  Weight loss can help make a difference in managing diabetes  Your team will help you set a weight-loss goal, such as 10 to 15 pounds, or 5% of your extra weight  Together you and your team can set manageable weight loss goals  • Take your diabetes medicine or insulin as directed  You may need diabetes medicine, insulin, or both to help control your blood sugar levels  Your healthcare provider will teach you how and when to take your diabetes medicine or insulin  You will also be taught about side effects oral diabetes medicine can cause  Insulin may be injected or given through a pump or pen  You and your providers will decide on the best method for you:    ? An insulin pump  is an implanted device that gives your insulin 24 hours a day  An insulin pump prevents the need for multiple insulin injections in a day  ? An insulin pen  is a device prefilled with the right amount of insulin  ? You and your family members will be taught how to draw up and give insulin  if this is the best method for you  Your providers will also teach you how to dispose of needles and syringes  ? You will learn how much insulin you need  and when to give it  You will be taught when not to give insulin  You will also be taught what to do if your blood sugar level drops too low   This may happen if you take insulin and do not eat the right amount of carbohydrates  More ways to manage type 2 diabetes:   • Wear medical alert identification  Wear medical alert jewelry or carry a card that says you have diabetes  Ask your provider where to get these items  • Do not smoke  Nicotine and other chemicals in cigarettes and cigars can cause lung and blood vessel damage  It also makes it more difficult to manage your diabetes  Ask your provider for information if you currently smoke and need help to quit  Do not use e-cigarettes or smokeless tobacco in place of cigarettes or to help you quit  They still contain nicotine  • Check your feet each day for cuts, scratches, calluses, or other wounds  Look for redness and swelling, and feel for warmth  Wear shoes that fit well  Check your shoes for rocks or other objects that can hurt your feet  Do not walk barefoot or wear shoes without socks  Wear cotton socks to help keep your feet dry  • Ask about vaccines you may need  You have a higher risk for serious illness if you get the flu, pneumonia, COVID-19, or hepatitis  Ask your provider if you should get vaccines to prevent these or other diseases, and when to get the vaccines  • Talk to your provider if you become stressed about diabetes care  Sometimes being able to fit diabetes care into your life can cause increased stress  The stress can cause you not to take care of yourself properly  Your care team providers can help by offering tips about self-care  Your providers may suggest you talk to a mental health provider who can listen and offer help with self-care issues  • Have your A1c checked as directed  Your provider may check your A1c every 3 months, or 2 times each year if your diabetes is controlled  An A1c test shows the average amount of sugar in your blood over the past 2 to 3 months  Your provider will tell you what your A1c level should be  • Have screening tests as directed    Your provider may recommend screening for complications of diabetes and other conditions that may develop  Some screenings may begin right away and some may happen within the first 5 years of diagnosis:    ? Examples of diabetes complications  include kidney problems, high cholesterol, high blood pressure, blood vessel problems, eye problems, and sleep apnea  ? You may be screened for a low vitamin B level  if you take oral diabetes medicine for a long time  ? Women of childbearing years may be screened  for polycystic ovarian syndrome (PCOS)  Follow up with your doctor or diabetes care team providers as directed: You may need to have blood tests done before your follow-up visit  The test results will show if changes need to be made in your treatment or self-care  Talk to your provider if you cannot afford your medicine  Write down your questions so you remember to ask them during your visits  © Copyright Corbin[employer] Kymberly 2022 Information is for End User's use only and may not be sold, redistributed or otherwise used for commercial purposes  The above information is an  only  It is not intended as medical advice for individual conditions or treatments  Talk to your doctor, nurse or pharmacist before following any medical regimen to see if it is safe and effective for you

## 2023-02-28 NOTE — ASSESSMENT & PLAN NOTE
Bp high today and not better by end of appt, was good at Endo appt and has been better in past at our appt as well, likely related to disagreement with wife (wife crying and upset during his appt), con't current BP meds for now, urged diet/exercise/wgt loss and improtance of tx of DIANA reviewed, recheck BP in 3 mos - if still elevated will increase Benicar, urged to check BP at home and to call if consistently > 140/90

## 2023-02-28 NOTE — ASSESSMENT & PLAN NOTE
NOT adeherent to PAP tx, SE of untx DIANA reviewed, urged discussion with Pulm/sleep medicine about other mask options/tx options, diet/exercise/wgt loss encouraged

## 2023-02-28 NOTE — ASSESSMENT & PLAN NOTE
TSH was a bit high recently - Endo just increased levothyroxine, med list reviewed and thyroid med UTD, con't meds/labs and f/u as per Endo

## 2023-02-28 NOTE — ASSESSMENT & PLAN NOTE
Lab Results   Component Value Date    HGBA1C 7 7 (H) 02/16/2023   DM type 2 with hyperglycemia/polyneuropathy and insulin dependence - uncontrolled with A1C 7 7 - uncontrolled DM and SE and complications reviewed, urged to get on track with diet and regular exercise, con't meds and labs and f/u as per MELANIE Harrington on DM foot exam (10/22) and eye exam (8/22), on an ARB and statin

## 2023-03-04 ENCOUNTER — NURSE TRIAGE (OUTPATIENT)
Dept: OTHER | Facility: OTHER | Age: 53
End: 2023-03-04

## 2023-03-04 NOTE — TELEPHONE ENCOUNTER
Patient started with a sore throat, bodyaches, runny nose and congestion Friday  Reports mild cough  Denies CP or SOB      Appointment scheduled as requested

## 2023-03-04 NOTE — TELEPHONE ENCOUNTER
Regarding: sore throat, body aches, runny nose, congestion  ----- Message from Mary Hilton sent at 3/4/2023  6:33 PM EST -----  "izzy been sick for the last 24-48 hours, izzy had a sore throat, body aches, and runny nose and congestion "

## 2023-03-05 NOTE — TELEPHONE ENCOUNTER
Regarding: sore throat, body aches, runny nose, congestion  ----- Message from Kimantiemmanuel 64 sent at 3/4/2023  7:01 PM EST -----  " I spoke to a nurse and I forgot to  tell her that I had a pneumonia shot last Tuesday   I don't know if the symptoms that I am having are due to that "

## 2023-03-05 NOTE — TELEPHONE ENCOUNTER
Reason for Disposition  • Common cold with no complications    Answer Assessment - Initial Assessment Questions  1  ONSET: "When did the nasal discharge start?"       Yesterday   2  AMOUNT: "How much discharge is there?"       Post nasal drip  3  COUGH: "Do you have a cough?" If yes, ask: "Describe the color of your sputum" (clear, white, yellow, green)      Mild non productive   4  RESPIRATORY DISTRESS: "Describe your breathing "       Denies SOB   5  FEVER: "Do you have a fever?" If Yes, ask: "What is your temperature, how was it measured, and when did it start?"      Low grade 99  6  SEVERITY: "Overall, how bad are you feeling right now?" (e g , doesn't interfere with normal activities, staying home from school/work, staying in bed)       "a little tired"  7   OTHER SYMPTOMS: "Do you have any other symptoms?" (e g , sore throat, earache, wheezing, vomiting)      Post nasal drip, low grade fever, nasal congestion and drainage    Protocols used: COMMON COLD-ADULT-

## 2023-03-06 ENCOUNTER — OFFICE VISIT (OUTPATIENT)
Dept: FAMILY MEDICINE CLINIC | Facility: HOSPITAL | Age: 53
End: 2023-03-06

## 2023-03-06 VITALS
DIASTOLIC BLOOD PRESSURE: 92 MMHG | OXYGEN SATURATION: 97 % | HEIGHT: 68 IN | TEMPERATURE: 96.3 F | BODY MASS INDEX: 37.98 KG/M2 | SYSTOLIC BLOOD PRESSURE: 134 MMHG | WEIGHT: 250.6 LBS | HEART RATE: 82 BPM

## 2023-03-06 DIAGNOSIS — E11.65 UNCONTROLLED TYPE 2 DIABETES MELLITUS WITH HYPERGLYCEMIA (HCC): ICD-10-CM

## 2023-03-06 DIAGNOSIS — J98.8 BACTERIAL RESPIRATORY INFECTION: Primary | ICD-10-CM

## 2023-03-06 DIAGNOSIS — J45.40 MODERATE PERSISTENT ASTHMA WITHOUT COMPLICATION: ICD-10-CM

## 2023-03-06 DIAGNOSIS — B96.89 BACTERIAL RESPIRATORY INFECTION: Primary | ICD-10-CM

## 2023-03-06 RX ORDER — AMOXICILLIN AND CLAVULANATE POTASSIUM 875; 125 MG/1; MG/1
1 TABLET, FILM COATED ORAL EVERY 12 HOURS SCHEDULED
Qty: 14 TABLET | Refills: 0 | Status: SHIPPED | OUTPATIENT
Start: 2023-03-06 | End: 2023-03-13

## 2023-03-06 NOTE — ASSESSMENT & PLAN NOTE
Reassured no current s/sx of exacerbation but has used nebulizer which is out of the norm for him, urged to take abx as directed and to call with increase/regular neb use or with increase SOB/wheezing/chest tightness

## 2023-03-06 NOTE — ASSESSMENT & PLAN NOTE
D/w pt that diabetics can see increase in BS with significant infection and to call if he see's this occurring, con't current DM meds for now  Lab Results   Component Value Date    HGBA1C 7 7 (H) 02/16/2023

## 2023-03-06 NOTE — PROGRESS NOTES
Name: Mariela Gutierrez      : 1970      MRN: [id number]  Encounter Provider: Sabrina Luna DO  Encounter Date: 3/6/2023   Encounter department: [facility] [employer]      1  Bacterial respiratory infection  Comments:  Pt higher risk with asthma and DM and has some rhonchi on exam and recent use of nebulizer which is outside of nml for him - will tx conservatively with Augmentin d/t RF,   Rest/fluids/lozenges/hot tea/garles and OTC decongestant and cough medication was recommended; d/w pt that cough can last 4-6 wks but call with new/worsening symptoms  Orders:  -     amoxicillin-clavulanate (Augmentin) 875-125 mg per tablet; Take 1 tablet by mouth every 12 (twelve) hours for 7 days    2  Moderate persistent asthma without complication  Assessment & Plan:  Reassured no current s/sx of exacerbation but has used nebulizer which is out of the norm for him, urged to take abx as directed and to call with increase/regular neb use or with increase SOB/wheezing/chest tightness      3  Uncontrolled type 2 diabetes mellitus with hyperglycemia ([facility] [address] )  Assessment & Plan:  D/w pt that diabetics can see increase in BS with significant infection and to call if he see's this occurring, con't current DM meds for now  Lab Results   Component Value Date    HGBA1C 7 7 (H) 2023       Colonoscopy 10/19 - 10 yrs    BW        Subjective      HPI Pt here for an acute visit    Pt here with 5 days of resp symptoms  Symptoms started with congestion and now has runny nose and cough and body aches and fatigue  He has had no actual fever documented  He thinks he may be wheezing a little but has no SOB  He has asthma and is using Trelegy daily  He uses a neb prn and used it once yesterday "but I don't know if I really needed it"  He usually does not use the nebulizer  He notes no loss or taste/N/V/D/rashessinus pain but has had some temporal HA pain   He is using Mucinex and cough medication  He feels a bit better today  He is also diabetic but has not noted any spikes/increase in BS  Review of Systems   Constitutional: Positive for fatigue  Negative for chills and fever  HENT: Positive for congestion and rhinorrhea  Negative for ear pain, sinus pressure, sinus pain and sore throat  Eyes: Negative for discharge, redness and itching  Respiratory: Positive for cough and wheezing  Negative for chest tightness and shortness of breath  Cardiovascular: Negative for chest pain and palpitations  Gastrointestinal: Negative for abdominal pain, diarrhea, nausea and vomiting  Genitourinary: Negative for difficulty urinating and dysuria  Musculoskeletal: Positive for arthralgias and myalgias  Skin: Negative for rash and wound  Neurological: Positive for headaches  Negative for dizziness  Hematological: Negative for adenopathy  Psychiatric/Behavioral: Negative for confusion         Current Outpatient Medications on File Prior to Visit   Medication Sig   • olmesartan (BENICAR) 20 mg tablet TAKE 1 TABLET DAILY   • albuterol (2 5 mg/3 mL) 0 083 % nebulizer solution Take 1 vial (2 5 mg total) by nebulization every 6 (six) hours as needed for wheezing or shortness of breath   • albuterol (PROVENTIL HFA,VENTOLIN HFA) 90 mcg/act inhaler Inhale 1 puff every 4 (four) hours as needed   • atorvastatin (LIPITOR) 10 mg tablet TAKE 1 TABLET DAILY   • BD Pen Needle Shanelle U/F 32G X 4 MM MISC USE TO INJECT UNDER THE SKIN DAILY   • Butalbital-APAP-Caffeine -40 MG CAPS Take 1 capsule by mouth every 8 (eight) hours as needed (headache)   • cetirizine (ZyrTEC) 10 mg tablet Take 1 tablet by mouth daily as needed   • cholecalciferol (VITAMIN D3) 1,000 units tablet Take 1 tablet (1,000 Units total) by mouth daily   • FLUoxetine (PROzac) 20 MG tablet Take 2 tablets (40 mg total) by mouth daily   • fluticasone (FLONASE) 50 mcg/act nasal spray USE 2 SPRAYS IN EACH NOSTRIL DAILY   • fluticasone-umeclidinium-vilanterol (Trelegy Ellipta) 100-62 5-25 MCG/INH inhaler Inhale 1 puff daily Rinse mouth after use  • glipiZIDE (GLUCOTROL XL) 10 mg 24 hr tablet TAKE 1 TABLET DAILY   • insulin glargine (Semglee) 100 units/mL subcutaneous injection Inject up to 60 unites daily   • Jardiance 25 MG TABS TAKE 1 TABLET DAILY IN THE MORNING   • levothyroxine 50 mcg tablet TAKE 1 TABLET DAILY   • metFORMIN (GLUCOPHAGE) 1000 MG tablet TAKE 1 TABLET TWICE A DAY   • montelukast (SINGULAIR) 10 mg tablet take 1 tablet by mouth at bedtime   • pantoprazole (PROTONIX) 40 mg tablet TAKE 1 TABLET DAILY   • semaglutide, 1 mg/dose, (Ozempic, 1 MG/DOSE,) 4 mg/3 mL injection pen Inject 0 75 mL (1 mg total) under the skin every 7 days   • Skyrizi, 150 MG Dose, 75 MG/0 83ML PSKT Every 3 months       Objective     /92   Pulse 82   Temp (!) 96 3 °F (35 7 °C) (Tympanic)   Ht 5' 8" (1 727 m)   Wt 114 kg (250 lb 9 6 oz)   SpO2 97%   BMI 38 10 kg/m²     Physical Exam  Vitals and nursing note reviewed  Constitutional:       General: He is not in acute distress  Appearance: He is well-developed  He is not ill-appearing  HENT:      Head: Normocephalic and atraumatic  Ears:      Comments: B/L TM partially occluded by cerumen     Mouth/Throat:      Pharynx: No oropharyngeal exudate or posterior oropharyngeal erythema  Eyes:      General:         Right eye: No discharge  Left eye: No discharge  Conjunctiva/sclera: Conjunctivae normal    Neck:      Trachea: No tracheal deviation  Cardiovascular:      Rate and Rhythm: Normal rate and regular rhythm  Heart sounds: No murmur heard  Pulmonary:      Effort: Pulmonary effort is normal  No respiratory distress  Breath sounds: Rhonchi present  No wheezing or rales  Comments: Rare rhonchi RUL  Musculoskeletal:         General: No deformity or signs of injury  Cervical back: Neck supple     Lymphadenopathy:      Cervical: Cervical adenopathy present  Skin:     General: Skin is warm and dry  Coloration: Skin is not pale  Findings: No rash  Neurological:      General: No focal deficit present  Mental Status: He is alert  Mental status is at baseline  Motor: No abnormal muscle tone  Gait: Gait normal    Psychiatric:         Behavior: Behavior normal          Thought Content:  Thought content normal          Judgment: Judgment normal        Shaun Mcmillan DO

## 2023-03-11 ENCOUNTER — OFFICE VISIT (OUTPATIENT)
Dept: URGENT CARE | Facility: CLINIC | Age: 53
End: 2023-03-11

## 2023-03-11 ENCOUNTER — NURSE TRIAGE (OUTPATIENT)
Dept: OTHER | Facility: OTHER | Age: 53
End: 2023-03-11

## 2023-03-11 VITALS
SYSTOLIC BLOOD PRESSURE: 126 MMHG | DIASTOLIC BLOOD PRESSURE: 66 MMHG | TEMPERATURE: 97.4 F | OXYGEN SATURATION: 96 % | HEART RATE: 83 BPM

## 2023-03-11 DIAGNOSIS — J20.9 ACUTE BRONCHITIS, UNSPECIFIED ORGANISM: Primary | ICD-10-CM

## 2023-03-11 RX ORDER — METHYLPREDNISOLONE 4 MG/1
TABLET ORAL
Qty: 21 TABLET | Refills: 0 | Status: SHIPPED | OUTPATIENT
Start: 2023-03-11

## 2023-03-11 NOTE — PROGRESS NOTES
[facility]        NAME: Paul Ceballos is a 45 y o  male  : 1970    MRN: [id number]  DATE: 2023  TIME: 3:49 PM    Assessment and Plan   Acute bronchitis, unspecified organism [J20 9]  1  Acute bronchitis, unspecified organism  methylPREDNISolone 4 MG tablet therapy pack            Patient Instructions       Follow up with PCP in 3-5 days  Proceed to  ER if symptoms worsen  Chief Complaint     Chief Complaint   Patient presents with   • Wheezing     Worsened wheezing over past week, especially last 3 days  History of asthma  Pt reports recovering from a cold a week and a half ago  Denies sore throat  Pt reports only general aches and pains  History of Present Illness       57-year-old male with persistent cough, wheezing and chest tightness despite being on antibiotics for 1 week for an upper respiratory infection  Denies any fevers or chills  Review of Systems   Review of Systems   Constitutional: Negative  HENT: Negative  Eyes: Negative  Respiratory: Positive for chest tightness and wheezing  Cardiovascular: Negative  Gastrointestinal: Negative  Genitourinary: Negative  Skin: Negative  Allergic/Immunologic: Negative  Neurological: Negative  Hematological: Negative  Psychiatric/Behavioral: Negative            Current Medications       Current Outpatient Medications:   •  albuterol (2 5 mg/3 mL) 0 083 % nebulizer solution, Take 1 vial (2 5 mg total) by nebulization every 6 (six) hours as needed for wheezing or shortness of breath, Disp: 120 vial, Rfl: 2  •  albuterol (PROVENTIL HFA,VENTOLIN HFA) 90 mcg/act inhaler, Inhale 1 puff every 4 (four) hours as needed, Disp: , Rfl:   •  amoxicillin-clavulanate (Augmentin) 875-125 mg per tablet, Take 1 tablet by mouth every 12 (twelve) hours for 7 days, Disp: 14 tablet, Rfl: 0  •  atorvastatin (LIPITOR) 10 mg tablet, TAKE 1 TABLET DAILY, Disp: 90 tablet, Rfl: 3  •  Butalbital-APAP-Caffeine -40 MG CAPS, Take 1 capsule by mouth every 8 (eight) hours as needed (headache), Disp: 30 capsule, Rfl: 0  •  cetirizine (ZyrTEC) 10 mg tablet, Take 1 tablet by mouth daily as needed, Disp: , Rfl:   •  cholecalciferol (VITAMIN D3) 1,000 units tablet, Take 1 tablet (1,000 Units total) by mouth daily, Disp: , Rfl:   •  FLUoxetine (PROzac) 20 MG tablet, Take 2 tablets (40 mg total) by mouth daily, Disp: 180 tablet, Rfl: 0  •  fluticasone (FLONASE) 50 mcg/act nasal spray, USE 2 SPRAYS IN EACH NOSTRIL DAILY, Disp: 48 g, Rfl: 3  •  fluticasone-umeclidinium-vilanterol (Trelegy Ellipta) 100-62 5-25 MCG/INH inhaler, Inhale 1 puff daily Rinse mouth after use , Disp:  , Rfl:   •  glipiZIDE (GLUCOTROL XL) 10 mg 24 hr tablet, TAKE 1 TABLET DAILY, Disp: 90 tablet, Rfl: 3  •  insulin glargine (Semglee) 100 units/mL subcutaneous injection, Inject up to 60 unites daily, Disp: 60 mL, Rfl: 2  •  Jardiance 25 MG TABS, TAKE 1 TABLET DAILY IN THE MORNING, Disp: 90 tablet, Rfl: 3  •  levothyroxine 50 mcg tablet, TAKE 1 TABLET DAILY, Disp: 90 tablet, Rfl: 3  •  metFORMIN (GLUCOPHAGE) 1000 MG tablet, TAKE 1 TABLET TWICE A DAY, Disp: 180 tablet, Rfl: 3  •  methylPREDNISolone 4 MG tablet therapy pack, Use as directed on package, Disp: 21 tablet, Rfl: 0  •  montelukast (SINGULAIR) 10 mg tablet, take 1 tablet by mouth at bedtime, Disp: 30 tablet, Rfl: 5  •  olmesartan (BENICAR) 20 mg tablet, TAKE 1 TABLET DAILY, Disp: 90 tablet, Rfl: 3  •  pantoprazole (PROTONIX) 40 mg tablet, TAKE 1 TABLET DAILY, Disp: 90 tablet, Rfl: 3  •  semaglutide, 1 mg/dose, (Ozempic, 1 MG/DOSE,) 4 mg/3 mL injection pen, Inject 0 75 mL (1 mg total) under the skin every 7 days, Disp: 9 mL, Rfl: 3  •  Skyrizi, 150 MG Dose, 75 MG/0 83ML PSKT, Every 3 months, Disp: , Rfl:   •  BD Pen Needle Shanelle U/F 32G X 4 MM MISC, USE TO INJECT UNDER THE SKIN DAILY, Disp: 90 each, Rfl: 3    Current Allergies     Allergies as of 03/11/2023   • (No Known Allergies)            The following portions of the patient's history were reviewed and updated as appropriate: allergies, current medications, past family history, past medical history, past social history, past surgical history and problem list      Past Medical History:   Diagnosis Date   • Anxiety 02/22/2019   • Asthma 05/21/2019   • Diabetes mellitus ([address] )    • GERD (gastroesophageal reflux disease) 12/19/2013   • Hypertriglyceridemia 07/08/2015   • Hypothyroidism    • Migraine without aura and without status migrainosus, not intractable 05/21/2019   • Psoriasis    • Seasonal allergies 05/21/2019    Dr Belem Valentin   • Vitamin D deficiency        Past Surgical History:   Procedure Laterality Date   • CATARACT EXTRACTION, BILATERAL  2017   • CHOLECYSTECTOMY  2000    lap   • UNDESCENDED TESTICLE EXPLORATION Bilateral     as a child       Family History   Problem Relation Age of Onset   • Diabetes Mother    • Thyroid disease Mother         post thyroidectomy   • Stroke Mother    • Diabetes type II Mother    • Thyroid disease Father         post thyroidectomy   • Coronary artery disease Father    • No Known Problems Brother    • No Known Problems Brother    • Colon cancer Neg Hx    • Colon polyps Neg Hx          Medications have been verified  Objective   /66 (BP Location: Right arm, Patient Position: Sitting, Cuff Size: Large)   Pulse 83   Temp (!) 97 4 °F (36 3 °C) (Temporal)   SpO2 96%   No LMP for male patient  Physical Exam     Physical Exam  Constitutional:       Appearance: He is well-developed  HENT:      Head: Normocephalic  Mouth/Throat:      Mouth: Mucous membranes are moist       Pharynx: No oropharyngeal exudate or posterior oropharyngeal erythema  Eyes:      Pupils: Pupils are equal, round, and reactive to light  Cardiovascular:      Rate and Rhythm: Normal rate and regular rhythm  Pulmonary:      Effort: Pulmonary effort is normal    Abdominal:      General: Abdomen is flat     Musculoskeletal: General: Normal range of motion  Cervical back: Normal range of motion  Skin:     General: Skin is warm  Neurological:      Mental Status: He is alert and oriented to person, place, and time

## 2023-03-11 NOTE — TELEPHONE ENCOUNTER
Regarding: cold symptoms, wheezing  ----- Message from Landry Skiff sent at 3/11/2023  9:03 AM EST -----  " I've has a cold for the past week   Now my asthma is acting up and I am wheezing "

## 2023-03-11 NOTE — TELEPHONE ENCOUNTER
Pt called in stating he was seen in office for cold and prescribed abx on 3/6/2023  States abx is helping but states asthma is getting worse  States sob at times and hears wheezing  Using neb treatment q 4 hours  Advised urgent care  Pt requested office ppt on Monday  Advised with symptoms shouldn't wait til Monday due symptoms getting worse  Verbalized understanding and will go to urgent care  Reason for Disposition  • [1] MILD asthma attack (e g , no SOB at rest, mild SOB with walking, speaks normally in sentences, mild wheezing) AND [2]  persists > 24 hours on appropriate treatment    Answer Assessment - Initial Assessment Questions  1  RESPIRATORY STATUS: "Describe your breathing?" (e g , wheezing, shortness of breath, unable to speak, severe coughing)      Wheezing   2  ONSET: "When did this asthma attack begin?"      Getting progressively worse over 2 days   3  TRIGGER: "What do you think triggered this attack?" (e g , URI, exposure to pollen or other allergen, tobacco smoke)       Sick for last few weeks     5  SEVERITY: "How bad is this attack?"     - MILD: No SOB at rest, mild SOB with walking, speaks normally in sentences, can lay down, no retractions, pulse < 100  (GREEN Zone: PEFR %)    - MODERATE: SOB at rest, SOB with minimal exertion and prefers to sit, cannot lie down flat, speaks in phrases, mild retractions, audible wheezing, pulse 100-120  (YELLOW Zone: PEFR 50-80%)     - SEVERE: Very SOB at rest, speaks in single words, struggling to breathe, sitting hunched forward, retractions, usually loud wheezing, sometimes minimal wheezing because of decreased air movement, pulse > 120  (RED Zone: PEFR < 50%)  Mild   6   MEDICATIONS (Inhaler or nebs): "What are your asthma medications?" and "What treatments have you given so far?"     - Quick-relief: albuterol, metaproterenol, salbutamol, or other inhaled or nebulized beta-agonist medicines    - Long-term-control: steroids, cromolyn, or other anti-inflammatory medicines  Using neb treatment every 4 hours   7   OTHER SYMPTOMS: "Do you have any other symptoms? (e g , runny nose, chest pain, fever)      Denies    Protocols used: ASTHMA ATTACK-ADULT-AH

## 2023-03-15 ENCOUNTER — TELEPHONE (OUTPATIENT)
Dept: FAMILY MEDICINE CLINIC | Facility: HOSPITAL | Age: 53
End: 2023-03-15

## 2023-03-15 NOTE — TELEPHONE ENCOUNTER
Spoke to pt, advised all common effects from steroid, advised BP isn't terrible but just to continue to monitor  Please advise    Thank you

## 2023-03-15 NOTE — TELEPHONE ENCOUNTER
Patient on prednisone rx'd by an urgent care for his asthma  Currently on day 5 of a taper, 2 more days left  BP has been running high - just now was 144/90  Patient reports no new headache or change of vision  But by end of day just doesn't feel right, not sure how to describe  Blood sugar also high, but aware that is a common SE of steroids  Patient aware Dr Enrrique Virk not in today  Is there anything he needs to be doing until she can address this note? No call back needed if not

## 2023-04-17 ENCOUNTER — OFFICE VISIT (OUTPATIENT)
Dept: PODIATRY | Facility: CLINIC | Age: 53
End: 2023-04-17

## 2023-04-17 VITALS
DIASTOLIC BLOOD PRESSURE: 76 MMHG | WEIGHT: 250 LBS | SYSTOLIC BLOOD PRESSURE: 119 MMHG | HEART RATE: 71 BPM | HEIGHT: 68 IN | BODY MASS INDEX: 37.89 KG/M2

## 2023-04-17 DIAGNOSIS — L40.9 PSORIASIS: ICD-10-CM

## 2023-04-17 DIAGNOSIS — B35.1 ONYCHOMYCOSIS: ICD-10-CM

## 2023-04-17 DIAGNOSIS — E11.40 TYPE 2 DIABETES MELLITUS WITH DIABETIC NEUROPATHY, WITHOUT LONG-TERM CURRENT USE OF INSULIN (HCC): Primary | ICD-10-CM

## 2023-05-04 NOTE — PROGRESS NOTES
Assessment/Plan:     DM2 with diabetic neuropathy, without long-term current use of insulin ([address] )  Proper diabetic foot care discussed, encouraged good blood sugar management to prevent future complications  Psoriasis  Avoid topical steroids at this time since lesions seem to be dissipating with Ozempic    Onychomycosis  May be dystrophic secondary to psoriasis which could only be differentiated with biopsy  Subjective:     Patient ID: Elizabet Patricia is a 45 y o  male  4/17/2023: 66-year-old type II diabetic presents for biannual diabetic foot exam   Reports last A1c 7 5  Concerned with increased dystrophic nature of great toenails  Psoriasis has been doing well but has multiple lesions on both lower extremities  Reports since he was started on Ozempic his lesions have improved      Review of Systems   Constitutional: Negative for chills and fever  HENT: Negative for ear pain and sore throat  Eyes: Negative for pain and visual disturbance  Respiratory: Negative for cough and shortness of breath  Cardiovascular: Negative for chest pain and palpitations  Gastrointestinal: Negative for abdominal pain and vomiting  Endocrine:        Diabetes   Genitourinary: Negative for dysuria and hematuria  Musculoskeletal: Negative for arthralgias and back pain  Skin: Negative for color change and rash  Thickened dystrophic great toenails   Neurological: Negative for seizures and syncope  All other systems reviewed and are negative  Objective:     Physical Exam  Constitutional:       Appearance: Normal appearance  HENT:      Head: Normocephalic  Right Ear: Tympanic membrane normal       Left Ear: Tympanic membrane normal       Nose: No congestion  Mouth/Throat:      Pharynx: No oropharyngeal exudate or posterior oropharyngeal erythema  Eyes:      Extraocular Movements: Extraocular movements intact  Pupils: Pupils are equal, round, and reactive to light  Cardiovascular:      Pulses:           Dorsalis pedis pulses are 1+ on the right side and 1+ on the left side  Posterior tibial pulses are 1+ on the right side and 1+ on the left side  Pulmonary:      Effort: Pulmonary effort is normal    Musculoskeletal:         General: Normal range of motion  Feet:      Right foot:      Protective Sensation: 10 sites tested  10 sites sensed  Toenail Condition: Right toenails are abnormally thick  Left foot:      Protective Sensation: 10 sites tested  10 sites sensed  Toenail Condition: Left toenails are abnormally thick  Skin:     General: Skin is warm and dry  Capillary Refill: Capillary refill takes 2 to 3 seconds  Coloration: Skin is not pale  Findings: No bruising, erythema, lesion or rash  Comments: Texture tone and turgor within normal limits  No atrophic changes noted  Digital hair noted  Multiple small plaque areas on both lower extremities and top of feet    We will hallux nails are severely dystrophic with brittle nature and punctate changes which could be consistent with psoriasis  Neurological:      General: No focal deficit present  Mental Status: He is alert  Cranial Nerves: No cranial nerve deficit  Sensory: Sensory deficit (Minutes vibratory sensations bilateral ) present  Motor: No weakness        Gait: Gait normal       Deep Tendon Reflexes: Reflexes normal    Psychiatric:         Mood and Affect: Mood normal          Behavior: Behavior normal          Judgment: Judgment normal

## 2023-05-22 ENCOUNTER — TELEPHONE (OUTPATIENT)
Dept: FAMILY MEDICINE CLINIC | Facility: HOSPITAL | Age: 53
End: 2023-05-22

## 2023-05-22 NOTE — TELEPHONE ENCOUNTER
Has been sick since the weekend    Running a fever/ Chills/ Cough    Should he be seen virtually? Or can something be called in for him?      Please call to advise

## 2023-05-23 ENCOUNTER — OFFICE VISIT (OUTPATIENT)
Dept: FAMILY MEDICINE CLINIC | Facility: HOSPITAL | Age: 53
End: 2023-05-23

## 2023-05-23 VITALS
SYSTOLIC BLOOD PRESSURE: 144 MMHG | BODY MASS INDEX: 37.04 KG/M2 | DIASTOLIC BLOOD PRESSURE: 94 MMHG | HEART RATE: 74 BPM | OXYGEN SATURATION: 97 % | TEMPERATURE: 96.8 F | HEIGHT: 68 IN | WEIGHT: 244.4 LBS

## 2023-05-23 DIAGNOSIS — J45.40 MODERATE PERSISTENT ASTHMA WITHOUT COMPLICATION: ICD-10-CM

## 2023-05-23 DIAGNOSIS — E11.65 UNCONTROLLED TYPE 2 DIABETES MELLITUS WITH HYPERGLYCEMIA (HCC): ICD-10-CM

## 2023-05-23 DIAGNOSIS — J98.9 RESPIRATORY ILLNESS: Primary | ICD-10-CM

## 2023-05-23 RX ORDER — DEUCRAVACITINIB 6 MG/1
1 TABLET, FILM COATED ORAL DAILY
COMMUNITY
Start: 2023-04-25

## 2023-05-23 NOTE — PROGRESS NOTES
Name: Moises Jolly      : 1970      MRN: [id number]  Encounter Provider: Mily Palacio DO  Encounter Date: 2023   Encounter department: [address] [place] [address]  203     Assessment & Plan     1  Respiratory illness  Comments:  Reassured pt that he has only had 4 days of symptoms and is seemingly better today, d/w pt that most URI's are viral and last 10-14 days, reassured exam essentially nml, I urged gargles/hot tea/lozenges/rest and fluids, cont' OTC Mucinex and monitor resp and sugars closely - call with increase SOB/wheezing or with regular use of rescue inhaler/nebs, d/w pt that cough can last 4-6 wks BUT if no better at all by Fri OR if symptoms worsen he should call and would start abx, hold on steroid as no s/sx of asthma exacerbation and exam nml and would not want steroids to increase BS if not needed, will follow    2  Moderate persistent asthma without complication  Assessment & Plan:  Reassured no current s/sx of exacerbation, con't daily Trelegy and call with use of rescue inhaler/nebulizer or with new/worse SOB/wheezing      3  Uncontrolled type 2 diabetes mellitus with hyperglycemia ([facility] [address] )  Assessment & Plan:  Pt notes no increase in BS at home with concurrent illness, urged to watch closely as can see this in diabetics with significant infection, con't current insulin regimen for now, has BW to be done prior to Endo appt in early , UTD on DM foot exam (10/22) and eye exam ()  Lab Results   Component Value Date    HGBA1C 7 7 (H) 2023           Colonoscopy 10/19 - 10 yrs    DM labs   DM foot exam 10/22  DM eye exam     Subjective      HPI Pt here for an acute visit    Pt with 4 days of not feeling well  Symptoms started with nasal congestion and runny nose  He has some post nasal drip and a minor cough  He has had some wheezing and some mild SOB  He had chills yesterday and sweated most of the night but did not check his temp    He notes no ear pain/HA's/dizziness/N/V/D/rashes/urinary symptoms  He had some sinus pain over the weekend but that has improved  He felt very achy over the weekend but that has improved  He has been using Mucinex and Ibuprofen  He is on Trelegy daily for his asthma  He used his nebulizer once this am d/t wheezing but has had no recent rescue inhaler use  He is diabetic and is checking his sugars daily and has not noted an increase in his BS  He has had mult sick contacts at home  Review of Systems   Constitutional: Positive for appetite change and chills  Negative for fever  HENT: Positive for congestion, sinus pain and sore throat  Negative for ear pain  Eyes: Negative for pain and discharge  Respiratory: Positive for cough and wheezing  Negative for shortness of breath  Cardiovascular: Negative for chest pain and palpitations  Gastrointestinal: Negative for abdominal pain, diarrhea, nausea and vomiting  Genitourinary: Negative for difficulty urinating and dysuria  Musculoskeletal: Positive for arthralgias and myalgias  Skin: Negative for rash and wound  Neurological: Negative for dizziness and headaches  Psychiatric/Behavioral: Negative for confusion         Current Outpatient Medications on File Prior to Visit   Medication Sig   • albuterol (2 5 mg/3 mL) 0 083 % nebulizer solution Take 1 vial (2 5 mg total) by nebulization every 6 (six) hours as needed for wheezing or shortness of breath   • albuterol (PROVENTIL HFA,VENTOLIN HFA) 90 mcg/act inhaler Inhale 1 puff every 4 (four) hours as needed   • atorvastatin (LIPITOR) 10 mg tablet TAKE 1 TABLET DAILY   • BD Pen Needle Shanelle U/F 32G X 4 MM MISC USE TO INJECT UNDER THE SKIN DAILY   • Butalbital-APAP-Caffeine -40 MG CAPS Take 1 capsule by mouth every 8 (eight) hours as needed (headache)   • cetirizine (ZyrTEC) 10 mg tablet Take 1 tablet by mouth daily as needed   • cholecalciferol (VITAMIN D3) 1,000 units tablet Take 1 tablet (1,000 "Units total) by mouth daily   • FLUoxetine (PROzac) 20 MG tablet Take 2 tablets (40 mg total) by mouth daily   • fluticasone (FLONASE) 50 mcg/act nasal spray USE 2 SPRAYS IN EACH NOSTRIL DAILY   • fluticasone-umeclidinium-vilanterol (Trelegy Ellipta) 100-62 5-25 MCG/INH inhaler Inhale 1 puff daily Rinse mouth after use  • glipiZIDE (GLUCOTROL XL) 10 mg 24 hr tablet TAKE 1 TABLET DAILY   • insulin glargine (Semglee) 100 units/mL subcutaneous injection Inject up to 60 unites daily   • Jardiance 25 MG TABS TAKE 1 TABLET DAILY IN THE MORNING   • levothyroxine 50 mcg tablet TAKE 1 TABLET DAILY   • metFORMIN (GLUCOPHAGE) 1000 MG tablet TAKE 1 TABLET TWICE A DAY   • montelukast (SINGULAIR) 10 mg tablet take 1 tablet by mouth at bedtime   • olmesartan (BENICAR) 20 mg tablet TAKE 1 TABLET DAILY   • pantoprazole (PROTONIX) 40 mg tablet TAKE 1 TABLET DAILY   • semaglutide, 1 mg/dose, (Ozempic, 1 MG/DOSE,) 4 mg/3 mL injection pen Inject 0 75 mL (1 mg total) under the skin every 7 days   • Skyrizi, 150 MG Dose, 75 MG/0 83ML PSKT Every 3 months   • Sotyktu 6 MG TABS Take 1 tablet by mouth in the morning   • [DISCONTINUED] methylPREDNISolone 4 MG tablet therapy pack Use as directed on package       Objective     /94   Pulse 74   Temp (!) 96 8 °F (36 °C) (Tympanic)   Ht 5' 8\" (1 727 m)   Wt 111 kg (244 lb 6 4 oz)   SpO2 97%   BMI 37 16 kg/m²     Physical Exam  Vitals and nursing note reviewed  Constitutional:       General: He is not in acute distress  Appearance: He is well-developed  He is not ill-appearing  HENT:      Head: Normocephalic and atraumatic  Ears:      Comments: TM blocked by cerumen B/L     Mouth/Throat:      Mouth: Mucous membranes are moist  No oral lesions  Pharynx: Uvula midline  No pharyngeal swelling, oropharyngeal exudate or posterior oropharyngeal erythema  Tonsils: No tonsillar exudate  Eyes:      General:         Right eye: No discharge           Left eye: No " discharge  Conjunctiva/sclera: Conjunctivae normal    Neck:      Trachea: No tracheal deviation  Cardiovascular:      Rate and Rhythm: Normal rate and regular rhythm  Heart sounds: No murmur heard  Pulmonary:      Effort: Pulmonary effort is normal  No respiratory distress  Breath sounds: Normal breath sounds  No wheezing, rhonchi or rales  Musculoskeletal:         General: No deformity or signs of injury  Cervical back: Neck supple  Lymphadenopathy:      Cervical: Cervical adenopathy present  Skin:     General: Skin is warm and dry  Coloration: Skin is not pale  Findings: No rash  Neurological:      General: No focal deficit present  Mental Status: He is alert  Mental status is at baseline  Motor: No abnormal muscle tone  Gait: Gait normal    Psychiatric:         Mood and Affect: Mood normal          Behavior: Behavior normal          Thought Content:  Thought content normal          Judgment: Judgment normal        Janae Villegas DO

## 2023-05-23 NOTE — ASSESSMENT & PLAN NOTE
Pt notes no increase in BS at home with concurrent illness, urged to watch closely as can see this in diabetics with significant infection, con't current insulin regimen for now, has BW to be done prior to Endo appt in early June, UTD on DM foot exam (10/22) and eye exam (8/22)  Lab Results   Component Value Date    HGBA1C 7 7 (H) 02/16/2023

## 2023-05-23 NOTE — ASSESSMENT & PLAN NOTE
Reassured no current s/sx of exacerbation, con't daily Trelegy and call with use of rescue inhaler/nebulizer or with new/worse SOB/wheezing

## 2023-05-29 DIAGNOSIS — E11.65 UNCONTROLLED TYPE 2 DIABETES MELLITUS WITH HYPERGLYCEMIA (HCC): ICD-10-CM

## 2023-05-29 RX ORDER — GLIPIZIDE 10 MG/1
TABLET, FILM COATED, EXTENDED RELEASE ORAL
Qty: 90 TABLET | Refills: 3 | Status: SHIPPED | OUTPATIENT
Start: 2023-05-29

## 2023-05-30 ENCOUNTER — OFFICE VISIT (OUTPATIENT)
Dept: FAMILY MEDICINE CLINIC | Facility: HOSPITAL | Age: 53
End: 2023-05-30

## 2023-05-30 VITALS
BODY MASS INDEX: 36.43 KG/M2 | HEIGHT: 68 IN | TEMPERATURE: 97.8 F | WEIGHT: 240.4 LBS | DIASTOLIC BLOOD PRESSURE: 80 MMHG | HEART RATE: 78 BPM | SYSTOLIC BLOOD PRESSURE: 140 MMHG

## 2023-05-30 DIAGNOSIS — R13.13 PHARYNGEAL DYSPHAGIA: ICD-10-CM

## 2023-05-30 DIAGNOSIS — I10 ESSENTIAL HYPERTENSION: Primary | ICD-10-CM

## 2023-05-30 NOTE — ASSESSMENT & PLAN NOTE
BP up again today, review of chart notes in general BP has been well controlled, wgt down 10 lbs recently, con't current regimen for now, if BP consistently > 140/90 at any other medical appt pt was advised to call and would increase Benicar

## 2023-05-30 NOTE — PROGRESS NOTES
Name: Christel Lopez      : 1970      MRN: [id number]  Encounter Provider: Zbigniew Lloyd DO  Encounter Date: 2023   Encounter department: [address] [place] [address]  203     Assessment & Plan     1  Essential hypertension  Assessment & Plan:  BP up again today, review of chart notes in general BP has been well controlled, wgt down 10 lbs recently, con't current regimen for now, if BP consistently > 140/90 at any other medical appt pt was advised to call and would increase Benicar      2  Pharyngeal dysphagia  Comments:  Unusual in its abrupt onset but will increase PPI to 40 mg bid and check swallow study, call with new/worse symptoms, follow closely  Orders:  -     FL barium swallow ROUTINE esophagus; Future; Expected date: 2023      Colonoscopy 10/19 - 10 yrs    PSA     BW  (A1C 7 7) - has BW ordered by Endo prior to  appt  DM foot exam 10/22  DM eye exam       Subjective      HPI Pt here for BP check    Last visit in May his BP was elevated    Pt noting issues with solids and liquids since  am (3 days)  He states when he has solids he has to chew it a lot and swallow it with water and will then cough food up and have to chew it again and swallow again  He had a recent URI and was tx with OTC decongestant  He had PND at the time of illness but that has improved and is almost resolved  He notes the sensation of heart burn once when he had to force himself to swallow and felt he swallowed a lot of air and felt heartburn  Symptoms were really bad on  and have improved since then but have not resolved  He cannot id what brought them on or what has made them slightly better  He notes no persistent blood in stool/black stools/F/C/unintentional wgt loss/night sweats  He has lost 10 lbs from  which he states is d/t starting Ozempic  He has to tried anything for his symptoms since they started on        Last visit pts BP was elevated for the first time  BP up again today  He does not check his BP at home and denies adding salt to his diet  He has used Ibuprofen but is to using it regularly  He notes no chronic HA's/dizziness/double vision/CP/palp  Review of Systems   Constitutional: Negative for chills and fever  HENT: Positive for trouble swallowing  Negative for congestion, sore throat and voice change  Eyes: Negative for visual disturbance  Respiratory: Negative for cough and shortness of breath  Cardiovascular: Negative for chest pain and palpitations  Gastrointestinal: Positive for constipation  Negative for blood in stool, diarrhea and nausea  Musculoskeletal: Positive for arthralgias  Neurological: Negative for dizziness and headaches  Current Outpatient Medications on File Prior to Visit   Medication Sig   • albuterol (2 5 mg/3 mL) 0 083 % nebulizer solution Take 1 vial (2 5 mg total) by nebulization every 6 (six) hours as needed for wheezing or shortness of breath   • albuterol (PROVENTIL HFA,VENTOLIN HFA) 90 mcg/act inhaler Inhale 1 puff every 4 (four) hours as needed   • atorvastatin (LIPITOR) 10 mg tablet TAKE 1 TABLET DAILY   • BD Pen Needle Shanelle U/F 32G X 4 MM MISC USE TO INJECT UNDER THE SKIN DAILY   • Butalbital-APAP-Caffeine -40 MG CAPS Take 1 capsule by mouth every 8 (eight) hours as needed (headache)   • cetirizine (ZyrTEC) 10 mg tablet Take 1 tablet by mouth daily as needed   • cholecalciferol (VITAMIN D3) 1,000 units tablet Take 1 tablet (1,000 Units total) by mouth daily   • FLUoxetine (PROzac) 20 MG tablet Take 2 tablets (40 mg total) by mouth daily   • fluticasone (FLONASE) 50 mcg/act nasal spray USE 2 SPRAYS IN EACH NOSTRIL DAILY   • fluticasone-umeclidinium-vilanterol (Trelegy Ellipta) 100-62 5-25 MCG/INH inhaler Inhale 1 puff daily Rinse mouth after use     • glipiZIDE (GLUCOTROL XL) 10 mg 24 hr tablet TAKE 1 TABLET DAILY   • insulin glargine (Semglee) 100 units/mL subcutaneous "injection Inject up to 60 unites daily   • Jardiance 25 MG TABS TAKE 1 TABLET DAILY IN THE MORNING   • levothyroxine 50 mcg tablet TAKE 1 TABLET DAILY   • metFORMIN (GLUCOPHAGE) 1000 MG tablet TAKE 1 TABLET TWICE A DAY   • montelukast (SINGULAIR) 10 mg tablet take 1 tablet by mouth at bedtime   • olmesartan (BENICAR) 20 mg tablet TAKE 1 TABLET DAILY   • pantoprazole (PROTONIX) 40 mg tablet TAKE 1 TABLET DAILY   • semaglutide, 1 mg/dose, (Ozempic, 1 MG/DOSE,) 4 mg/3 mL injection pen Inject 0 75 mL (1 mg total) under the skin every 7 days   • Skyrizi, 150 MG Dose, 75 MG/0 83ML PSKT Every 3 months   • Sotyktu 6 MG TABS Take 1 tablet by mouth in the morning       Objective     /80   Pulse 78   Temp 97 8 °F (36 6 °C) (Tympanic)   Ht 5' 8\" (1 727 m)   Wt 109 kg (240 lb 6 4 oz)   BMI 36 55 kg/m²     Physical Exam  Vitals and nursing note reviewed  Constitutional:       General: He is not in acute distress  Appearance: He is well-developed  He is not ill-appearing  HENT:      Head: Normocephalic and atraumatic  Right Ear: Tympanic membrane and external ear normal       Left Ear: Tympanic membrane and external ear normal       Ears:      Comments: Partial cerumen impaction B/L but what was visible of TM was normal     Mouth/Throat:      Pharynx: No oropharyngeal exudate or posterior oropharyngeal erythema  Eyes:      General:         Right eye: No discharge  Left eye: No discharge  Conjunctiva/sclera: Conjunctivae normal    Neck:      Trachea: No tracheal deviation  Cardiovascular:      Rate and Rhythm: Normal rate and regular rhythm  Heart sounds: No murmur heard  Pulmonary:      Effort: Pulmonary effort is normal  No respiratory distress  Breath sounds: Normal breath sounds  No wheezing, rhonchi or rales  Abdominal:      General: There is no distension  Palpations: Abdomen is soft  Tenderness: There is no abdominal tenderness  There is no guarding or rebound   " Musculoskeletal:         General: No deformity or signs of injury  Cervical back: Neck supple  Lymphadenopathy:      Cervical: Cervical adenopathy present  Skin:     General: Skin is warm and dry  Coloration: Skin is not pale  Findings: No bruising  Neurological:      General: No focal deficit present  Mental Status: He is alert  Mental status is at baseline  Motor: No abnormal muscle tone  Gait: Gait normal    Psychiatric:         Mood and Affect: Mood normal          Behavior: Behavior normal          Thought Content:  Thought content normal          Judgment: Judgment normal        Bayron Fernandez DO

## 2023-05-31 DIAGNOSIS — E11.65 UNCONTROLLED TYPE 2 DIABETES MELLITUS WITH HYPERGLYCEMIA (HCC): ICD-10-CM

## 2023-05-31 RX ORDER — PEN NEEDLE, DIABETIC 32GX 5/32"
NEEDLE, DISPOSABLE MISCELLANEOUS
Qty: 90 EACH | Refills: 3 | Status: SHIPPED | OUTPATIENT
Start: 2023-05-31

## 2023-06-02 LAB
ALBUMIN SERPL-MCNC: 4.4 G/DL (ref 3.8–4.9)
ALBUMIN/GLOB SERPL: 1.8 {RATIO} (ref 1.2–2.2)
ALP SERPL-CCNC: 96 IU/L (ref 44–121)
ALT SERPL-CCNC: 21 IU/L (ref 0–44)
AST SERPL-CCNC: 19 IU/L (ref 0–40)
BASOPHILS # BLD AUTO: 0.1 X10E3/UL (ref 0–0.2)
BASOPHILS NFR BLD AUTO: 1 %
BILIRUB SERPL-MCNC: 0.6 MG/DL (ref 0–1.2)
BUN SERPL-MCNC: 14 MG/DL (ref 6–24)
BUN/CREAT SERPL: 16 (ref 9–20)
CALCIUM SERPL-MCNC: 9.8 MG/DL (ref 8.7–10.2)
CHLORIDE SERPL-SCNC: 102 MMOL/L (ref 96–106)
CHOLEST SERPL-MCNC: 118 MG/DL (ref 100–199)
CHOLEST/HDLC SERPL: 3.1 RATIO (ref 0–5)
CO2 SERPL-SCNC: 29 MMOL/L (ref 20–29)
CREAT SERPL-MCNC: 0.85 MG/DL (ref 0.76–1.27)
EGFR: 103 ML/MIN/1.73
EOSINOPHIL # BLD AUTO: 0.3 X10E3/UL (ref 0–0.4)
EOSINOPHIL NFR BLD AUTO: 3 %
ERYTHROCYTE [DISTWIDTH] IN BLOOD BY AUTOMATED COUNT: 13.7 % (ref 11.6–15.4)
EST. AVERAGE GLUCOSE BLD GHB EST-MCNC: 137 MG/DL
GLOBULIN SER-MCNC: 2.4 G/DL (ref 1.5–4.5)
GLUCOSE SERPL-MCNC: 132 MG/DL (ref 70–99)
HBA1C MFR BLD: 6.4 % (ref 4.8–5.6)
HCT VFR BLD AUTO: 48.4 % (ref 37.5–51)
HDLC SERPL-MCNC: 38 MG/DL
HGB BLD-MCNC: 16.7 G/DL (ref 13–17.7)
IMM GRANULOCYTES # BLD: 0 X10E3/UL (ref 0–0.1)
IMM GRANULOCYTES NFR BLD: 0 %
LDLC SERPL CALC-MCNC: 54 MG/DL (ref 0–99)
LYMPHOCYTES # BLD AUTO: 2.4 X10E3/UL (ref 0.7–3.1)
LYMPHOCYTES NFR BLD AUTO: 29 %
MCH RBC QN AUTO: 29 PG (ref 26.6–33)
MCHC RBC AUTO-ENTMCNC: 34.5 G/DL (ref 31.5–35.7)
MCV RBC AUTO: 84 FL (ref 79–97)
MONOCYTES # BLD AUTO: 0.8 X10E3/UL (ref 0.1–0.9)
MONOCYTES NFR BLD AUTO: 10 %
NEUTROPHILS # BLD AUTO: 4.6 X10E3/UL (ref 1.4–7)
NEUTROPHILS NFR BLD AUTO: 57 %
PLATELET # BLD AUTO: 314 X10E3/UL (ref 150–450)
POTASSIUM SERPL-SCNC: 5 MMOL/L (ref 3.5–5.2)
PROT SERPL-MCNC: 6.8 G/DL (ref 6–8.5)
RBC # BLD AUTO: 5.76 X10E6/UL (ref 4.14–5.8)
SL AMB VLDL CHOLESTEROL CALC: 26 MG/DL (ref 5–40)
SODIUM SERPL-SCNC: 142 MMOL/L (ref 134–144)
T4 FREE SERPL-MCNC: 1 NG/DL (ref 0.82–1.77)
TRIGL SERPL-MCNC: 148 MG/DL (ref 0–149)
TSH SERPL DL<=0.005 MIU/L-ACNC: 4.85 UIU/ML (ref 0.45–4.5)
WBC # BLD AUTO: 8.1 X10E3/UL (ref 3.4–10.8)

## 2023-06-07 ENCOUNTER — OFFICE VISIT (OUTPATIENT)
Dept: ENDOCRINOLOGY | Facility: HOSPITAL | Age: 53
End: 2023-06-07
Payer: COMMERCIAL

## 2023-06-07 VITALS
WEIGHT: 242 LBS | SYSTOLIC BLOOD PRESSURE: 130 MMHG | BODY MASS INDEX: 36.68 KG/M2 | DIASTOLIC BLOOD PRESSURE: 80 MMHG | HEART RATE: 80 BPM | HEIGHT: 68 IN

## 2023-06-07 DIAGNOSIS — E03.9 ACQUIRED HYPOTHYROIDISM: ICD-10-CM

## 2023-06-07 DIAGNOSIS — E11.42 DIABETIC POLYNEUROPATHY ASSOCIATED WITH TYPE 2 DIABETES MELLITUS (HCC): ICD-10-CM

## 2023-06-07 DIAGNOSIS — E11.65 TYPE 2 DIABETES MELLITUS WITH HYPERGLYCEMIA, WITH LONG-TERM CURRENT USE OF INSULIN (HCC): Primary | ICD-10-CM

## 2023-06-07 DIAGNOSIS — Z79.4 TYPE 2 DIABETES MELLITUS WITH HYPERGLYCEMIA, WITH LONG-TERM CURRENT USE OF INSULIN (HCC): Primary | ICD-10-CM

## 2023-06-07 DIAGNOSIS — I10 ESSENTIAL HYPERTENSION: ICD-10-CM

## 2023-06-07 DIAGNOSIS — E78.1 HYPERTRIGLYCERIDEMIA: ICD-10-CM

## 2023-06-07 DIAGNOSIS — E03.9 HYPOTHYROIDISM, UNSPECIFIED TYPE: ICD-10-CM

## 2023-06-07 PROCEDURE — 99214 OFFICE O/P EST MOD 30 MIN: CPT | Performed by: NURSE PRACTITIONER

## 2023-06-07 RX ORDER — LEVOTHYROXINE SODIUM 0.07 MG/1
TABLET ORAL
Qty: 102 TABLET | Refills: 3 | Status: SHIPPED | OUTPATIENT
Start: 2023-06-07

## 2023-06-07 NOTE — PATIENT INSTRUCTIONS
Be mindful of diet  Stay active and stay hydrated  Continue current regimen  Check blood sugars regularly, twice daily at alternating times  Increase levothyroxine to 75 mcg daily Monday through Saturday with 2 tablets on Sunday  Please take your vitamins after lunch or in the evening  Wait at least 30 minutes to eat after Levothyroxine  Recheck thyroid lab work in 6-8 weeks to reassess

## 2023-06-07 NOTE — PROGRESS NOTES
Magdalena Carolina 52 y o  male MRN: [id number]    Encounter: [id number]      Assessment/Plan     Assessment: This is a 52y o -year-old male with type 2 diabetes with neuropathy, hypothyroidism, hypertension and hyperlipidemia  Plan:  1  Type 2 diabetes   Hemoglobin A1c is improved to 6 4 %   He will continue the same metformin, Ozempic, Jardiance, glipizide, and Lantus insulin  [facility] will continue to work on diet, exercise, and weight loss   I have asked him to check his blood sugars twice daily at alternating times and send a record to the office in 2 weeks for review   Check hemoglobin A1c prior to next visit      2  Diabetic neuropathy   He denies neuropathic symptoms   Diabetic foot exams are up-to-date      3  Hypothyroidism   TSH is elevated with a normal free T4  He will increase his Levothyroxine to 75 mcg - Monday through Saturday with 2 tablets on Sunday   Discussed the proper process for taking his levothyroxine   Recheck his TSH and free T4 in 6 weeks to reassess      4  Hypertension   He is normotensive in the office today   Continue current regimen   Check comprehensive metabolic panel prior to next visit      5  Hyperlipidemia  Stable   Continue atorvastatin 10 mg daily  CC: Type II diabetes follow-up    History of Present Illness     HPI:  52 y  o  year old male with type 2 diabetes, insulin requiring with neuropathy for about 17 years, hypothyroidism, hypertension, hyperlipidemia for follow-up visit   He is on oral agents and insulin at home and takes Jardiance 25 mg daily, metformin 1000 mg twice a day, Ozempic 1 mg once a week, glipizide XL 10 mg daily, and Lantus insulin 35 units daily   His most recent hemoglobin A1c from June 1, 2023 is 6 4   He denies any polyuria, polydipsia, nocturia, polyphagia, and blurry vision   He denies numbness or tingling of the feet   He denies chest pain or shortness of breath   He denies nephropathy, retinopathy, heart attack, stroke and claudication but does admit to neuropathy        Hypoglycemic episodes:  none recently      The patient's last eye exam was in jan 2021   The patient's last foot exam was in April 2022 at PCP       Blood Sugar/Glucometer/Pump/CGM review: Does not check blood sugars regularly      He has hyperlipidemia hyper triglyceridemia and takes atorvastatin 10 mg daily   He denies chest pain or shortness of breath        He has hypertension and takes olmesartan 20 mg daily   He denies headache or stroke-like symptoms but can have occasional lightheadedness      He has hypothyroidism and takes levothyroxine 75 mcg daily   His most recent TSH from June 1, 2023 is 4 850 with free T4 of 1 00   He denies heat or cold intolerance, palpitation, tremors, fatigue   He denies insomnia, diarrhea or constipation  Review of Systems   Constitutional: Negative  Negative for chills, fatigue and fever  HENT: Negative  Negative for trouble swallowing and voice change  Eyes: Negative for photophobia, pain, discharge, redness, itching and visual disturbance  Respiratory: Negative for cough and shortness of breath  Cardiovascular: Negative for chest pain and palpitations  Gastrointestinal: Negative for abdominal pain, constipation, diarrhea, nausea and vomiting  Endocrine: Negative for cold intolerance, heat intolerance, polydipsia, polyphagia and polyuria  Genitourinary: Negative  Musculoskeletal: Negative  Skin: Negative  Allergic/Immunologic: Negative  Neurological: Negative for dizziness, syncope, light-headedness and headaches  Hematological: Negative  Psychiatric/Behavioral: Negative  All other systems reviewed and are negative        Historical Information   Past Medical History:   Diagnosis Date   • Anxiety 02/22/2019   • Asthma 05/21/2019   • GERD (gastroesophageal reflux disease) 12/19/2013   • Hypertriglyceridemia 07/08/2015   • Hypothyroidism    • Migraine without aura and without status migrainosus, not intractable 05/21/2019   • Psoriasis    • Seasonal allergies 05/21/2019    Dr Shad Noonan   • Vitamin D deficiency      Past Surgical History:   Procedure Laterality Date   • CATARACT EXTRACTION, BILATERAL  2017   • CHOLECYSTECTOMY  2000    lap   • UNDESCENDED TESTICLE EXPLORATION Bilateral     as a child     Social History   Social History     Substance and Sexual Activity   Alcohol Use Not Currently     Social History     Substance and Sexual Activity   Drug Use Never     Social History     Tobacco Use   Smoking Status Never   Smokeless Tobacco Never     Family History:   Family History   Problem Relation Age of Onset   • Diabetes Mother    • Thyroid disease Mother         post thyroidectomy   • Stroke Mother    • Diabetes type II Mother    • Thyroid disease Father         post thyroidectomy   • Coronary artery disease Father    • No Known Problems Brother    • No Known Problems Brother    • Colon cancer Neg Hx    • Colon polyps Neg Hx        Meds/Allergies   Current Outpatient Medications   Medication Sig Dispense Refill   • albuterol (2 5 mg/3 mL) 0 083 % nebulizer solution Take 1 vial (2 5 mg total) by nebulization every 6 (six) hours as needed for wheezing or shortness of breath 120 vial 2   • albuterol (PROVENTIL HFA,VENTOLIN HFA) 90 mcg/act inhaler Inhale 1 puff every 4 (four) hours as needed     • atorvastatin (LIPITOR) 10 mg tablet TAKE 1 TABLET DAILY 90 tablet 3   • Butalbital-APAP-Caffeine -40 MG CAPS Take 1 capsule by mouth every 8 (eight) hours as needed (headache) 30 capsule 0   • cetirizine (ZyrTEC) 10 mg tablet Take 1 tablet by mouth daily as needed     • cholecalciferol (VITAMIN D3) 1,000 units tablet Take 1 tablet (1,000 Units total) by mouth daily     • FLUoxetine (PROzac) 20 MG tablet Take 2 tablets (40 mg total) by mouth daily 180 tablet 0   • fluticasone (FLONASE) 50 mcg/act nasal spray USE 2 SPRAYS IN EACH NOSTRIL DAILY 48 g 3   • fluticasone-umeclidinium-vilanterol (Trelegy Ellipta) 100-62 5-25 MCG/INH inhaler Inhale 1 puff daily Rinse mouth after use  • glipiZIDE (GLUCOTROL XL) 10 mg 24 hr tablet TAKE 1 TABLET DAILY 90 tablet 3   • insulin glargine (Semglee) 100 units/mL subcutaneous injection Inject up to 60 unites daily 60 mL 2   • Insulin Pen Needle (BD Pen Needle Shanelle U/F) 32G X 4 MM MISC Inject once daily 90 each 3   • Jardiance 25 MG TABS TAKE 1 TABLET DAILY IN THE MORNING 90 tablet 3   • levothyroxine 50 mcg tablet TAKE 1 TABLET DAILY 90 tablet 3   • metFORMIN (GLUCOPHAGE) 1000 MG tablet TAKE 1 TABLET TWICE A  tablet 3   • montelukast (SINGULAIR) 10 mg tablet take 1 tablet by mouth at bedtime 30 tablet 5   • olmesartan (BENICAR) 20 mg tablet TAKE 1 TABLET DAILY 90 tablet 3   • pantoprazole (PROTONIX) 40 mg tablet TAKE 1 TABLET DAILY 90 tablet 3   • semaglutide, 1 mg/dose, (Ozempic, 1 MG/DOSE,) 4 mg/3 mL injection pen Inject 0 75 mL (1 mg total) under the skin every 7 days 9 mL 3   • Skyrizi, 150 MG Dose, 75 MG/0 83ML PSKT Every 3 months     • Sotyktu 6 MG TABS Take 1 tablet by mouth in the morning       No current facility-administered medications for this visit  No Known Allergies    Objective   Vitals: There were no vitals taken for this visit  Physical Exam  Vitals reviewed  Constitutional:       Appearance: He is well-developed  He is obese  HENT:      Head: Normocephalic and atraumatic  Nose: Nose normal    Eyes:      Conjunctiva/sclera: Conjunctivae normal       Pupils: Pupils are equal, round, and reactive to light  Cardiovascular:      Rate and Rhythm: Normal rate and regular rhythm  Heart sounds: Normal heart sounds  Pulmonary:      Effort: Pulmonary effort is normal       Breath sounds: Normal breath sounds  Abdominal:      General: Bowel sounds are normal       Palpations: Abdomen is soft  Musculoskeletal:         General: Normal range of motion  Cervical back: Normal range of motion and neck supple     Skin:     General: Skin is warm and dry  Neurological:      Mental Status: He is alert and oriented to person, place, and time  Psychiatric:         Behavior: Behavior normal          Thought Content:  Thought content normal          Judgment: Judgment normal          Lab Results:   Lab Results   Component Value Date/Time    Albumin 4 4 06/01/2023 07:18 AM    Albumin 4 0 02/16/2023 06:52 AM    Albumin 4 2 11/11/2022 08:11 AM    ALT 21 06/01/2023 07:18 AM    ALT 23 02/16/2023 06:52 AM    ALT 27 11/11/2022 08:11 AM    AST 19 06/01/2023 07:18 AM    AST 24 02/16/2023 06:52 AM    AST 23 11/11/2022 08:11 AM    BUN 14 06/01/2023 07:18 AM    BUN 12 02/16/2023 06:52 AM    BUN 15 11/11/2022 08:11 AM    Chloride 102 06/01/2023 07:18 AM    Chloride 102 02/16/2023 06:52 AM    Chloride 101 11/11/2022 08:11 AM    CO2 29 06/01/2023 07:18 AM    CO2 27 02/16/2023 06:52 AM    CO2 25 11/11/2022 08:11 AM    Creatinine 0 85 06/01/2023 07:18 AM    Creatinine 0 85 02/16/2023 06:52 AM    Creatinine 0 82 11/11/2022 08:11 AM    Globulin, Total 2 4 06/01/2023 07:18 AM    Globulin, Total 2 5 02/16/2023 06:52 AM    Globulin, Total 2 1 11/11/2022 08:11 AM    HCT 48 4 06/01/2023 07:18 AM    HCT 46 0 02/16/2023 06:52 AM    HCT 45 2 11/11/2022 08:11 AM    HDL 38 (L) 06/01/2023 07:18 AM    HDL 43 11/11/2022 08:12 AM    Hemoglobin 16 7 06/01/2023 07:18 AM    Hemoglobin 15 6 02/16/2023 06:52 AM    Hemoglobin 15 5 11/11/2022 08:11 AM    Hemoglobin A1C 6 4 (H) 06/01/2023 07:18 AM    Hemoglobin A1C 7 7 (H) 02/16/2023 06:52 AM    Hemoglobin A1C 7 3 (H) 11/11/2022 08:11 AM    Potassium 5 0 06/01/2023 07:18 AM    Potassium 4 7 02/16/2023 06:52 AM    Potassium 5 0 11/11/2022 08:11 AM    MCV 84 06/01/2023 07:18 AM    MCV 85 02/16/2023 06:52 AM    MCV 85 11/11/2022 08:11 AM    Platelet Count 125 47/10/2954 07:18 AM    Platelet Count 748 72/37/3590 06:52 AM    Platelet Count 138 74/41/6590 08:11 AM    Protein, Total 6 8 06/01/2023 07:18 AM    Protein, Total 6 5 02/16/2023 06:52 AM "Protein, Total 6 3 11/11/2022 08:11 AM    Triglycerides 148 06/01/2023 07:18 AM    Triglycerides 135 11/11/2022 08:12 AM    White Blood Cell Count 8 1 06/01/2023 07:18 AM    White Blood Cell Count 6 9 02/16/2023 06:52 AM    White Blood Cell Count 7 3 11/11/2022 08:11 AM     Portions of the record may have been created with voice recognition software  Occasional wrong word or \"sound a like\" substitutions may have occurred due to the inherent limitations of voice recognition software  Read the chart carefully and recognize, using context, where substitutions have occurred    "

## 2023-06-27 DIAGNOSIS — E11.65 UNCONTROLLED TYPE 2 DIABETES MELLITUS WITH HYPERGLYCEMIA (HCC): ICD-10-CM

## 2023-06-27 RX ORDER — EMPAGLIFLOZIN 25 MG/1
TABLET, FILM COATED ORAL
Qty: 90 TABLET | Refills: 3 | Status: SHIPPED | OUTPATIENT
Start: 2023-06-27

## 2023-07-07 ENCOUNTER — OFFICE VISIT (OUTPATIENT)
Dept: FAMILY MEDICINE CLINIC | Facility: HOSPITAL | Age: 53
End: 2023-07-07
Payer: COMMERCIAL

## 2023-07-07 VITALS
HEART RATE: 76 BPM | TEMPERATURE: 97.4 F | HEIGHT: 68 IN | DIASTOLIC BLOOD PRESSURE: 94 MMHG | WEIGHT: 243.8 LBS | BODY MASS INDEX: 36.95 KG/M2 | SYSTOLIC BLOOD PRESSURE: 132 MMHG

## 2023-07-07 DIAGNOSIS — E11.65 UNCONTROLLED TYPE 2 DIABETES MELLITUS WITH HYPERGLYCEMIA (HCC): ICD-10-CM

## 2023-07-07 DIAGNOSIS — E11.42 DIABETIC POLYNEUROPATHY ASSOCIATED WITH TYPE 2 DIABETES MELLITUS (HCC): Primary | ICD-10-CM

## 2023-07-07 DIAGNOSIS — E03.9 ACQUIRED HYPOTHYROIDISM: ICD-10-CM

## 2023-07-07 DIAGNOSIS — R13.13 PHARYNGEAL DYSPHAGIA: ICD-10-CM

## 2023-07-07 DIAGNOSIS — K21.9 GASTROESOPHAGEAL REFLUX DISEASE, UNSPECIFIED WHETHER ESOPHAGITIS PRESENT: ICD-10-CM

## 2023-07-07 DIAGNOSIS — I10 ESSENTIAL HYPERTENSION: ICD-10-CM

## 2023-07-07 DIAGNOSIS — G47.33 OSA (OBSTRUCTIVE SLEEP APNEA): ICD-10-CM

## 2023-07-07 PROCEDURE — 99214 OFFICE O/P EST MOD 30 MIN: CPT | Performed by: INTERNAL MEDICINE

## 2023-07-07 RX ORDER — OLMESARTAN MEDOXOMIL 40 MG/1
20 TABLET ORAL DAILY
Qty: 90 TABLET | Refills: 1 | Status: SHIPPED | OUTPATIENT
Start: 2023-07-07

## 2023-07-07 NOTE — ASSESSMENT & PLAN NOTE
Lab Results   Component Value Date    HGBA1C 6.4 (H) 06/01/2023   DM type 2 with polyneuropathy - controlled and greatly improved with A1c 6.4 - congratulated on improvement, con't meds/labs and f/u as per MELANIE Harrington on DM foot exam (10/22) and eye exam (8/22 - 2 yrs), he is on an ARB and statin, will follow

## 2023-07-07 NOTE — ASSESSMENT & PLAN NOTE
Clinically euthyroid but TSH was elevated, Levothyroxine increased by Endo, con't meds/labs and f/u as per Endo

## 2023-07-07 NOTE — PROGRESS NOTES
Name: Patricia Figueroa      : 1970      MRN: [id number]  Encounter Provider: Denae Mcneill DO  Encounter Date: 2023   Encounter department: [address] [place], [place] [address] [place] [address]     Assessment & Plan     1. Diabetic polyneuropathy associated with type 2 diabetes mellitus ([address] [facility])  Assessment & Plan:    Lab Results   Component Value Date    HGBA1C 6.4 (H) 2023   A1C greatly improved, con't meds and f/u as per Endo      2. Acquired hypothyroidism  Assessment & Plan:  Clinically euthyroid but TSH was elevated, Levothyroxine increased by Endo, con't meds/labs and f/u as per Endo      3. Pharyngeal dysphagia  Comments:  Pt states is is "90% improved" w/o any change in  meds, never did swallow study, call with relapse in symptoms, will monitor    4. Gastroesophageal reflux disease, unspecified whether esophagitis present  Assessment & Plan:  Noting no great reflux symptoms and dysphagia increased WITHOUT increase in PPI, never did swallow study - call with relapse in symptoms, con't current PPI for now      5. Essential hypertension  Assessment & Plan:  BP up again today, not using PAP tx and importance of doing so and CV/Neuro SE reviewed, increase Olmesartan from 20 mg to 40 mg, start checking BP at home and call if consistently > 140/90, re-eval in 4-6 wks, diet/exercise/wgt loss encouraged    Orders:  -     olmesartan (BENICAR) 40 mg tablet; Take 0.5 tablets (20 mg total) by mouth daily    6. Uncontrolled type 2 diabetes mellitus with hyperglycemia ([address] )  Assessment & Plan:    Lab Results   Component Value Date    HGBA1C 6.4 (H) 2023   DM type 2 with polyneuropathy - controlled and greatly improved with A1c 6.4 - congratulated on improvement, con't meds/labs and f/u as per Endo, UTD on DM foot exam (10/22) and eye exam ( - 2 yrs), he is on an ARB and statin, will follow    Orders:  -     olmesartan (BENICAR) 40 mg tablet; Take 0.5 tablets (20 mg total) by mouth daily    7.  DIANA (obstructive sleep apnea)  Assessment & Plan:  Not using CPAP, SE of untx DIANA reviewed - including his elevated BP, con't to encourage healthy diet and regular exercise and wgt loss as well        Colonoscopy 10/19- 10 yrs    PSA 11/22      Subjective      HPI Pt here for follow up appt and BW results    BW results were d/w pt in detail: FBS/A1C 132/6.4, rest of CMP/CBC was wnl, FLP with HDL low at 38 but TC/TG and LDL at goal, TSH a bit elevated at 4.850, FT4 wnl. Def of controlled vs uncontrolled DM was reviewed. Diet was reviewed - wgt down 7 lbs from March 23. He is taking his DM meds as directed by Endo - OV note from 6//23 with Yosef Patino reviewed. He has had benefit with the Ozempic. He has had no recent changes to his DM meds. He has BW to be done prior to f/u. He is UTD on DM foot exam (10/22)  and eye exam (8/22 - 2 yrs). He is on statin and an ARB. Pt is taking their thyroid medication daily w/o any other meds and prior to eating. He had his levothyroxine increased at Endo appt and is currently taking 75 mcg 2 tab on Sun and 1 tab all other day (Mon through Sat). He denies any significant wgt changes/fatigue/C/D/tremor/palp/hair loss or skin changes. Last visit in May pt was have pharyngeal dysphagia which started abruptly. We increased his PPI to 40 mg bid and ordered a swallow study. He never increased the Protonix and symptoms just gradually improved. He notes being "90% better). He has not done the swallow study. BP again above goal today and meds were reviewed and no changes have occurred. He denies missing doses of meds or SE with the meds. He does not check his BP outside the office. He notes no frequent HA's/dizziness/double vision/CP. He has DIANA but is not using his CPAP. Review of Systems   Constitutional: Negative for chills, fever and unexpected weight change. HENT: Positive for trouble swallowing. Negative for congestion.     Eyes: Negative for pain and visual disturbance. Respiratory: Negative for cough, shortness of breath and wheezing. Cardiovascular: Negative for chest pain and palpitations. Gastrointestinal: Negative for abdominal pain, blood in stool, constipation, diarrhea, nausea and vomiting. Genitourinary: Negative for difficulty urinating and dysuria. Musculoskeletal: Negative for arthralgias and myalgias. Skin: Positive for rash. Negative for wound. Psoriasis flaring up again   Neurological: Negative for dizziness and headaches. Hematological: Does not bruise/bleed easily. Psychiatric/Behavioral: Negative for confusion and dysphoric mood. Current Outpatient Medications on File Prior to Visit   Medication Sig   • albuterol (2.5 mg/3 mL) 0.083 % nebulizer solution Take 1 vial (2.5 mg total) by nebulization every 6 (six) hours as needed for wheezing or shortness of breath   • albuterol (PROVENTIL HFA,VENTOLIN HFA) 90 mcg/act inhaler Inhale 1 puff every 4 (four) hours as needed   • atorvastatin (LIPITOR) 10 mg tablet TAKE 1 TABLET DAILY   • Butalbital-APAP-Caffeine -40 MG CAPS Take 1 capsule by mouth every 8 (eight) hours as needed (headache)   • cetirizine (ZyrTEC) 10 mg tablet Take 1 tablet by mouth daily as needed   • cholecalciferol (VITAMIN D3) 1,000 units tablet Take 1 tablet (1,000 Units total) by mouth daily   • FLUoxetine (PROzac) 20 MG tablet Take 2 tablets (40 mg total) by mouth daily   • fluticasone (FLONASE) 50 mcg/act nasal spray USE 2 SPRAYS IN EACH NOSTRIL DAILY   • fluticasone-umeclidinium-vilanterol (Trelegy Ellipta) 100-62.5-25 MCG/INH inhaler Inhale 1 puff daily Rinse mouth after use.    • glipiZIDE (GLUCOTROL XL) 10 mg 24 hr tablet TAKE 1 TABLET DAILY   • insulin glargine (Semglee) 100 units/mL subcutaneous injection Inject up to 60 unites daily   • Insulin Pen Needle (BD Pen Needle Shanelle U/F) 32G X 4 MM MISC Inject once daily   • Jardiance 25 MG TABS TAKE 1 TABLET DAILY IN THE MORNING   • levothyroxine 75 mcg tablet Take 1 tab Mon-Sat and 2 tabs on Sun. • metFORMIN (GLUCOPHAGE) 1000 MG tablet TAKE 1 TABLET TWICE A DAY   • montelukast (SINGULAIR) 10 mg tablet take 1 tablet by mouth at bedtime   • pantoprazole (PROTONIX) 40 mg tablet TAKE 1 TABLET DAILY   • semaglutide, 1 mg/dose, (Ozempic, 1 MG/DOSE,) 4 mg/3 mL injection pen Inject 0.75 mL (1 mg total) under the skin every 7 days   • Skyrizi, 150 MG Dose, 75 MG/0.83ML PSKT Every 3 months   • Sotyktu 6 MG TABS Take 1 tablet by mouth in the morning   • [DISCONTINUED] olmesartan (BENICAR) 20 mg tablet TAKE 1 TABLET DAILY       Objective     /94   Pulse 76   Temp (!) 97.4 °F (36.3 °C) (Tympanic)   Ht 5' 8" (1.727 m)   Wt 111 kg (243 lb 12.8 oz)   BMI 37.07 kg/m²     Physical Exam  Vitals and nursing note reviewed. Constitutional:       General: He is not in acute distress. Appearance: He is well-developed. He is not ill-appearing. HENT:      Head: Normocephalic and atraumatic. Eyes:      General:         Right eye: No discharge. Left eye: No discharge. Conjunctiva/sclera: Conjunctivae normal.   Neck:      Trachea: No tracheal deviation. Cardiovascular:      Rate and Rhythm: Normal rate and regular rhythm. Heart sounds: No murmur heard. Pulmonary:      Effort: Pulmonary effort is normal. No respiratory distress. Breath sounds: Normal breath sounds. No wheezing, rhonchi or rales. Abdominal:      General: There is no distension. Palpations: Abdomen is soft. Tenderness: There is no abdominal tenderness. There is no guarding or rebound. Musculoskeletal:         General: No deformity or signs of injury. Cervical back: Neck supple. Lymphadenopathy:      Cervical: No cervical adenopathy. Skin:     General: Skin is warm and dry. Coloration: Skin is not pale. Findings: No rash. Comments: Wide spread psoriatic plaques   Neurological:      General: No focal deficit present. Mental Status: He is alert. Mental status is at baseline. Motor: No abnormal muscle tone. Gait: Gait normal.   Psychiatric:         Behavior: Behavior normal.         Thought Content:  Thought content normal.         Judgment: Judgment normal.       Amelia Delgadillo, DO

## 2023-07-07 NOTE — ASSESSMENT & PLAN NOTE
Noting no great reflux symptoms and dysphagia increased WITHOUT increase in PPI, never did swallow study - call with relapse in symptoms, con't current PPI for now

## 2023-07-07 NOTE — ASSESSMENT & PLAN NOTE
Not using CPAP, SE of untx DIANA reviewed - including his elevated BP, con't to encourage healthy diet and regular exercise and wgt loss as well

## 2023-07-07 NOTE — ASSESSMENT & PLAN NOTE
Lab Results   Component Value Date    HGBA1C 6.4 (H) 06/01/2023   A1C greatly improved, con't meds and f/u as per Endo

## 2023-07-07 NOTE — ASSESSMENT & PLAN NOTE
BP up again today, not using PAP tx and importance of doing so and CV/Neuro SE reviewed, increase Olmesartan from 20 mg to 40 mg, start checking BP at home and call if consistently > 140/90, re-eval in 4-6 wks, diet/exercise/wgt loss encouraged

## 2023-08-14 LAB
LEFT EYE DIABETIC RETINOPATHY: NORMAL
RIGHT EYE DIABETIC RETINOPATHY: NORMAL

## 2023-08-14 PROCEDURE — 2023F DILAT RTA XM W/O RTNOPTHY: CPT | Performed by: INTERNAL MEDICINE

## 2023-08-31 ENCOUNTER — OFFICE VISIT (OUTPATIENT)
Dept: FAMILY MEDICINE CLINIC | Facility: HOSPITAL | Age: 53
End: 2023-08-31
Payer: COMMERCIAL

## 2023-08-31 VITALS
WEIGHT: 244.4 LBS | DIASTOLIC BLOOD PRESSURE: 84 MMHG | SYSTOLIC BLOOD PRESSURE: 130 MMHG | TEMPERATURE: 98.4 F | OXYGEN SATURATION: 95 % | HEART RATE: 66 BPM | BODY MASS INDEX: 37.04 KG/M2 | HEIGHT: 68 IN

## 2023-08-31 DIAGNOSIS — E11.65 UNCONTROLLED TYPE 2 DIABETES MELLITUS WITH HYPERGLYCEMIA (HCC): ICD-10-CM

## 2023-08-31 DIAGNOSIS — I10 ESSENTIAL HYPERTENSION: Primary | ICD-10-CM

## 2023-08-31 DIAGNOSIS — Z12.5 PROSTATE CANCER SCREENING: ICD-10-CM

## 2023-08-31 PROCEDURE — 99214 OFFICE O/P EST MOD 30 MIN: CPT | Performed by: INTERNAL MEDICINE

## 2023-08-31 RX ORDER — OLMESARTAN MEDOXOMIL 40 MG/1
40 TABLET ORAL DAILY
Qty: 90 TABLET | Refills: 1
Start: 2023-08-31

## 2023-08-31 RX ORDER — INSULIN GLARGINE-YFGN 100 [IU]/ML
INJECTION, SOLUTION SUBCUTANEOUS
COMMUNITY
Start: 2023-07-09

## 2023-08-31 NOTE — PROGRESS NOTES
Name: Alexandria Bowser      : 1970      MRN: [id number]  Encounter Provider: Sachi Juarez DO  Encounter Date: 2023   Encounter department: [address] [place], [place] [address] [place] [id number]     Assessment & Plan     1. Essential hypertension  Assessment & Plan:  BP at goal with increase in Olmesartan, con't current regimen, recheck in 3-4 mos    Orders:  -     olmesartan (BENICAR) 40 mg tablet; Take 1 tablet (40 mg total) by mouth daily    2. Uncontrolled type 2 diabetes mellitus with hyperglycemia ([address])  Comments:  urine microalbumin:cr ratio annually - order given for Nov, will follow  Orders:  -     olmesartan (BENICAR) 40 mg tablet; Take 1 tablet (40 mg total) by mouth daily  -     Albumin / creatinine urine ratio; Future; Expected date: 2023  -     Albumin / creatinine urine ratio    3. Prostate cancer screening  Comments:  BW annually - order given for Nov, will follow  Orders:  -     PSA Total (Reflex To Free); Future; Expected date: 2023  -     PSA Total (Reflex To Free)      Colonoscopy 10/19- 10 yrs     PSA  - urine microalbumin:Cr ratio due in Nov as well        Subjective      HPI Pt here for a BP check    Last visit pt BP was elevated above goal and we subsequently increased his  Olmesartan from 20 mg to 40 mg daily. He is here today for a BP/med check. BP at goal today and meds were reviewed and med list is UTD. He denies missing doses of meds or SE with the meds. He does not check his BP outside the office. He notes no frequent Ha's/dizziness/double vision/CP. Has DM labs and f/u with Endo next month. Due for urine micro in Nov        Review of Systems   Constitutional: Negative for chills and fever. HENT: Positive for postnasal drip and rhinorrhea. Eyes: Positive for discharge and itching. Negative for redness. Respiratory: Negative for cough and shortness of breath. Cardiovascular: Negative for chest pain and palpitations.    Gastrointestinal: Negative for abdominal pain, diarrhea, nausea and vomiting. Genitourinary: Negative for difficulty urinating and dysuria. Musculoskeletal: Negative for arthralgias and myalgias. Neurological: Negative for dizziness and headaches. Psychiatric/Behavioral: Negative for confusion. Current Outpatient Medications on File Prior to Visit   Medication Sig   • albuterol (2.5 mg/3 mL) 0.083 % nebulizer solution Take 1 vial (2.5 mg total) by nebulization every 6 (six) hours as needed for wheezing or shortness of breath   • albuterol (PROVENTIL HFA,VENTOLIN HFA) 90 mcg/act inhaler Inhale 1 puff every 4 (four) hours as needed   • atorvastatin (LIPITOR) 10 mg tablet TAKE 1 TABLET DAILY   • Butalbital-APAP-Caffeine -40 MG CAPS Take 1 capsule by mouth every 8 (eight) hours as needed (headache)   • cetirizine (ZyrTEC) 10 mg tablet Take 1 tablet by mouth daily as needed   • cholecalciferol (VITAMIN D3) 1,000 units tablet Take 1 tablet (1,000 Units total) by mouth daily   • FLUoxetine (PROzac) 20 MG tablet Take 2 tablets (40 mg total) by mouth daily   • fluticasone (FLONASE) 50 mcg/act nasal spray USE 2 SPRAYS IN EACH NOSTRIL DAILY   • fluticasone-umeclidinium-vilanterol (Trelegy Ellipta) 100-62.5-25 MCG/INH inhaler Inhale 1 puff daily Rinse mouth after use. • glipiZIDE (GLUCOTROL XL) 10 mg 24 hr tablet TAKE 1 TABLET DAILY   • insulin glargine (Semglee) 100 units/mL subcutaneous injection Inject up to 60 unites daily   • Insulin Pen Needle (BD Pen Needle Shanelle U/F) 32G X 4 MM MISC Inject once daily   • Jardiance 25 MG TABS TAKE 1 TABLET DAILY IN THE MORNING   • levothyroxine 75 mcg tablet Take 1 tab Mon-Sat and 2 tabs on Sun.    • metFORMIN (GLUCOPHAGE) 1000 MG tablet TAKE 1 TABLET TWICE A DAY   • montelukast (SINGULAIR) 10 mg tablet take 1 tablet by mouth at bedtime   • pantoprazole (PROTONIX) 40 mg tablet TAKE 1 TABLET DAILY   • semaglutide, 1 mg/dose, (Ozempic, 1 MG/DOSE,) 4 mg/3 mL injection pen Inject 0.75 mL (1 mg total) under the skin every 7 days   • Semglhernan, yfgn, 100 UNIT/ML SOPN    • Skyrizi, 150 MG Dose, 75 MG/0.83ML PSKT Every 3 months   • Sotyktu 6 MG TABS Take 1 tablet by mouth in the morning   • [DISCONTINUED] olmesartan (BENICAR) 40 mg tablet Take 0.5 tablets (20 mg total) by mouth daily       Objective     /84   Pulse 66   Temp 98.4 °F (36.9 °C)   Ht 5' 8" (1.727 m)   Wt 111 kg (244 lb 6.4 oz)   SpO2 95%   BMI 37.16 kg/m²     Physical Exam  Vitals and nursing note reviewed. Constitutional:       General: He is not in acute distress. Appearance: He is well-developed. He is not ill-appearing. HENT:      Head: Normocephalic and atraumatic. Eyes:      General:         Right eye: No discharge. Left eye: No discharge. Conjunctiva/sclera: Conjunctivae normal.   Neck:      Trachea: No tracheal deviation. Cardiovascular:      Rate and Rhythm: Normal rate and regular rhythm. Heart sounds: No murmur heard. Pulmonary:      Effort: Pulmonary effort is normal. No respiratory distress. Breath sounds: Normal breath sounds. No wheezing, rhonchi or rales. Abdominal:      Tenderness: There is no rebound. Musculoskeletal:      Cervical back: Neck supple. Right lower leg: No edema. Left lower leg: No edema. Lymphadenopathy:      Cervical: No cervical adenopathy. Skin:     General: Skin is warm and dry. Coloration: Skin is not pale. Findings: No bruising. Neurological:      General: No focal deficit present. Mental Status: He is alert. Mental status is at baseline. Motor: No abnormal muscle tone. Gait: Gait normal.   Psychiatric:         Mood and Affect: Mood normal.         Behavior: Behavior normal.         Thought Content:  Thought content normal.         Judgment: Judgment normal.       Yong Spencer DO

## 2023-09-06 ENCOUNTER — TELEPHONE (OUTPATIENT)
Dept: FAMILY MEDICINE CLINIC | Facility: HOSPITAL | Age: 53
End: 2023-09-06

## 2023-09-07 ENCOUNTER — TELEMEDICINE (OUTPATIENT)
Dept: FAMILY MEDICINE CLINIC | Facility: HOSPITAL | Age: 53
End: 2023-09-07
Payer: COMMERCIAL

## 2023-09-07 VITALS — TEMPERATURE: 100.4 F | BODY MASS INDEX: 36.98 KG/M2 | WEIGHT: 244 LBS | HEIGHT: 68 IN

## 2023-09-07 DIAGNOSIS — E11.65 UNCONTROLLED TYPE 2 DIABETES MELLITUS WITH HYPERGLYCEMIA (HCC): ICD-10-CM

## 2023-09-07 DIAGNOSIS — U07.1 COVID-19: Primary | ICD-10-CM

## 2023-09-07 PROCEDURE — 99214 OFFICE O/P EST MOD 30 MIN: CPT | Performed by: INTERNAL MEDICINE

## 2023-09-07 RX ORDER — NIRMATRELVIR AND RITONAVIR 300-100 MG
3 KIT ORAL 2 TIMES DAILY
Qty: 30 TABLET | Refills: 0 | Status: SHIPPED | OUTPATIENT
Start: 2023-09-07 | End: 2023-09-12

## 2023-09-07 NOTE — PROGRESS NOTES
COVID-19 Outpatient Progress Note    Assessment/Plan:    Problem List Items Addressed This Visit        Endocrine    Uncontrolled type 2 diabetes mellitus with hyperglycemia ([address])     Higher risk for complications/hospitalization/death with COVID reviewed, interaction with statin reviewed - will hold Atorvastatin the 5 days he is on the Paxlovid, call with any significant SE/concerns  Lab Results   Component Value Date    HGBA1C 6.4 (H) 06/01/2023           Other Visit Diagnoses     COVID-19    -  Primary    Relevant Medications    nirmatrelvir & ritonavir (Paxlovid, 300/100,) tablet therapy pack         Disposition:     Patient has asymptomatic or mild COVID-19 infection. Based off CDC guidelines, they were recommended to isolate for 5 days. If they are asymptomatic or symptoms are improving with no fevers in the past 24 hours, isolation may be ended followed by 5 days of wearing a mask when around othes to minimize risk of infecting others. If still have a fever or other symptoms have not improved, continue to isolate until they improve. Regardless of when they end isolation, avoid being around people who are more likely to get very sick from COVID-19 until at least day 11. Discussed symptom directed medication options with patient. Discussed vitamin D, vitamin C, and/or zinc supplementation with patient. He was advised to self isolate and to avoid all public places/transportation/stores 5+5=10 days. He was encouraged to wear a mask when around other household contacts and to use his own bathroom if at all possible. He was encouraged to wipe down surfaces/handles and to use and wash his own utensils. He was advised to monitor symptoms and to call with persistent fever OR with any new/worse symptoms      Patient meets criteria for PAXLOVID and they have been counseled appropriately according to EUA documentation released by the FDA. After discussion, patient agrees to treatment.     Vesta Doan is an investigational medicine used to treat mild-to-moderate COVID-19 in adults and children (15years of age and older weighing at least 80 pounds (40 kg)) with positive results of direct SARS-CoV-2 viral testing, and who are at high risk for progression to severe COVID-19, including hospitalization or death. PAXLOVID is investigational because it is still being studied. There is limited information about the safety and effectiveness of using PAXLOVID to treat people with mild-to-moderate COVID-19. The FDA has authorized the emergency use of PAXLOVID for the treatment of mild-tomoderate COVID-19 in adults and children (15years of age and older weighing at least 80 pounds (40 kg)) with a positive test for the virus that causes COVID-19, and who are at high risk for progression to severe COVID-19, including hospitalization or death, under an EUA. What should I tell my healthcare provider before I take PAXLOVID? Tell your healthcare provider if you:  - Have any allergies  - Have liver or kidney disease  - Are pregnant or plan to become pregnant  - Are breastfeeding a child  - Have any serious illnesses    Tell your healthcare provider about all the medicines you take, including prescription and over-the-counter medicines, vitamins, and herbal supplements. Some medicines may interact with PAXLOVID and may cause serious side effects. Keep a list of your medicines to show your healthcare provider and pharmacist when you get a new medicine. You can ask your healthcare provider or pharmacist for a list of medicines that interact with PAXLOVID. Do not start taking a new medicine without telling your healthcare provider. Your healthcare provider can tell you if it is safe to take PAXLOVID with other medicines. Tell your healthcare provider if you are taking combined hormonal contraceptive. PAXLOVID may affect how your birth control pills work.  Females who are able to become pregnant should use another effective alternative form of contraception or an additional barrier method of contraception. Talk to your healthcare provider if you have any questions about contraceptive methods that might be right for you. How do I take PAXLOVID? PAXLOVID consists of 2 medicines: nirmatrelvir and ritonavir. - Take 2 pink tablets of nirmatrelvir with 1 white tablet of ritonavir by mouth 2 times each day (in the morning and in the evening) for 5 days. For each dose, take all 3 tablets at the same time. - If you have kidney disease, talk to your healthcare provider. You may need a different dose. - Swallow the tablets whole. Do not chew, break, or crush the tablets  - Take PAXLOVID with or without food. - Do not stop taking PAXLOVID without talking to your healthcare provider, even if you feel better. - If you miss a dose of PAXLOVID within 8 hours of the time it is usually taken, take it as soon as you remember. If you miss a dose by more than 8 hours, skip the missed dose and take the next dose at your regular time. Do not take 2 doses of PAXLOVID at the same time. - If you take too much PAXLOVID, call your healthcare provider or go to the nearest hospital emergency room right away. - If you are taking a ritonavir- or cobicistat-containing medicine to treat hepatitis C or Human Immunodeficiency Virus (HIV), you should continue to take your medicine as prescribed by your healthcare provider.  - Talk to your healthcare provider if you do not feel better or if you feel worse after 5 days. Who should generally not take PAXLOVID? Do not take PAXLOVID if:  You are allergic to nirmatrelvir, ritonavir, or any of the ingredients in PAXLOVID.     You are taking any of the following medicines:  - Alfuzosin  - Pethidine, piroxicam, propoxyphene  - Ranolazine  - Amiodarone, dronedarone, flecainide, propafenone, quinidine  - Colchicine  - Lurasidone, pimozide, clozapine  - Dihydroergotamine, ergotamine, methylergonovine  - Lovastatin, simvastatin  - Sildenafil (Revatio®) for pulmonary arterial hypertension (PAH)  - Triazolam, oral midazolam  - Apalutamide  - Carbamazepine, phenobarbital, phenytoin  - Rifampin  - [place]’s Wort (hypericum perforatum)    What are the important possible side effects of PAXLOVID? Possible side effects of PAXLOVID are:  - Liver Problems. Tell your healthcare provider right away if you have any of these signs and symptoms of liver problems: loss of appetite, yellowing of your skin and the whites of eyes (jaundice), dark-colored urine, pale colored stools and itchy skin, stomach area (abdominal) pain. - Resistance to HIV Medicines. If you have untreated HIV infection, PAXLOVID may lead to some HIV medicines not working as well in the future. - Other possible side effects include: altered sense of taste, diarrhea, high blood pressure, or muscle aches    These are not all the possible side effects of PAXLOVID. Not many people have taken PAXLOVID. Serious and unexpected side effects may happen. Tom Fitzgerald is still being studied, so it is possible that all of the risks are not known at this time. What other treatment choices are there? Like Rosea Radhika may allow for the emergency use of other medicines to treat people with COVID-19. Go to https://[employer]/ for information on the emergency use of other medicines that are authorized by FDA to treat people with COVID-19. Your healthcare provider may talk with you about clinical trials for which you may be eligible. It is your choice to be treated or not to be treated with PAXLOVID. Should you decide not to receive it or for your child not to receive it, it will not change your standard medical care. What if I am pregnant or breastfeeding? There is no experience treating pregnant women or breastfeeding mothers with PAXLOVID.  For a mother and unborn baby, the benefit of taking PAXLOVID may be greater than the risk from the treatment. If you are pregnant, discuss your options and specific situation with your healthcare provider. It is recommended that you use effective barrier contraception or do not have sexual activity while taking PAXLOVID. If you are breastfeeding, discuss your options and specific situation with your healthcare provider. How do I report side effects with PAXLOVID? Contact your healthcare provider if you have any side effects that bother you or do not go away. Report side effects to [employer] at www.fda.gov/medwatch or call 9-305-VGH9163 or you can report side effects to [facility]. at the contact information provided below. Website Fax number Telephone number   scrible [phone] [phone]     How should I store [address] [place] [address]? Store PAXLOVID tablets at room temperature between 68°F to 77°F (20°C to 25°C). Full fact sheet for patients, parents, and caregivers can be found at: [employer]za    I have spent a total time of 15 minutes on the day of the encounter for this patient including risks and benefits of treatment options, instructions for management, patient and family education, importance of treatment compliance, risk factor reductions, impressions, counseling/coordination of care, documenting in the medical record and obtaining or reviewing history.        Encounter provider: Mark Love DO     Provider located at: [address] [place] [address]   [id number]  [facility] [id number]     Recent Visits  Date Type Provider Dept   09/06/23 Telephone Mark Love DO [facility] [address]   08/31/23 Office Visit Mark Love DO [facility] [address]   Showing recent visits within past 7 days and meeting all other requirements  Today's Visits  Date Type Provider Dept   09/07/23 Telemedicine Dung Wayne DO [facility] 0   Showing today's visits and meeting all other requirements  Future Appointments  No visits were found meeting these conditions. Showing future appointments within next 150 days and meeting all other requirements     This virtual check-in was done via [address] [place] [address] and patient was informed that this is a secure, HIPAA-compliant platform. He agrees to proceed. Patient agrees to participate in a virtual check in via telephone or video visit instead of presenting to the office to address urgent/immediate medical needs. Patient is aware this is a billable service. He acknowledged consent and understanding of privacy and security of the video platform. The patient has agreed to participate and understands they can discontinue the visit at any time. After connecting through [facility], the patient was identified by name and date of birth. Danelle Mejia was informed that this was a telemedicine visit and that the exam was being conducted confidentially over secure lines. My office door was closed. No one else was in the room. Danelle Mejia acknowledged consent and understanding of privacy and security of the telemedicine visit. I informed the patient that I have reviewed his record in Epic and presented the opportunity for him to ask any questions regarding the visit today. The patient agreed to participate. Verification of patient location:  Patient is located in the following state in which I hold an active license: [place]    Subjective:   Danelle Mejia is a 52 y.o. male who has been screened for COVID-19. Symptom change since last report: improving. Patient's symptoms include fever, chills, fatigue, nasal congestion, rhinorrhea, cough, myalgias and headache. Patient denies sore throat, anosmia, loss of taste, shortness of breath, chest tightness, abdominal pain, nausea, vomiting and diarrhea.      - Date of symptom onset: 9/5/2023  - Date of exposure: 9/3/2023  - Date of positive COVID-19 test: 9/6/2023. Type of test: Home antigen. Patient with typical symptoms of COVID-19 and they attest that they were positive on home rapid antigen testing. Image of positive result is not able to be uploaded into their chart. COVID-19 vaccination status: Fully vaccinated with booster    Robe Biswas has been staying home and has isolated themselves in his home. He is taking care to not share personal items and is cleaning all surfaces that are touched often, like counters, tabletops, and doorknobs using household cleaning sprays or wipes. Wearing a mask when leaving room?: is not      Symptoms started Tue with low grade fever, chills, cold symptoms, body aches. His son tested + for COVID recently (Sunday) so he tested today and was +. He has been using Tylenol and Mucinex w/some benefit. Lab Results   Component Value Date    SARSCOV2 Negative 12/23/2021    SARSCOV2 Not Detected 03/26/2020       Review of Systems   Constitutional: Positive for chills, fatigue and fever. HENT: Positive for congestion, ear pain and rhinorrhea. Negative for ear discharge and sore throat. Respiratory: Positive for cough. Negative for chest tightness and shortness of breath. Gastrointestinal: Negative for abdominal pain, diarrhea, nausea and vomiting. Genitourinary: Negative for difficulty urinating and dysuria. Musculoskeletal: Positive for myalgias. Skin: Negative for rash. Neurological: Positive for headaches.      Current Outpatient Medications on File Prior to Visit   Medication Sig   • albuterol (2.5 mg/3 mL) 0.083 % nebulizer solution Take 1 vial (2.5 mg total) by nebulization every 6 (six) hours as needed for wheezing or shortness of breath   • albuterol (PROVENTIL HFA,VENTOLIN HFA) 90 mcg/act inhaler Inhale 1 puff every 4 (four) hours as needed   • atorvastatin (LIPITOR) 10 mg tablet TAKE 1 TABLET DAILY   • Butalbital-APAP-Caffeine -40 MG CAPS Take 1 capsule by mouth every 8 (eight) hours as needed (headache)   • cetirizine (ZyrTEC) 10 mg tablet Take 1 tablet by mouth daily as needed   • cholecalciferol (VITAMIN D3) 1,000 units tablet Take 1 tablet (1,000 Units total) by mouth daily   • FLUoxetine (PROzac) 20 MG tablet Take 2 tablets (40 mg total) by mouth daily   • fluticasone (FLONASE) 50 mcg/act nasal spray USE 2 SPRAYS IN EACH NOSTRIL DAILY   • fluticasone-umeclidinium-vilanterol (Trelegy Ellipta) 100-62.5-25 MCG/INH inhaler Inhale 1 puff daily Rinse mouth after use. • glipiZIDE (GLUCOTROL XL) 10 mg 24 hr tablet TAKE 1 TABLET DAILY   • insulin glargine (Semglee) 100 units/mL subcutaneous injection Inject up to 60 unites daily   • Insulin Pen Needle (BD Pen Needle Shanelle U/F) 32G X 4 MM MISC Inject once daily   • Jardiance 25 MG TABS TAKE 1 TABLET DAILY IN THE MORNING   • levothyroxine 75 mcg tablet Take 1 tab Mon-Sat and 2 tabs on Sun. • metFORMIN (GLUCOPHAGE) 1000 MG tablet TAKE 1 TABLET TWICE A DAY   • montelukast (SINGULAIR) 10 mg tablet take 1 tablet by mouth at bedtime   • olmesartan (BENICAR) 40 mg tablet Take 1 tablet (40 mg total) by mouth daily   • pantoprazole (PROTONIX) 40 mg tablet TAKE 1 TABLET DAILY   • semaglutide, 1 mg/dose, (Ozempic, 1 MG/DOSE,) 4 mg/3 mL injection pen Inject 0.75 mL (1 mg total) under the skin every 7 days   • Semglee, yfgn, 100 UNIT/ML SOPN    • Skyrizi, 150 MG Dose, 75 MG/0.83ML PSKT Every 3 months   • Sotyktu 6 MG TABS Take 1 tablet by mouth in the morning       Objective:    Temp 100.4 °F (38 °C)   Ht 5' 8" (1.727 m)   Wt 111 kg (244 lb)   BMI 37.10 kg/m²        Physical Exam  Vitals and nursing note reviewed. Constitutional:       General: He is not in acute distress. Appearance: He is not ill-appearing. HENT:      Head: Normocephalic and atraumatic. Eyes:      General:         Right eye: No discharge. Left eye: No discharge.       Conjunctiva/sclera: Conjunctivae normal. Pulmonary:      Effort: Pulmonary effort is normal. No respiratory distress. Skin:     Coloration: Skin is not pale. Findings: No rash. Psychiatric:         Behavior: Behavior normal.         Thought Content:  Thought content normal.         Judgment: Judgment normal.       Genesis More, DO

## 2023-09-07 NOTE — ASSESSMENT & PLAN NOTE
Higher risk for complications/hospitalization/death with COVID reviewed, interaction with statin reviewed - will hold Atorvastatin the 5 days he is on the Paxlovid, call with any significant SE/concerns  Lab Results   Component Value Date    HGBA1C 6.4 (H) 06/01/2023

## 2023-09-27 DIAGNOSIS — R42 DIZZINESS: Primary | ICD-10-CM

## 2023-09-27 RX ORDER — MECLIZINE HCL 12.5 MG/1
12.5 TABLET ORAL EVERY 8 HOURS PRN
Qty: 20 TABLET | Refills: 0 | Status: SHIPPED | OUTPATIENT
Start: 2023-09-27

## 2023-10-17 ENCOUNTER — OFFICE VISIT (OUTPATIENT)
Dept: PODIATRY | Facility: CLINIC | Age: 53
End: 2023-10-17
Payer: COMMERCIAL

## 2023-10-17 VITALS
SYSTOLIC BLOOD PRESSURE: 151 MMHG | DIASTOLIC BLOOD PRESSURE: 87 MMHG | WEIGHT: 244 LBS | HEIGHT: 68 IN | BODY MASS INDEX: 36.98 KG/M2 | HEART RATE: 74 BPM

## 2023-10-17 DIAGNOSIS — B35.3 TINEA PEDIS OF BOTH FEET: Primary | ICD-10-CM

## 2023-10-17 DIAGNOSIS — L40.9 HYPERTROPHY OF NAIL DUE TO PSORIASIS: ICD-10-CM

## 2023-10-17 DIAGNOSIS — L60.2 HYPERTROPHY OF NAIL DUE TO PSORIASIS: ICD-10-CM

## 2023-10-17 PROCEDURE — 99213 OFFICE O/P EST LOW 20 MIN: CPT | Performed by: PODIATRIST

## 2023-10-18 LAB
ALBUMIN SERPL-MCNC: 4.3 G/DL (ref 3.8–4.9)
ALBUMIN/GLOB SERPL: 1.7 {RATIO} (ref 1.2–2.2)
ALP SERPL-CCNC: 102 IU/L (ref 44–121)
ALT SERPL-CCNC: 37 IU/L (ref 0–44)
AST SERPL-CCNC: 26 IU/L (ref 0–40)
BASOPHILS # BLD AUTO: 0.1 X10E3/UL (ref 0–0.2)
BASOPHILS NFR BLD AUTO: 1 %
BILIRUB SERPL-MCNC: 0.5 MG/DL (ref 0–1.2)
BUN SERPL-MCNC: 17 MG/DL (ref 6–24)
BUN/CREAT SERPL: 21 (ref 9–20)
CALCIUM SERPL-MCNC: 9.4 MG/DL (ref 8.7–10.2)
CHLORIDE SERPL-SCNC: 102 MMOL/L (ref 96–106)
CO2 SERPL-SCNC: 23 MMOL/L (ref 20–29)
CREAT SERPL-MCNC: 0.8 MG/DL (ref 0.76–1.27)
EGFR: 105 ML/MIN/1.73
EOSINOPHIL # BLD AUTO: 0.4 X10E3/UL (ref 0–0.4)
EOSINOPHIL NFR BLD AUTO: 5 %
ERYTHROCYTE [DISTWIDTH] IN BLOOD BY AUTOMATED COUNT: 14 % (ref 11.6–15.4)
EST. AVERAGE GLUCOSE BLD GHB EST-MCNC: 203 MG/DL
GLOBULIN SER-MCNC: 2.6 G/DL (ref 1.5–4.5)
GLUCOSE SERPL-MCNC: 178 MG/DL (ref 70–99)
HBA1C MFR BLD: 8.7 % (ref 4.8–5.6)
HCT VFR BLD AUTO: 50.4 % (ref 37.5–51)
HGB BLD-MCNC: 16.9 G/DL (ref 13–17.7)
IMM GRANULOCYTES # BLD: 0 X10E3/UL (ref 0–0.1)
IMM GRANULOCYTES NFR BLD: 0 %
LYMPHOCYTES # BLD AUTO: 2.3 X10E3/UL (ref 0.7–3.1)
LYMPHOCYTES NFR BLD AUTO: 29 %
MCH RBC QN AUTO: 28.6 PG (ref 26.6–33)
MCHC RBC AUTO-ENTMCNC: 33.5 G/DL (ref 31.5–35.7)
MCV RBC AUTO: 85 FL (ref 79–97)
MONOCYTES # BLD AUTO: 0.7 X10E3/UL (ref 0.1–0.9)
MONOCYTES NFR BLD AUTO: 9 %
NEUTROPHILS # BLD AUTO: 4.5 X10E3/UL (ref 1.4–7)
NEUTROPHILS NFR BLD AUTO: 56 %
PLATELET # BLD AUTO: 267 X10E3/UL (ref 150–450)
POTASSIUM SERPL-SCNC: 4.9 MMOL/L (ref 3.5–5.2)
PROT SERPL-MCNC: 6.9 G/DL (ref 6–8.5)
RBC # BLD AUTO: 5.9 X10E6/UL (ref 4.14–5.8)
SODIUM SERPL-SCNC: 141 MMOL/L (ref 134–144)
T4 FREE SERPL-MCNC: 0.9 NG/DL (ref 0.82–1.77)
TSH SERPL DL<=0.005 MIU/L-ACNC: 6.46 UIU/ML (ref 0.45–4.5)
WBC # BLD AUTO: 8.1 X10E3/UL (ref 3.4–10.8)

## 2023-10-18 NOTE — PROGRESS NOTES
Assessment/Plan:   Diabetic foot exam performed. Recommend topical antifungal cream for a 3-4-week duration applying daily. Recommend Lotrimin ultra OTC cream.  No treatment options available to address dystrophic psoriatic nail changes. If this fails to respond there are other options available. Follow-up 6 months. Diagnoses and all orders for this visit:    Tinea pedis of both feet    Hypertrophy of nail due to psoriasis          Subjective:     Patient ID: Sylvester Edmonds is a 52 y.o. male. 10/17/2023: 39-year-old type II diabetic presents today for DFE concerned with dry flaking skin on both feet. PMH of psoriasis. Reports his diabetes been fairly well controlled with an HbA1c of 6.3. Has been a diabetic for 25 years. 4/17/2023: 49-year-old type II diabetic presents for biannual diabetic foot exam.  Reports last A1c 7.5. Concerned with increased dystrophic nature of great toenails. Psoriasis has been doing well but has multiple lesions on both lower extremities. Reports since he was started on Ozempic his lesions have improved        Review of Systems   Constitutional: Negative. HENT: Negative. Eyes: Negative. Respiratory: Negative. Cardiovascular: Negative. Gastrointestinal: Negative. Endocrine: Negative. Genitourinary: Negative. Musculoskeletal: Negative. Skin:  Positive for rash (Dry flaking skin bilateral feet). Allergic/Immunologic: Negative. Neurological: Negative. Hematological: Negative. Psychiatric/Behavioral: Negative. Objective:     Physical Exam  Constitutional:       Appearance: Normal appearance. HENT:      Head: Normocephalic. Right Ear: Tympanic membrane normal.      Left Ear: Tympanic membrane normal.      Nose: No congestion. Mouth/Throat:      Pharynx: No oropharyngeal exudate or posterior oropharyngeal erythema.    Eyes:      Conjunctiva/sclera: Conjunctivae normal.      Pupils: Pupils are equal, round, and reactive to light. Cardiovascular:      Rate and Rhythm: Normal rate and regular rhythm. Pulses:           Dorsalis pedis pulses are 1+ on the right side and 1+ on the left side. Posterior tibial pulses are 1+ on the right side and 1+ on the left side. Pulmonary:      Effort: Pulmonary effort is normal.   Musculoskeletal:         General: Normal range of motion. Feet:      Right foot:      Protective Sensation: 10 sites tested. 9 sites sensed. Skin integrity: Dry skin present. Toenail Condition: Right toenails are abnormally thick. Left foot:      Protective Sensation: 10 sites tested. 9 sites sensed. Skin integrity: Dry skin present. Toenail Condition: Left toenails are abnormally thick. Comments: Dystrophic hallux nails with pitting and longitudinal ridges are more consistent with psoriasis as opposed to mycosis. Skin:     General: Skin is warm and dry. Capillary Refill: Capillary refill takes 2 to 3 seconds. Coloration: Skin is not pale. Findings: Rash (Dry flaking skin bilateral feet from heel-to-toe with serpiginous margins and slight erythema along the edges. Findings are consistent with tinea) present. No bruising, erythema or lesion. Neurological:      Mental Status: He is alert. Cranial Nerves: No cranial nerve deficit. Sensory: Sensory deficit (Diminished vibratory sensation bilateral) present. Motor: No weakness.       Gait: Gait normal.      Deep Tendon Reflexes: Reflexes normal.   Psychiatric:         Mood and Affect: Mood normal.         Behavior: Behavior normal.         Judgment: Judgment normal.

## 2023-10-19 ENCOUNTER — OFFICE VISIT (OUTPATIENT)
Dept: ENDOCRINOLOGY | Facility: HOSPITAL | Age: 53
End: 2023-10-19
Payer: COMMERCIAL

## 2023-10-19 VITALS
SYSTOLIC BLOOD PRESSURE: 136 MMHG | BODY MASS INDEX: 37.62 KG/M2 | HEIGHT: 68 IN | HEART RATE: 90 BPM | DIASTOLIC BLOOD PRESSURE: 84 MMHG | WEIGHT: 248.2 LBS

## 2023-10-19 DIAGNOSIS — E11.65 UNCONTROLLED TYPE 2 DIABETES MELLITUS WITH HYPERGLYCEMIA (HCC): Primary | ICD-10-CM

## 2023-10-19 DIAGNOSIS — I10 ESSENTIAL HYPERTENSION: ICD-10-CM

## 2023-10-19 DIAGNOSIS — Z79.4 TYPE 2 DIABETES MELLITUS WITH HYPERGLYCEMIA, WITH LONG-TERM CURRENT USE OF INSULIN (HCC): ICD-10-CM

## 2023-10-19 DIAGNOSIS — E11.42 DIABETIC POLYNEUROPATHY ASSOCIATED WITH TYPE 2 DIABETES MELLITUS (HCC): ICD-10-CM

## 2023-10-19 DIAGNOSIS — E03.9 ACQUIRED HYPOTHYROIDISM: ICD-10-CM

## 2023-10-19 DIAGNOSIS — E11.65 TYPE 2 DIABETES MELLITUS WITH HYPERGLYCEMIA, WITH LONG-TERM CURRENT USE OF INSULIN (HCC): ICD-10-CM

## 2023-10-19 PROCEDURE — 99214 OFFICE O/P EST MOD 30 MIN: CPT | Performed by: NURSE PRACTITIONER

## 2023-10-19 NOTE — PROGRESS NOTES
[address] 52 y.o. male MRN: [id number]    Encounter: [id number]      Assessment/Plan     Assessment: This is a 52y.o.-year-old male with type 2 diabetes with neuropathy, hypothyroidism, hypertension and hyperlipidemia     Plan:  1. Type 2 diabetes. Hemoglobin A1c is elevated to 8.7%. He will continue the same metformin, Jardiance, Glipizide, and Lantus insulin. Increase Ozempic to 2 mg weekly. He will continue to work on diet, exercise, and weight loss. I have asked him to check his blood sugars twice daily at alternating times and send a record to the office in 2 weeks for review. I have asked him to follow-up with medical nutrition therapy for help with his diet. Check hemoglobin A1c prior to next visit. 2. Diabetic neuropathy. He denies neuropathic symptoms. Diabetic foot exams are up-to-date. 3. Hypothyroidism. TSH is elevated with a normal free T4. He will increase his Levothyroxine to 75 mcg - Monday through Friday with 2 tablets on Saturday and Sunday. Discussed the proper process for taking his levothyroxine. Recheck his TSH and free T4 in 6 weeks to reassess. 4. Hypertension. Continue current regimen. Check comprehensive metabolic panel prior to next visit. 5. Hyperlipidemia. Continue atorvastatin 10 mg daily. Check fasting lipid panel prior to next visit. CC: Type 2 Diabetes follow up    History of Present Illness     HPI:  52y.o. year old male with type 2 diabetes, insulin requiring with neuropathy for about 17 years, hypothyroidism, hypertension, hyperlipidemia for follow-up visit. He is on oral agents and insulin at home and takes Jardiance 25 mg daily, metformin 1000 mg twice a day, Ozempic 1 mg once a week, glipizide XL 10 mg daily, and Lantus insulin 35 units daily. His most recent hemoglobin A1c from October 17, 2023 is 8.7. He denies any polyuria, polydipsia, nocturia, polyphagia, and blurry vision. He denies numbness or tingling of the feet.   He denies chest pain or shortness of breath. He denies nephropathy, retinopathy, heart attack, stroke and claudication but does admit to neuropathy. Hypoglycemic episodes:  none recently. The patient's last eye exam was in jan 2021. The patient's last foot exam was in April 2022 at [facility]. Blood Sugar/Glucometer/Pump/CGM review: Does not check blood sugars regularly. He has hyperlipidemia hypertriglyceridemia and takes atorvastatin 10 mg daily. He denies chest pain or shortness of breath. He has hypertension and takes olmesartan 20 mg daily. He denies headache or stroke-like symptoms but can have occasional lightheadedness. He has hypothyroidism and takes levothyroxine 75 mcg a day through Saturday with 2 tablets on Sunday his most recent TSH from October 17, 2023 is 6.460 with free T4 of 0.90. He denies heat or cold intolerance, palpitation, tremors, fatigue. He denies insomnia, diarrhea or constipation. Review of Systems   Constitutional:  Positive for fatigue. Negative for chills and fever. HENT: Negative. Negative for trouble swallowing and voice change. Eyes:  Negative for photophobia, pain, discharge, redness, itching and visual disturbance. Respiratory:  Negative for cough and shortness of breath. Cardiovascular:  Negative for chest pain and palpitations. Gastrointestinal:  Negative for abdominal pain, constipation, diarrhea, nausea and vomiting. Endocrine: Positive for polyuria (1-2 times per night). Negative for cold intolerance, heat intolerance, polydipsia and polyphagia. Genitourinary: Negative. Musculoskeletal: Negative. Skin:  Positive for rash (Psoriasis). Allergic/Immunologic: Negative. Neurological:  Negative for dizziness, syncope, light-headedness and headaches. Hematological: Negative. Psychiatric/Behavioral:  Positive for sleep disturbance. All other systems reviewed and are negative.       Historical Information   Past Medical History:   Diagnosis Date    Anxiety 02/22/2019    Asthma 05/21/2019    GERD (gastroesophageal reflux disease) 12/19/2013    Hypertriglyceridemia 07/08/2015    Hypothyroidism     Migraine without aura and without status migrainosus, not intractable 05/21/2019    Psoriasis     Seasonal allergies 05/21/2019    Dr. Keith Louise    Vitamin D deficiency      Past Surgical History:   Procedure Laterality Date    CATARACT EXTRACTION, BILATERAL  2017    CHOLECYSTECTOMY  2000    lap    UNDESCENDED TESTICLE EXPLORATION Bilateral     as a child     Social History   Social History     Substance and Sexual Activity   Alcohol Use Not Currently     Social History     Substance and Sexual Activity   Drug Use Never     Social History     Tobacco Use   Smoking Status Never   Smokeless Tobacco Never     Family History:   Family History   Problem Relation Age of Onset    Diabetes Mother     Thyroid disease Mother         post thyroidectomy    Stroke Mother     Diabetes type II Mother     Thyroid disease Father         post thyroidectomy    Coronary artery disease Father     No Known Problems Brother     No Known Problems Brother     Colon cancer Neg Hx     Colon polyps Neg Hx        Meds/Allergies   Current Outpatient Medications   Medication Sig Dispense Refill    albuterol (2.5 mg/3 mL) 0.083 % nebulizer solution Take 1 vial (2.5 mg total) by nebulization every 6 (six) hours as needed for wheezing or shortness of breath 120 vial 2    albuterol (PROVENTIL HFA,VENTOLIN HFA) 90 mcg/act inhaler Inhale 1 puff every 4 (four) hours as needed      atorvastatin (LIPITOR) 10 mg tablet TAKE 1 TABLET DAILY 90 tablet 3    Butalbital-APAP-Caffeine -40 MG CAPS Take 1 capsule by mouth every 8 (eight) hours as needed (headache) 30 capsule 0    cetirizine (ZyrTEC) 10 mg tablet Take 1 tablet by mouth daily as needed      cholecalciferol (VITAMIN D3) 1,000 units tablet Take 1 tablet (1,000 Units total) by mouth daily      fluticasone (FLONASE) 50 mcg/act nasal spray USE 2 SPRAYS IN EACH NOSTRIL DAILY 48 g 3    fluticasone-umeclidinium-vilanterol (Trelegy Ellipta) 100-62.5-25 MCG/INH inhaler Inhale 1 puff daily Rinse mouth after use. glipiZIDE (GLUCOTROL XL) 10 mg 24 hr tablet TAKE 1 TABLET DAILY 90 tablet 3    insulin glargine (Semglee) 100 units/mL subcutaneous injection Inject up to 60 unites daily 60 mL 2    Insulin Pen Needle (BD Pen Needle Shanelle U/F) 32G X 4 MM MISC Inject once daily 90 each 3    Jardiance 25 MG TABS TAKE 1 TABLET DAILY IN THE MORNING 90 tablet 3    levothyroxine 75 mcg tablet Take 1 tab Mon-Sat and 2 tabs on Sun. 102 tablet 3    meclizine (ANTIVERT) 12.5 MG tablet Take 1 tablet (12.5 mg total) by mouth every 8 (eight) hours as needed for dizziness 20 tablet 0    metFORMIN (GLUCOPHAGE) 1000 MG tablet TAKE 1 TABLET TWICE A  tablet 3    montelukast (SINGULAIR) 10 mg tablet take 1 tablet by mouth at bedtime 30 tablet 5    olmesartan (BENICAR) 40 mg tablet Take 1 tablet (40 mg total) by mouth daily 90 tablet 1    pantoprazole (PROTONIX) 40 mg tablet TAKE 1 TABLET DAILY 90 tablet 3    semaglutide, 1 mg/dose, (Ozempic, 1 MG/DOSE,) 4 mg/3 mL injection pen Inject 0.75 mL (1 mg total) under the skin every 7 days 9 mL 3    Skyrizi, 150 MG Dose, 75 MG/0.83ML PSKT Every 3 months      Sotyktu 6 MG TABS Take 1 tablet by mouth in the morning      FLUoxetine (PROzac) 20 MG tablet Take 2 tablets (40 mg total) by mouth daily (Patient not taking: Reported on 10/19/2023) 180 tablet 0    Semglee, yfgn, 100 UNIT/ML SOPN  (Patient not taking: Reported on 10/19/2023)       No current facility-administered medications for this visit. No Known Allergies    Objective   Vitals: Blood pressure 136/84, pulse 90, height 5' 8" (1.727 m), weight 113 kg (248 lb 3.2 oz). Physical Exam  Vitals reviewed. Constitutional:       Appearance: He is well-developed. He is obese. HENT:      Head: Normocephalic and atraumatic.       Nose: Nose normal.   Eyes: Conjunctiva/sclera: Conjunctivae normal.      Pupils: Pupils are equal, round, and reactive to light. Cardiovascular:      Rate and Rhythm: Normal rate and regular rhythm. Heart sounds: Normal heart sounds. Pulmonary:      Effort: Pulmonary effort is normal.      Breath sounds: Normal breath sounds. Abdominal:      General: Bowel sounds are normal.      Palpations: Abdomen is soft. Musculoskeletal:         General: Normal range of motion. Cervical back: Normal range of motion and neck supple. Skin:     General: Skin is warm and dry. Neurological:      Mental Status: He is alert and oriented to person, place, and time. Psychiatric:         Behavior: Behavior normal.         Thought Content:  Thought content normal.         Judgment: Judgment normal.       Lab Results:   Lab Results   Component Value Date/Time    Hemoglobin A1C 8.7 (H) 10/17/2023 07:37 AM    Hemoglobin A1C 6.4 (H) 06/01/2023 07:18 AM    Hemoglobin A1C 7.7 (H) 02/16/2023 06:52 AM    White Blood Cell Count 8.1 10/17/2023 07:37 AM    White Blood Cell Count 8.1 06/01/2023 07:18 AM    White Blood Cell Count 6.9 02/16/2023 06:52 AM    Hemoglobin 16.9 10/17/2023 07:37 AM    Hemoglobin 16.7 06/01/2023 07:18 AM    Hemoglobin 15.6 02/16/2023 06:52 AM    HCT 50.4 10/17/2023 07:37 AM    HCT 48.4 06/01/2023 07:18 AM    HCT 46.0 02/16/2023 06:52 AM    MCV 85 10/17/2023 07:37 AM    MCV 84 06/01/2023 07:18 AM    MCV 85 02/16/2023 06:52 AM    Platelet Count 019 12/64/4456 07:37 AM    Platelet Count 491 76/76/3228 07:18 AM    Platelet Count 015 52/53/6559 06:52 AM    BUN 17 10/17/2023 07:37 AM    BUN 14 06/01/2023 07:18 AM    BUN 12 02/16/2023 06:52 AM    Potassium 4.9 10/17/2023 07:37 AM    Potassium 5.0 06/01/2023 07:18 AM    Potassium 4.7 02/16/2023 06:52 AM    Chloride 102 10/17/2023 07:37 AM    Chloride 102 06/01/2023 07:18 AM    Chloride 102 02/16/2023 06:52 AM    CO2 23 10/17/2023 07:37 AM    CO2 29 06/01/2023 07:18 AM    CO2 27 02/16/2023 06:52 AM    Creatinine 0.80 10/17/2023 07:37 AM    Creatinine 0.85 06/01/2023 07:18 AM    Creatinine 0.85 02/16/2023 06:52 AM    AST 26 10/17/2023 07:37 AM    AST 19 06/01/2023 07:18 AM    AST 24 02/16/2023 06:52 AM    ALT 37 10/17/2023 07:37 AM    ALT 21 06/01/2023 07:18 AM    ALT 23 02/16/2023 06:52 AM    Protein, Total 6.9 10/17/2023 07:37 AM    Protein, Total 6.8 06/01/2023 07:18 AM    Protein, Total 6.5 02/16/2023 06:52 AM    Albumin 4.3 10/17/2023 07:37 AM    Albumin 4.4 06/01/2023 07:18 AM    Albumin 4.0 02/16/2023 06:52 AM    Globulin, Total 2.6 10/17/2023 07:37 AM    Globulin, Total 2.4 06/01/2023 07:18 AM    Globulin, Total 2.5 02/16/2023 06:52 AM    HDL 38 (L) 06/01/2023 07:18 AM    HDL 43 11/11/2022 08:12 AM    Triglycerides 148 06/01/2023 07:18 AM    Triglycerides 135 11/11/2022 08:12 AM       Portions of the record may have been created with voice recognition software. Occasional wrong word or "sound a like" substitutions may have occurred due to the inherent limitations of voice recognition software. Read the chart carefully and recognize, using context, where substitutions have occurred.

## 2023-10-19 NOTE — PATIENT INSTRUCTIONS
Be mindful of diet. Stay active and stay hydrated. Continue current regime, however we will increase Ozempic to 2 mg. Check blood sugars regularly, twice daily at alternating times. Increase levothyroxine to 75 mcg daily Monday through Friday with 2 tablets on Saturday and Sunday. Please take your vitamins after lunch or in the evening. Wait at least 30 minutes to eat after Levothyroxine. Recheck thyroid lab work in 6-8 weeks to reassess. Follow up with medical nutrition therapy for help with your diet.

## 2023-10-20 ENCOUNTER — TELEPHONE (OUTPATIENT)
Dept: ADMINISTRATIVE | Facility: OTHER | Age: 53
End: 2023-10-20

## 2023-10-20 DIAGNOSIS — E11.65 UNCONTROLLED TYPE 2 DIABETES MELLITUS WITH HYPERGLYCEMIA (HCC): ICD-10-CM

## 2023-10-20 NOTE — TELEPHONE ENCOUNTER
Upon review of the In Basket request and the patient's chart, initial outreach has been made via fax to facility. Please see Contacts section for details.      Thank you  Ronit Diaz MA

## 2023-10-20 NOTE — LETTER
Diabetic Foot Exam Form    Date Requested: 10/20/23  Patient: Jaja Slater  Patient : [id number]   Referring Provider: Mark Love DO    Diabetic Foot Exam Performed with shoes and socks removed        Yes         No     Date of Diabetic Foot Exam ______________________________  Risk Score ____________________________________________    Left Foot       Visual Inspection         Monofilament Testing Sensory Exam        Pedal Pulses         Additional Comments         Right Foot      Visual Inspection         Monofilament Testing Sensory Exam       Pedal Pulses         Additional Comments         Comments __________________________________________________________    Practice Providing Exam ______________________________________________    Exam Performed By (print name) _______________________________________      Provider Signature ___________________________________________________      These reports are needed for  compliance. Please fax this completed form and a copy of the Diabetic Foot Exam report to our office located at [address] [place] [address] as soon as possible via Fax [phone] attention Elena: Phone [phone]    We thank you for your assistance in treating our mutual patient.

## 2023-10-20 NOTE — LETTER
Diabetic Foot Exam Form    Date Requested: 10/25/23  Patient: Fallon Kaplan  Patient : [id number]   Referring Provider: Carl Wren DO    Diabetic Foot Exam Performed with shoes and socks removed        Yes         No     Date of Diabetic Foot Exam ______________________________  Risk Score ____________________________________________    Left Foot       Visual Inspection         Monofilament Testing Sensory Exam        Pedal Pulses         Additional Comments         Right Foot      Visual Inspection         Monofilament Testing Sensory Exam       Pedal Pulses         Additional Comments         Comments __________________________________________________________    Practice Providing Exam ______________________________________________    Exam Performed By (print name) _______________________________________      Provider Signature ___________________________________________________      These reports are needed for  compliance. Please fax this completed form and a copy of the Diabetic Foot Exam report to our office located at [address] [place] [address] as soon as possible via Fax [phone] attention Elena: Phone [phone]    We thank you for your assistance in treating our mutual patient.

## 2023-10-20 NOTE — TELEPHONE ENCOUNTER
----- Message from [address] sent at 10/19/2023  3:25 PM EDT -----  Regarding: DM FOOT EXAM  10/19/23 3:25 PM    Girma, our patient Joan Prakash has had a DM Foot Exam performed by Dr Mingo Mathews in [place].  His number is     Thank you,  Nahomy Tidwell  [facility]

## 2023-10-25 NOTE — TELEPHONE ENCOUNTER
As a follow-up, a second attempt has been made for outreach via fax to facility. Please see Contacts section for details.     Thank you  Gerhardt Parents, MA

## 2023-10-31 NOTE — TELEPHONE ENCOUNTER
As a final attempt, a third outreach has been made via telephone call to facility. Please see Contacts section for details. This encounter will be closed and completed by end of day. Should we receive the requested information because of previous outreach attempts, the requested patient's chart will be updated appropriately.      Thank you  Carlos Aguilera MA

## 2023-11-20 ENCOUNTER — IMMUNIZATIONS (OUTPATIENT)
Dept: FAMILY MEDICINE CLINIC | Facility: HOSPITAL | Age: 53
End: 2023-11-20
Payer: COMMERCIAL

## 2023-11-20 DIAGNOSIS — Z23 ENCOUNTER FOR IMMUNIZATION: Primary | ICD-10-CM

## 2023-11-20 PROCEDURE — 90471 IMMUNIZATION ADMIN: CPT

## 2023-11-20 PROCEDURE — 90686 IIV4 VACC NO PRSV 0.5 ML IM: CPT

## 2023-12-14 DIAGNOSIS — E11.65 TYPE 2 DIABETES MELLITUS WITH HYPERGLYCEMIA, WITH LONG-TERM CURRENT USE OF INSULIN (HCC): ICD-10-CM

## 2023-12-14 DIAGNOSIS — Z79.4 TYPE 2 DIABETES MELLITUS WITH HYPERGLYCEMIA, WITH LONG-TERM CURRENT USE OF INSULIN (HCC): ICD-10-CM

## 2024-02-13 DIAGNOSIS — K21.9 GASTROESOPHAGEAL REFLUX DISEASE: ICD-10-CM

## 2024-02-13 RX ORDER — PANTOPRAZOLE SODIUM 40 MG/1
TABLET, DELAYED RELEASE ORAL
Qty: 90 TABLET | Refills: 3 | Status: SHIPPED | OUTPATIENT
Start: 2024-02-13

## 2024-02-21 PROBLEM — Z12.11 COLON CANCER SCREENING: Status: RESOLVED | Noted: 2019-10-11 | Resolved: 2024-02-21

## 2024-02-26 NOTE — ASSESSMENT & PLAN NOTE
BP at goal, con't current meds, diet/exercise/wgt loss encouraged
Lab Results   Component Value Date    HGBA1C 8 7 (H) 05/10/2021   DM type 2 with hyperglycemia and neuropathy - uncontrolled with A1C 8 7 - urged to get on track with diet and get back to exercise and get wgt down, increase Lantus from 26 U to 30 U, start check BS 2 times daily and call with readings in 2 wks, on ARB and statin, foot exam done today, UTD on eye exam (3/21)
No recent exacerbations, Saw Pulm and had Advair and Spiriva changed to Trelegy, med list updated today, notes improved symptom control with new inhaler, con't meds and f/u as per [facility]
On CPAP 9 cm H20, admits to not using nightly but has started back on more of a regular basis recently, importance of DIANA tx reviewed, diet/exercise/wgt loss encouraged
Male

## 2024-03-20 DIAGNOSIS — G43.009 MIGRAINE WITHOUT AURA AND WITHOUT STATUS MIGRAINOSUS, NOT INTRACTABLE: ICD-10-CM

## 2024-03-20 RX ORDER — BUTALBITAL, ACETAMINOPHEN AND CAFFEINE 300; 40; 50 MG/1; MG/1; MG/1
1 CAPSULE ORAL EVERY 8 HOURS PRN
Qty: 30 CAPSULE | Refills: 0 | Status: SHIPPED | OUTPATIENT
Start: 2024-03-20

## 2024-04-12 LAB
ALBUMIN SERPL-MCNC: 4.2 G/DL (ref 3.8–4.9)
ALBUMIN/GLOB SERPL: 1.7 {RATIO} (ref 1.2–2.2)
ALP SERPL-CCNC: 116 IU/L (ref 44–121)
ALT SERPL-CCNC: 30 IU/L (ref 0–44)
AST SERPL-CCNC: 23 IU/L (ref 0–40)
BASOPHILS # BLD AUTO: 0.1 X10E3/UL (ref 0–0.2)
BASOPHILS NFR BLD AUTO: 1 %
BILIRUB SERPL-MCNC: 0.8 MG/DL (ref 0–1.2)
BUN SERPL-MCNC: 12 MG/DL (ref 6–24)
BUN/CREAT SERPL: 14 (ref 9–20)
CALCIUM SERPL-MCNC: 9.3 MG/DL (ref 8.7–10.2)
CHLORIDE SERPL-SCNC: 101 MMOL/L (ref 96–106)
CHOLEST SERPL-MCNC: 119 MG/DL (ref 100–199)
CHOLEST/HDLC SERPL: 2.8 RATIO (ref 0–5)
CO2 SERPL-SCNC: 26 MMOL/L (ref 20–29)
CREAT SERPL-MCNC: 0.85 MG/DL (ref 0.76–1.27)
EGFR: 103 ML/MIN/1.73
EOSINOPHIL # BLD AUTO: 0.3 X10E3/UL (ref 0–0.4)
EOSINOPHIL NFR BLD AUTO: 4 %
ERYTHROCYTE [DISTWIDTH] IN BLOOD BY AUTOMATED COUNT: 13 % (ref 11.6–15.4)
EST. AVERAGE GLUCOSE BLD GHB EST-MCNC: 212 MG/DL
GLOBULIN SER-MCNC: 2.5 G/DL (ref 1.5–4.5)
GLUCOSE SERPL-MCNC: 240 MG/DL (ref 70–99)
HBA1C MFR BLD: 9 % (ref 4.8–5.6)
HCT VFR BLD AUTO: 48 % (ref 37.5–51)
HDLC SERPL-MCNC: 43 MG/DL
HGB BLD-MCNC: 16.3 G/DL (ref 13–17.7)
IMM GRANULOCYTES # BLD: 0 X10E3/UL (ref 0–0.1)
IMM GRANULOCYTES NFR BLD: 0 %
LDLC SERPL CALC-MCNC: 53 MG/DL (ref 0–99)
LYMPHOCYTES # BLD AUTO: 2.4 X10E3/UL (ref 0.7–3.1)
LYMPHOCYTES NFR BLD AUTO: 30 %
MCH RBC QN AUTO: 29 PG (ref 26.6–33)
MCHC RBC AUTO-ENTMCNC: 34 G/DL (ref 31.5–35.7)
MCV RBC AUTO: 85 FL (ref 79–97)
MONOCYTES # BLD AUTO: 0.7 X10E3/UL (ref 0.1–0.9)
MONOCYTES NFR BLD AUTO: 9 %
NEUTROPHILS # BLD AUTO: 4.7 X10E3/UL (ref 1.4–7)
NEUTROPHILS NFR BLD AUTO: 56 %
PLATELET # BLD AUTO: 259 X10E3/UL (ref 150–450)
POTASSIUM SERPL-SCNC: 5 MMOL/L (ref 3.5–5.2)
PROT SERPL-MCNC: 6.7 G/DL (ref 6–8.5)
RBC # BLD AUTO: 5.62 X10E6/UL (ref 4.14–5.8)
SL AMB VLDL CHOLESTEROL CALC: 23 MG/DL (ref 5–40)
SODIUM SERPL-SCNC: 141 MMOL/L (ref 134–144)
T4 FREE SERPL-MCNC: 0.88 NG/DL (ref 0.82–1.77)
TRIGL SERPL-MCNC: 127 MG/DL (ref 0–149)
TSH SERPL DL<=0.005 MIU/L-ACNC: 11 UIU/ML (ref 0.45–4.5)
WBC # BLD AUTO: 8.2 X10E3/UL (ref 3.4–10.8)

## 2024-04-16 ENCOUNTER — OFFICE VISIT (OUTPATIENT)
Dept: PODIATRY | Facility: CLINIC | Age: 54
End: 2024-04-16
Payer: COMMERCIAL

## 2024-04-16 ENCOUNTER — OFFICE VISIT (OUTPATIENT)
Dept: ENDOCRINOLOGY | Facility: HOSPITAL | Age: 54
End: 2024-04-16
Payer: COMMERCIAL

## 2024-04-16 VITALS
HEART RATE: 90 BPM | SYSTOLIC BLOOD PRESSURE: 122 MMHG | HEIGHT: 68 IN | WEIGHT: 244.8 LBS | DIASTOLIC BLOOD PRESSURE: 80 MMHG | BODY MASS INDEX: 37.1 KG/M2

## 2024-04-16 VITALS
WEIGHT: 248 LBS | DIASTOLIC BLOOD PRESSURE: 82 MMHG | HEIGHT: 68 IN | SYSTOLIC BLOOD PRESSURE: 143 MMHG | HEART RATE: 80 BPM | BODY MASS INDEX: 37.59 KG/M2

## 2024-04-16 DIAGNOSIS — E03.9 ACQUIRED HYPOTHYROIDISM: ICD-10-CM

## 2024-04-16 DIAGNOSIS — E11.40 TYPE 2 DIABETES MELLITUS WITH DIABETIC NEUROPATHY, WITHOUT LONG-TERM CURRENT USE OF INSULIN (HCC): ICD-10-CM

## 2024-04-16 DIAGNOSIS — I10 ESSENTIAL HYPERTENSION: ICD-10-CM

## 2024-04-16 DIAGNOSIS — E11.42 DIABETIC POLYNEUROPATHY ASSOCIATED WITH TYPE 2 DIABETES MELLITUS (HCC): ICD-10-CM

## 2024-04-16 DIAGNOSIS — L40.9 PSORIASIS OF NAIL: ICD-10-CM

## 2024-04-16 DIAGNOSIS — B35.3 TINEA PEDIS OF BOTH FEET: Primary | ICD-10-CM

## 2024-04-16 DIAGNOSIS — E78.1 HYPERTRIGLYCERIDEMIA: ICD-10-CM

## 2024-04-16 DIAGNOSIS — E11.65 UNCONTROLLED TYPE 2 DIABETES MELLITUS WITH HYPERGLYCEMIA (HCC): Primary | ICD-10-CM

## 2024-04-16 PROCEDURE — 99214 OFFICE O/P EST MOD 30 MIN: CPT | Performed by: INTERNAL MEDICINE

## 2024-04-16 PROCEDURE — 99213 OFFICE O/P EST LOW 20 MIN: CPT | Performed by: PODIATRIST

## 2024-04-16 RX ORDER — BIMEKIZUMAB 160 MG/ML
INJECTION, SOLUTION SUBCUTANEOUS
COMMUNITY

## 2024-04-16 RX ORDER — CLOTRIMAZOLE AND BETAMETHASONE DIPROPIONATE 10; .64 MG/G; MG/G
CREAM TOPICAL DAILY
Qty: 45 G | Refills: 1 | Status: SHIPPED | OUTPATIENT
Start: 2024-04-16 | End: 2024-05-14

## 2024-04-16 RX ORDER — ESOMEPRAZOLE MAGNESIUM 40 MG/1
CAPSULE, DELAYED RELEASE ORAL
COMMUNITY

## 2024-04-16 RX ORDER — BLOOD-GLUCOSE SENSOR
EACH MISCELLANEOUS
Qty: 2 EACH | Refills: 6 | Status: SHIPPED | OUTPATIENT
Start: 2024-04-16

## 2024-04-16 NOTE — PROGRESS NOTES
4/18/2024    Assessment/Plan     1. Uncontrolled type 2 diabetes mellitus with hyperglycemia (HCC)  -     T4, free; Future; Expected date: 06/03/2024  -     Comprehensive metabolic panel; Future; Expected date: 06/03/2024  -     Hemoglobin A1C; Future; Expected date: 06/03/2024  -     TSH, 3rd generation; Future; Expected date: 06/03/2024  -     Albumin / creatinine urine ratio; Future; Expected date: 06/03/2024  -     T4, free  -     Comprehensive metabolic panel  -     Hemoglobin A1C  -     TSH, 3rd generation  -     Albumin / creatinine urine ratio  -     Continuous Blood Gluc Sensor (FreeStyle Alexx 3 Sensor) MISC; Apply every 14 days to check blood sugars 4-10 times a day    2. Diabetic polyneuropathy associated with type 2 diabetes mellitus (HCC)  -     T4, free; Future; Expected date: 06/03/2024  -     Comprehensive metabolic panel; Future; Expected date: 06/03/2024  -     Hemoglobin A1C; Future; Expected date: 06/03/2024  -     TSH, 3rd generation; Future; Expected date: 06/03/2024  -     Albumin / creatinine urine ratio; Future; Expected date: 06/03/2024  -     T4, free  -     Comprehensive metabolic panel  -     Hemoglobin A1C  -     TSH, 3rd generation  -     Albumin / creatinine urine ratio    3. Acquired hypothyroidism  -     T4, free; Future; Expected date: 06/03/2024  -     Comprehensive metabolic panel; Future; Expected date: 06/03/2024  -     Hemoglobin A1C; Future; Expected date: 06/03/2024  -     TSH, 3rd generation; Future; Expected date: 06/03/2024  -     Albumin / creatinine urine ratio; Future; Expected date: 06/03/2024  -     T4, free  -     Comprehensive metabolic panel  -     Hemoglobin A1C  -     TSH, 3rd generation  -     Albumin / creatinine urine ratio    4. Essential hypertension  -     T4, free; Future; Expected date: 06/03/2024  -     Comprehensive metabolic panel; Future; Expected date: 06/03/2024  -     Hemoglobin A1C; Future; Expected date: 06/03/2024  -     TSH, 3rd generation;  Future; Expected date: 06/03/2024  -     Albumin / creatinine urine ratio; Future; Expected date: 06/03/2024  -     T4, free  -     Comprehensive metabolic panel  -     Hemoglobin A1C  -     TSH, 3rd generation  -     Albumin / creatinine urine ratio    5. Hypertriglyceridemia  -     T4, free; Future; Expected date: 06/03/2024  -     Comprehensive metabolic panel; Future; Expected date: 06/03/2024  -     Hemoglobin A1C; Future; Expected date: 06/03/2024  -     TSH, 3rd generation; Future; Expected date: 06/03/2024  -     Albumin / creatinine urine ratio; Future; Expected date: 06/03/2024  -     T4, free  -     Comprehensive metabolic panel  -     Hemoglobin A1C  -     TSH, 3rd generation  -     Albumin / creatinine urine ratio         Assessment/Plan:     1.  Type 2 diabetes, insulin-requiring  Hemoglobin A1c of 9% does demonstrate uncontrolled diabetes. At present, most likely it is due to him going off a lot of his medicines over the last several months. He is back on almost all the medicines at the proper dosages except Ozempic, which is 1 mg a week, but he has been on that for 3 weeks and should be going up to 2 mg once a week in the next several weeks. For now, I will not adjust his medications. I just want him to get back to the medicines he was supposed to be on. He is also going to work on dietary changes and exercise.    2.  Diabetic neuropathy.  He denies neuropathic symptoms. He does follow up with podiatry regularly. Diabetic foot exam was performed in the office today.     3.  Hypothyroidism.  Recent thyroid function studies do show an elevated TSH consistent with biochemical hypothyroidism. This is due to him being off levothyroxine so for now, he will continue his current dose of levothyroxine 75 mcg 1 tablet 5 days a week and 2 tablets on weekends.     4.  Hypertension.  He is normotensive in the office on his current dose of olmesartan and will continue this.    5.  Hypertriglyceridemia.  He has a  lipid profile that is excellent. He will continue the same atorvastatin 10 mg daily.     I have asked him to follow up in 3 months with preceding hemoglobin A1c, CMP, urine microalbumin to creatinine ratio, TSH, and free T4.     Of note, he did express interest in retrying the FreeStyle leona now that his psoriasis is properly treated as he did not tolerate it in the past since it fell off his psoriasis on his arm. His psoriasis is improved. We will try the FreeStyle leona 3 continuous glucose monitoring system to be applied every 14 days. He will download the eulalia and make an account and then once he has the sensors, he will contact the office so that our office can be linked to his account. If the FreeStyle leona is not covered by his insurance, then we can try the Dexcom G7.    CC: Diabetes type II, thyroid, blood pressure, lipid follow-up    History of Present Illness     HPI: Venu Pizarro is a 53 y.o. year old male with type 2 diabetes, insulin requiring with neuropathy diagnosed 18 years ago, hypothyroidism, hypertension, hyperlipidemia, who is here for a follow-up visit.    He is on current medication regimen of metformin 1000 mg twice daily, Jardiance 25 mg daily, glipizide XL 10 mg daily, Semglee insulin 35 units, with occasional increases to 40 or 45 units if his glucose levels are elevated, Ozempic 1 mg once a week for 3 weeks now. He denies any polyuria and nocturia but admits polydipsia. He denies peripheral neuropathy and retinopathy  Hypoglycemic episodes: NO      Blood Sugar/Glucometer/Pump/CGM review: He has not been testing his blood sugars regularly.    The patient's last eye exam was in 2023.     He was last seen approximately 4 to 5 months ago, discontinued several of his medications due to psychological reasons. He discontinued Ozempic, which he resumed afterwards. He occasionally monitors his blood glucose level due to personal preference for finger prick testing. He previously tried the  FreeStyle Alexx but discontinued its use due to psoriasis.     He has a decreased appetite. His sleeping habit is not optimal, and he usually wakes up every 2 to 3 hours. He has a history of cataract surgery. He denies numbness or tingling in his feet, and has an appointment with Dr. Torres, a podiatrist, later today. He reports positional dizziness when lying back, but denies significant headaches, lightheadedness, or palpitations. He experiences chills only when feeling cold. He denies hand tremors, and gastrointestinal disturbances. He reports fatigue and occasional afternoon naps. His weight has fluctuated since resuming Ozempic. He denies experiencing chest pain or dyspnea.    His cholesterol levels are well-managed with atorvastatin 10 mg. He takes olmesartan 40 mg a day.     He is currently on a regimen of levothyroxine 75 mcg, taking 1 tablet from Monday to Friday and 2 tablets on Sundays for his thyroid.    On the other note, his psoriasis is not improving. He is currently taking medication that has significantly reduced his psoriasis symptoms. However, he has recently developed bumps on the soles of his feet as a side effect of the psoriasis medication he is using.        Last A1C was   Lab Results   Component Value Date    HGBA1C 9.0 (H) 04/11/2024   .        Review of Systems  The pertinent positive and negative findings are as noted in the HPI.    Historical Information   Past Medical History:   Diagnosis Date    Anxiety 02/22/2019    Asthma 05/21/2019    GERD (gastroesophageal reflux disease) 12/19/2013    Hypertriglyceridemia 07/08/2015    Hypothyroidism     Migraine without aura and without status migrainosus, not intractable 05/21/2019    Psoriasis     Seasonal allergies 05/21/2019    Dr. Sheppard    Vitamin D deficiency      Past Surgical History:   Procedure Laterality Date    CATARACT EXTRACTION, BILATERAL  2017    CHOLECYSTECTOMY  2000    lap    UNDESCENDED TESTICLE EXPLORATION Bilateral      as a child     Social History   Social History     Substance and Sexual Activity   Alcohol Use Not Currently     Social History     Substance and Sexual Activity   Drug Use Never     Social History     Tobacco Use   Smoking Status Never   Smokeless Tobacco Never     Family History:   Family History   Problem Relation Age of Onset    Diabetes Mother     Thyroid disease Mother         post thyroidectomy    Stroke Mother     Diabetes type II Mother     Thyroid disease Father         post thyroidectomy    Coronary artery disease Father     No Known Problems Brother     No Known Problems Brother     Colon cancer Neg Hx     Colon polyps Neg Hx        Meds/Allergies   Current Outpatient Medications   Medication Sig Dispense Refill    albuterol (2.5 mg/3 mL) 0.083 % nebulizer solution Take 1 vial (2.5 mg total) by nebulization every 6 (six) hours as needed for wheezing or shortness of breath 120 vial 2    albuterol (PROVENTIL HFA,VENTOLIN HFA) 90 mcg/act inhaler Inhale 1 puff every 4 (four) hours as needed      atorvastatin (LIPITOR) 10 mg tablet TAKE 1 TABLET DAILY 90 tablet 3    Bimekizumab-bkzx (Bimzelx) 160 MG/ML SOAJ Inject under the skin every 30 (thirty) days      Butalbital-APAP-Caffeine -40 MG CAPS Take 1 capsule by mouth every 8 (eight) hours as needed (headache) 30 capsule 0    cetirizine (ZyrTEC) 10 mg tablet Take 1 tablet by mouth daily as needed      cholecalciferol (VITAMIN D3) 1,000 units tablet Take 1 tablet (1,000 Units total) by mouth daily      Continuous Blood Gluc Sensor (FreeStyle Alexx 3 Sensor) MISC Apply every 14 days to check blood sugars 4-10 times a day 2 each 6    fluticasone (FLONASE) 50 mcg/act nasal spray USE 2 SPRAYS IN EACH NOSTRIL DAILY 48 g 3    fluticasone-umeclidinium-vilanterol (Trelegy Ellipta) 100-62.5-25 MCG/INH inhaler Inhale 1 puff daily Rinse mouth after use.      glipiZIDE (GLUCOTROL XL) 10 mg 24 hr tablet TAKE 1 TABLET DAILY 90 tablet 3    insulin glargine (Semglee) 100  "units/mL subcutaneous injection Inject up to 60 unites daily 60 mL 2    Insulin Pen Needle (BD Pen Needle Shanelle U/F) 32G X 4 MM MISC Inject once daily 90 each 3    Jardiance 25 MG TABS TAKE 1 TABLET DAILY IN THE MORNING 90 tablet 3    levothyroxine 75 mcg tablet Take 1 tab Mon-Sat and 2 tabs on Sun. (Patient taking differently: Take 1 tab Mon-fri and 2 tabs on sat and Sun.) 102 tablet 3    meclizine (ANTIVERT) 12.5 MG tablet Take 1 tablet (12.5 mg total) by mouth every 8 (eight) hours as needed for dizziness 20 tablet 0    metFORMIN (GLUCOPHAGE) 1000 MG tablet Take one (1,000 mg) tablet twice a day 180 tablet 3    montelukast (SINGULAIR) 10 mg tablet take 1 tablet by mouth at bedtime 30 tablet 5    olmesartan (BENICAR) 40 mg tablet Take 1 tablet (40 mg total) by mouth daily 90 tablet 1    pantoprazole (PROTONIX) 40 mg tablet TAKE 1 TABLET DAILY 90 tablet 3    semaglutide, 2 mg/dose, (Ozempic) 8 mg/ mL injection pen Inject 0.75 mL (2 mg total) under the skin every 7 days 9 mL 1    clotrimazole-betamethasone (LOTRISONE) 1-0.05 % cream Apply topically daily for 28 days 45 g 1    esomeprazole (NexIUM) 40 MG capsule        No current facility-administered medications for this visit.     No Known Allergies    Objective   Vitals: Blood pressure 122/80, pulse 90, height 5' 8\" (1.727 m), weight 111 kg (244 lb 12.8 oz).  Invasive Devices       None                   Physical Exam  Cardiovascular:      Pulses: no weak pulses.           Dorsalis pedis pulses are 2+ on the right side and 2+ on the left side.        Posterior tibial pulses are 2+ on the right side and 2+ on the left side.   Feet:      Right foot:      Skin integrity: No ulcer, skin breakdown, erythema, warmth, callus or dry skin.      Left foot:      Skin integrity: No ulcer, skin breakdown, erythema, warmth, callus or dry skin.       Physical exam normal with pertinent positives and negatives.     Thyroid: Thyroid appears normal in size with no palpable thyroid " nodules.  Respiratory: Lungs are clear.  Cardiovascular: Heart rhythm is regular with no murmurs.  Musculoskeletal: No tremor of the outstretched hands. Patellar deep tendon reflexes are diminished.   Skin: No edema or ulcerations on the diabetic foot exam, but there is dry skin on the bottom of his feet. The dorsalis pedis and posterior tibialis pulses are 2+ bilaterally. Vibration sensation is slightly diminished to the first toe DIP joint on both sides. Monofilament sensation intact except diminished to the heels bilaterally.  Patient's shoes and socks removed.    Right Foot/Ankle   Right Foot Inspection  Skin Exam: skin normal and skin intact. No dry skin, no warmth, no callus, no erythema, no maceration, no abnormal color, no pre-ulcer, no ulcer and no callus.     Toe Exam: No swelling and  no right toe deformity    Sensory   Vibration: diminished  Monofilament testing: intact    Vascular  Capillary refills: < 3 seconds  The right DP pulse is 2+. The right PT pulse is 2+.     Left Foot/Ankle  Left Foot Inspection  Skin Exam: skin normal and skin intact. No dry skin, no warmth, no erythema, no maceration, normal color, no pre-ulcer, no ulcer and no callus.     Toe Exam: No swelling and no left toe deformity.     Sensory   Vibration: diminished  Monofilament testing: intact    Vascular  Capillary refills: < 3 seconds  The left DP pulse is 2+. The left PT pulse is 2+.     Assign Risk Category  No deformity present  Loss of protective sensation  No weak pulses  Risk: 1        The history was obtained from the review of the chart and from the patient.    Lab Results:    Most recent Alc is  Lab Results   Component Value Date    HGBA1C 9.0 (H) 04/11/2024               Lab Results   Component Value Date    CREATININE 0.85 04/11/2024    CREATININE 0.80 10/17/2023    CREATININE 0.85 06/01/2023    BUN 12 04/11/2024    K 5.0 04/11/2024     04/11/2024    CO2 26 04/11/2024     eGFR   Date Value Ref Range Status    04/11/2024 103 >59 mL/min/1.73 Final         Lab Results   Component Value Date    HDL 43 04/11/2024    TRIG 127 04/11/2024    CHOLHDL 2.8 04/11/2024       Lab Results   Component Value Date    ALT 30 04/11/2024    AST 23 04/11/2024       Lab Results   Component Value Date    TSH 11.000 (H) 04/11/2024    FREET4 0.88 04/11/2024         Blood work performed on 4/11/2024 showed a hemoglobin A1c of 9%.     TSH is 11 with a free T4 of 0.88.     Total cholesterol 119, triglyceride 127, HDL 43, LDL 53.    CBC is normal.     CMP showed a glucose of 240 fasting but was otherwise normal.     Future Appointments   Date Time Provider Department Center   7/24/2024  3:40 PM Nadine Tiwari MD ENDO [facility] [place]   10/17/2024  4:00 PM Dariusz Torres DPM [facility]       Transcribed for Nadine Tiwari MD, by  Yuly Campos on 04/18/24 at 5:48 PM. Powered by Dragon [employer].

## 2024-04-16 NOTE — PATIENT INSTRUCTIONS
Hgba1c is 9.0%. this is much higher.     Likely due to going off the medicines.     For now, no change in medications except get back up to the ozempic 2 mg once a week.     We'll try ordering the freestyle leona 3 sensors. Once you have the freestyle leona on and running, let us know and we can send you an email to link to us.     Continue the same levothyroxine and atorvastatin for now.     Follow up in 3 months with blood work.

## 2024-04-16 NOTE — PROGRESS NOTES
Assessment/Plan:   Discussed with patient the importance of better diabetic blood sugar control and how it will impact future complications with his feet.  Uncontrolled diabetes will damage his peripheral nervous system and lead to neuropathic changes that will put him at risk for developing ulcerations and difficulty healing.  Rx clotrimazole/betamethasone cream to be used daily for 3 to 4 weeks.  Follow-up in approximately 6 months.     Diagnoses and all orders for this visit:    Tinea pedis of both feet  -     clotrimazole-betamethasone (LOTRISONE) 1-0.05 % cream; Apply topically daily for 28 days    Psoriasis of nail    Type 2 diabetes mellitus with diabetic neuropathy, without long-term current use of insulin (HCC)    Other orders  -     esomeprazole (NexIUM) 40 MG capsule          Subjective:     Patient ID: Venu Pizarro is a 53 y.o. male.    4/16/2024: 53-year-old type II diabetic seen today for follow-up diabetic foot exam and reports that he continues to have trouble with athlete's foot.  Reports his diabetes has not been doing well with his last HbA1c of 9.0 on 4/11/2024.    10/17/2023: 53-year-old type II diabetic presents today for DFE concerned with dry flaking skin on both feet.  PMH of psoriasis.  Reports his diabetes been fairly well controlled with an HbA1c of 6.3.  Has been a diabetic for 25 years.    4/17/2023: 52-year-old type II diabetic presents for biannual diabetic foot exam.  Reports last A1c 7.5.  Concerned with increased dystrophic nature of great toenails.  Psoriasis has been doing well but has multiple lesions on both lower extremities.  Reports since he was started on Ozempic his lesions have improved        Review of Systems   Musculoskeletal: Negative.    Skin:  Positive for rash.         Objective:     Physical Exam  Constitutional:       Appearance: Normal appearance.   HENT:      Head: Normocephalic.      Right Ear: Tympanic membrane normal.      Left Ear: Tympanic membrane  normal.      Nose: No congestion.      Mouth/Throat:      Pharynx: No oropharyngeal exudate or posterior oropharyngeal erythema.   Eyes:      Conjunctiva/sclera: Conjunctivae normal.      Pupils: Pupils are equal, round, and reactive to light.   Cardiovascular:      Rate and Rhythm: Normal rate and regular rhythm.      Pulses: Normal pulses.   Pulmonary:      Effort: Pulmonary effort is normal.   Musculoskeletal:         General: Normal range of motion.   Feet:      Right foot:      Protective Sensation: 10 sites tested.  9 sites sensed.      Skin integrity: Dry skin present.      Toenail Condition: Right toenails are abnormally thick.      Left foot:      Protective Sensation: 10 sites tested.  9 sites sensed.      Skin integrity: Dry skin present.      Toenail Condition: Left toenails are abnormally thick.   Skin:     General: Skin is warm and dry.      Capillary Refill: Capillary refill takes 2 to 3 seconds.      Coloration: Skin is not pale.      Findings: No bruising, erythema, lesion or rash.      Comments: Chronic scaling flaking skin with serpiginous margins consistent with tinea.    Bilateral great toes are dystrophic with pitting and longitudinal ridges consistent with psoriasis.   Neurological:      Mental Status: He is alert.      Cranial Nerves: No cranial nerve deficit.      Sensory: No sensory deficit.      Motor: No weakness.      Gait: Gait normal.      Deep Tendon Reflexes: Reflexes normal.      Comments: Diminished vibratory sensation bilateral   Psychiatric:         Mood and Affect: Mood normal.         Behavior: Behavior normal.         Judgment: Judgment normal.

## 2024-04-17 ENCOUNTER — TELEPHONE (OUTPATIENT)
Age: 54
End: 2024-04-17

## 2024-04-17 NOTE — TELEPHONE ENCOUNTER
PA for FreeStyle Alexx 3 Sensor     Submitted via    []CMM-KEY   [x][employer]-Case ID #   []Faxed to plan   []Other website   []Phone call Case ID #     Office notes sent, clinical questions answered. Awaiting determination    Turnaround time for your insurance to make a decision on your Prior Authorization can take 7-21 business days.

## 2024-04-23 NOTE — TELEPHONE ENCOUNTER
PA for freestyle leona sensor Denied    Reason:  Coverage is provided if the patient is using an insulin regimen. This includes patients  on a basal insulin regimen, basal and prandial insulin regimen, prandial insulin  regimen, or continuous subcutaneous insulin infusion (insulin pump). Coverage cannot  be authorized at this time.      Message sent to office clinical pool Yes    Denial letter scanned into Media Yes    Appeal started No ( Provider will need to decide if appeal is warranted and send clinical documentation to PA team for initiation.)

## 2024-04-23 NOTE — TELEPHONE ENCOUNTER
Please submit prior auth again with the correct information. It says it was denied because he is not on insulin. I checked his med list and the doctors last note and they both show that he is on Semglee which is insulin.

## 2024-04-23 NOTE — TELEPHONE ENCOUNTER
PA for sensors Approved   Date(s) approved March 24, 2024 to April 23, 2025   Case #    Patient advised by [x] [place] Message                      [] Phone call       Pharmacy advised by [x]Fax                                     []Phone call    Approval letter scanned into Media No

## 2024-05-02 ENCOUNTER — VBI (OUTPATIENT)
Dept: ADMINISTRATIVE | Facility: OTHER | Age: 54
End: 2024-05-02

## 2024-05-09 DIAGNOSIS — Z79.4 TYPE 2 DIABETES MELLITUS WITH HYPERGLYCEMIA, WITH LONG-TERM CURRENT USE OF INSULIN (HCC): ICD-10-CM

## 2024-05-09 DIAGNOSIS — E11.65 TYPE 2 DIABETES MELLITUS WITH HYPERGLYCEMIA, WITH LONG-TERM CURRENT USE OF INSULIN (HCC): ICD-10-CM

## 2024-05-09 RX ORDER — INSULIN GLARGINE-YFGN 100 [IU]/ML
INJECTION, SOLUTION SUBCUTANEOUS
Qty: 60 ML | Refills: 3 | Status: SHIPPED | OUTPATIENT
Start: 2024-05-09

## 2024-05-13 DIAGNOSIS — E03.9 HYPOTHYROIDISM, UNSPECIFIED TYPE: ICD-10-CM

## 2024-05-13 RX ORDER — LEVOTHYROXINE SODIUM 0.07 MG/1
TABLET ORAL
Qty: 102 TABLET | Refills: 3 | Status: SHIPPED | OUTPATIENT
Start: 2024-05-13

## 2024-05-20 DIAGNOSIS — E11.65 UNCONTROLLED TYPE 2 DIABETES MELLITUS WITH HYPERGLYCEMIA (HCC): ICD-10-CM

## 2024-05-20 RX ORDER — BLOOD-GLUCOSE SENSOR
EACH MISCELLANEOUS
Qty: 2 EACH | Refills: 5 | Status: SHIPPED | OUTPATIENT
Start: 2024-05-20

## 2024-05-23 DIAGNOSIS — E11.65 UNCONTROLLED TYPE 2 DIABETES MELLITUS WITH HYPERGLYCEMIA (HCC): ICD-10-CM

## 2024-05-23 RX ORDER — GLIPIZIDE 10 MG/1
TABLET, FILM COATED, EXTENDED RELEASE ORAL
Qty: 90 TABLET | Refills: 1 | Status: SHIPPED | OUTPATIENT
Start: 2024-05-23

## 2024-06-10 ENCOUNTER — TELEPHONE (OUTPATIENT)
Age: 54
End: 2024-06-10

## 2024-06-10 NOTE — TELEPHONE ENCOUNTER
Reason for call: OUT OF MEDICATION  Patient requesting a refill of his anxiety medication stated he had stopped it but would like to start again taking it again but, he does not know the name of the medication.    While speaking with Patient he received a phone call and asked me to hold on but the call was disconnected. I got all the information so I am routing to the office for review    [x] Refill   [] Prior Auth  [] Other:     Office:   [x] PCP/Provider - Dr Baker  [] Specialty/Provider -     Medication: ? (Anxiety medication)    Dose/Frequency: ?    Quantity: 90D    Pharmacy: [facility] on file     Does the patient have enough for 3 days?   [] Yes   [x] No - Send as HP to POD

## 2024-06-10 NOTE — TELEPHONE ENCOUNTER
Can make virtual appt or in office appt to discuss mood.  Has not been seen since Aug 23 and is overdue for physical and BP check - should schedule PE as well

## 2024-06-14 DIAGNOSIS — E11.65 UNCONTROLLED TYPE 2 DIABETES MELLITUS WITH HYPERGLYCEMIA (HCC): ICD-10-CM

## 2024-06-17 ENCOUNTER — OFFICE VISIT (OUTPATIENT)
Dept: FAMILY MEDICINE CLINIC | Facility: HOSPITAL | Age: 54
End: 2024-06-17
Payer: COMMERCIAL

## 2024-06-17 VITALS
HEART RATE: 80 BPM | HEIGHT: 68 IN | DIASTOLIC BLOOD PRESSURE: 82 MMHG | BODY MASS INDEX: 36.98 KG/M2 | WEIGHT: 244 LBS | OXYGEN SATURATION: 93 % | SYSTOLIC BLOOD PRESSURE: 118 MMHG

## 2024-06-17 DIAGNOSIS — I10 ESSENTIAL HYPERTENSION: ICD-10-CM

## 2024-06-17 DIAGNOSIS — F41.9 ANXIETY: Primary | ICD-10-CM

## 2024-06-17 PROCEDURE — 99213 OFFICE O/P EST LOW 20 MIN: CPT | Performed by: NURSE PRACTITIONER

## 2024-06-17 RX ORDER — FLUOXETINE 10 MG/1
TABLET, FILM COATED ORAL
Qty: 60 TABLET | Refills: 1 | Status: SHIPPED | OUTPATIENT
Start: 2024-06-17

## 2024-06-17 NOTE — PROGRESS NOTES
[facility]   Cleina BLAKE    Assessment/Plan:   1. Anxiety  Assessment & Plan:  HX anxiety with depression, increased situational due to stress with 18yr old son.  Not currently in therapy. Associated sleep disturbance.  Discussed how CBT along with medication is best practice.  Will restart fluoxetine 10mg daily x2 week then increase to 20mg daily.   Behavioral health referral in place.     Orders:  -     Ambulatory referral to Psych Services; Future  -     FLUoxetine (PROzac) 10 MG tablet; Take 10mg daily for 2 weeks then increase to 20mg daily.  2. Essential hypertension  Assessment & Plan:  Well controlled on olmesartan 40mg daily.       Restart fluoxetine 10mg daily for two weeks then increase to 20mg daily.  Referral to behavioral health.  Return in about 4 weeks (around 7/15/2024) for Annual physical, Recheck.  Patient may call or return to office with any questions or concerns.     ______________________________________________________________________  Subjective:     Patient ID: Venu Pizarro is a 54 y.o. male.  Venu Pizarro  Chief Complaint   Patient presents with    Anxiety       Increased situational anxiety with some associated depression.  Hx anxiety but not currently medicated.  Intermittent sleep disturbance.  Not currently in therapy.    Was on fluoxetine in the past and found it helpful.  Would like to restart this medication               The following portions of the patient's history were reviewed and updated as appropriate: allergies, current medications, past family history, past medical history, past social history, past surgical history, and problem list.    Review of Systems   Constitutional: Negative.  Negative for activity change, appetite change, chills, fatigue and fever.   HENT: Negative.  Negative for congestion, ear pain, postnasal drip and sinus pain.    Eyes: Negative.    Respiratory: Negative.  Negative for cough and shortness of breath.     Cardiovascular: Negative.  Negative for chest pain and leg swelling.   Gastrointestinal: Negative.  Negative for constipation and diarrhea.   Endocrine: Negative.    Genitourinary: Negative.  Negative for dysuria.   Musculoskeletal: Negative.    Skin: Negative.    Allergic/Immunologic: Negative.  Negative for immunocompromised state.   Neurological: Negative.  Negative for dizziness and light-headedness.   Hematological: Negative.    Psychiatric/Behavioral:  Positive for dysphoric mood and sleep disturbance. The patient is nervous/anxious.          Objective:      Vitals:    06/17/24 1010   BP: 118/82   Pulse: 80   SpO2: 93%      Physical Exam  Vitals and nursing note reviewed.   Constitutional:       Appearance: Normal appearance. He is obese.   HENT:      Head: Normocephalic and atraumatic.      Right Ear: Tympanic membrane, ear canal and external ear normal.      Left Ear: Tympanic membrane, ear canal and external ear normal.      Nose: Nose normal.      Mouth/Throat:      Mouth: Mucous membranes are moist.      Pharynx: Oropharynx is clear.   Eyes:      Extraocular Movements: Extraocular movements intact.      Conjunctiva/sclera: Conjunctivae normal.      Pupils: Pupils are equal, round, and reactive to light.   Cardiovascular:      Rate and Rhythm: Normal rate and regular rhythm.      Pulses: Normal pulses.      Heart sounds: Normal heart sounds.   Pulmonary:      Effort: Pulmonary effort is normal.      Breath sounds: Normal breath sounds.   Abdominal:      General: Bowel sounds are normal.      Palpations: Abdomen is soft.   Musculoskeletal:         General: Normal range of motion.      Cervical back: Normal range of motion and neck supple.   Skin:     General: Skin is warm and dry.      Findings: Rash present.      Comments: psoriasis   Neurological:      General: No focal deficit present.      Mental Status: He is alert and oriented to person, place, and time.   Psychiatric:         Mood and Affect: Mood  "normal.         Behavior: Behavior normal.         Thought Content: Thought content normal.         Judgment: Judgment normal.           Portions of the record may have been created with voice recognition software. Occasional wrong word or \"sound alike\" substitutions may have occurred due to the inherent limitations of voice recognition software. Please review the chart carefully and recognize, using context, where substitutions/typographical errors may have occurred.       "

## 2024-06-17 NOTE — PATIENT INSTRUCTIONS
Restart fluoxetine 10mg daily for two weeks then increase to 20mg daily.  Referral to behavioral health.

## 2024-06-17 NOTE — ASSESSMENT & PLAN NOTE
HX anxiety with depression, increased situational due to stress with 18yr old son.  Not currently in therapy. Associated sleep disturbance.  Discussed how CBT along with medication is best practice.  Will restart fluoxetine 10mg daily x2 week then increase to 20mg daily.   Behavioral health referral in place.

## 2024-06-21 DIAGNOSIS — E11.65 UNCONTROLLED TYPE 2 DIABETES MELLITUS WITH HYPERGLYCEMIA (HCC): ICD-10-CM

## 2024-06-21 RX ORDER — EMPAGLIFLOZIN 25 MG/1
TABLET, FILM COATED ORAL
Qty: 90 TABLET | Refills: 1 | Status: SHIPPED | OUTPATIENT
Start: 2024-06-21

## 2024-07-02 ENCOUNTER — TELEPHONE (OUTPATIENT)
Dept: PSYCHIATRY | Facility: CLINIC | Age: 54
End: 2024-07-02

## 2024-07-15 DIAGNOSIS — E11.65 UNCONTROLLED TYPE 2 DIABETES MELLITUS WITH HYPERGLYCEMIA (HCC): ICD-10-CM

## 2024-07-15 RX ORDER — PEN NEEDLE, DIABETIC 32GX 5/32"
NEEDLE, DISPOSABLE MISCELLANEOUS
Qty: 90 EACH | Refills: 3 | Status: SHIPPED | OUTPATIENT
Start: 2024-07-15

## 2024-07-15 NOTE — PROGRESS NOTES
History of Present Illness   Well Adult Physical   Patient here for a comprehensive physical exam.  Returns for physical.  Restarted fluoxetine 10mg daily which is effective in managing anxiety/depression.  Has not felt the need to increase to 20mg.  Has not established with behavioral health; encouraged to do so.  Upcoming f/u scheduled with endocrine.  Has FreeStyle leona and blood sugar improved with A1C 6.3  Concerned about left shoulder pain with limited ROM for the past 6 months.  Unable to sleep on his left side due to pain.  No injury associated; is left handed and did play baseball in youth.  No decreased  strength, paresthesia associated.  Has used voltaren gel prn without much improvement.           Diet and Physical Activity  Diet: well balanced diet  Weight concerns: Patient has class 2 obesity (BMI 35.0-39.9)  Exercise: intermittently   EtOH: rare/occasional/daily/social/none: no  Marijuana: none     Depression Screen  PHQ-2/9 Depression Screening              General Health  Hearing: Slighty decreased: bilateral  Vision: goes for regular eye exams  Dental: regular dental visits    Cancer Screening  Colononoscopy 2019  PSA ordered    Smoker never   Annual screening with low-dose helical computed tomography (CT) for patients age 55 to 74 years with history of smoking at least 30 pack-years and, if a former smoker, had quit within the previous 15 years         The following portions of the patient's history were reviewed and updated as appropriate: allergies, current medications, past family history, past medical history, past social history, past surgical history and problem list.    Review of Systems     Review of Systems   Constitutional: Negative.  Negative for activity change, appetite change, chills, fatigue and fever.   HENT: Negative.  Negative for congestion, ear pain, postnasal drip and sinus pain.    Eyes: Negative.    Respiratory: Negative.  Negative for cough and shortness of breath.     Cardiovascular: Negative.  Negative for chest pain and leg swelling.   Gastrointestinal: Negative.  Negative for abdominal pain, constipation, diarrhea and nausea.   Endocrine: Negative.    Genitourinary: Negative.  Negative for difficulty urinating, dysuria and frequency.   Musculoskeletal:  Positive for arthralgias.   Skin:  Positive for rash.   Allergic/Immunologic: Negative.  Negative for immunocompromised state.   Neurological: Negative.  Negative for dizziness and light-headedness.   Hematological: Negative.    Psychiatric/Behavioral:  Positive for sleep disturbance. The patient is nervous/anxious.        Past Medical History     Past Medical History:   Diagnosis Date    Anxiety 02/22/2019    Asthma 05/21/2019    GERD (gastroesophageal reflux disease) 12/19/2013    Hypertriglyceridemia 07/08/2015    Hypothyroidism     Migraine without aura and without status migrainosus, not intractable 05/21/2019    Psoriasis     Seasonal allergies 05/21/2019    Dr. Sheppard    Vitamin D deficiency        Past Surgical History     Past Surgical History:   Procedure Laterality Date    CATARACT EXTRACTION, BILATERAL  2017    CHOLECYSTECTOMY  2000    lap    UNDESCENDED TESTICLE EXPLORATION Bilateral     as a child       Social History     Social History     Socioeconomic History    Marital status: /Civil Union     Spouse name: None    Number of children: None    Years of education: None    Highest education level: None   Occupational History    Occupation:    Tobacco Use    Smoking status: Never    Smokeless tobacco: Never   Vaping Use    Vaping status: Never Used   Substance and Sexual Activity    Alcohol use: Not Currently    Drug use: Never    Sexual activity: None   Other Topics Concern    None   Social History Narrative    , full time employment as      Social Determinants of Health     Financial Resource Strain: Not on file   Food Insecurity: Not on file   Transportation Needs: Not on  file   Physical Activity: Not on file   Stress: Not on file   Social Connections: Not on file   Intimate Partner Violence: Not on file   Housing Stability: Not on file       Family History     Family History   Problem Relation Age of Onset    Diabetes Mother     Thyroid disease Mother         post thyroidectomy    Stroke Mother     Diabetes type II Mother     Thyroid disease Father         post thyroidectomy    Coronary artery disease Father     No Known Problems Brother     No Known Problems Brother     Colon cancer Neg Hx     Colon polyps Neg Hx        Current Medications       Current Outpatient Medications:     albuterol (2.5 mg/3 mL) 0.083 % nebulizer solution, Take 1 vial (2.5 mg total) by nebulization every 6 (six) hours as needed for wheezing or shortness of breath, Disp: 120 vial, Rfl: 2    atorvastatin (LIPITOR) 10 mg tablet, TAKE 1 TABLET DAILY, Disp: 90 tablet, Rfl: 3    BD Pen Needle Shanelle 2nd Gen 32G X 4 MM MISC, INJECT ONCE DAILY, Disp: 90 each, Rfl: 3    Bimekizumab-bkzx (Bimzelx) 160 MG/ML SOAJ, Inject under the skin every 30 (thirty) days, Disp: , Rfl:     Butalbital-APAP-Caffeine -40 MG CAPS, Take 1 capsule by mouth every 8 (eight) hours as needed (headache), Disp: 30 capsule, Rfl: 0    cetirizine (ZyrTEC) 10 mg tablet, Take 1 tablet by mouth daily as needed, Disp: , Rfl:     cholecalciferol (VITAMIN D3) 1,000 units tablet, Take 1 tablet (1,000 Units total) by mouth daily, Disp: , Rfl:     clotrimazole-betamethasone (LOTRISONE) 1-0.05 % cream, Apply topically daily for 28 days, Disp: 45 g, Rfl: 1    Continuous Glucose Sensor (FreeStyle Alexx 3 Sensor) MISC, Apply every 14 days to check blood sugars 4-10 times a day, Disp: 2 each, Rfl: 5    Empagliflozin (Jardiance) 25 MG TABS, TAKE 1 TABLET DAILY IN THE MORNING, Disp: 90 tablet, Rfl: 1    FLUoxetine (PROzac) 10 MG tablet, Take 10mg daily for 2 weeks then increase to 20mg daily., Disp: 60 tablet, Rfl: 1    fluticasone (FLONASE) 50 mcg/act  "nasal spray, USE 2 SPRAYS IN EACH NOSTRIL DAILY, Disp: 48 g, Rfl: 3    fluticasone-umeclidinium-vilanterol (Trelegy Ellipta) 100-62.5-25 MCG/INH inhaler, Inhale 1 puff daily Rinse mouth after use., Disp:  , Rfl:     glipiZIDE (GLUCOTROL XL) 10 mg 24 hr tablet, TAKE 1 TABLET DAILY, Disp: 90 tablet, Rfl: 1    levothyroxine 75 mcg tablet, TAKE 1 TABLET ON MONDAY THROUGH SATURDAY AND 2 TABLETS ON SUNDAY, Disp: 102 tablet, Rfl: 3    meclizine (ANTIVERT) 12.5 MG tablet, Take 1 tablet (12.5 mg total) by mouth every 8 (eight) hours as needed for dizziness, Disp: 20 tablet, Rfl: 0    metFORMIN (GLUCOPHAGE) 1000 MG tablet, Take one (1,000 mg) tablet twice a day, Disp: 180 tablet, Rfl: 3    olmesartan (BENICAR) 40 mg tablet, Take 1 tablet (40 mg total) by mouth daily, Disp: 90 tablet, Rfl: 1    pantoprazole (PROTONIX) 40 mg tablet, TAKE 1 TABLET DAILY (Patient taking differently: As needed), Disp: 90 tablet, Rfl: 3    semaglutide, 2 mg/dose, (Ozempic) 8 mg/ mL injection pen, Inject 0.75 mL (2 mg total) under the skin every 7 days, Disp: 9 mL, Rfl: 1    Semglee, yfgn, 100 UNIT/ML SOPN, INJECT UP TO 60 UNITS DAILY, Disp: 60 mL, Rfl: 3    albuterol (PROVENTIL HFA,VENTOLIN HFA) 90 mcg/act inhaler, Inhale 1 puff every 4 (four) hours as needed (Patient not taking: Reported on 6/17/2024), Disp: , Rfl:      Allergies     No Known Allergies    Objective     /78   Pulse 75   Ht 5' 8\" (1.727 m)   Wt 111 kg (245 lb)   SpO2 95%   BMI 37.25 kg/m²      Physical Exam  Vitals and nursing note reviewed.   Constitutional:       Appearance: Normal appearance. He is obese.   HENT:      Head: Normocephalic and atraumatic.      Right Ear: Tympanic membrane, ear canal and external ear normal. There is impacted cerumen.      Left Ear: Tympanic membrane, ear canal and external ear normal. There is no impacted cerumen.      Nose: Nose normal.      Mouth/Throat:      Mouth: Mucous membranes are moist.      Pharynx: Oropharynx is clear.   Eyes: "      Extraocular Movements: Extraocular movements intact.      Conjunctiva/sclera: Conjunctivae normal.      Pupils: Pupils are equal, round, and reactive to light.   Cardiovascular:      Rate and Rhythm: Normal rate and regular rhythm.      Pulses: Normal pulses.      Heart sounds: Normal heart sounds.   Pulmonary:      Effort: Pulmonary effort is normal.      Breath sounds: Normal breath sounds.   Abdominal:      General: Bowel sounds are normal.      Palpations: Abdomen is soft.   Musculoskeletal:      Left shoulder: Bony tenderness present. No swelling or effusion. Decreased range of motion. Normal strength. Normal pulse.      Cervical back: Normal range of motion and neck supple.      Comments: Abnormal apley scratch test with negative drop arm test   Skin:     General: Skin is warm and dry.      Comments: Psoriatic plaques   Neurological:      General: No focal deficit present.      Mental Status: He is alert and oriented to person, place, and time.      Cranial Nerves: Cranial nerves 2-12 are intact.      Motor: Motor function is intact.      Coordination: Coordination is intact.      Gait: Gait is intact.   Psychiatric:         Mood and Affect: Mood normal.         Behavior: Behavior normal.         Thought Content: Thought content normal.         Judgment: Judgment normal.           No results found.    Health Maintenance     Health Maintenance   Topic Date Due    HIV Screening  Never done    COVID-19 Vaccine (5 - 2023-24 season) 09/01/2023    Kidney Health Evaluation: Albumin/Creatinine Ratio  11/11/2023    Influenza Vaccine (1) 09/01/2024    HEMOGLOBIN A1C  10/11/2024    Kidney Health Evaluation: GFR  04/11/2025    Diabetic Foot Exam  04/16/2025    Depression Screening  06/17/2025    Annual Physical  07/16/2025    DM Eye Exam  08/14/2025    DTaP,Tdap,and Td Vaccines (2 - Td or Tdap) 02/18/2026    RSV Vaccine Age 60+ Years (1 - 1-dose 60+ series) 05/28/2028    Colorectal Cancer Screening  10/23/2029     Hepatitis C Screening  Completed    Zoster Vaccine  Completed    Pneumococcal Vaccine: Pediatrics (0 to 5 Years) and At-Risk Patients (6 to 64 Years)  Completed    RSV Vaccine age 0-20 Months  Aged Out    HIB Vaccine  Aged Out    IPV Vaccine  Aged Out    Hepatitis A Vaccine  Aged Out    Meningococcal ACWY Vaccine  Aged Out    HPV Vaccine  Aged Out     Immunization History   Administered Date(s) Administered    COVID-19 MODERNA VACC 0.5 ML IM 03/25/2021, 04/23/2021, 11/05/2021    COVID-19 PFIZER VACCINE 0.3 ML IM 12/27/2022    INFLUENZA 12/19/2013, 11/18/2015, 11/12/2016, 09/18/2017, 09/18/2017, 12/01/2018, 12/01/2018, 10/18/2022    Influenza, injectable, quadrivalent, preservative free 0.5 mL 11/20/2023    Influenza, recombinant, quadrivalent,injectable, preservative free 09/30/2019, 10/07/2020, 09/23/2021, 10/18/2022    Pneumococcal Conjugate Vaccine 20-valent (Pcv20), Polysace 02/28/2023    Pneumococcal Polysaccharide PPV23 12/19/2013, 05/14/2021    Tdap 02/18/2016    Tuberculin Skin Test-PPD Intradermal 11/21/2015       Laboratory Results:   Lab Results   Component Value Date    WBC 8.2 04/11/2024    WBC 8.1 10/17/2023    WBC 8.1 06/01/2023    HGB 16.3 04/11/2024    HGB 16.9 10/17/2023    HGB 16.7 06/01/2023    HCT 48.0 04/11/2024    HCT 50.4 10/17/2023    HCT 48.4 06/01/2023    MCV 85 04/11/2024    MCV 85 10/17/2023    MCV 84 06/01/2023     04/11/2024     10/17/2023     06/01/2023     Lab Results   Component Value Date    BUN 12 04/11/2024    BUN 17 10/17/2023    BUN 14 06/01/2023     Lab Results   Component Value Date    GLUC 240 (H) 04/11/2024    GLUC 178 (H) 10/17/2023    GLUC 132 (H) 06/01/2023    ALT 30 04/11/2024    ALT 37 10/17/2023    ALT 21 06/01/2023    AST 23 04/11/2024    AST 26 10/17/2023    AST 19 06/01/2023     Lab Results   Component Value Date    TSH 11.000 (H) 04/11/2024    TSH 6.460 (H) 10/17/2023    TSH 4.850 (H) 06/01/2023     Lab Results   Component Value Date    HGBA1C  "9.0 (H) 04/11/2024    HGBA1C 8.7 (H) 10/17/2023    HGBA1C 6.4 (H) 06/01/2023       Lipid Profile:   No results found for: \"CHOL\"  Lab Results   Component Value Date    HDL 43 04/11/2024    HDL 38 (L) 06/01/2023    HDL 43 11/11/2022     No results found for: \"LDLC\"  Lab Results   Component Value Date    LDLCALC 53 04/11/2024    LDLCALC 54 06/01/2023    LDLCALC 59 11/11/2022     Lab Results   Component Value Date    TRIG 127 04/11/2024    TRIG 148 06/01/2023    TRIG 135 11/11/2022       Assessment/Plan   1. Well adult exam  2. Essential hypertension  Assessment & Plan:  Well controlled on olmesartan 40mg daily  3. Anxiety  Assessment & Plan:  Restarted fluoxetine 10mg daily since last OV which he feels is effective in managing recurrent anxiety/depression.  Has not established with psychotherapy; encouraged to do so.    4. Acute pain of left shoulder  -     XR shoulder 2+ vw left; Future; Expected date: 07/16/2024  -     Ambulatory Referral to Orthopedic Surgery; Future  5. Hearing loss of right ear due to cerumen impaction  6. Vitamin D deficiency  Assessment & Plan:  Hx vitamin D deficiency.  Adherent to taking vitamin D3 1000 IU daily.  Will recheck level with upcoming labs.   Orders:  -     Vitamin D 25 hydroxy; Future  -     Vitamin D 25 hydroxy  7. Screening for prostate cancer  -     PSA, total and free; Future  -     PSA, total and free      1. Healthy male exam.  2. Patient Counseling:   Nutrition: Stressed importance of a well balanced diet, moderation of sodium/saturated fat, caloric balance and sufficient intake of fiber  Exercise: Stressed the importance of regular exercise with a goal of 150 minutes per week  Dental Health: Discussed daily flossing and brushing and regular dental visits   Injury Prevention: Discussed Safety Belts, Safety Helmets, and Smoke Detectors    Immunizations reviewed.   Discussed benefits of screening .  Discussed the patient's BMI with him.  The BMI is above average; BMI " management plan is completed  3. Cancer Screening   4. Labs ordered/to be completed  5. Xray left shoulder and referral to orthopedics.  Use debrox or hydrogen peroxide on ear and return for lavage  6. Follow up next physical in 1 year.    HAYDEN Vera

## 2024-07-16 ENCOUNTER — OFFICE VISIT (OUTPATIENT)
Dept: FAMILY MEDICINE CLINIC | Facility: HOSPITAL | Age: 54
End: 2024-07-16
Payer: COMMERCIAL

## 2024-07-16 VITALS
HEART RATE: 75 BPM | HEIGHT: 68 IN | OXYGEN SATURATION: 95 % | SYSTOLIC BLOOD PRESSURE: 114 MMHG | WEIGHT: 245 LBS | BODY MASS INDEX: 37.13 KG/M2 | DIASTOLIC BLOOD PRESSURE: 78 MMHG

## 2024-07-16 DIAGNOSIS — F41.9 ANXIETY: ICD-10-CM

## 2024-07-16 DIAGNOSIS — I10 ESSENTIAL HYPERTENSION: ICD-10-CM

## 2024-07-16 DIAGNOSIS — H61.21 HEARING LOSS OF RIGHT EAR DUE TO CERUMEN IMPACTION: ICD-10-CM

## 2024-07-16 DIAGNOSIS — Z12.5 SCREENING FOR PROSTATE CANCER: ICD-10-CM

## 2024-07-16 DIAGNOSIS — M25.512 ACUTE PAIN OF LEFT SHOULDER: ICD-10-CM

## 2024-07-16 DIAGNOSIS — Z00.00 WELL ADULT EXAM: Primary | ICD-10-CM

## 2024-07-16 DIAGNOSIS — E55.9 VITAMIN D DEFICIENCY: ICD-10-CM

## 2024-07-16 PROCEDURE — 99396 PREV VISIT EST AGE 40-64: CPT | Performed by: NURSE PRACTITIONER

## 2024-07-16 PROCEDURE — 99213 OFFICE O/P EST LOW 20 MIN: CPT | Performed by: NURSE PRACTITIONER

## 2024-07-16 NOTE — ASSESSMENT & PLAN NOTE
Restarted fluoxetine 10mg daily since last OV which he feels is effective in managing recurrent anxiety/depression.  Has not established with psychotherapy; encouraged to do so.

## 2024-07-16 NOTE — ASSESSMENT & PLAN NOTE
Hx vitamin D deficiency.  Adherent to taking vitamin D3 1000 IU daily.  Will recheck level with upcoming labs.

## 2024-07-19 LAB
25(OH)D3+25(OH)D2 SERPL-MCNC: 31.3 NG/ML (ref 30–100)
ALBUMIN SERPL-MCNC: 4.2 G/DL (ref 3.8–4.9)
ALBUMIN/CREAT UR: 5 MG/G CREAT (ref 0–29)
ALP SERPL-CCNC: 96 IU/L (ref 44–121)
ALT SERPL-CCNC: 42 IU/L (ref 0–44)
AST SERPL-CCNC: 37 IU/L (ref 0–40)
BILIRUB SERPL-MCNC: 0.5 MG/DL (ref 0–1.2)
BUN SERPL-MCNC: 14 MG/DL (ref 6–24)
BUN/CREAT SERPL: 15 (ref 9–20)
CALCIUM SERPL-MCNC: 9.4 MG/DL (ref 8.7–10.2)
CHLORIDE SERPL-SCNC: 101 MMOL/L (ref 96–106)
CO2 SERPL-SCNC: 27 MMOL/L (ref 20–29)
CREAT SERPL-MCNC: 0.91 MG/DL (ref 0.76–1.27)
CREAT UR-MCNC: 138.1 MG/DL
EGFR: 99 ML/MIN/1.73
EST. AVERAGE GLUCOSE BLD GHB EST-MCNC: 143 MG/DL
GLOBULIN SER-MCNC: 2.8 G/DL (ref 1.5–4.5)
GLUCOSE SERPL-MCNC: 118 MG/DL (ref 70–99)
HBA1C MFR BLD: 6.6 % (ref 4.8–5.6)
MICROALBUMIN UR-MCNC: 7.1 UG/ML
POTASSIUM SERPL-SCNC: 4.9 MMOL/L (ref 3.5–5.2)
PROT SERPL-MCNC: 7 G/DL (ref 6–8.5)
PSA FREE MFR SERPL: 60 %
PSA FREE SERPL-MCNC: 0.12 NG/ML
PSA SERPL-MCNC: 0.2 NG/ML (ref 0–4)
SODIUM SERPL-SCNC: 141 MMOL/L (ref 134–144)
T4 FREE SERPL-MCNC: 1.03 NG/DL (ref 0.82–1.77)
TSH SERPL DL<=0.005 MIU/L-ACNC: 3.08 UIU/ML (ref 0.45–4.5)

## 2024-07-24 ENCOUNTER — OFFICE VISIT (OUTPATIENT)
Dept: ENDOCRINOLOGY | Facility: HOSPITAL | Age: 54
End: 2024-07-24
Payer: COMMERCIAL

## 2024-07-24 VITALS
BODY MASS INDEX: 37.38 KG/M2 | DIASTOLIC BLOOD PRESSURE: 82 MMHG | WEIGHT: 246.6 LBS | SYSTOLIC BLOOD PRESSURE: 118 MMHG | HEIGHT: 68 IN | HEART RATE: 81 BPM

## 2024-07-24 DIAGNOSIS — E78.1 HYPERTRIGLYCERIDEMIA: ICD-10-CM

## 2024-07-24 DIAGNOSIS — E11.42 DIABETIC POLYNEUROPATHY ASSOCIATED WITH TYPE 2 DIABETES MELLITUS (HCC): ICD-10-CM

## 2024-07-24 DIAGNOSIS — I10 ESSENTIAL HYPERTENSION: ICD-10-CM

## 2024-07-24 DIAGNOSIS — E03.9 ACQUIRED HYPOTHYROIDISM: ICD-10-CM

## 2024-07-24 DIAGNOSIS — E11.65 UNCONTROLLED TYPE 2 DIABETES MELLITUS WITH HYPERGLYCEMIA (HCC): Primary | ICD-10-CM

## 2024-07-24 PROCEDURE — 95251 CONT GLUC MNTR ANALYSIS I&R: CPT | Performed by: INTERNAL MEDICINE

## 2024-07-24 PROCEDURE — 99214 OFFICE O/P EST MOD 30 MIN: CPT | Performed by: INTERNAL MEDICINE

## 2024-07-24 NOTE — PROGRESS NOTES
7/24/2024    Assessment & Plan      Diagnoses and all orders for this visit:    Uncontrolled type 2 diabetes mellitus with hyperglycemia (HCC)  -     Comprehensive metabolic panel; Future  -     Hemoglobin A1C; Future  -     TSH, 3rd generation; Future  -     T4, free; Future  -     Comprehensive metabolic panel  -     Hemoglobin A1C  -     TSH, 3rd generation  -     T4, free    Diabetic polyneuropathy associated with type 2 diabetes mellitus (HCC)  -     Comprehensive metabolic panel; Future  -     Hemoglobin A1C; Future  -     TSH, 3rd generation; Future  -     T4, free; Future  -     Comprehensive metabolic panel  -     Hemoglobin A1C  -     TSH, 3rd generation  -     T4, free    Acquired hypothyroidism  -     Comprehensive metabolic panel; Future  -     Hemoglobin A1C; Future  -     TSH, 3rd generation; Future  -     T4, free; Future  -     Comprehensive metabolic panel  -     Hemoglobin A1C  -     TSH, 3rd generation  -     T4, free    Essential hypertension  -     Comprehensive metabolic panel; Future  -     Hemoglobin A1C; Future  -     TSH, 3rd generation; Future  -     T4, free; Future  -     Comprehensive metabolic panel  -     Hemoglobin A1C  -     TSH, 3rd generation  -     T4, free    Hypertriglyceridemia  -     Comprehensive metabolic panel; Future  -     Hemoglobin A1C; Future  -     TSH, 3rd generation; Future  -     T4, free; Future  -     Comprehensive metabolic panel  -     Hemoglobin A1C  -     TSH, 3rd generation  -     T4, free          Assessment & Plan  1. Type 2 diabetes, insulin requiring.  Most recent hemoglobin A1c is 6.6 percent, which demonstrates excellent control of his diabetes. This is much better than last visit as he has worked on his dietary changes and is now on a FreeStyle Alexx. He is now back on Ozempic. He will continue the same Jardiance, glipizide, Ozempic, metformin, and Semglee insulin. If he has more frequent hypoglycemia, we can either decrease the glipizide or Semglee  insulin.    2. Diabetic neuropathy.  He has no neuropathic symptoms. Diabetic foot exams are up to date. He does have unusual blisters on his feet, possibly from a medication, which I am going to defer to dermatology.    3. Hypothyroidism.  Most recent thyroid function studies are normal. He is clinically and biochemically euthyroid. He will continue the same levothyroxine 75 mcg daily.    4. Hypertension.  He is normotensive in the office. He will continue the same Olmesartan.    5. Hyperlipidemia with hypertriglyceridemia.  He will continue the same atorvastatin 10 mg daily.    Follow-up  The patient will follow up in 3 months with preceding hemoglobin A1c, CMP, TSH, and free T4.        CC: Diabetes type II, thyroid, blood pressure, lipid follow-up      History of Present Illness    HPI: Venu Pizarro is a 54-year-old male with type 2 diabetes, insulin requiring, with neuropathy diagnosed 18 years ago, hypothyroidism, hypertension, hyperlipidemia, here for a follow-up visit.    Diabetic complications include neuropathy.  He denies nephropathy, retinopathy, heart attack, stroke, or claudication.    He is managing his diabetes with Jardiance 25 mg daily, Glipizide XL 10 mg daily, Ozempic 1 mg weekly, metformin 1000 mg twice daily, and Semglee insulin 35 units daily. He had to temporarily discontinue Ozempic due to insurance and availability issues, but has resumed it for the past 3 weeks. He reports no unusual urination or nocturnal urination, but admits to poor sleep. He denies excessive thirst or hunger.     He denies numbness or tingling in his feet. He has blisters on his feet, which he attributes to his psoriasis medication, Bimzelx. His dermatologist administered a second injection for his psoriasis, but this did not provide relief. He is on a maintenance regimen of Bimzelx every other month, but plans to discontinue it until his foot condition improves. He last saw his podiatrist less than 6 months ago.      He denies blurry vision and sees his eye doctor annually.     For his hypothyroidism, he is on levothyroxine 75 mcg, 1 pill 6 days a week and 2 pills on Sunday.He denies excessive sweating or cold intolerance. He experiences constipation, for which he takes Actvia, otherwise he can go 3 to 4 days without a bowel movement. He denies heart racing, palpitations, tremors, or shaky hands. He does not feel excessively tired during the day, occasionally taking a nap from 5 to 6. He occasionally experiences headaches. He denies lightheadedness or dizziness upon standing, but experiences it if he lies his head back too far. He denies chest pain or shortness of breath.      He is on Olmesartan 40 mg daily for hypertension. He occasionally experiences headaches. He denies lightheadedness or dizziness upon standing, but experiences it if he lies his head back too far.     He is on atorvastatin 10 mg daily for cholesterol management.He denies chest pain or shortness of breath.    Supplemental Information  He has a dentist appointment next week and he can not lay flat.    Blood Sugar/Glucometer/Pump/CGM review: He utilizes a freestyle leona 3 continuous glucose monitoring system to test his blood sugars throughout the day.  Freestyle leona 3 download from 7/11/2024 through 7/20/2024 was reviewed in the office today.  CGM was active 86% of the time.  Average glucose is 121 mg/dL with a glucose variability of 23.5%.  95% of blood sugars are in target range, 3% high, 0% very high, 2% low, and 0% very low.      Historical Information   Past Medical History:   Diagnosis Date    Anxiety 02/22/2019    Asthma 05/21/2019    GERD (gastroesophageal reflux disease) 12/19/2013    Hypertriglyceridemia 07/08/2015    Hypothyroidism     Migraine without aura and without status migrainosus, not intractable 05/21/2019    Psoriasis     Seasonal allergies 05/21/2019    Dr. Sheppard    Vitamin D deficiency      Past Surgical History:   Procedure  Laterality Date    CATARACT EXTRACTION, BILATERAL  2017    CHOLECYSTECTOMY  2000    lap    UNDESCENDED TESTICLE EXPLORATION Bilateral     as a child     Social History   Social History     Substance and Sexual Activity   Alcohol Use Not Currently     Social History     Substance and Sexual Activity   Drug Use Never     Social History     Tobacco Use   Smoking Status Never   Smokeless Tobacco Never     Family History:   Family History   Problem Relation Age of Onset    Diabetes Mother     Thyroid disease Mother         post thyroidectomy    Stroke Mother     Diabetes type II Mother     Thyroid disease Father         post thyroidectomy    Coronary artery disease Father     No Known Problems Brother     No Known Problems Brother     Colon cancer Neg Hx     Colon polyps Neg Hx        Meds/Allergies   Current Outpatient Medications   Medication Sig Dispense Refill    albuterol (2.5 mg/3 mL) 0.083 % nebulizer solution Take 1 vial (2.5 mg total) by nebulization every 6 (six) hours as needed for wheezing or shortness of breath 120 vial 2    albuterol (PROVENTIL HFA,VENTOLIN HFA) 90 mcg/act inhaler Inhale 1 puff every 4 (four) hours as needed      atorvastatin (LIPITOR) 10 mg tablet TAKE 1 TABLET DAILY 90 tablet 3    BD Pen Needle Shanelle 2nd Gen 32G X 4 MM MISC INJECT ONCE DAILY 90 each 3    Bimekizumab-bkzx (Bimzelx) 160 MG/ML SOAJ Inject under the skin every 30 (thirty) days      Butalbital-APAP-Caffeine -40 MG CAPS Take 1 capsule by mouth every 8 (eight) hours as needed (headache) 30 capsule 0    cetirizine (ZyrTEC) 10 mg tablet Take 1 tablet by mouth daily as needed      cholecalciferol (VITAMIN D3) 1,000 units tablet Take 1 tablet (1,000 Units total) by mouth daily      clotrimazole-betamethasone (LOTRISONE) 1-0.05 % cream Apply topically daily for 28 days 45 g 1    Continuous Glucose Sensor (FreeStyle Alexx 3 Sensor) MISC Apply every 14 days to check blood sugars 4-10 times a day 2 each 5    Empagliflozin  "(Jardiance) 25 MG TABS TAKE 1 TABLET DAILY IN THE MORNING 90 tablet 1    FLUoxetine (PROzac) 10 MG tablet Take 10mg daily for 2 weeks then increase to 20mg daily. 60 tablet 1    fluticasone (FLONASE) 50 mcg/act nasal spray USE 2 SPRAYS IN EACH NOSTRIL DAILY 48 g 3    fluticasone-umeclidinium-vilanterol (Trelegy Ellipta) 100-62.5-25 MCG/INH inhaler Inhale 1 puff daily Rinse mouth after use.      glipiZIDE (GLUCOTROL XL) 10 mg 24 hr tablet TAKE 1 TABLET DAILY 90 tablet 1    levothyroxine 75 mcg tablet TAKE 1 TABLET ON MONDAY THROUGH SATURDAY AND 2 TABLETS ON SUNDAY 102 tablet 3    meclizine (ANTIVERT) 12.5 MG tablet Take 1 tablet (12.5 mg total) by mouth every 8 (eight) hours as needed for dizziness 20 tablet 0    metFORMIN (GLUCOPHAGE) 1000 MG tablet Take one (1,000 mg) tablet twice a day 180 tablet 3    olmesartan (BENICAR) 40 mg tablet Take 1 tablet (40 mg total) by mouth daily 90 tablet 1    pantoprazole (PROTONIX) 40 mg tablet TAKE 1 TABLET DAILY (Patient taking differently: As needed) 90 tablet 3    semaglutide, 2 mg/dose, (Ozempic) 8 mg/ mL injection pen Inject 0.75 mL (2 mg total) under the skin every 7 days 9 mL 1    Semglee, yfgn, 100 UNIT/ML SOPN INJECT UP TO 60 UNITS DAILY 60 mL 3     No current facility-administered medications for this visit.     No Known Allergies    Objective   Vitals: Blood pressure 118/82, pulse 81, height 5' 8\" (1.727 m), weight 112 kg (246 lb 9.6 oz).  Invasive Devices       None                   Physical Exam    Thyroid is normal in size. No palpable thyroid nodules.  Lungs are clear to auscultation.  Heart has a regular rate and rhythm. No murmurs.  No tremor of the outstretched hands. No lower extremity edema. Patellar deep tendon reflexes are normal. Multiple small clusters of blisters on the plantar surface of the foot, left worse than right.      The history was obtained from the review of the chart and from the patient.    Lab Results:    Most recent Alc is  Lab Results "   Component Value Date    HGBA1C 6.6 (H) 07/18/2024           Blood work performed on 7/18/2024 showed a 25-hydroxy vitamin D level of 31.3.    Urine microalbumin to creatinine ratio was 5.    TSH is 3.08 with a free T4 of 1.03.    CMP showed a glucose of 118 fasting but was otherwise normal.    Lab Results   Component Value Date    CREATININE 0.91 07/18/2024    CREATININE 0.85 04/11/2024    CREATININE 0.80 10/17/2023    BUN 14 07/18/2024    K 4.9 07/18/2024     07/18/2024    CO2 27 07/18/2024     eGFR   Date Value Ref Range Status   07/18/2024 99 >59 mL/min/1.73 Final         Lab Results   Component Value Date    HDL 43 04/11/2024    TRIG 127 04/11/2024    CHOLHDL 2.8 04/11/2024       Lab Results   Component Value Date    ALT 42 07/18/2024    AST 37 07/18/2024       Lab Results   Component Value Date    TSH 3.080 07/18/2024    FREET4 1.03 07/18/2024             Future Appointments   Date Time Provider Department Center   10/17/2024  4:00 PM Dariusz Torres DPM [facility]   11/14/2024  3:20 PM Nadine Tiwari MD ENDO [facility]

## 2024-07-24 NOTE — PATIENT INSTRUCTIONS
Hgba1c is 6.6%. this is excellent.     Continue the same diabetes medications.     Continue to use the freestyle leona.     If low sugars, let me know.     The thyroid is normal. Continue the same levothyroxine.     Follow up in 3 months with blood work.

## 2024-09-08 DIAGNOSIS — F41.9 ANXIETY: ICD-10-CM

## 2024-09-08 RX ORDER — FLUOXETINE 10 MG/1
TABLET, FILM COATED ORAL
Qty: 60 TABLET | Refills: 1 | Status: SHIPPED | OUTPATIENT
Start: 2024-09-08

## 2024-09-10 DIAGNOSIS — E11.65 UNCONTROLLED TYPE 2 DIABETES MELLITUS WITH HYPERGLYCEMIA (HCC): ICD-10-CM

## 2024-09-10 RX ORDER — BLOOD-GLUCOSE SENSOR
EACH MISCELLANEOUS
Qty: 2 EACH | Refills: 5 | Status: CANCELLED | OUTPATIENT
Start: 2024-09-10

## 2024-09-10 NOTE — TELEPHONE ENCOUNTER
Med refill    Alexx 3 Sensor     [facility]   PATIENT IS STILL WAITING ON THE ANDROGEL TO BE SENT TO [facility] ON [address].

## 2024-09-11 RX ORDER — BLOOD-GLUCOSE SENSOR
EACH MISCELLANEOUS
Qty: 2 EACH | Refills: 5 | Status: SHIPPED | OUTPATIENT
Start: 2024-09-11 | End: 2024-09-13 | Stop reason: SDUPTHER

## 2024-09-13 DIAGNOSIS — E11.65 UNCONTROLLED TYPE 2 DIABETES MELLITUS WITH HYPERGLYCEMIA (HCC): ICD-10-CM

## 2024-09-13 RX ORDER — BLOOD-GLUCOSE SENSOR
EACH MISCELLANEOUS
Qty: 2 EACH | Refills: 5 | Status: SHIPPED | OUTPATIENT
Start: 2024-09-13

## 2024-09-13 NOTE — TELEPHONE ENCOUNTER
Medication refill    Alexx 3 sensors    THIS IS NOT A DUPLICATE PATIENT REQUESTING THIS BE SENT TO THE [facility] ON [address] IN [place]

## 2024-10-17 ENCOUNTER — IMMUNIZATIONS (OUTPATIENT)
Dept: FAMILY MEDICINE CLINIC | Facility: HOSPITAL | Age: 54
End: 2024-10-17
Payer: COMMERCIAL

## 2024-10-17 DIAGNOSIS — Z23 ENCOUNTER FOR IMMUNIZATION: Primary | ICD-10-CM

## 2024-10-17 PROCEDURE — 90673 RIV3 VACCINE NO PRESERV IM: CPT

## 2024-10-17 PROCEDURE — G0008 ADMIN INFLUENZA VIRUS VAC: HCPCS

## 2024-11-06 DIAGNOSIS — E11.65 UNCONTROLLED TYPE 2 DIABETES MELLITUS WITH HYPERGLYCEMIA (HCC): ICD-10-CM

## 2024-11-06 RX ORDER — BLOOD-GLUCOSE SENSOR
EACH MISCELLANEOUS
Qty: 6 EACH | Refills: 1 | Status: SHIPPED | OUTPATIENT
Start: 2024-11-06

## 2024-11-06 NOTE — TELEPHONE ENCOUNTER
Reason for call: pt requesting sensors to be resent to [facility]   [x] Refill   [] Prior Auth  [] Other:     Office:   [] PCP/Provider -   [x] Specialty/Provider - ENDO    Medication: Continuous Glucose Sensor (FreeStyle Alexx 3 Sensor) MISC     Dose/Frequency: Apply every 14 days to check blood sugars 4-10 times a day     Quantity:  2 each  Refills: 5 ordered    Pharmacy: [facility] [place]     Does the patient have enough for 3 days?   [] Yes   [x] No - Send as HP to POD

## 2024-11-17 ENCOUNTER — APPOINTMENT (EMERGENCY)
Dept: RADIOLOGY | Facility: HOSPITAL | Age: 54
End: 2024-11-17
Payer: COMMERCIAL

## 2024-11-17 ENCOUNTER — HOSPITAL ENCOUNTER (EMERGENCY)
Facility: HOSPITAL | Age: 54
Discharge: HOME | End: 2024-11-17
Attending: EMERGENCY MEDICINE
Payer: COMMERCIAL

## 2024-11-17 VITALS
BODY MASS INDEX: 38.49 KG/M2 | WEIGHT: 253.97 LBS | SYSTOLIC BLOOD PRESSURE: 179 MMHG | TEMPERATURE: 97.6 F | HEART RATE: 83 BPM | OXYGEN SATURATION: 92 % | RESPIRATION RATE: 18 BRPM | DIASTOLIC BLOOD PRESSURE: 96 MMHG | HEIGHT: 68 IN

## 2024-11-17 DIAGNOSIS — S42.202A CLOSED FRACTURE OF LEFT PROXIMAL HUMERUS: Primary | ICD-10-CM

## 2024-11-17 PROCEDURE — 73060 X-RAY EXAM OF HUMERUS: CPT

## 2024-11-17 PROCEDURE — 73030 X-RAY EXAM OF SHOULDER: CPT

## 2024-11-17 PROCEDURE — 99284 EMERGENCY DEPT VISIT MOD MDM: CPT

## 2024-11-17 PROCEDURE — 99283 EMERGENCY DEPT VISIT LOW MDM: CPT

## 2024-11-17 RX ORDER — ACETAMINOPHEN 500 MG
1000 TABLET ORAL 3 TIMES DAILY PRN
Qty: 30 TABLET | Refills: 0 | Status: SHIPPED | OUTPATIENT
Start: 2024-11-17 | End: 2024-11-22

## 2024-11-17 RX ORDER — OXYCODONE HYDROCHLORIDE 5 MG/1
5 TABLET ORAL EVERY 6 HOURS PRN
Qty: 12 TABLET | Refills: 0 | Status: SHIPPED | OUTPATIENT
Start: 2024-11-17

## 2024-11-17 RX ORDER — OXYCODONE HYDROCHLORIDE 5 MG/1
5 TABLET ORAL ONCE
Refills: 0 | Status: COMPLETED | OUTPATIENT
Start: 2024-11-17 | End: 2024-11-17

## 2024-11-17 RX ORDER — ACETAMINOPHEN 325 MG/1
650 TABLET ORAL ONCE
Status: COMPLETED | OUTPATIENT
Start: 2024-11-17 | End: 2024-11-17

## 2024-11-17 RX ORDER — IBUPROFEN 600 MG/1
TABLET, FILM COATED ORAL
Qty: 18 TABLET | Refills: 0 | Status: SHIPPED | OUTPATIENT
Start: 2024-11-17 | End: 2024-11-22

## 2024-11-17 RX ADMIN — ACETAMINOPHEN 650 MG: 325 TABLET, FILM COATED ORAL at 21:51

## 2024-11-17 RX ADMIN — OXYCODONE HYDROCHLORIDE 5 MG: 5 TABLET ORAL at 21:51

## 2024-11-18 ENCOUNTER — HOSPITAL ENCOUNTER (OUTPATIENT)
Dept: RADIOLOGY | Facility: HOSPITAL | Age: 54
Discharge: HOME | End: 2024-11-18
Payer: COMMERCIAL

## 2024-11-18 ENCOUNTER — OFFICE VISIT (OUTPATIENT)
Dept: FAMILY MEDICINE CLINIC | Facility: HOSPITAL | Age: 54
End: 2024-11-18
Payer: COMMERCIAL

## 2024-11-18 ENCOUNTER — TELEPHONE (OUTPATIENT)
Age: 54
End: 2024-11-18

## 2024-11-18 VITALS
OXYGEN SATURATION: 95 % | TEMPERATURE: 97.6 F | SYSTOLIC BLOOD PRESSURE: 158 MMHG | WEIGHT: 256 LBS | HEIGHT: 68 IN | HEART RATE: 71 BPM | DIASTOLIC BLOOD PRESSURE: 90 MMHG | BODY MASS INDEX: 38.8 KG/M2

## 2024-11-18 DIAGNOSIS — L40.50 PSORIATIC ARTHROPATHY (HCC): ICD-10-CM

## 2024-11-18 DIAGNOSIS — G89.29 CHRONIC PAIN OF BOTH KNEES: ICD-10-CM

## 2024-11-18 DIAGNOSIS — J45.40 MODERATE PERSISTENT ASTHMA WITHOUT COMPLICATION: ICD-10-CM

## 2024-11-18 DIAGNOSIS — M25.562 CHRONIC PAIN OF BOTH KNEES: ICD-10-CM

## 2024-11-18 DIAGNOSIS — M25.561 CHRONIC PAIN OF BOTH KNEES: Primary | ICD-10-CM

## 2024-11-18 DIAGNOSIS — M25.562 CHRONIC PAIN OF BOTH KNEES: Primary | ICD-10-CM

## 2024-11-18 DIAGNOSIS — M25.561 CHRONIC PAIN OF BOTH KNEES: ICD-10-CM

## 2024-11-18 DIAGNOSIS — G89.29 CHRONIC PAIN OF BOTH KNEES: Primary | ICD-10-CM

## 2024-11-18 PROCEDURE — 99214 OFFICE O/P EST MOD 30 MIN: CPT | Performed by: FAMILY MEDICINE

## 2024-11-18 PROCEDURE — 73562 X-RAY EXAM OF KNEE 3: CPT

## 2024-11-18 NOTE — DISCHARGE INSTRUCTIONS
Rest, ice, and use the provided sling to protect the left arm fracture.  Use the prescribed medications as directed for pain control.  I have placed a referral to orthopedics we may closely follow-up regarding your humerus fracture.  Schedule an appointment for further guidance and treatment of your fracture.    Return to the ER if you develop new or worsening pain, severe numbness or weakness of the left hand, or severe swelling of the left hand/lower arm.

## 2024-11-18 NOTE — TELEPHONE ENCOUNTER
Hello,    Please advise if a forced appointment can be accommodated for the patient:    Call back #: [phone]    Insurance: [employer]    Reason for appointment: Patient was seen in ED 11/17 for Closed fracture of left proximal humerus. Soonest available apt was too far out. Please advise patient with a force on.    Requested doctor and/or location: ANY / [place]      Thank you.

## 2024-11-18 NOTE — ASSESSMENT & PLAN NOTE
He does see dermatology for his psoriasis.  He has multiple joint pains to include his elbow fingers knees ankles.  Thus concern for psoriatic arthropathy.  Referral to rheumatology as well.  Orders:    Ambulatory Referral to Rheumatology; Future

## 2024-11-18 NOTE — PROGRESS NOTES
Name: Venu Pizarro      : 1970      MRN: [id number]  Encounter Provider: Royce Pierre MD  Encounter Date: 2024   Encounter department: [facility] [address]   :  Assessment & Plan  Chronic pain of both knees  Patient does have known psoriasis.  He is concerned about psoriatic arthropathy.  However with ongoing knee pain I suspect were dealing more with osteoarthritis.  Obtain plain films.  He does indicate to me he has a follow-up appointment with orthopedics for his knees.    He also has an orthopedic appointment regarding his arms today.  Orders:    XR knee 3 vw right non injury; Future    XR knee 3 vw left non injury; Future    Moderate persistent asthma without complication  This has been stable.       Psoriatic arthropathy (HCC)  He does see dermatology for his psoriasis.  He has multiple joint pains to include his elbow fingers knees ankles.  Thus concern for psoriatic arthropathy.  Referral to rheumatology as well.  Orders:    Ambulatory Referral to Rheumatology; Future           History of Present Illness     Venu is here due to chronic bilateral knee pain.  No injury no falls.  Notes it has been cracking.  Reports a history of psoriasis and was told he had psoriatic arthropathy of the right knee.  Many years ago his knee had to be drained and believes had a steroid injection placed.  There is no fever no chills.  No history of gout.    He did fall injuring and fracturing his arm.  He is currently in a sling and is following up with orthopedics.  On review he does recall now that he has an Ortho pedis in [place] who will be seeing him for his knees.    Knee Pain       Review of Systems   Constitutional: Negative.  Negative for activity change, appetite change, chills and diaphoresis.   HENT:  Negative for congestion and dental problem.    Respiratory: Negative.  Negative for apnea, chest tightness, shortness of breath and wheezing.    Cardiovascular:  "Negative.  Negative for chest pain, palpitations and leg swelling.   Gastrointestinal: Negative.  Negative for abdominal distention, abdominal pain, constipation, diarrhea and nausea.   Genitourinary: Negative.  Negative for difficulty urinating, dysuria and frequency.          Objective   /90 (BP Location: Left arm, Patient Position: Sitting, Cuff Size: Large)   Pulse 71   Temp 97.6 °F (36.4 °C) (Tympanic)   Ht 5' 8\" (1.727 m)   Wt 116 kg (256 lb)   SpO2 95%   BMI 38.92 kg/m²      Physical Exam  Vitals and nursing note reviewed.   Constitutional:       Appearance: Normal appearance.   Cardiovascular:      Rate and Rhythm: Normal rate and regular rhythm.      Pulses: Normal pulses.      Heart sounds: Normal heart sounds.   Musculoskeletal:      Right knee: No swelling, deformity or effusion. Normal range of motion.      Left knee: No swelling, deformity or effusion. Normal range of motion.   Neurological:      Mental Status: He is alert.         "

## 2024-11-18 NOTE — ED PROVIDER NOTES
Time reflects when diagnosis was documented in both MDM as applicable and the Disposition within this note       Time User Action Codes Description Comment    11/17/2024  9:44 PM Lynda Kaur Add [S42.202A] Closed fracture of left proximal humerus           ED Disposition       ED Disposition   Discharge    Condition   Stable    Date/Time   Sun Nov 17, 2024  9:44 PM    Comment   Venu Pizarro discharge to home/self care.                   Assessment & Plan       Medical Decision Making  DDx including but not limited to: Contusion, sprain, AC joint separation, fracture    Patient with trip and fall onto the left arm 2 hours prior to arrival.  Pain has been improved since taking Aleve at home but given significant pain with range of motion he presents for further evaluation.  He is neurovascularly intact with 2+ radial pulse, equal sensation and strength of both hands.  He does have tenderness of the mid to proximal humerus for which x-rays will be obtained.  X-rays with finding of acute surgical neck humerus fracture without dislocation.  I discussed the results with the patient and his significant other, plan for sling, Tylenol, NSAIDs, Price, as needed oxycodone, and referral to orthopedics.    Problems Addressed:  Closed fracture of left proximal humerus: acute illness or injury    Amount and/or Complexity of Data Reviewed  Independent Historian: spouse  Radiology: ordered and independent interpretation performed.    Risk  OTC drugs.  Prescription drug management.  Parenteral controlled substances.             Medications   acetaminophen (TYLENOL) tablet 650 mg (650 mg Oral Given 11/17/24 2151)   oxyCODONE (ROXICODONE) IR tablet 5 mg (5 mg Oral Given 11/17/24 2151)       ED Risk Strat Scores                           SBIRT 20yo+      Flowsheet Row Most Recent Value   Initial Alcohol Screen: US AUDIT-C     1. How often do you have a drink containing alcohol? 0 Filed at: 11/17/2024 2059   2. How many drinks  containing alcohol do you have on a typical day you are drinking?  0 Filed at: 11/17/2024 2059   3a. Male UNDER 65: How often do you have five or more drinks on one occasion? 0 Filed at: 11/17/2024 2059   Audit-C Score 0 Filed at: 11/17/2024 2059   STEPAN: How many times in the past year have you...    Used an illegal drug or used a prescription medication for non-medical reasons? Never Filed at: 11/17/2024 2059                            History of Present Illness       No chief complaint on file.      Past Medical History:   Diagnosis Date    Anxiety 02/22/2019    Asthma 05/21/2019    GERD (gastroesophageal reflux disease) 12/19/2013    Hypertriglyceridemia 07/08/2015    Hypothyroidism     Migraine without aura and without status migrainosus, not intractable 05/21/2019    Psoriasis     Seasonal allergies 05/21/2019    Dr. Sheppard    Vitamin D deficiency       Past Surgical History:   Procedure Laterality Date    CATARACT EXTRACTION, BILATERAL  2017    CHOLECYSTECTOMY  2000    lap    UNDESCENDED TESTICLE EXPLORATION Bilateral     as a child      Family History   Problem Relation Age of Onset    Diabetes Mother     Thyroid disease Mother         post thyroidectomy    Stroke Mother     Diabetes type II Mother     Thyroid disease Father         post thyroidectomy    Coronary artery disease Father     No Known Problems Brother     No Known Problems Brother     Colon cancer Neg Hx     Colon polyps Neg Hx       Social History     Tobacco Use    Smoking status: Never    Smokeless tobacco: Never   Vaping Use    Vaping status: Never Used   Substance Use Topics    Alcohol use: Not Currently    Drug use: Never      E-Cigarette/Vaping    E-Cigarette Use Never User       E-Cigarette/Vaping Substances    Nicotine No     THC No     CBD No     Flavoring No     Other No     Unknown No       I have reviewed and agree with the history as documented.     The patient is a 54-year-old male presenting for evaluation of left shoulder/left  upper arm pain.  The patient was leaving [employer] when he missed a step and fell onto the left arm/shoulder with in a bent position trapped underneath his body.  He notes feeling instant pain to the left mid arm and proximal shoulder.  He took 600 mg of Aleve with some relief of his pain.  However, whenever he goes to move the arm he has significant discomfort to the anterolateral aspect of both the shoulder and the mid humerus.  For concern of fracture/other injury, he presents here with his significant other.  He denies breaks in skin and wound.  He is left-handed.  He denies associated paresthesias.  He denies injury to the distal elbow, wrist, and hand.  He denies head strike, loss of consciousness, blood thinner use.  He denies pain or injury elsewhere.      History provided by:  Patient and significant other   used: No        Review of Systems   Musculoskeletal:  Positive for arthralgias (Left shoulder and upper arm). Negative for back pain, joint swelling and neck pain.   Skin:  Negative for rash and wound.   All other systems reviewed and are negative.          Objective       ED Triage Vitals [11/17/24 2100]   Temperature Pulse Blood Pressure Respirations SpO2 Patient Position - Orthostatic VS   97.6 °F (36.4 °C) 83 (!) 179/96 18 92 % Sitting      Temp Source Heart Rate Source BP Location FiO2 (%) Pain Score    Temporal -- Right arm -- 10 - Worst Possible Pain      Vitals      Date and Time Temp Pulse SpO2 Resp BP Pain Score FACES Pain Rating User   11/17/24 2151 -- -- -- -- -- 4 -- AD   11/17/24 2100 97.6 °F (36.4 °C) 83 92 % 18 179/96 10 - Worst Possible Pain -- WM            Physical Exam  Vitals and nursing note reviewed.   Constitutional:       General: He is not in acute distress.     Appearance: Normal appearance. He is normal weight. He is not ill-appearing or toxic-appearing.   HENT:      Head: Normocephalic and atraumatic.      Nose: Nose normal.      Mouth/Throat:      Mouth:  Mucous membranes are moist.      Pharynx: Oropharynx is clear.   Eyes:      Extraocular Movements: Extraocular movements intact.      Conjunctiva/sclera: Conjunctivae normal.   Cardiovascular:      Rate and Rhythm: Normal rate.   Pulmonary:      Effort: Pulmonary effort is normal. No respiratory distress.   Musculoskeletal:      Right shoulder: Normal.      Left shoulder: No swelling, deformity or effusion. Decreased range of motion.      Right upper arm: Normal.      Left upper arm: Tenderness (medial aspect of proximal bicep) and bony tenderness (mid to proximal shaft of humerus) present. No swelling, edema or deformity.      Right elbow: Normal.      Left elbow: Normal. No swelling or deformity. Normal range of motion. No tenderness.      Right wrist: Normal.      Left wrist: Normal. No swelling, deformity, bony tenderness or snuff box tenderness. Normal range of motion.      Right hand: Normal.      Left hand: Normal. No swelling, deformity, tenderness or bony tenderness. Normal range of motion. Normal capillary refill.        Arms:       Cervical back: Normal, normal range of motion and neck supple. No swelling, edema, deformity, erythema or bony tenderness.   Skin:     General: Skin is warm and dry.      Capillary Refill: Capillary refill takes less than 2 seconds.      Findings: No bruising, ecchymosis, laceration or wound.   Neurological:      General: No focal deficit present.      Mental Status: He is alert and oriented to person, place, and time. Mental status is at baseline.         Results Reviewed       None            XR shoulder 2+ views LEFT   ED Interpretation by HAYDEN Camargo (11/17 2143)   Abnormal   Acute fracture of the surgical neck of the humerus without dislocation.      XR humerus LEFT   ED Interpretation by HAYDEN Camargo (11/17 2143)   Abnormal   Acute fracture of the surgical neck of the humerus without dislocation.          Procedures    ED Medication and Procedure Management    Prior to Admission Medications   Prescriptions Last Dose Informant Patient Reported? Taking?   BD Pen Needle Shanelle 2nd Gen 32G X 4 MM MISC  Self No No   Sig: INJECT ONCE DAILY   Bimekizumab-bkzx (Bimzelx) 160 MG/ML SOAJ  Self Yes No   Sig: Inject under the skin every 30 (thirty) days   Butalbital-APAP-Caffeine -40 MG CAPS  Self No No   Sig: Take 1 capsule by mouth every 8 (eight) hours as needed (headache)   Continuous Glucose Sensor (FreeStyle Alexx 3 Sensor) MISC   No No   Sig: Apply every 14 days to check blood sugars 4-10 times a day   Empagliflozin (Jardiance) 25 MG TABS  Self No No   Sig: TAKE 1 TABLET DAILY IN THE MORNING   FLUoxetine 10 MG tablet   No No   Sig: TAKE 1 TABLET BY MOUTH EVERY DAY FOR 2 WEEKS THEN INCREASE TO 2 TABLETS DAILY   Semglee, yfgn, 100 UNIT/ML SOPN  Self No No   Sig: INJECT UP TO 60 UNITS DAILY   albuterol (2.5 mg/3 mL) 0.083 % nebulizer solution  Self No No   Sig: Take 1 vial (2.5 mg total) by nebulization every 6 (six) hours as needed for wheezing or shortness of breath   albuterol (PROVENTIL HFA,VENTOLIN HFA) 90 mcg/act inhaler  Self Yes No   Sig: Inhale 1 puff every 4 (four) hours as needed   atorvastatin (LIPITOR) 10 mg tablet  Self No No   Sig: TAKE 1 TABLET DAILY   cetirizine (ZyrTEC) 10 mg tablet  Self Yes No   Sig: Take 1 tablet by mouth daily as needed   cholecalciferol (VITAMIN D3) 1,000 units tablet  Self No No   Sig: Take 1 tablet (1,000 Units total) by mouth daily   clotrimazole-betamethasone (LOTRISONE) 1-0.05 % cream  Self No No   Sig: Apply topically daily for 28 days   fluticasone (FLONASE) 50 mcg/act nasal spray  Self No No   Sig: USE 2 SPRAYS IN EACH NOSTRIL DAILY   fluticasone-umeclidinium-vilanterol (Trelegy Ellipta) 100-62.5-25 MCG/INH inhaler  Self No No   Sig: Inhale 1 puff daily Rinse mouth after use.   glipiZIDE (GLUCOTROL XL) 10 mg 24 hr tablet  Self No No   Sig: TAKE 1 TABLET DAILY   levothyroxine 75 mcg tablet  Self No No   Sig: TAKE 1 TABLET ON  MONDAY THROUGH SATURDAY AND 2 TABLETS ON SUNDAY   meclizine (ANTIVERT) 12.5 MG tablet  Self No No   Sig: Take 1 tablet (12.5 mg total) by mouth every 8 (eight) hours as needed for dizziness   metFORMIN (GLUCOPHAGE) 1000 MG tablet  Self No No   Sig: Take one (1,000 mg) tablet twice a day   olmesartan (BENICAR) 40 mg tablet  Self No No   Sig: Take 1 tablet (40 mg total) by mouth daily   pantoprazole (PROTONIX) 40 mg tablet  Self No No   Sig: TAKE 1 TABLET DAILY   Patient taking differently: As needed   semaglutide, 2 mg/dose, (Ozempic) 8 mg/ mL injection pen  Self No No   Sig: Inject 0.75 mL (2 mg total) under the skin every 7 days      Facility-Administered Medications: None     Patient's Medications   Discharge Prescriptions    ACETAMINOPHEN (TYLENOL) 500 MG TABLET    Take 2 tablets (1,000 mg total) by mouth 3 (three) times a day as needed for mild pain or moderate pain for up to 5 days       Start Date: 11/17/2024End Date: 11/22/2024       Order Dose: 1,000 mg       Quantity: 30 tablet    Refills: 0    IBUPROFEN (MOTRIN) 600 MG TABLET    Take 1 tablet (600 mg total) by mouth 3 (three) times a day with meals for 3 days, THEN 1 tablet (600 mg total) 3 (three) times a day as needed for mild pain for up to 3 days.       Start Date: 11/17/2024End Date: 11/22/2024       Order Dose: --       Quantity: 18 tablet    Refills: 0    OXYCODONE (ROXICODONE) 5 IMMEDIATE RELEASE TABLET    Take 1 tablet (5 mg total) by mouth every 6 (six) hours as needed for severe pain Max Daily Amount: 20 mg       Start Date: 11/17/2024End Date: --       Order Dose: 5 mg       Quantity: 12 tablet    Refills: 0       ED SEPSIS DOCUMENTATION   Time reflects when diagnosis was documented in both MDM as applicable and the Disposition within this note       Time User Action Codes Description Comment    11/17/2024  9:44 PM Lynda Kaur Add [S42.202A] Closed fracture of left proximal humerus                  HAYDEN Camargo  11/17/24 4534

## 2024-11-19 DIAGNOSIS — E11.65 UNCONTROLLED TYPE 2 DIABETES MELLITUS WITH HYPERGLYCEMIA (HCC): ICD-10-CM

## 2024-11-19 RX ORDER — GLIPIZIDE 10 MG/1
10 TABLET, FILM COATED, EXTENDED RELEASE ORAL DAILY
Qty: 90 TABLET | Refills: 1 | Status: SHIPPED | OUTPATIENT
Start: 2024-11-19

## 2024-11-20 ENCOUNTER — OFFICE VISIT (OUTPATIENT)
Dept: OBGYN CLINIC | Facility: CLINIC | Age: 54
End: 2024-11-20
Payer: COMMERCIAL

## 2024-11-20 VITALS
HEIGHT: 68 IN | DIASTOLIC BLOOD PRESSURE: 82 MMHG | WEIGHT: 257 LBS | BODY MASS INDEX: 38.95 KG/M2 | SYSTOLIC BLOOD PRESSURE: 128 MMHG

## 2024-11-20 DIAGNOSIS — S42.202A CLOSED TRAUMATIC NONDISPLACED FRACTURE OF PROXIMAL END OF LEFT HUMERUS, INITIAL ENCOUNTER: Primary | ICD-10-CM

## 2024-11-20 PROCEDURE — 23600 CLTX PROX HUMRL FX W/O MNPJ: CPT | Performed by: ORTHOPAEDIC SURGERY

## 2024-11-20 PROCEDURE — 99204 OFFICE O/P NEW MOD 45 MIN: CPT | Performed by: ORTHOPAEDIC SURGERY

## 2024-11-20 NOTE — PROGRESS NOTES
Assessment:     1. Closed traumatic nondisplaced fracture of proximal end of left humerus, initial encounter        Plan:     Problem List Items Addressed This Visit          Musculoskeletal and Integument    Closed traumatic nondisplaced fracture of proximal end of left humerus - Primary    Findings consistent with left nondisplaced proximal humerus fracture sustained 11/17/24.  Patient's left shoulder x-ray with radiology report and prognosis reviewed with patient. It will take 6-8 weeks for fracture to heal enough to start therapy. He will remain in sling at all times except to bath and to come out for elbow range of motion only. Continue with ibuprofen for pain control, add ice as well. NWB left upper extremity, avoid leaning on left elbow as well. See patient back in 3 weeks with repeat imaging and if doing well then start pendulum exercises,3-4 weeks margarito start physical therapy focusing on motion. All patient's questions were answered to his satisfaction.  This note is created using dictation transcription.  It may contain typographical errors, grammatical errors, improperly dictated words, background noise and other errors.         Relevant Orders    Fracture / Dislocation Treatment      Subjective:     Patient ID: Venu Pizarro is a 54 y.o. male.  Chief Complaint:  54-year-old male presenting for evaluation of left shoulder pain. Referred by ED, HAYDEN Camargo on 11/17/24. The patient was leaving [employer] when he missed a step and fell onto the left arm/shoulder with in a bent position trapped underneath his body.  He notes feeling instant pain to the left mid arm and proximal shoulder. Imaging showed proximal humerus fracture. Presents in sling. Pain is well controlled. He is using ibuprofen for pain control. Complaint with sling. Sleeps in recline position on sofa. Denies any numbness or tingling in hand.     Allergy:  No Known Allergies  Medications:  all current active meds have been reviewed  Past  Medical History:  Past Medical History:   Diagnosis Date    Anxiety 02/22/2019    Asthma 05/21/2019    GERD (gastroesophageal reflux disease) 12/19/2013    Hypertriglyceridemia 07/08/2015    Hypothyroidism     Migraine without aura and without status migrainosus, not intractable 05/21/2019    Psoriasis     Seasonal allergies 05/21/2019    Dr. Sheppard    Vitamin D deficiency      Past Surgical History:  Past Surgical History:   Procedure Laterality Date    CATARACT EXTRACTION, BILATERAL  2017    CHOLECYSTECTOMY  2000    lap    UNDESCENDED TESTICLE EXPLORATION Bilateral     as a child     Family History:  Family History   Problem Relation Age of Onset    Diabetes Mother     Thyroid disease Mother         post thyroidectomy    Stroke Mother     Diabetes type II Mother     Thyroid disease Father         post thyroidectomy    Coronary artery disease Father     No Known Problems Brother     No Known Problems Brother     Colon cancer Neg Hx     Colon polyps Neg Hx      Social History:  Social History     Substance and Sexual Activity   Alcohol Use Not Currently     Social History     Substance and Sexual Activity   Drug Use Never     Social History     Tobacco Use   Smoking Status Never   Smokeless Tobacco Never     Review of Systems   Constitutional:  Negative for chills and fever.   HENT:  Negative for ear pain and sore throat.    Eyes:  Negative for pain and visual disturbance.   Respiratory:  Negative for cough and shortness of breath.    Cardiovascular:  Negative for chest pain and palpitations.   Gastrointestinal:  Negative for abdominal pain and vomiting.   Genitourinary:  Negative for dysuria and hematuria.   Musculoskeletal:  Positive for arthralgias (left shoulder). Negative for back pain.   Skin:  Negative for color change and rash.   Neurological:  Negative for seizures and syncope.   Psychiatric/Behavioral: Negative.     All other systems reviewed and are negative.        Objective:  BP Readings from Last 1  "Encounters:   11/20/24 128/82      Wt Readings from Last 1 Encounters:   11/20/24 117 kg (257 lb)      BMI:   Estimated body mass index is 39.08 kg/m² as calculated from the following:    Height as of this encounter: 5' 8\" (1.727 m).    Weight as of this encounter: 117 kg (257 lb).  BSA:   Estimated body surface area is 2.28 meters squared as calculated from the following:    Height as of this encounter: 5' 8\" (1.727 m).    Weight as of this encounter: 117 kg (257 lb).   Physical Exam  Vitals and nursing note reviewed.   Constitutional:       Appearance: Normal appearance. He is well-developed.   HENT:      Head: Normocephalic and atraumatic.      Right Ear: External ear normal.      Left Ear: External ear normal.   Eyes:      Extraocular Movements: Extraocular movements intact.      Conjunctiva/sclera: Conjunctivae normal.   Pulmonary:      Effort: Pulmonary effort is normal.   Musculoskeletal:         General: Tenderness (left shoulder arthralgia) present.      Cervical back: Neck supple.   Skin:     General: Skin is warm and dry.   Neurological:      Mental Status: He is alert and oriented to person, place, and time.      Deep Tendon Reflexes: Reflexes are normal and symmetric.   Psychiatric:         Mood and Affect: Mood normal.         Behavior: Behavior normal.       Left Shoulder Exam     Tenderness   Left shoulder tenderness location: proximal humerus.    Range of Motion   Active abduction:  abnormal   Passive abduction:  abnormal   Forward flexion:  abnormal   Internal rotation 0 degrees:  abnormal     Other   Erythema: absent  Scars: absent  Sensation: normal  Pulse: present     Comments:  Range of motion and strength deferred due to proximal humerus fracture  Ecchymosis lateral shoulder deltoid  No pain in left elbow  Sensation intact upper extremity             I have personally reviewed pertinent films in PACS and my interpretation is x-ray left shoulder non-displaced surgical neck fracture in " acceptable position and alignment.    Fracture / Dislocation Treatment    Date/Time: 11/20/2024 2:00 PM    Performed by: Fabian Boykin MD  Authorized by: Fabian Boykin MD    Patient Location:  Clinic  [facility] Protocol:  Consent given by: patient  Patient understanding: patient states understanding of the procedure being performed  Patient identity confirmed: verbally with patient    Injury location:  Shoulder  Location details:  Left shoulder  Injury type:  Fracture  Fracture type: surgical neck    Distal perfusion: normal    Neurological function: normal    Range of motion: reduced    Manipulation performed?: No    Immobilization:  Sling  Distal perfusion: normal    Neurological function: normal    Range of motion: unchanged    Patient tolerance:  Patient tolerated the procedure well with no immediate complications     Scribe Attestation      I,:  Shorty Farley am acting as a scribe while in the presence of the attending physician.:       I,:  Fabian Boykin MD personally performed the services described in this documentation    as scribed in my presence.:

## 2024-11-20 NOTE — ASSESSMENT & PLAN NOTE
Findings consistent with left nondisplaced proximal humerus fracture sustained 11/17/24.  Patient's left shoulder x-ray with radiology report and prognosis reviewed with patient. It will take 6-8 weeks for fracture to heal enough to start therapy. He will remain in sling at all times except to bath and to come out for elbow range of motion only. Continue with ibuprofen for pain control, add ice as well. NWB left upper extremity, avoid leaning on left elbow as well. See patient back in 3 weeks with repeat imaging and if doing well then start pendulum exercises,3-4 weeks margarito start physical therapy focusing on motion. All patient's questions were answered to his satisfaction.  This note is created using dictation transcription.  It may contain typographical errors, grammatical errors, improperly dictated words, background noise and other errors.

## 2024-11-22 DIAGNOSIS — F41.9 ANXIETY: ICD-10-CM

## 2024-11-22 RX ORDER — FLUOXETINE 10 MG/1
TABLET, FILM COATED ORAL
Qty: 60 TABLET | Refills: 5 | Status: SHIPPED | OUTPATIENT
Start: 2024-11-22

## 2024-11-25 ENCOUNTER — RESULTS FOLLOW-UP (OUTPATIENT)
Dept: FAMILY MEDICINE CLINIC | Facility: HOSPITAL | Age: 54
End: 2024-11-25

## 2024-11-27 ENCOUNTER — TELEPHONE (OUTPATIENT)
Age: 54
End: 2024-11-27

## 2024-11-27 NOTE — TELEPHONE ENCOUNTER
PA for FLUoxetine 10 MG tablet SUBMITTED to [facility]- Called pharmacy and med has been filled and pt has been using a discount card to pay for med.    via    []CMM-KEY:   [x]Surescripts-Case ID # : [id number]   []Availity-Auth ID #  []Faxed to plan   []Other website  []Phone call Case ID #     []PA sent as URGENT    All office notes, labs and other pertaining documents and studies sent. Clinical questions answered. Awaiting determination from insurance company.     Turnaround time for your insurance to make a decision on your Prior Authorization can take 7-21 business days.

## 2024-11-30 LAB
ALBUMIN SERPL-MCNC: 4.2 G/DL (ref 3.8–4.9)
ALP SERPL-CCNC: 127 IU/L (ref 44–121)
ALT SERPL-CCNC: 61 IU/L (ref 0–44)
AST SERPL-CCNC: 36 IU/L (ref 0–40)
BILIRUB SERPL-MCNC: 0.5 MG/DL (ref 0–1.2)
BUN SERPL-MCNC: 19 MG/DL (ref 6–24)
BUN/CREAT SERPL: 24 (ref 9–20)
CALCIUM SERPL-MCNC: 9.6 MG/DL (ref 8.7–10.2)
CHLORIDE SERPL-SCNC: 101 MMOL/L (ref 96–106)
CO2 SERPL-SCNC: 26 MMOL/L (ref 20–29)
CREAT SERPL-MCNC: 0.79 MG/DL (ref 0.76–1.27)
EGFR: 104 ML/MIN/1.73
EST. AVERAGE GLUCOSE BLD GHB EST-MCNC: 134 MG/DL
GLOBULIN SER-MCNC: 2.6 G/DL (ref 1.5–4.5)
GLUCOSE SERPL-MCNC: 100 MG/DL (ref 70–99)
HBA1C MFR BLD: 6.3 % (ref 4.8–5.6)
POTASSIUM SERPL-SCNC: 4.6 MMOL/L (ref 3.5–5.2)
PROT SERPL-MCNC: 6.8 G/DL (ref 6–8.5)
SODIUM SERPL-SCNC: 141 MMOL/L (ref 134–144)
T4 FREE SERPL-MCNC: 0.97 NG/DL (ref 0.82–1.77)
TSH SERPL DL<=0.005 MIU/L-ACNC: 6.33 UIU/ML (ref 0.45–4.5)

## 2024-12-02 ENCOUNTER — RESULTS FOLLOW-UP (OUTPATIENT)
Dept: ENDOCRINOLOGY | Facility: HOSPITAL | Age: 54
End: 2024-12-02

## 2024-12-03 NOTE — TELEPHONE ENCOUNTER
PA for FLUoxetine 10 MG tablet DENIED    Reason:(Screenshot if applicable)        Message sent to office clinical pool Yes    Denial letter scanned into Media Yes    Appeal started No (Provider will need to decide if appeal is warranted and send clinical documentation to Prior Authorization Team for initiation.)    **Please follow up with your patient regarding denial and next steps**

## 2024-12-04 ENCOUNTER — OFFICE VISIT (OUTPATIENT)
Dept: ENDOCRINOLOGY | Facility: HOSPITAL | Age: 54
End: 2024-12-04
Payer: COMMERCIAL

## 2024-12-04 VITALS
OXYGEN SATURATION: 94 % | BODY MASS INDEX: 38.49 KG/M2 | WEIGHT: 254 LBS | DIASTOLIC BLOOD PRESSURE: 88 MMHG | SYSTOLIC BLOOD PRESSURE: 138 MMHG | HEART RATE: 74 BPM | HEIGHT: 68 IN

## 2024-12-04 DIAGNOSIS — E03.9 ACQUIRED HYPOTHYROIDISM: ICD-10-CM

## 2024-12-04 DIAGNOSIS — E78.1 HYPERTRIGLYCERIDEMIA: ICD-10-CM

## 2024-12-04 DIAGNOSIS — Z79.4 TYPE 2 DIABETES MELLITUS WITH HYPERGLYCEMIA, WITH LONG-TERM CURRENT USE OF INSULIN (HCC): Primary | ICD-10-CM

## 2024-12-04 DIAGNOSIS — E11.42 DIABETIC POLYNEUROPATHY ASSOCIATED WITH TYPE 2 DIABETES MELLITUS (HCC): ICD-10-CM

## 2024-12-04 DIAGNOSIS — E11.65 TYPE 2 DIABETES MELLITUS WITH HYPERGLYCEMIA, WITH LONG-TERM CURRENT USE OF INSULIN (HCC): Primary | ICD-10-CM

## 2024-12-04 DIAGNOSIS — I10 ESSENTIAL HYPERTENSION: ICD-10-CM

## 2024-12-04 PROCEDURE — 95251 CONT GLUC MNTR ANALYSIS I&R: CPT | Performed by: INTERNAL MEDICINE

## 2024-12-04 PROCEDURE — 99214 OFFICE O/P EST MOD 30 MIN: CPT | Performed by: INTERNAL MEDICINE

## 2024-12-04 RX ORDER — LEVOTHYROXINE SODIUM 100 UG/1
100 TABLET ORAL DAILY
Qty: 90 TABLET | Refills: 2 | Status: SHIPPED | OUTPATIENT
Start: 2024-12-04

## 2024-12-04 RX ORDER — CLOBETASOL PROPIONATE 0.5 MG/G
OINTMENT TOPICAL
COMMUNITY
Start: 2024-12-01

## 2024-12-04 NOTE — PATIENT INSTRUCTIONS
Hgba1c is 6.3%. this is excellent.     We can readd ozempic but start lower dose 0.25 mg weekly for 2 weeks and then 0.5 mg weekly for 2-4 weeks and then 1 mg weekly for 2-4 weeks and then 2 mg weekly.     Let me know if the stomach is unhappy.     Work on better snacks at night, more protein and less carbs.     If lower sugars with readding ozempic, decrease the semglee insulin again to 35 units.     Follow up in 3-4 months with Blood work.

## 2024-12-04 NOTE — PROGRESS NOTES
12/4/2024    Assessment & Plan      Diagnoses and all orders for this visit:    Type 2 diabetes mellitus with hyperglycemia, with long-term current use of insulin (HCC)  -     semaglutide, 0.25 or 0.5 mg/dose, (Ozempic, 0.25 or 0.5 MG/DOSE,) 2 mg/3 mL injection pen; 0.25 mg weekly for 2 weeks and then 0.5 mg weekly to use up pen.  -     semaglutide, 1 mg/dose, (Ozempic, 1 MG/DOSE,) 4 mg/3 mL injection pen; After finished 0.5 mg pen, start 1 mg pen weekly until used up and then 2 mg weekly.  -     Comprehensive metabolic panel; Future  -     Hemoglobin A1C; Future  -     TSH, 3rd generation; Future  -     T4, free; Future  -     CBC and differential; Future  -     Lipid Panel with Direct LDL reflex; Future  -     Comprehensive metabolic panel  -     Hemoglobin A1C  -     TSH, 3rd generation  -     T4, free  -     CBC and differential  -     Lipid Panel with Direct LDL reflex    Diabetic polyneuropathy associated with type 2 diabetes mellitus (HCC)  -     Comprehensive metabolic panel; Future  -     Hemoglobin A1C; Future  -     TSH, 3rd generation; Future  -     T4, free; Future  -     CBC and differential; Future  -     Lipid Panel with Direct LDL reflex; Future  -     Comprehensive metabolic panel  -     Hemoglobin A1C  -     TSH, 3rd generation  -     T4, free  -     CBC and differential  -     Lipid Panel with Direct LDL reflex    Acquired hypothyroidism  -     levothyroxine 100 mcg tablet; Take 1 tablet (100 mcg total) by mouth daily  -     Comprehensive metabolic panel; Future  -     Hemoglobin A1C; Future  -     TSH, 3rd generation; Future  -     T4, free; Future  -     CBC and differential; Future  -     Lipid Panel with Direct LDL reflex; Future  -     Comprehensive metabolic panel  -     Hemoglobin A1C  -     TSH, 3rd generation  -     T4, free  -     CBC and differential  -     Lipid Panel with Direct LDL reflex    Hypertriglyceridemia  -     Comprehensive metabolic panel; Future  -     Hemoglobin A1C;  Future  -     TSH, 3rd generation; Future  -     T4, free; Future  -     CBC and differential; Future  -     Lipid Panel with Direct LDL reflex; Future  -     Comprehensive metabolic panel  -     Hemoglobin A1C  -     TSH, 3rd generation  -     T4, free  -     CBC and differential  -     Lipid Panel with Direct LDL reflex    Essential hypertension  -     Comprehensive metabolic panel; Future  -     Hemoglobin A1C; Future  -     TSH, 3rd generation; Future  -     T4, free; Future  -     CBC and differential; Future  -     Lipid Panel with Direct LDL reflex; Future  -     Comprehensive metabolic panel  -     Hemoglobin A1C  -     TSH, 3rd generation  -     T4, free  -     CBC and differential  -     Lipid Panel with Direct LDL reflex    Other orders  -     clobetasol (TEMOVATE) 0.05 % ointment          Assessment & Plan  1. Type 2 diabetes, insulin requiring.  His most recent hemoglobin A1c is 6.3%, demonstrating excellent control of his diabetes. He is interested in retrying Ozempic for his diabetes but did not tolerate the 2 mg dosage when he restarted it. He will work on eating better snacks at night with more protein and fewer carbohydrates. He will start with 0.25 mg weekly for 2 weeks, then increase to 0.5 mg weekly for about 4 weeks, then 1 mg weekly for about 4 weeks, and finally 2 mg weekly. He will call if his GI tract does not tolerate this adjustment or if his blood sugars start to drop. For now, he will continue the same doses of Jardiance 25 mg daily, metformin 1000 mg twice a day, glipizide 10 mg daily, and Semglee insulin 40 units at night. Once he restarts Ozempic, the Semglee insulin will be decreased to 35 units daily. He will continue to utilize his FreeStyle Alexx continuous glucose monitoring system.    2. Diabetic neuropathy.  He reports no neuropathic symptoms, and diabetic foot exams are up to date.    3. Hypothyroidism.  His most recent TSH is mildly elevated, signifying biochemical  hypothyroidism. He will increase his levothyroxine to 100 mcg daily.    4. Hypertension.  He is normotensive in the office. He will continue his current dose of olmesartan 40 mg daily.    5. Hyperlipidemia with hypertriglyceridemia.  He will continue the current dose of atorvastatin 10 mg daily. A lipid profile will be repeated at his next visit.    Follow-up  The patient will follow up in 3 to 4 months with preceding hemoglobin A1c, CMP, CBC, TSH, free T4, and lipid panel.        CC: Diabetes type II, thyroid, blood pressure, lipid follow-up      History of Present Illness    HPI: Venu Pizarro  is a 54-year-old male with type 2 diabetes, insulin-requiring, diagnosed 18 years ago, hypothyroidism, hypertension, and hyperlipidemia, who presents for a follow-up visit.     Diabetes complications include neuropathy. He reports no nephropathy, retinopathy, heart attack, stroke, or claudication.    His current medication regimen includes Jardiance 25 mg daily, metformin 1000 mg twice daily, Semglee insulin 40 units at night, and glipizide 10 mg daily. He has discontinued the use of semaglutide or Ozempic due to adverse gastrointestinal effects experienced upon resumption. He reports no current nausea or abdominal pain. He does not experience excessive urination but acknowledges urination during the night. He reports blurry vision and had an ophthalmology consultation earlier this year. He does not experience excessive thirst or hunger.  He has observed that consumption of cereal and sugar-free creamer in his coffee results in elevated blood glucose levels. His typical breakfast includes two eggs, coffee, and bread. He also consumes cereal before bedtime or early morning if he wakes up prematurely. He prefers not to sleep on an empty stomach, usually eats around 10:00 PM.     He reports no numbness or tingling in his feet or hands. He recently consulted a podiatrist.  Last diabetic foot exam in the endocrine office was  April 2024.    He takes levothyroxine 75 mcg 1 tablet 6 days a week and 2 tablets on Sunday for his hypothyroidism.  He reports he does take his thyroid medicine on an empty stomach.  He believes his thyroid function test results have increased, which is concerning given his medication adherence. He reports no abnormal temperature sensations, tremors, palpitations, diarrhea, or constipation. He has noticed a change in bowel consistency since his fall. His sleep quality is satisfactory, and he does not feel excessively fatigued. He finds that taking his thyroid medication and consuming coffee in the morning improves his energy levels.      He reports no headaches, lightheadedness, or dizziness.  He is currently on olmesartan 40 mg daily for blood pressure management and renal protection.    He reports no chest pain or shortness of breath. He is on atorvastatin 10 mg daily for cholesterol management.    Supplemental Information  He receives an injection for psoriasis every other month. He sustained a fracture in his upper arm near the shoulder two weeks ago due to a fall. He experiences shooting pain radiating down his arm to his finger, accompanied by tingling sensations. He reports no numbness or tingling in his feet or hands. He recently consulted a podiatrist.    Blood Sugar/Glucometer/Pump/CGM review: He is utilizing a freestyle leona 3 continuous glucose monitoring system to test his blood sugars throughout the day.  Freestyle leona download from 11/21/2020 24 through 12//2024 was reviewed in the office today.  CGM was active 94% of the time.  Average glucose is 146 mg/dL with a glucose variability of 29.1% and a GMI of 6.8%.  79% of blood sugars are in target range, 18% high, 2% very high, 1% low, and 0% very low.      Historical Information   Past Medical History:   Diagnosis Date    Anxiety 02/22/2019    Asthma 05/21/2019    GERD (gastroesophageal reflux disease) 12/19/2013    Hypertriglyceridemia 07/08/2015     Hypothyroidism     Migraine without aura and without status migrainosus, not intractable 05/21/2019    Psoriasis     Seasonal allergies 05/21/2019    Dr. Sheppard    Vitamin D deficiency      Past Surgical History:   Procedure Laterality Date    CATARACT EXTRACTION, BILATERAL  2017    CHOLECYSTECTOMY  2000    lap    UNDESCENDED TESTICLE EXPLORATION Bilateral     as a child     Social History   Social History     Substance and Sexual Activity   Alcohol Use Not Currently     Social History     Substance and Sexual Activity   Drug Use Never     Social History     Tobacco Use   Smoking Status Never   Smokeless Tobacco Never     Family History:   Family History   Problem Relation Age of Onset    Diabetes Mother     Thyroid disease Mother         post thyroidectomy    Stroke Mother     Diabetes type II Mother     Thyroid disease Father         post thyroidectomy    Coronary artery disease Father     No Known Problems Brother     No Known Problems Brother     Colon cancer Neg Hx     Colon polyps Neg Hx        Meds/Allergies   Current Outpatient Medications   Medication Sig Dispense Refill    albuterol (2.5 mg/3 mL) 0.083 % nebulizer solution Take 1 vial (2.5 mg total) by nebulization every 6 (six) hours as needed for wheezing or shortness of breath 120 vial 2    albuterol (PROVENTIL HFA,VENTOLIN HFA) 90 mcg/act inhaler Inhale 1 puff every 4 (four) hours as needed      atorvastatin (LIPITOR) 10 mg tablet TAKE 1 TABLET DAILY 90 tablet 3    BD Pen Needle Shanelle 2nd Gen 32G X 4 MM MISC INJECT ONCE DAILY 90 each 3    Bimekizumab-bkzx (Bimzelx) 160 MG/ML SOAJ Inject under the skin every 2 (two) months      Butalbital-APAP-Caffeine -40 MG CAPS Take 1 capsule by mouth every 8 (eight) hours as needed (headache) 30 capsule 0    cetirizine (ZyrTEC) 10 mg tablet Take 1 tablet by mouth daily as needed      cholecalciferol (VITAMIN D3) 1,000 units tablet Take 1 tablet (1,000 Units total) by mouth daily      clobetasol (TEMOVATE)  0.05 % ointment       Continuous Glucose Sensor (FreeStyle Alexx 3 Sensor) MISC Apply every 14 days to check blood sugars 4-10 times a day 6 each 1    Empagliflozin (Jardiance) 25 MG TABS TAKE 1 TABLET DAILY IN THE MORNING 90 tablet 1    FLUoxetine 10 MG tablet TAKE 1 TABLET BY MOUTH EVERY DAY FOR 2 WEEKS THEN INCREASE TO 2 TABLETS DAILY 60 tablet 5    fluticasone (FLONASE) 50 mcg/act nasal spray USE 2 SPRAYS IN EACH NOSTRIL DAILY 48 g 3    fluticasone-umeclidinium-vilanterol (Trelegy Ellipta) 100-62.5-25 MCG/INH inhaler Inhale 1 puff daily Rinse mouth after use.      glipiZIDE (GLUCOTROL XL) 10 mg 24 hr tablet TAKE 1 TABLET DAILY 90 tablet 1    levothyroxine 100 mcg tablet Take 1 tablet (100 mcg total) by mouth daily 90 tablet 2    meclizine (ANTIVERT) 12.5 MG tablet Take 1 tablet (12.5 mg total) by mouth every 8 (eight) hours as needed for dizziness 20 tablet 0    metFORMIN (GLUCOPHAGE) 1000 MG tablet Take one (1,000 mg) tablet twice a day 180 tablet 3    olmesartan (BENICAR) 40 mg tablet Take 1 tablet (40 mg total) by mouth daily 90 tablet 1    oxyCODONE (Roxicodone) 5 immediate release tablet Take 1 tablet (5 mg total) by mouth every 6 (six) hours as needed for severe pain Max Daily Amount: 20 mg 12 tablet 0    pantoprazole (PROTONIX) 40 mg tablet TAKE 1 TABLET DAILY 90 tablet 3    semaglutide, 0.25 or 0.5 mg/dose, (Ozempic, 0.25 or 0.5 MG/DOSE,) 2 mg/3 mL injection pen 0.25 mg weekly for 2 weeks and then 0.5 mg weekly to use up pen. 3 mL 0    semaglutide, 1 mg/dose, (Ozempic, 1 MG/DOSE,) 4 mg/3 mL injection pen After finished 0.5 mg pen, start 1 mg pen weekly until used up and then 2 mg weekly. 3 mL 0    Semglee, yfgn, 100 UNIT/ML SOPN INJECT UP TO 60 UNITS DAILY (Patient taking differently: 40 units at night) 60 mL 3    clotrimazole-betamethasone (LOTRISONE) 1-0.05 % cream Apply topically daily for 28 days 45 g 1    ibuprofen (MOTRIN) 600 mg tablet Take 1 tablet (600 mg total) by mouth 3 (three) times a day  "with meals for 3 days, THEN 1 tablet (600 mg total) 3 (three) times a day as needed for mild pain for up to 3 days. 18 tablet 0    semaglutide, 2 mg/dose, (Ozempic) 8 mg/ mL injection pen Inject 0.75 mL (2 mg total) under the skin every 7 days (Patient not taking: Reported on 12/4/2024) 9 mL 1     No current facility-administered medications for this visit.     No Known Allergies    Objective   Vitals: Blood pressure 138/88, pulse 74, height 5' 8\" (1.727 m), weight 115 kg (254 lb), SpO2 94%.  Invasive Devices       None                   Physical Exam    Thyroid is normal in size. No palpable nodules.  Lungs are clear to auscultation.  Heart has a regular rate and rhythm. No murmurs.  No lower extremity edema.      The history was obtained from the review of the chart and from the patient.    Lab Results:    Most recent Alc is  Lab Results   Component Value Date    HGBA1C 6.3 (H) 11/29/2024           Blood work performed on 11/29/2024 showed a CMP with a glucose of 100 fasting, BUN/creatinine ratio 24, alkaline phosphatase 127, ALT 61, but was otherwise normal.    Lab Results   Component Value Date    CREATININE 0.79 11/29/2024    CREATININE 0.91 07/18/2024    CREATININE 0.85 04/11/2024    BUN 19 11/29/2024    K 4.6 11/29/2024     11/29/2024    CO2 26 11/29/2024     eGFR   Date Value Ref Range Status   11/29/2024 104 >59 mL/min/1.73 Final         Lab Results   Component Value Date    HDL 43 04/11/2024    TRIG 127 04/11/2024    CHOLHDL 2.8 04/11/2024       Lab Results   Component Value Date    ALT 61 (H) 11/29/2024    AST 36 11/29/2024       Lab Results   Component Value Date    TSH 6.330 (H) 11/29/2024    FREET4 0.97 11/29/2024             Future Appointments   Date Time Provider Department Center   12/11/2024  2:15 PM Fabian Boykin MD [facility]   3/18/2025  4:15 PM HAYDEN Perez [facility]     "

## 2024-12-09 DIAGNOSIS — E11.65 TYPE 2 DIABETES MELLITUS WITH HYPERGLYCEMIA, WITH LONG-TERM CURRENT USE OF INSULIN (HCC): ICD-10-CM

## 2024-12-09 DIAGNOSIS — Z79.4 TYPE 2 DIABETES MELLITUS WITH HYPERGLYCEMIA, WITH LONG-TERM CURRENT USE OF INSULIN (HCC): ICD-10-CM

## 2024-12-10 ENCOUNTER — TELEPHONE (OUTPATIENT)
Age: 54
End: 2024-12-10

## 2024-12-10 NOTE — TELEPHONE ENCOUNTER
Marialuisa from [facility] called for 2-3 office notes to be faxed to [phone] for upcoming appt on Dec 16, 2024. Any questions please call Marialuisa at [phone] ext [phone]. Thank you

## 2024-12-11 ENCOUNTER — OFFICE VISIT (OUTPATIENT)
Dept: OBGYN CLINIC | Facility: CLINIC | Age: 54
End: 2024-12-11

## 2024-12-11 ENCOUNTER — APPOINTMENT (OUTPATIENT)
Dept: RADIOLOGY | Facility: CLINIC | Age: 54
End: 2024-12-11
Payer: COMMERCIAL

## 2024-12-11 VITALS
DIASTOLIC BLOOD PRESSURE: 62 MMHG | HEIGHT: 68 IN | SYSTOLIC BLOOD PRESSURE: 130 MMHG | BODY MASS INDEX: 38.95 KG/M2 | WEIGHT: 257 LBS

## 2024-12-11 DIAGNOSIS — S42.202D CLOSED TRAUMATIC NONDISPLACED FRACTURE OF PROXIMAL END OF LEFT HUMERUS WITH ROUTINE HEALING, SUBSEQUENT ENCOUNTER: ICD-10-CM

## 2024-12-11 DIAGNOSIS — S42.202D CLOSED TRAUMATIC NONDISPLACED FRACTURE OF PROXIMAL END OF LEFT HUMERUS WITH ROUTINE HEALING, SUBSEQUENT ENCOUNTER: Primary | ICD-10-CM

## 2024-12-11 PROCEDURE — 99024 POSTOP FOLLOW-UP VISIT: CPT | Performed by: ORTHOPAEDIC SURGERY

## 2024-12-11 PROCEDURE — 73030 X-RAY EXAM OF SHOULDER: CPT

## 2024-12-11 NOTE — ASSESSMENT & PLAN NOTE
Findings consistent with left nondisplaced proximal humerus fracture sustained 11/17/24. Findings and treatment options were discussed with the patient. X-rays were reviewed with him that reveal a stable well aligned fracture with no evidence of displacement.  He is to continue to come out of the sling for elbow range of motion.  He may start pendulum swings.  Continue icing and NSAIDs as needed for pain.  No lifting, pushing or pulling with that arm.  Follow-up in 3 weeks with repeat x-rays of the left shoulder.  All patient's questions were answered to his satisfaction.  This note is created using dictation transcription.  It may contain typographical errors, grammatical errors, improperly dictated words, background noise and other errors.

## 2024-12-11 NOTE — PROGRESS NOTES
Assessment:     1. Closed traumatic nondisplaced fracture of proximal end of left humerus with routine healing, subsequent encounter        Plan:     Problem List Items Addressed This Visit          Musculoskeletal and Integument    Closed traumatic nondisplaced fracture of proximal end of left humerus - Primary    Findings consistent with left nondisplaced proximal humerus fracture sustained 11/17/24. Findings and treatment options were discussed with the patient. X-rays were reviewed with him that reveal a stable well aligned fracture with no evidence of displacement.  He is to continue to come out of the sling for elbow range of motion.  He may start pendulum swings.  Continue icing and NSAIDs as needed for pain.  No lifting, pushing or pulling with that arm.  Follow-up in 3 weeks with repeat x-rays of the left shoulder.  All patient's questions were answered to his satisfaction.  This note is created using dictation transcription.  It may contain typographical errors, grammatical errors, improperly dictated words, background noise and other errors.         Relevant Orders    XR shoulder 2+ vw left      Subjective:     Patient ID: Venu Pizarro is a 54 y.o. male.  Chief Complaint:  54-year-old male following up for a left nondisplaced proximal humerus fracture.  On 11/17/24,  patient was leaving [employer] when he missed a step and fell onto the left arm/shoulder with in a bent position trapped underneath his body.  He has been using the sling as directed.  He has been coming out of it for elbow range of motion.  He states he occasionally feels some shooting pain all the way down to his hand.  Pain overall in the left shoulder is improving.    Allergy:  No Known Allergies  Medications:  all current active meds have been reviewed  Past Medical History:  Past Medical History:   Diagnosis Date    Anxiety 02/22/2019    Asthma 05/21/2019    GERD (gastroesophageal reflux disease) 12/19/2013    Hypertriglyceridemia  07/08/2015    Hypothyroidism     Migraine without aura and without status migrainosus, not intractable 05/21/2019    Psoriasis     Seasonal allergies 05/21/2019    Dr. Sheppard    Vitamin D deficiency      Past Surgical History:  Past Surgical History:   Procedure Laterality Date    CATARACT EXTRACTION, BILATERAL  2017    CHOLECYSTECTOMY  2000    lap    UNDESCENDED TESTICLE EXPLORATION Bilateral     as a child     Family History:  Family History   Problem Relation Age of Onset    Diabetes Mother     Thyroid disease Mother         post thyroidectomy    Stroke Mother     Diabetes type II Mother     Thyroid disease Father         post thyroidectomy    Coronary artery disease Father     No Known Problems Brother     No Known Problems Brother     Colon cancer Neg Hx     Colon polyps Neg Hx      Social History:  Social History     Substance and Sexual Activity   Alcohol Use Not Currently     Social History     Substance and Sexual Activity   Drug Use Never     Social History     Tobacco Use   Smoking Status Never   Smokeless Tobacco Never     Review of Systems   Constitutional:  Negative for chills and fever.   HENT:  Negative for ear pain and sore throat.    Eyes:  Negative for pain and visual disturbance.   Respiratory:  Negative for cough and shortness of breath.    Cardiovascular:  Negative for chest pain and palpitations.   Gastrointestinal:  Negative for abdominal pain and vomiting.   Genitourinary:  Negative for dysuria and hematuria.   Musculoskeletal:  Positive for arthralgias (left shoulder). Negative for back pain.   Skin:  Negative for color change and rash.   Neurological:  Negative for seizures and syncope.   Psychiatric/Behavioral: Negative.     All other systems reviewed and are negative.        Objective:  BP Readings from Last 1 Encounters:   12/11/24 130/62      Wt Readings from Last 1 Encounters:   12/11/24 117 kg (257 lb)      BMI:   Estimated body mass index is 39.08 kg/m² as calculated from the  "following:    Height as of this encounter: 5' 8\" (1.727 m).    Weight as of this encounter: 117 kg (257 lb).  BSA:   Estimated body surface area is 2.28 meters squared as calculated from the following:    Height as of this encounter: 5' 8\" (1.727 m).    Weight as of this encounter: 117 kg (257 lb).   Physical Exam  Vitals and nursing note reviewed.   Constitutional:       Appearance: Normal appearance. He is well-developed.   HENT:      Head: Normocephalic and atraumatic.      Right Ear: External ear normal.      Left Ear: External ear normal.   Eyes:      Extraocular Movements: Extraocular movements intact.      Conjunctiva/sclera: Conjunctivae normal.   Pulmonary:      Effort: Pulmonary effort is normal.   Musculoskeletal:      Cervical back: Neck supple.   Skin:     General: Skin is warm and dry.   Neurological:      Mental Status: He is alert and oriented to person, place, and time.      Deep Tendon Reflexes: Reflexes are normal and symmetric.   Psychiatric:         Mood and Affect: Mood normal.         Behavior: Behavior normal.       Left Shoulder Exam     Tenderness   Left shoulder tenderness location: proximal humerus.    Range of Motion   Active abduction:  abnormal   Passive abduction:  abnormal   Forward flexion:  abnormal   Internal rotation 0 degrees:  abnormal     Other   Erythema: absent  Scars: absent  Sensation: normal  Pulse: present     Comments:  Range of motion and strength deferred due to proximal humerus fracture  Moving all fingers  Sensation intact upper extremity             I have personally reviewed pertinent films in PACS and my interpretation is x-ray left shoulder reveal stable, non-displaced surgical neck fracture in acceptable position and alignment.      Scribe Attestation      I,:  Lala Chan PA-C am acting as a scribe while in the presence of the attending physician.:       I,:  Fabian Boykin MD personally performed the services described in this documentation    as scribed in my " presence.:

## 2024-12-18 DIAGNOSIS — E11.65 UNCONTROLLED TYPE 2 DIABETES MELLITUS WITH HYPERGLYCEMIA (HCC): ICD-10-CM

## 2024-12-18 RX ORDER — EMPAGLIFLOZIN 25 MG/1
25 TABLET, FILM COATED ORAL EVERY MORNING
Qty: 90 TABLET | Refills: 1 | Status: SHIPPED | OUTPATIENT
Start: 2024-12-18

## 2024-12-26 ENCOUNTER — HOSPITAL ENCOUNTER (OUTPATIENT)
Dept: RADIOLOGY | Facility: HOSPITAL | Age: 54
End: 2024-12-26
Payer: COMMERCIAL

## 2024-12-26 DIAGNOSIS — T14.8XXA CRUSHING INJURY OF MULTIPLE SITES OF TRUNK: ICD-10-CM

## 2024-12-26 DIAGNOSIS — M25.40 EFFUSION OF JOINT, MULTIPLE SITES: ICD-10-CM

## 2024-12-26 DIAGNOSIS — M25.50 PAIN IN JOINT, MULTIPLE SITES: ICD-10-CM

## 2024-12-26 DIAGNOSIS — Z51.81 ENCOUNTER FOR THERAPEUTIC DRUG MONITORING: ICD-10-CM

## 2024-12-26 DIAGNOSIS — L40.9 PSORIASIS: ICD-10-CM

## 2024-12-26 DIAGNOSIS — L40.50 PSORIATIC ARTHROPATHY (HCC): ICD-10-CM

## 2024-12-26 PROCEDURE — 73130 X-RAY EXAM OF HAND: CPT

## 2024-12-26 PROCEDURE — 73562 X-RAY EXAM OF KNEE 3: CPT

## 2024-12-29 DIAGNOSIS — E11.65 TYPE 2 DIABETES MELLITUS WITH HYPERGLYCEMIA, WITH LONG-TERM CURRENT USE OF INSULIN (HCC): ICD-10-CM

## 2024-12-29 DIAGNOSIS — Z79.4 TYPE 2 DIABETES MELLITUS WITH HYPERGLYCEMIA, WITH LONG-TERM CURRENT USE OF INSULIN (HCC): ICD-10-CM

## 2024-12-30 RX ORDER — SEMAGLUTIDE 0.68 MG/ML
INJECTION, SOLUTION SUBCUTANEOUS
Qty: 3 ML | Refills: 2 | Status: SHIPPED | OUTPATIENT
Start: 2024-12-30

## 2024-12-31 ENCOUNTER — APPOINTMENT (OUTPATIENT)
Dept: RADIOLOGY | Facility: CLINIC | Age: 54
End: 2024-12-31
Payer: COMMERCIAL

## 2024-12-31 ENCOUNTER — OFFICE VISIT (OUTPATIENT)
Dept: OBGYN CLINIC | Facility: CLINIC | Age: 54
End: 2024-12-31

## 2024-12-31 VITALS — WEIGHT: 257 LBS | HEIGHT: 68 IN | BODY MASS INDEX: 38.95 KG/M2

## 2024-12-31 DIAGNOSIS — S42.202D CLOSED TRAUMATIC NONDISPLACED FRACTURE OF PROXIMAL END OF LEFT HUMERUS WITH ROUTINE HEALING, SUBSEQUENT ENCOUNTER: Primary | ICD-10-CM

## 2024-12-31 DIAGNOSIS — S42.202D CLOSED TRAUMATIC NONDISPLACED FRACTURE OF PROXIMAL END OF LEFT HUMERUS WITH ROUTINE HEALING, SUBSEQUENT ENCOUNTER: ICD-10-CM

## 2024-12-31 PROCEDURE — 99024 POSTOP FOLLOW-UP VISIT: CPT | Performed by: PHYSICIAN ASSISTANT

## 2024-12-31 PROCEDURE — 73030 X-RAY EXAM OF SHOULDER: CPT

## 2024-12-31 NOTE — ASSESSMENT & PLAN NOTE
Findings consistent with left nondisplaced proximal humerus fracture sustained 11/17/24. Findings and treatment options were discussed with the patient. X-rays were reviewed with him that reveal a stable well aligned fracture with no evidence of displacement.  He may discontinue the sling.  He was given a prescription to start formal outpatient physical therapy.  Discussed importance of doing the exercises at home as well.  Follow-up in 6 weeks with Dr. Boykin with repeat x-rays of the left shoulder.  All patient's questions were answered to his satisfaction.  This note is created using dictation transcription.  It may contain typographical errors, grammatical errors, improperly dictated words, background noise and other errors.

## 2024-12-31 NOTE — PROGRESS NOTES
Assessment:     1. Closed traumatic nondisplaced fracture of proximal end of left humerus with routine healing, subsequent encounter        Plan:     Problem List Items Addressed This Visit          Musculoskeletal and Integument    Closed traumatic nondisplaced fracture of proximal end of left humerus - Primary    Findings consistent with left nondisplaced proximal humerus fracture sustained 11/17/24. Findings and treatment options were discussed with the patient. X-rays were reviewed with him that reveal a stable well aligned fracture with no evidence of displacement.  He may discontinue the sling.  He was given a prescription to start formal outpatient physical therapy.  Discussed importance of doing the exercises at home as well.  Follow-up in 6 weeks with Dr. Boykin with repeat x-rays of the left shoulder.  All patient's questions were answered to his satisfaction.  This note is created using dictation transcription.  It may contain typographical errors, grammatical errors, improperly dictated words, background noise and other errors.         Relevant Orders    XR shoulder 2+ vw left    Ambulatory Referral to Physical Therapy      Subjective:     Patient ID: Venu Pizarro is a 54 y.o. male.  Chief Complaint:  54-year-old male following up for a left nondisplaced proximal humerus fracture.  On 11/17/24,  patient was leaving [place] when he missed a step and fell onto the left arm/shoulder with in a bent position trapped underneath his body.  He is doing well overall.  Pain in the left shoulder has decreased since last visit.  He continues to work on pendulum swings.    Allergy:  No Known Allergies  Medications:  all current active meds have been reviewed  Past Medical History:  Past Medical History:   Diagnosis Date    Anxiety 02/22/2019    Asthma 05/21/2019    GERD (gastroesophageal reflux disease) 12/19/2013    Hypertriglyceridemia 07/08/2015    Hypothyroidism     Migraine without aura and without status  "migrainosus, not intractable 05/21/2019    Psoriasis     Seasonal allergies 05/21/2019    Dr. Sheppard    Vitamin D deficiency      Past Surgical History:  Past Surgical History:   Procedure Laterality Date    CATARACT EXTRACTION, BILATERAL  2017    CHOLECYSTECTOMY  2000    lap    UNDESCENDED TESTICLE EXPLORATION Bilateral     as a child     Family History:  Family History   Problem Relation Age of Onset    Diabetes Mother     Thyroid disease Mother         post thyroidectomy    Stroke Mother     Diabetes type II Mother     Thyroid disease Father         post thyroidectomy    Coronary artery disease Father     No Known Problems Brother     No Known Problems Brother     Colon cancer Neg Hx     Colon polyps Neg Hx      Social History:  Social History     Substance and Sexual Activity   Alcohol Use Not Currently     Social History     Substance and Sexual Activity   Drug Use Never     Social History     Tobacco Use   Smoking Status Never   Smokeless Tobacco Never     Review of Systems   Constitutional:  Negative for chills and fever.   HENT:  Negative for ear pain and sore throat.    Eyes:  Negative for pain and visual disturbance.   Respiratory:  Negative for cough and shortness of breath.    Cardiovascular:  Negative for chest pain and palpitations.   Gastrointestinal:  Negative for abdominal pain and vomiting.   Genitourinary:  Negative for dysuria and hematuria.   Musculoskeletal:  Positive for arthralgias (left shoulder). Negative for back pain.   Skin:  Negative for color change and rash.   Neurological:  Negative for seizures and syncope.   Psychiatric/Behavioral: Negative.     All other systems reviewed and are negative.        Objective:  BP Readings from Last 1 Encounters:   12/11/24 130/62      Wt Readings from Last 1 Encounters:   12/31/24 117 kg (257 lb)      BMI:   Estimated body mass index is 39.08 kg/m² as calculated from the following:    Height as of this encounter: 5' 8\" (1.727 m).    Weight as of " "this encounter: 117 kg (257 lb).  BSA:   Estimated body surface area is 2.28 meters squared as calculated from the following:    Height as of this encounter: 5' 8\" (1.727 m).    Weight as of this encounter: 117 kg (257 lb).   Physical Exam  Vitals and nursing note reviewed.   Constitutional:       Appearance: Normal appearance. He is well-developed.   HENT:      Head: Normocephalic and atraumatic.      Right Ear: External ear normal.      Left Ear: External ear normal.   Eyes:      Extraocular Movements: Extraocular movements intact.      Conjunctiva/sclera: Conjunctivae normal.   Pulmonary:      Effort: Pulmonary effort is normal.   Musculoskeletal:      Cervical back: Neck supple.   Skin:     General: Skin is warm and dry.   Neurological:      Mental Status: He is alert and oriented to person, place, and time.      Deep Tendon Reflexes: Reflexes are normal and symmetric.   Psychiatric:         Mood and Affect: Mood normal.         Behavior: Behavior normal.       Left Shoulder Exam     Tenderness   The patient is experiencing no tenderness.     Range of Motion   Active abduction:  abnormal   Passive abduction:  abnormal   Forward flexion:  abnormal   Internal rotation 0 degrees:  abnormal     Other   Erythema: absent  Scars: absent  Sensation: normal  Pulse: present     Comments:  No significant pain in shoulder with gentle passive range of motion  Moving all fingers  Sensation intact upper extremity             I have personally reviewed pertinent films in PACS and my interpretation is x-ray left shoulder reveal stable, non-displaced surgical neck fracture in acceptable position and alignment with some early bone callous formation.      "

## 2025-01-07 ENCOUNTER — EVALUATION (OUTPATIENT)
Dept: PHYSICAL THERAPY | Facility: CLINIC | Age: 55
End: 2025-01-07
Payer: COMMERCIAL

## 2025-01-07 DIAGNOSIS — S42.202D CLOSED TRAUMATIC NONDISPLACED FRACTURE OF PROXIMAL END OF LEFT HUMERUS WITH ROUTINE HEALING, SUBSEQUENT ENCOUNTER: Primary | ICD-10-CM

## 2025-01-07 PROCEDURE — 97162 PT EVAL MOD COMPLEX 30 MIN: CPT | Performed by: PHYSICAL THERAPIST

## 2025-01-07 NOTE — PROGRESS NOTES
PT Evaluation     Today's date: 2025  Patient name: Venu Pizarro  : 1970  MRN: [id number]  Referring provider: Lala Chan PA-C  Dx:   Encounter Diagnosis     ICD-10-CM    1. Closed traumatic nondisplaced fracture of proximal end of left humerus with routine healing, subsequent encounter  S42. Ambulatory Referral to Physical Therapy                     Assessment  Impairments: abnormal or restricted ROM, activity intolerance, impaired physical strength, lacks appropriate home exercise program, pain with function and poor posture     Assessment details: Pt is a 54 y.o. year old male coming to outpatient PT with  a/p L humerus fx s/p fall on 24. Pt presents with increased pain and TTP, decreased ROM, decreased strength, and overall decreased functional mobility. Pt would benefit from skilled PT services in order to address these deficits and reach maximum level of function. Thank you kindly for the referral!    Pt was issued a written HEP to be performed on a daily basis.   Barriers to therapy: High insurance cost  Understanding of Dx/Px/POC: good     Prognosis: good    Goals  STG's ( 3-4 weeks)  1. Pt will be independent in HEP  2. Pt will be able to perform dressing and self care activities with greater ease  LTG's ( 6- 8 weeks)  1. Improve FOTO score by 8-10 points  2. Pt will have decreased pain to 2/10 at worst  3. Pt will have improved L shoulder strength by 1/2 grade  4. Pt will be able to perform household lifting activities    Plan  Patient would benefit from: PT eval and skilled physical therapy  Planned modality interventions: cryotherapy    Planned therapy interventions: joint mobilization, manual therapy, neuromuscular re-education, strengthening, stretching, therapeutic activities, therapeutic exercise, functional ROM exercises, flexibility and home exercise program    Frequency: 2x week  Duration in weeks: 6  Plan of Care beginning date: 2025  Plan of Care expiration date:  "2025  Treatment plan discussed with: PTA and patient        Subjective Evaluation    History of Present Illness  Date of onset: 2024  Mechanism of injury: trauma  Mechanism of injury: Pt fell in [employer] on 24, and fell onto his left shoulder and his arm was trapped under his body. X-ray testing showed \" Acute minimally displaced fracture of the humeral surgical neck. Glenohumeral joint alignment is maintained.\"     Pt was immobilized in a sling for 6 weeks. Pt has discontinued his sling use.  Pt is not able to lift his arm. Pt is not able to wash his hair. Pt just started using the computer keyboard for work activities, and needs to bend his body down with eating. Pt sleeps on his back and is currently sleeping on a reclining sofa. Pt is not able to sleep on his L side. Pt has just started to be able to write with his L hand. Pt is driving short, local distances.    Work: ; works from home  Hobbies: none  Gait: no abnormalities  Patient Goals  Patient goals for therapy: decreased pain, increased strength, independence with ADLs/IADLs and increased motion    Pain  At best pain ratin  At worst pain rating: 3  Location: L arm/ pectoral region  Quality: dull ache    Social Support  Lives with: spouse    Employment status: working  Hand dominance: left      Diagnostic Tests  X-ray: abnormal  Treatments  No previous or current treatments        Objective     Neurological Testing     Sensation     Shoulder   Left Shoulder   Intact: light touch    Right Shoulder   Intact: Light touch    Reflexes   Left   Biceps (C5/C6): trace (1+)  Brachioradialis (C6): trace (1+)  Triceps (C7): trace (1+)    Right   Biceps (C5/C6): trace (1+)  Brachioradialis (C6): trace (1+)  Triceps (C7): trace (1+)    Active Range of Motion   Left Shoulder   Flexion: 50 degrees   Abduction: 45 degrees   External rotation 45°: 0 degrees   Internal rotation 45°: 70 degrees     Right Shoulder   Flexion: 142 degrees "   Abduction: 125 degrees     Left Elbow   Flexion: 105 degrees   Extension: -10 degrees     Right Elbow   Normal active range of motion    Additional Active Range of Motion Details  Cervical ROM; limited with mild pain at end ROM SB and rotation  Posture: pt sit with rounded shoulders, moderate forward head and thoracic khyphosis  Mild TTP L pectoral region  (-) TTP L shoulder     Passive Range of Motion   Left Shoulder   Flexion: 105 degrees   Abduction: 80 degrees   External rotation 45°: 0 degrees   Internal rotation 45°: 75 degrees     Strength/Myotome Testing     Left Shoulder     Planes of Motion   Left shoulder forward flexion strength: NT 2* decreased ROM.   Left shoulder abduction strength: NT 2* decreased ROm.   External rotation at 0°: 4+   Internal rotation at 0°: 4+     Right Shoulder   Normal muscle strength            Daily Treatment Diary     Precautions: none  CO-MORBIDITIES:  HEP ACCESS CODE: [id number]  FOTO Completed On:     POC Expires Reeval for Medicare to be completed  Unit Limit Auth Expiration Date PT/OT/STVisit Limit   2/18/25 By visit n/a N/a 12/31/25 30    Completed on visit                    Auth Status DATE 1/7        Approved Visit # 1         Remaining 29        MANUAL THERAPY         L shoulder PROM/ MFR         L elbow PROM                                             THERAPEUTIC EXERCISE HEP 5'        Pulleys: flexion for ROM          Pulleys; scap for ROM          Pendulums; CW, CCW          Table slides; flex, abd, ER          Standing cane IR AAROM          Seated cane ER AAROM          Standing cane scaption AAROM          Walking holding wt at side to promote elbow ext          L elbow flexion AROM/ AAROM          Shoulder blade squeezes                                                            NEUROMUSCULAR REEDUCATION                                                                                                                                                        THERAPEUTIC ACTIVITY                                                  GAIT TRAINING                                                  MODALITIES

## 2025-01-09 ENCOUNTER — OFFICE VISIT (OUTPATIENT)
Dept: PHYSICAL THERAPY | Facility: CLINIC | Age: 55
End: 2025-01-09
Payer: COMMERCIAL

## 2025-01-09 DIAGNOSIS — S42.202D CLOSED TRAUMATIC NONDISPLACED FRACTURE OF PROXIMAL END OF LEFT HUMERUS WITH ROUTINE HEALING, SUBSEQUENT ENCOUNTER: Primary | ICD-10-CM

## 2025-01-09 PROCEDURE — 97110 THERAPEUTIC EXERCISES: CPT | Performed by: PHYSICAL THERAPIST

## 2025-01-09 PROCEDURE — 97140 MANUAL THERAPY 1/> REGIONS: CPT | Performed by: PHYSICAL THERAPIST

## 2025-01-09 NOTE — PROGRESS NOTES
"Daily Note     Today's date: 2025  Patient name: Venu Pizarro  : 1970  MRN: [id number]  Referring provider: Lala Chan PA-C  Dx:   Encounter Diagnosis     ICD-10-CM    1. Closed traumatic nondisplaced fracture of proximal end of left humerus with routine healing, subsequent encounter  S42.                      Subjective: Pt reports no pain upon arrival to therapy, however pain increased to 5/10 with exercise. Pt is compliant in HEP. Pt had increased soreness after the initial eval.      Objective: See treatment diary below      Assessment: Tolerated treatment well. Patient exhibited good technique with therapeutic exercises. Pt had increased pain with most ROM ex. Pt is improving with PROM and elbow AROM. Pt had mild pulling when walking with 2# weight.      Plan: Continue per plan of care. Focus on decreasing pain and improving ROM.     Daily Treatment Diary     Precautions: none  CO-MORBIDITIES:  HEP ACCESS CODE: [id number]  FOTO Completed On:     POC Expires Reeval for Medicare to be completed  Unit Limit Auth Expiration Date PT/OT/STVisit Limit   25 By visit n/a N/a 25 30    Completed on visit                    Auth Status DATE        Approved Visit # 1 2        Remaining 29 28       MANUAL THERAPY         L shoulder PROM/ MFR  9'       L elbow PROM  1'                                           THERAPEUTIC EXERCISE HEP 5'        Pulleys: flexion for ROM   2'       Pulleys; scap for ROM   2'       Pendulums; CW, CCW   15 ea       Table slides; flex, abd, ER   5x5\" ea       Standing cane IR AAROM   10x5\"       Seated cane ER AAROM   10 ea       Standing cane scaption AAROM   10x5\"       Walking holding wt at side to promote elbow ext   2# 2 small laps       L elbow flexion AROM/ AAROM   10       Shoulder blade squeezes   10x5\"       Supine shld flexion HH AAROM   NV       L shoulder isometrics; all dir   NV                                     NEUROMUSCULAR REEDUCATION         "                                                                                                                                               THERAPEUTIC ACTIVITY                                                  GAIT TRAINING                                                  MODALITIES

## 2025-01-14 ENCOUNTER — OFFICE VISIT (OUTPATIENT)
Dept: PHYSICAL THERAPY | Facility: CLINIC | Age: 55
End: 2025-01-14
Payer: COMMERCIAL

## 2025-01-14 DIAGNOSIS — S42.202D CLOSED TRAUMATIC NONDISPLACED FRACTURE OF PROXIMAL END OF LEFT HUMERUS WITH ROUTINE HEALING, SUBSEQUENT ENCOUNTER: Primary | ICD-10-CM

## 2025-01-14 PROCEDURE — 97112 NEUROMUSCULAR REEDUCATION: CPT

## 2025-01-14 PROCEDURE — 97140 MANUAL THERAPY 1/> REGIONS: CPT

## 2025-01-14 PROCEDURE — 97110 THERAPEUTIC EXERCISES: CPT

## 2025-01-14 NOTE — PROGRESS NOTES
Daily Note     Today's date: 2025  Patient name: Venu Pizarro  : 1970  MRN: [id number]  Referring provider: Lala Chan PA-C  Dx:   Encounter Diagnosis     ICD-10-CM    1. Closed traumatic nondisplaced fracture of proximal end of left humerus with routine healing, subsequent encounter  S42.                      Subjective:   Current Status: shoulder and elbow is still stiff and painful at times.  New Symptoms/ Problems/ Changes to Established Plan: no new sx'  Response to Last Treatment - n/a since IE  HEP/ Activity Recommendations- dispensed today ()  Progress Towards Goals: decreased pain, increased strength, independence with ADLs/IADLs and increased motion      Objective: See treatment diary below      Assessment:   Response to manual treatment: Pt is very guarded during PROM in the beginning however pt was able to relax during session.  Form with exercises: fair form, pt is motivated to get better however, pain is a limiting factor 2* to guarding and avoidance.  Response to exercises: good response to all TE.  Modifications in treatment and why: n/a  Recommendations - encourage pt to start utilizing his L arm more.         Plan: Continue per plan of care. Focus on decreasing pain and improving ROM.     Daily Treatment Diary     Precautions: none  CO-MORBIDITIES:  HEP ACCESS CODE: [id number]  FOTO Completed On: (Score:   48 Score Predication:  68 )             POC Expires Reeval for Medicare to be completed  Unit Limit Auth Expiration Date PT/OT/STVisit Limit   25 By visit n/a N/a 25 30    Completed on visit                    Auth Status DATE       Approved Visit # 1 2 3       Remaining 29 28 27      MANUAL THERAPY         L shoulder PROM/ MFR  9' 8      L elbow PROM  1' 2                                          THERAPEUTIC EXERCISE HEP 5'        Pulleys: flexion for ROM   2' 3'      Pulleys; scap for ROM   2' 3'      Pendulums; CW, CCW   15 ea 2# 20 ea     "  Table slides; flex, abd, ER   5x5\" ea 5\" 10      Standing cane IR AAROM   10x5\" 5\" 10      Seated cane ER AAROM   10 ea 5\" 10      Standing cane scaption AAROM   10x5\" 5\" 10      Walking holding wt at side to promote elbow ext   2# 2 small laps 2# 2small laps      L elbow flexion AROM/ AAROM   10 10      Shoulder blade squeezes   10x5\" 5\" 10      Supine shld flexion HH AAROM   NV 1# 10      L shoulder isometrics; all dir   NV 5\" 10                                    NEUROMUSCULAR REEDUCATION                                                                                                                                                       THERAPEUTIC ACTIVITY                                                  GAIT TRAINING                                                  MODALITIES                                        "

## 2025-01-16 ENCOUNTER — APPOINTMENT (OUTPATIENT)
Dept: PHYSICAL THERAPY | Facility: CLINIC | Age: 55
End: 2025-01-16
Payer: COMMERCIAL

## 2025-01-17 ENCOUNTER — OFFICE VISIT (OUTPATIENT)
Dept: PHYSICAL THERAPY | Facility: CLINIC | Age: 55
End: 2025-01-17
Payer: COMMERCIAL

## 2025-01-17 DIAGNOSIS — S42.202D CLOSED TRAUMATIC NONDISPLACED FRACTURE OF PROXIMAL END OF LEFT HUMERUS WITH ROUTINE HEALING, SUBSEQUENT ENCOUNTER: Primary | ICD-10-CM

## 2025-01-17 PROCEDURE — 97112 NEUROMUSCULAR REEDUCATION: CPT

## 2025-01-17 PROCEDURE — 97110 THERAPEUTIC EXERCISES: CPT

## 2025-01-17 PROCEDURE — 97140 MANUAL THERAPY 1/> REGIONS: CPT

## 2025-01-17 NOTE — PROGRESS NOTES
"Daily Note     Today's date: 2025  Patient name: Venu Pizarro  : 1970  MRN: [id number]  Referring provider: Lala Chan PA-C  Dx:   Encounter Diagnosis     ICD-10-CM    1. Closed traumatic nondisplaced fracture of proximal end of left humerus with routine healing, subsequent encounter  S42.                      Subjective:   Current Status: shoulder and elbow feels fine today.   New Symptoms/ Problems/ Changes to Established Plan: no new sx'  Response to Last Treatment - not much soreness.  HEP/ Activity Recommendations- dispensed today ()  Progress Towards Goals: decreased pain, increased strength, independence with ADLs/IADLs and increased motion      Objective: See treatment diary below      Assessment:   Response to manual treatment: Pt is less guarded during PROM compared to previous session.  Form with exercises: better form compared to lv.   Response to exercises: good response to all TE.  Modifications in treatment and why: n/a  Recommendations - encourage pt to start utilizing his L arm more.         Plan: Continue per plan of care. Focus on decreasing pain and improving ROM.     Daily Treatment Diary     Precautions: none  CO-MORBIDITIES:  HEP ACCESS CODE: [id number]  FOTO Completed On: (Score:   48 Score Predication:  68 )             POC Expires Reeval for Medicare to be completed  Unit Limit Auth Expiration Date PT/OT/STVisit Limit   25 By visit n/a N/a 25 30    Completed on visit                    Auth Status DATE      Approved Visit # 1 2 3 4      Remaining 29 28 27 26     MANUAL THERAPY         L shoulder PROM/ MFR  9' 8 8     L elbow PROM  1' 2 2                                         THERAPEUTIC EXERCISE HEP 5'        Pulleys: flexion for ROM   2' 3' 3'     Pulleys; scap for ROM   2' 3' 3'     Pendulums; CW, CCW   15 ea 2# 20 ea 2# 20 ea     Table slides; flex, abd, ER   5x5\" ea 5\" 10 5\" 20     Standing cane IR AAROM   10x5\" 5\" 10 5\" 20   " "  Seated cane ER AAROM   10 ea 5\" 10 5\" 20     Standing cane scaption AAROM   10x5\" 5\" 10 2\" 20     Walking holding wt at side to promote elbow ext   2# 2 small laps 2# 2small laps 2# 3sm laps     L elbow flexion AROM/ AAROM   10 10 2# 20     Shoulder blade squeezes   10x5\" 5\" 10 5\" 10     Supine shld flexion HH AAROM   NV 1# 10 2# 20     L shoulder isometrics; all dir   NV 5\" 10 5\" 10                                   NEUROMUSCULAR REEDUCATION                                                                                                                                                       THERAPEUTIC ACTIVITY                                                  GAIT TRAINING                                                  MODALITIES                                        "

## 2025-01-21 ENCOUNTER — OFFICE VISIT (OUTPATIENT)
Dept: PHYSICAL THERAPY | Facility: CLINIC | Age: 55
End: 2025-01-21
Payer: COMMERCIAL

## 2025-01-21 DIAGNOSIS — S42.202D CLOSED TRAUMATIC NONDISPLACED FRACTURE OF PROXIMAL END OF LEFT HUMERUS WITH ROUTINE HEALING, SUBSEQUENT ENCOUNTER: Primary | ICD-10-CM

## 2025-01-21 PROCEDURE — 97140 MANUAL THERAPY 1/> REGIONS: CPT

## 2025-01-21 PROCEDURE — 97112 NEUROMUSCULAR REEDUCATION: CPT

## 2025-01-21 PROCEDURE — 97110 THERAPEUTIC EXERCISES: CPT

## 2025-01-21 NOTE — PROGRESS NOTES
"Daily Note     Today's date: 2025  Patient name: Venu Pizarro  : 1970  MRN: [id number]  Referring provider: Lala Chan PA-C  Dx:   Encounter Diagnosis     ICD-10-CM    1. Closed traumatic nondisplaced fracture of proximal end of left humerus with routine healing, subsequent encounter  S42.                      Subjective:   Current Status: Pt reports he is starting to use his L arm more after lv.  New Symptoms/ Problems/ Changes to Established Plan: no new sx.  Response to Last Treatment - not much soreness despite new TE.  HEP/ Activity Recommendations- n/a  Progress Towards Goals: decreased pain, increased strength, independence with ADLs/IADLs and increased motion      Objective: See treatment diary below      Assessment:   Response to manual treatment: Pt is less guarded during PROM compared to previous session.  Form with exercises: introduced new TE with fair/good tolerance and form  Response to exercises: good response to all TE.  Modifications in treatment and why: n/a  Recommendations - encourage pt to start utilizing his L arm more.         Plan: Continue per plan of care. Focus on decreasing pain and improving ROM.     Daily Treatment Diary     Precautions: none  CO-MORBIDITIES:  HEP ACCESS CODE: [id number]  FOTO Completed On: (Score:   48 Score Predication:  68 )             POC Expires Reeval for Medicare to be completed  Unit Limit Auth Expiration Date PT/OT/STVisit Limit   25 By visit n/a N/a 25 30    Completed on visit                    Auth Status DATE     Approved Visit # 3 4 5     Remaining 27 26 25    MANUAL THERAPY       L shoulder PROM/ MFR 8 8 8    L elbow PROM 2 2 2                                THERAPEUTIC EXERCISE HEP       Pulleys: flexion for ROM  3' 3' 3'    Pulleys; scap for ROM  3' 3' 3'    Pendulums; CW, CCW  2# 20 ea 2# 20 ea 3# 20 ea    Table slides; flex, abd, ER  5\" 10 5\" 20 5\" 20    Standing cane IR AAROM  5\" 10 5\" 20 5\" 20  " "  Seated cane ER AAROM  5\" 10 5\" 20 5\" 20    Standing cane scaption AAROM  5\" 10 2\" 20 5\" 20    Walking holding wt at side to promote elbow ext  2# 2small laps 2# 3sm laps 3# 3sm laps    L elbow flexion AROM/ AAROM  10 2# 20 3# 20     Shoulder blade squeezes  5\" 10 5\" 10 5\" 10    Supine shld flexion HH AAROM  1# 10 2# 20 3# 20     L shoulder isometrics; all dir  5\" 10 5\" 10 5\" 10    S/l ER    0# 20    S/l Abd    0# 20            NEUROMUSCULAR REEDUCATION                                                                                                                         THERAPEUTIC ACTIVITY                                        GAIT TRAINING                                        MODALITIES                                  "

## 2025-01-24 ENCOUNTER — OFFICE VISIT (OUTPATIENT)
Dept: PHYSICAL THERAPY | Facility: CLINIC | Age: 55
End: 2025-01-24
Payer: COMMERCIAL

## 2025-01-24 DIAGNOSIS — S42.202D CLOSED TRAUMATIC NONDISPLACED FRACTURE OF PROXIMAL END OF LEFT HUMERUS WITH ROUTINE HEALING, SUBSEQUENT ENCOUNTER: Primary | ICD-10-CM

## 2025-01-24 PROCEDURE — 97140 MANUAL THERAPY 1/> REGIONS: CPT

## 2025-01-24 PROCEDURE — 97112 NEUROMUSCULAR REEDUCATION: CPT

## 2025-01-24 PROCEDURE — 97110 THERAPEUTIC EXERCISES: CPT

## 2025-01-24 NOTE — PROGRESS NOTES
Daily Note     Today's date: 2025  Patient name: Venu Pizarro  : 1970  MRN: [id number]  Referring provider: Lala Chan PA-C  Dx:   Encounter Diagnosis     ICD-10-CM    1. Closed traumatic nondisplaced fracture of proximal end of left humerus with routine healing, subsequent encounter  S42.                      Subjective:   Current Status: Pt reports his elbow is more tight than usual, 2* to working a lot at the computer.   New Symptoms/ Problems/ Changes to Established Plan: no new sx.  Response to Last Treatment - not much soreness despite new TE.  HEP/ Activity Recommendations- n/a  Progress Towards Goals: decreased pain, increased strength, independence with ADLs/IADLs and increased motion      Objective: See treatment diary below      Assessment:   Response to manual treatment: Pt continues to be is less guarded during PROM compared to previous sessions.  Form with exercises: introduced new TE with fair/good tolerance and form, in addition, introduced new stretches with great response.   Response to exercises: good response to all new TE.  Modifications in treatment and why: n/a  Recommendations - encourage pt to start utilizing his L arm more.         Plan: Continue per plan of care. Focus on decreasing pain and improving ROM.     Daily Treatment Diary     Precautions: none  CO-MORBIDITIES:  HEP ACCESS CODE: [id number]  FOTO Completed On: (Score:   48 Score Predication:  68 )             POC Expires Reeval for Medicare to be completed  Unit Limit Auth Expiration Date PT/OT/STVisit Limit   25 By visit n/a N/a 25 30    Completed on visit                    Auth Status DATE    Approved Visit # 3 4 5 6    Remaining 27 26 25 24   MANUAL THERAPY       L shoulder PROM/ MFR 8 8 8 2   L elbow PROM 2 2 2 8                               THERAPEUTIC EXERCISE HEP       Pulleys: flexion for ROM  3' 3' 3' 20x   Pulleys; scap for ROM  3' 3' 3' 20x   Pendulums; CW, CCW  2#  "20 ea 2# 20 ea 3# 20 ea    Table slides; flex, abd, ER  5\" 10 5\" 20 5\" 20    Standing cane IR AAROM  5\" 10 5\" 20 5\" 20    Seated cane ER AAROM  5\" 10 5\" 20 5\" 20    Standing cane scaption AAROM  5\" 10 2\" 20 5\" 20    Walking holding wt at side to promote elbow ext  2# 2small laps 2# 3sm laps 3# 3sm laps 3# 3sm laps   L elbow flexion AROM/ AAROM  10 2# 20 3# 20  3# 20   Shoulder blade squeezes  5\" 10 5\" 10 5\" 10    Supine shld flexion HH AAROM  1# 10 2# 20 3# 20     L shoulder isometrics; all dir  5\" 10 5\" 10 5\" 10    S/l ER    0# 20    S/l Abd    0# 20            NEUROMUSCULAR REEDUCATION         Bicep stretch     10\" 5   Wrist extension stretch     10\" 5                                                                                                   THERAPEUTIC ACTIVITY        5[facility] placing on shelf flex/scap     3x ea   Wall push ups     2x10                   GAIT TRAINING                                        MODALITIES       [facility]    8' +hep                       "

## 2025-01-28 ENCOUNTER — OFFICE VISIT (OUTPATIENT)
Dept: PHYSICAL THERAPY | Facility: CLINIC | Age: 55
End: 2025-01-28
Payer: COMMERCIAL

## 2025-01-28 DIAGNOSIS — S42.202D CLOSED TRAUMATIC NONDISPLACED FRACTURE OF PROXIMAL END OF LEFT HUMERUS WITH ROUTINE HEALING, SUBSEQUENT ENCOUNTER: Primary | ICD-10-CM

## 2025-01-28 PROCEDURE — 97110 THERAPEUTIC EXERCISES: CPT

## 2025-01-28 PROCEDURE — 97140 MANUAL THERAPY 1/> REGIONS: CPT

## 2025-01-28 PROCEDURE — 97112 NEUROMUSCULAR REEDUCATION: CPT

## 2025-01-28 NOTE — PROGRESS NOTES
Daily Note     Today's date: 2025  Patient name: Venu Pizarro  : 1970  MRN: [id number]  Referring provider: Lala Chan PA-C  Dx:   Encounter Diagnosis     ICD-10-CM    1. Closed traumatic nondisplaced fracture of proximal end of left humerus with routine healing, subsequent encounter  S4.                      Subjective:   Current Status: Pt reports his elbow is much looser since starting the two new stretches.   New Symptoms/ Problems/ Changes to Established Plan: no new sx.  Response to Last Treatment - not much soreness despite new TE.  HEP/ Activity Recommendations- n/a  Progress Towards Goals: decreased pain, increased strength, independence with ADLs/IADLs and increased motion      Objective: See treatment diary below      Assessment:   Response to manual treatment: Pt continues to show great progression during PROM compared to previous sessions.  Form with exercises: introduced new TE with fair/good tolerance and form, in addition,progressed below TE with good tolerance.    Response to exercises: better response to new TE introduced lv.  Modifications in treatment and why: n/a  Recommendations - encourage pt to start utilizing his L arm more.         Plan: Continue per plan of care. Focus on decreasing pain and improving ROM.     Daily Treatment Diary     Precautions: none  CO-MORBIDITIES:  HEP ACCESS CODE: [id number]  FOTO Completed On: (Score:   48 Score Predication:  68 )             POC Expires Reeval for Medicare to be completed  Unit Limit Auth Expiration Date PT/OT/STVisit Limit   25 By visit n/a N/a 25 30    Completed on visit                    Auth Status DATE    Approved Visit # 3 4 5 6 7    Remaining 27 26 25 24 23   MANUAL THERAPY        L shoulder PROM/ MFR 8 8 8 2 2   L elbow PROM 2 2 2 8 8                                   THERAPEUTIC EXERCISE HEP        Pulleys: flexion for ROM  3' 3' 3' 20x 20x   Pulleys; scap for ROM  3' 3' 3' 20x  "20x   Pendulums; CW, CCW  2# 20 ea 2# 20 ea 3# 20 ea     Table slides; flex, abd, ER  5\" 10 5\" 20 5\" 20  np   Standing cane IR AAROM  5\" 10 5\" 20 5\" 20     Seated cane ER AAROM  5\" 10 5\" 20 5\" 20     Standing cane scaption AAROM  5\" 10 2\" 20 5\" 20     Walking holding wt at side to promote elbow ext  2# 2small laps 2# 3sm laps 3# 3sm laps 3# 3sm laps 4# 3sm laps   L elbow flexion AROM/ AAROM  10 2# 20 3# 20  3# 20 4# 20   Hammer curls      4# 20   Shoulder blade squeezes  5\" 10 5\" 10 5\" 10     Supine shld flexion HH AAROM  1# 10 2# 20 3# 20   4# 20   L shoulder isometrics; all dir  5\" 10 5\" 10 5\" 10     S/l ER    0# 20     S/l Abd    0# 20     Supine punches       4# 20            NEUROMUSCULAR REEDUCATION          Bicep stretch     10\" 5 20\" 5   Wrist extension stretch     10\" 5 20\" 5                                                                                                               THERAPEUTIC ACTIVITY         5[facility] placing on shelf flex/scap     3x ea 3x ea   Wall push ups     2x10 2x10   Wall slides on flex/scap      2x10            GAIT TRAINING                                             MODALITIES        [facility]    8' +hep                         "

## 2025-01-31 ENCOUNTER — EVALUATION (OUTPATIENT)
Dept: PHYSICAL THERAPY | Facility: CLINIC | Age: 55
End: 2025-01-31
Payer: COMMERCIAL

## 2025-01-31 DIAGNOSIS — G89.29 CHRONIC PAIN OF RIGHT KNEE: ICD-10-CM

## 2025-01-31 DIAGNOSIS — M25.562 CHRONIC PAIN OF LEFT KNEE: ICD-10-CM

## 2025-01-31 DIAGNOSIS — G89.29 CHRONIC PAIN OF LEFT KNEE: ICD-10-CM

## 2025-01-31 DIAGNOSIS — S42.202D CLOSED TRAUMATIC NONDISPLACED FRACTURE OF PROXIMAL END OF LEFT HUMERUS WITH ROUTINE HEALING, SUBSEQUENT ENCOUNTER: Primary | ICD-10-CM

## 2025-01-31 DIAGNOSIS — M25.561 CHRONIC PAIN OF RIGHT KNEE: ICD-10-CM

## 2025-01-31 PROCEDURE — 97140 MANUAL THERAPY 1/> REGIONS: CPT | Performed by: PHYSICAL THERAPIST

## 2025-01-31 PROCEDURE — 97164 PT RE-EVAL EST PLAN CARE: CPT | Performed by: PHYSICAL THERAPIST

## 2025-01-31 PROCEDURE — 97110 THERAPEUTIC EXERCISES: CPT | Performed by: PHYSICAL THERAPIST

## 2025-01-31 NOTE — PROGRESS NOTES
PT Re-Evaluation     Today's date: 2025  Patient name: Venu Pizarro  : 1970  MRN: [id number]  Referring provider: Lala Chan PA-C  Dx:   Encounter Diagnosis     ICD-10-CM    1. Closed traumatic nondisplaced fracture of proximal end of left humerus with routine healing, subsequent encounter  S42.D       2. Chronic pain of right knee  M25.561     G89.29       3. Chronic pain of left knee  M25.562     G89.29                      Assessment  Impairments: abnormal or restricted ROM, activity intolerance, impaired physical strength, lacks appropriate home exercise program, pain with function and poor posture     Assessment details: Since starting skilled PT, L shoulder ROM and strength are improving, with increasing L elbow pain. Pt is making gradual gains with L shoulder functional activities.    Pt arrives to skilled PT with a script for B knee pain from his rheumatologist. Pt presents with increased pain and TTP, decreased B knee ROM, decreased B LE strength and decreased functional activities. Recommend pt initiate skilled PT to address these deficits and promote return to maximal functional activities.  Barriers to therapy: High insurance cost  Understanding of Dx/Px/POC: good     Prognosis: good    Goals  STG's ( 3-4 weeks)  1. Pt will be independent in HEP- met  2. Pt will be able to perform dressing and self care activities with greater ease- met  LTG's ( 6- 8 weeks)  1. Improve FOTO score by 8-10 points- partial met  2. Pt will have decreased pain to 2/10 at worst- not met  3. Pt will have improved L shoulder strength by 1/2 grade- partial met  4. Pt will be able to perform household lifting activities- not met    Plan  Patient would benefit from: PT eval and skilled physical therapy  Planned modality interventions: cryotherapy    Planned therapy interventions: joint mobilization, manual therapy, neuromuscular re-education, strengthening, stretching, therapeutic activities, therapeutic  "exercise, functional ROM exercises, flexibility and home exercise program    Frequency: 2x week  Duration in weeks: 6  Plan of Care beginning date: 1/31/2025  Plan of Care expiration date: 3/14/2025  Treatment plan discussed with: PTA and patient        Subjective Evaluation    History of Present Illness  Date of onset: 11/17/2024  Mechanism of injury: trauma  Mechanism of injury: I.E; Pt fell in [employer] on 11/17/24, and fell onto his left shoulder and his arm was trapped under his body. X-ray testing showed \" Acute minimally displaced fracture of the humeral surgical neck. Glenohumeral joint alignment is maintained.\"     Pt was immobilized in a sling for 6 weeks. Pt has discontinued his sling use.  Pt is not able to lift his arm. Pt is not able to wash his hair. Pt just started using the computer keyboard for work activities, and needs to bend his body down with eating. Pt sleeps on his back and is currently sleeping on a reclining sofa. Pt is not able to sleep on his L side. Pt has just started to be able to write with his L hand. Pt is driving short, local distances.    1/31/25: Pt reports some days are better than others with eating depending on his elbow pain. Pt is writing better and using the computer keyboard. Pt notes that he has constant elbow pain. Pt is able to reach the back of his ear on the side of his ead. Pt is able to sleep on his R side. Pt feels his ROM is improving.    Pt reports  B knee pain is getting progressively worse over the past 6 months. Pt went to his rheumatologist and was told that he did not have much fluid. Pt received injections without significant relief and was given methotrexate. Pt believed X-ray testing showed OA.  Pt has increased difficulty transferring sit to stand to get out of a chair, car, or toilet seat. Pt sits a lot for work and he tries to get up every hour. Pt has increased difficulty going up an down the steps. Pt tends to go down the steps sideways. Pt is avoiding " squatting and kneeling activities.     Work: ; works from home  Hobbies: none  Gait: no abnormalities  Patient Goals  Patient goals for therapy: decreased pain, increased strength, independence with ADLs/IADLs and increased motion    Pain  At best pain ratin  At worst pain ratin  Location: L arm/ pectoral region- can radiate down to the elbow;  B knees: 0/10 best  10/10 at worst  Quality: dull ache    Social Support  Lives with: spouse    Employment status: working  Hand dominance: left      Diagnostic Tests  X-ray: abnormal  Treatments  No previous or current treatments        Objective     Neurological Testing     Sensation     Shoulder   Left Shoulder   Intact: light touch    Right Shoulder   Intact: Light touch    Reflexes   Left   Biceps (C5/C6): trace (1+)  Brachioradialis (C6): trace (1+)  Triceps (C7): trace (1+)    Right   Biceps (C5/C6): trace (1+)  Brachioradialis (C6): trace (1+)  Triceps (C7): trace (1+)    Active Range of Motion   Left Shoulder   Flexion: 108 degrees   Abduction: 80 degrees   External rotation 45°: 45 degrees   Internal rotation 45°: 75 degrees     Right Shoulder   Flexion: 142 degrees   Abduction: 125 degrees     Left Elbow   Flexion: 130 degrees   Extension: 0 degrees     Right Elbow   Normal active range of motion  Left Knee   Flexion: 120 degrees   Extension: 0 degrees     Right Knee   Flexion: 120 degrees   Extension: 0 degrees     Additional Active Range of Motion Details  Cervical ROM; limited with mild pain at end ROM SB and rotation  Posture: pt sit with rounded shoulders, moderate forward head and thoracic khyphosis  Mild TTP L pectoral region  (-) TTP L shoulder   (+) TTP B medial quad and joint line    Passive Range of Motion   Left Shoulder   Flexion: 130 degrees   Abduction: 124 degrees   External rotation 45°: 45 degrees   Internal rotation 45°: 75 degrees   Left Knee   Flexion: 122 degrees   Extension: 0 degrees     Right Knee   Flexion: 122 degrees  "  Extension: 0 degrees     Strength/Myotome Testing     Left Shoulder     Planes of Motion   Flexion: 4   Left shoulder abduction strength: NT 2* decreased ROm.   External rotation at 0°: 5   Internal rotation at 0°: 5     Right Shoulder   Normal muscle strength    Right Hip   Planes of Motion   Adduction: 4+  External rotation: 4+          Daily Treatment Diary     Precautions: none  CO-MORBIDITIES:  HEP ACCESS CODE: [id number]  FOTO Completed On: 1/31(Score:  60 Score Predication:  68 )             POC Expires Reeval for Medicare to be completed  Unit Limit Auth Expiration Date PT/OT/STVisit Limit   3/14/25 By visit n/a N/a 12/31/25 30    Completed on visit                    Auth Status DATE 1/14 1/17 1/21 1/24 1/28 1/31   Approved Visit # 3 4 5 6 7 8    Remaining 27 26 25 24 23 22   MANUAL THERAPY         L shoulder PROM/ MFR 8 8 8 2 2 10'   L elbow PROM 2 2 2 8 8    RE-eval      20'                              THERAPEUTIC EXERCISE HEP         Pulleys: flexion for ROM  3' 3' 3' 20x 20x 3'   Pulleys; scap for ROM  3' 3' 3' 20x 20x 3'   Pendulums; CW, CCW  2# 20 ea 2# 20 ea 3# 20 ea      Bike for LE ROM  5\" 10 5\" 20 5\" 20  np NV   Standing HR  5\" 10 5\" 20 5\" 20   NV   Seated cane ER AAROM  5\" 10 5\" 20 5\" 20      Standing gastroc stretch  5\" 10 2\" 20 5\" 20   NV   Walking holding wt at side to promote elbow ext  2# 2small laps 2# 3sm laps 3# 3sm laps 3# 3sm laps 4# 3sm laps Hold wt   L elbow flexion AROM/ AAROM  10 2# 20 3# 20  3# 20 4# 20    Hammer curls      4# 20    Shoulder blade squeezes  5\" 10 5\" 10 5\" 10      Supine shld flexion HH AAROM  1# 10 2# 20 3# 20   4# 20    L shoulder isometrics; all dir  5\" 10 5\" 10 5\" 10      S/l ER    0# 20      S/l Abd    0# 20      Supine punches       4# 20    bridges       10x5\"   Quad sets       10x5\"   Supine SLR       5 ea   S/l hip abd L only       10   NEUROMUSCULAR REEDUCATION           Bicep stretch     10\" 5 20\" 5    Wrist extension stretch     10\" 5 20\" 5            "                                                                                                                 THERAPEUTIC ACTIVITY          5[facility] placing on shelf flex/scap     3x ea 3x ea    Wall push ups     2x10 2x10    Wall slides on flex/scap      2x10              GAIT TRAINING                                                  MODALITIES         p    8' +hep

## 2025-01-31 NOTE — LETTER
2025    Britanyrupesh Avelar  [address] [address]    Patient: Venu Pizarro   YOB: 1970   Date of Visit: 2025     Encounter Diagnosis     ICD-10-CM    1. Closed traumatic nondisplaced fracture of proximal end of left humerus with routine healing, subsequent encounter  S4 PT plan of care cert/re-cert      2. Chronic pain of right knee  M25.561 PT plan of care cert/re-cert    G89.29       3. Chronic pain of left knee  M25.562 PT plan of care cert/re-cert    G89.29           Dear Dr. Avelar:    Thank you for your recent referral of Venu Pizarro. Please review the attached evaluation summary from Venu's recent visit.     Please verify that you agree with the plan of care by signing the attached order.     If you have any questions or concerns, please do not hesitate to call.     I sincerely appreciate the opportunity to share in the care of one of your patients and hope to have another opportunity to work with you in the near future.       Sincerely,    Ladan Hughes, PT      Referring Provider:      I certify that I have read the below Plan of Care and certify the need for these services furnished under this plan of treatment while under my care.                    Britany Avelar  [address] [address]  Via Fax: [phone]          PT Re-Evaluation     Today's date: 2025  Patient name: Venu Pizarro  : 1970  MRN: [id number]  Referring provider: Lala Chan PA-C  Dx:   Encounter Diagnosis     ICD-10-CM    1. Closed traumatic nondisplaced fracture of proximal end of left humerus with routine healing, subsequent encounter  S4       2. Chronic pain of right knee  M25.561     G89.29       3. Chronic pain of left knee  M25.562     G89.29                      Assessment  Impairments: abnormal or restricted ROM, activity intolerance, impaired physical strength, lacks appropriate home exercise program, pain with  "function and poor posture     Assessment details: Since starting skilled PT, L shoulder ROM and strength are improving, with increasing L elbow pain. Pt is making gradual gains with L shoulder functional activities.    Pt arrives to skilled PT with a script for B knee pain from his rheumatologist. Pt presents with increased pain and TTP, decreased B knee ROM, decreased B LE strength and decreased functional activities. Recommend pt initiate skilled PT to address these deficits and promote return to maximal functional activities.  Barriers to therapy: High insurance cost  Understanding of Dx/Px/POC: good     Prognosis: good    Goals  STG's ( 3-4 weeks)  1. Pt will be independent in HEP- met  2. Pt will be able to perform dressing and self care activities with greater ease- met  LTG's ( 6- 8 weeks)  1. Improve FOTO score by 8-10 points- partial met  2. Pt will have decreased pain to 2/10 at worst- not met  3. Pt will have improved L shoulder strength by 1/2 grade- partial met  4. Pt will be able to perform household lifting activities- not met    Plan  Patient would benefit from: PT eval and skilled physical therapy  Planned modality interventions: cryotherapy    Planned therapy interventions: joint mobilization, manual therapy, neuromuscular re-education, strengthening, stretching, therapeutic activities, therapeutic exercise, functional ROM exercises, flexibility and home exercise program    Frequency: 2x week  Duration in weeks: 6  Plan of Care beginning date: 1/31/2025  Plan of Care expiration date: 3/14/2025  Treatment plan discussed with: PTA and patient        Subjective Evaluation    History of Present Illness  Date of onset: 11/17/2024  Mechanism of injury: trauma  Mechanism of injury: I.E; Pt fell in [employer] on 11/17/24, and fell onto his left shoulder and his arm was trapped under his body. X-ray testing showed \" Acute minimally displaced fracture of the humeral surgical neck. Glenohumeral joint alignment is " "maintained.\"     Pt was immobilized in a sling for 6 weeks. Pt has discontinued his sling use.  Pt is not able to lift his arm. Pt is not able to wash his hair. Pt just started using the computer keyboard for work activities, and needs to bend his body down with eating. Pt sleeps on his back and is currently sleeping on a reclining sofa. Pt is not able to sleep on his L side. Pt has just started to be able to write with his L hand. Pt is driving short, local distances.    25: Pt reports some days are better than others with eating depending on his elbow pain. Pt is writing better and using the computer keyboard. Pt notes that he has constant elbow pain. Pt is able to reach the back of his ear on the side of his ead. Pt is able to sleep on his R side. Pt feels his ROM is improving.    Pt reports  B knee pain is getting progressively worse over the past 6 months. Pt went to his rheumatologist and was told that he did not have much fluid. Pt received injections without significant relief and was given methotrexate. Pt believed X-ray testing showed OA.  Pt has increased difficulty transferring sit to stand to get out of a chair, car, or toilet seat. Pt sits a lot for work and he tries to get up every hour. Pt has increased difficulty going up an down the steps. Pt tends to go down the steps sideways. Pt is avoiding squatting and kneeling activities.     Work: ; works from home  Hobbies: none  Gait: no abnormalities  Patient Goals  Patient goals for therapy: decreased pain, increased strength, independence with ADLs/IADLs and increased motion    Pain  At best pain ratin  At worst pain ratin  Location: L arm/ pectoral region- can radiate down to the elbow;  B knees: 0/10 best  10/10 at worst  Quality: dull ache    Social Support  Lives with: spouse    Employment status: working  Hand dominance: left      Diagnostic Tests  X-ray: abnormal  Treatments  No previous or current " treatments        Objective     Neurological Testing     Sensation     Shoulder   Left Shoulder   Intact: light touch    Right Shoulder   Intact: Light touch    Reflexes   Left   Biceps (C5/C6): trace (1+)  Brachioradialis (C6): trace (1+)  Triceps (C7): trace (1+)    Right   Biceps (C5/C6): trace (1+)  Brachioradialis (C6): trace (1+)  Triceps (C7): trace (1+)    Active Range of Motion   Left Shoulder   Flexion: 108 degrees   Abduction: 80 degrees   External rotation 45°: 45 degrees   Internal rotation 45°: 75 degrees     Right Shoulder   Flexion: 142 degrees   Abduction: 125 degrees     Left Elbow   Flexion: 130 degrees   Extension: 0 degrees     Right Elbow   Normal active range of motion  Left Knee   Flexion: 120 degrees   Extension: 0 degrees     Right Knee   Flexion: 120 degrees   Extension: 0 degrees     Additional Active Range of Motion Details  Cervical ROM; limited with mild pain at end ROM SB and rotation  Posture: pt sit with rounded shoulders, moderate forward head and thoracic khyphosis  Mild TTP L pectoral region  (-) TTP L shoulder   (+) TTP B medial quad and joint line    Passive Range of Motion   Left Shoulder   Flexion: 130 degrees   Abduction: 124 degrees   External rotation 45°: 45 degrees   Internal rotation 45°: 75 degrees   Left Knee   Flexion: 122 degrees   Extension: 0 degrees     Right Knee   Flexion: 122 degrees   Extension: 0 degrees     Strength/Myotome Testing     Left Shoulder     Planes of Motion   Flexion: 4   Left shoulder abduction strength: NT 2* decreased ROm.   External rotation at 0°: 5   Internal rotation at 0°: 5     Right Shoulder   Normal muscle strength    Right Hip   Planes of Motion   Adduction: 4+  External rotation: 4+          Daily Treatment Diary     Precautions: none  CO-MORBIDITIES:  HEP ACCESS CODE: [id number]  FOTO Completed On: 1/31(Score:  60 Score Predication:  68 )             POC Expires Reeval for Medicare to be completed  Unit Limit Auth Expiration Date  "PT/OT/STVisit Limit   3/14/25 By visit n/a N/a 12/31/25 30    Completed on visit                    Auth Status DATE 1/14 1/17 1/21 1/24 1/28 1/31   Approved Visit # 3 4 5 6 7 8    Remaining 27 26 25 24 23 22   MANUAL THERAPY         L shoulder PROM/ MFR 8 8 8 2 2 10'   L elbow PROM 2 2 2 8 8    RE-eval      20'                              THERAPEUTIC EXERCISE HEP         Pulleys: flexion for ROM  3' 3' 3' 20x 20x 3'   Pulleys; scap for ROM  3' 3' 3' 20x 20x 3'   Pendulums; CW, CCW  2# 20 ea 2# 20 ea 3# 20 ea      Bike for LE ROM  5\" 10 5\" 20 5\" 20  np NV   Standing HR  5\" 10 5\" 20 5\" 20   NV   Seated cane ER AAROM  5\" 10 5\" 20 5\" 20      Standing gastroc stretch  5\" 10 2\" 20 5\" 20   NV   Walking holding wt at side to promote elbow ext  2# 2small laps 2# 3sm laps 3# 3sm laps 3# 3sm laps 4# 3sm laps Hold wt   L elbow flexion AROM/ AAROM  10 2# 20 3# 20  3# 20 4# 20    Hammer curls      4# 20    Shoulder blade squeezes  5\" 10 5\" 10 5\" 10      Supine shld flexion HH AAROM  1# 10 2# 20 3# 20   4# 20    L shoulder isometrics; all dir  5\" 10 5\" 10 5\" 10      S/l ER    0# 20      S/l Abd    0# 20      Supine punches       4# 20    bridges       10x5\"   Quad sets       10x5\"   Supine SLR       5 ea   S/l hip abd L only       10   NEUROMUSCULAR REEDUCATION           Bicep stretch     10\" 5 20\" 5    Wrist extension stretch     10\" 5 20\" 5                                                                                                                            THERAPEUTIC ACTIVITY          5[facility] placing on shelf flex/scap     3x ea 3x ea    Wall push ups     2x10 2x10    Wall slides on flex/scap      2x10              GAIT TRAINING                                                  MODALITIES         [facility]    8' +hep                                           "

## 2025-02-03 ENCOUNTER — APPOINTMENT (OUTPATIENT)
Dept: PHYSICAL THERAPY | Facility: CLINIC | Age: 55
End: 2025-02-03
Payer: COMMERCIAL

## 2025-02-03 DIAGNOSIS — K21.9 GASTROESOPHAGEAL REFLUX DISEASE: ICD-10-CM

## 2025-02-03 RX ORDER — PANTOPRAZOLE SODIUM 40 MG/1
40 TABLET, DELAYED RELEASE ORAL DAILY
Qty: 90 TABLET | Refills: 1 | Status: SHIPPED | OUTPATIENT
Start: 2025-02-03

## 2025-02-05 ENCOUNTER — OFFICE VISIT (OUTPATIENT)
Dept: PHYSICAL THERAPY | Facility: CLINIC | Age: 55
End: 2025-02-05
Payer: COMMERCIAL

## 2025-02-05 DIAGNOSIS — S42.202D CLOSED TRAUMATIC NONDISPLACED FRACTURE OF PROXIMAL END OF LEFT HUMERUS WITH ROUTINE HEALING, SUBSEQUENT ENCOUNTER: Primary | ICD-10-CM

## 2025-02-05 DIAGNOSIS — G89.29 CHRONIC PAIN OF LEFT KNEE: ICD-10-CM

## 2025-02-05 DIAGNOSIS — M25.562 CHRONIC PAIN OF LEFT KNEE: ICD-10-CM

## 2025-02-05 DIAGNOSIS — G89.29 CHRONIC PAIN OF RIGHT KNEE: ICD-10-CM

## 2025-02-05 DIAGNOSIS — M25.561 CHRONIC PAIN OF RIGHT KNEE: ICD-10-CM

## 2025-02-05 PROCEDURE — 97110 THERAPEUTIC EXERCISES: CPT

## 2025-02-05 PROCEDURE — 97112 NEUROMUSCULAR REEDUCATION: CPT

## 2025-02-05 PROCEDURE — 97140 MANUAL THERAPY 1/> REGIONS: CPT

## 2025-02-05 NOTE — PROGRESS NOTES
"Daily Note     Today's date: 2025  Patient name: Venu Pizarro  : 1970  MRN: [id number]  Referring provider: Lala Chan PA-C  Dx:   Encounter Diagnosis     ICD-10-CM    1. Closed traumatic nondisplaced fracture of proximal end of left humerus with routine healing, subsequent encounter  S42.       2. Chronic pain of right knee  M25.561     G89.29       3. Chronic pain of left knee  M25.562     G89.29                      Subjective: Pt states his knees are killing him, wants to do half and half PT work on his shoulder and knees.       Objective: See treatment diary below      Assessment: introduced new TE to focus on pt LEs, with great tolerance. No complain of pain or discomfort for Les however pt continues to be challenged with UE TE.      Plan: Continue per plan of care.      Daily Treatment Diary     Precautions: none  CO-MORBIDITIES:  HEP ACCESS CODE: [id number]  FOTO Completed On: (Score:  60 Score Predication:  68 )             POC Expires Reeval for Medicare to be completed  Unit Limit Auth Expiration Date PT/OT/STVisit Limit   3/14/25 By visit n/a N/a 25 30    Completed on visit                    Auth Status DATE    Approved Visit # 6 7 8 9    Remaining 24 23 22 21   MANUAL THERAPY       L shoulder PROM/ MFR 2 2 10' 5'   L elbow PROM 8 8  5'   RE-eval   20'                         THERAPEUTIC EXERCISE HEP       Pulleys: flexion for ROM  20x 20x 3' 20x   Pulleys; scap for ROM  20x 20x 3' 20x   UBE     3'/3'   Pendulums; CW, CCW        Bike for LE ROM   np NV 5'   Standing HR on slt bd L2    NV 20x   Standing gastroc stretch on slt bd L2    NV 20\" 3   Machine knee ext BL     25# 20   Machine knee flex BL     25# 20   Leg Press BL     105# 20   Leg Press SL R/L     55# 20 ea   bridges    10x5\"    Quad sets    10x5\"    Supine SLR    5 ea    S/l hip abd L only    10    ---------        Walking holding wt at side to promote elbow ext  3# 3sm laps 4# 3sm laps Hold wt  " "  L elbow flexion AROM/ AAROM  3# 20 4# 20  4# 20 pain free   Hammer curls   4# 20  4# 20 pain free   Supine shld flexion HH AAROM   4# 20  4# 20   S/l ER        S/l Abd        Supine punches    4# 20  4# 20                                   NEUROMUSCULAR REEDUCATION         Bicep stretch  10\" 5 20\" 5  hep   Wrist extension stretch  10\" 5 20\" 5  hep   Cross over stretch     20\" 3                                                                                           THERAPEUTIC ACTIVITY        5[facility] placing on shelf flex/scap  3x ea 3x ea     Wall push ups  2x10 2x10     Wall slides on flex/scap   2x10             GAIT TRAINING                                        MODALITIES       p 8' +hep                               "

## 2025-02-06 ENCOUNTER — APPOINTMENT (OUTPATIENT)
Dept: PHYSICAL THERAPY | Facility: CLINIC | Age: 55
End: 2025-02-06
Payer: COMMERCIAL

## 2025-02-11 ENCOUNTER — OFFICE VISIT (OUTPATIENT)
Dept: OBGYN CLINIC | Facility: CLINIC | Age: 55
End: 2025-02-11

## 2025-02-11 ENCOUNTER — OFFICE VISIT (OUTPATIENT)
Dept: PHYSICAL THERAPY | Facility: CLINIC | Age: 55
End: 2025-02-11
Payer: COMMERCIAL

## 2025-02-11 ENCOUNTER — APPOINTMENT (OUTPATIENT)
Dept: RADIOLOGY | Facility: CLINIC | Age: 55
End: 2025-02-11
Payer: COMMERCIAL

## 2025-02-11 VITALS — BODY MASS INDEX: 37.74 KG/M2 | HEIGHT: 68 IN | WEIGHT: 249 LBS

## 2025-02-11 DIAGNOSIS — S42.202D CLOSED TRAUMATIC NONDISPLACED FRACTURE OF PROXIMAL END OF LEFT HUMERUS WITH ROUTINE HEALING, SUBSEQUENT ENCOUNTER: Primary | ICD-10-CM

## 2025-02-11 DIAGNOSIS — M25.561 CHRONIC PAIN OF RIGHT KNEE: ICD-10-CM

## 2025-02-11 DIAGNOSIS — M25.562 CHRONIC PAIN OF LEFT KNEE: ICD-10-CM

## 2025-02-11 DIAGNOSIS — M77.12 LATERAL EPICONDYLITIS OF LEFT ELBOW: ICD-10-CM

## 2025-02-11 DIAGNOSIS — S42.202D CLOSED TRAUMATIC NONDISPLACED FRACTURE OF PROXIMAL END OF LEFT HUMERUS WITH ROUTINE HEALING, SUBSEQUENT ENCOUNTER: ICD-10-CM

## 2025-02-11 DIAGNOSIS — G89.29 CHRONIC PAIN OF LEFT KNEE: ICD-10-CM

## 2025-02-11 DIAGNOSIS — G89.29 CHRONIC PAIN OF RIGHT KNEE: ICD-10-CM

## 2025-02-11 PROCEDURE — 97140 MANUAL THERAPY 1/> REGIONS: CPT | Performed by: PHYSICAL THERAPIST

## 2025-02-11 PROCEDURE — 97110 THERAPEUTIC EXERCISES: CPT | Performed by: PHYSICAL THERAPIST

## 2025-02-11 PROCEDURE — 99024 POSTOP FOLLOW-UP VISIT: CPT | Performed by: ORTHOPAEDIC SURGERY

## 2025-02-11 PROCEDURE — 73030 X-RAY EXAM OF SHOULDER: CPT

## 2025-02-11 RX ORDER — METHOTREXATE 2.5 MG/1
TABLET ORAL
COMMUNITY
Start: 2025-01-13

## 2025-02-11 NOTE — PROGRESS NOTES
Daily Note     Today's date: 2025  Patient name: Venu Pizarro  : 1970  MRN: [id number]  Referring provider: Lala Chan PA-C  Dx:   Encounter Diagnosis     ICD-10-CM    1. Closed traumatic nondisplaced fracture of proximal end of left humerus with routine healing, subsequent encounter  S42.       2. Chronic pain of right knee  M25.561     G89.29       3. Chronic pain of left knee  M25.562     G89.29                      Subjective:  Pt reports his L > R knees are tight this morning. Pt notes his knees are often stiff in the morning. Pt has mild soreness in his shoulder. Pt continues to have elbow pain. Pt will be going to ortho this afternoon for his elbow. Pt has increased L hip pain when getting up in the morning. Pt feels uneasy when he takes the first several steps with his leg after getting out of bed      Objective: See treatment diary below      Assessment: Tolerated treatment well. Patient exhibited good technique with therapeutic exercises. Pt feels some popping with knee extension machine. Pt has tenderness in L greater trochanter region surrounding the bursae. Pt was instructed in self piriformis stretch to decrease L hip pain.      Plan: Continue per plan of care. Focus on improving ROM in his should and strength in his knees.     Daily Treatment Diary     Precautions: none  CO-MORBIDITIES:  HEP ACCESS CODE: [id number]  FOTO Completed On: (Score:  60 Score Predication:  68 )             POC Expires Reeval for Medicare to be completed  Unit Limit Auth Expiration Date PT/OT/STVisit Limit   3/14/25 By visit n/a N/a 25 30    Completed on visit                    Auth Status DATE    Approved Visit # 6 7 8 9 10    Remaining 24 23 22 21 20   MANUAL THERAPY        L shoulder PROM/ MFR 2 2 10' 5' 10'   L elbow PROM 8 8  5' np   RE-eval   20'     B knee PROM/ MFR     8'                   THERAPEUTIC EXERCISE HEP        Pulleys: flexion for ROM  20x 20x 3' 20x   "  Pulleys; scap for ROM  20x 20x 3' 20x    UBE     3'/3' 3'/3'   Pendulums; CW, CCW         Bike for LE ROM   np NV 5'    Standing HR on slt bd L2    NV 20x 20   Standing gastroc stretch on slt bd L2    NV 20\" 3 1'   Machine knee ext BL     25# 20 25# x20   Machine knee flex BL     25# 20 25# x20   Leg Press BL     105# 20    Leg Press SL R/L     55# 20 ea    bridges    10x5\"     Quad sets    10x5\"  10x10\"   Supine SLR    5 ea  10 ea   S/l hip abd L only    10  10   ---------         Walking holding wt at side to promote elbow ext  3# 3sm laps 4# 3sm laps Hold wt     L elbow flexion AROM/ AAROM  3# 20 4# 20  4# 20 pain free    Hammer curls   4# 20  4# 20 pain free    Supine shld flexion HH AAROM   4# 20  4# 20    S/l ER         S/l Abd         Supine punches    4# 20  4# 20    Piriformis stretch L hip      instruct                              NEUROMUSCULAR REEDUCATION          Bicep stretch  10\" 5 20\" 5  hep    Wrist extension stretch  10\" 5 20\" 5  hep    Cross over stretch     20\" 3                                                                                                       THERAPEUTIC ACTIVITY         5[facility] placing on shelf flex/scap  3x ea 3x ea      Wall push ups  2x10 2x10      Wall slides on flex/scap   2x10               GAIT TRAINING                                             MODALITIES        mhp 8' +hep                                   "

## 2025-02-11 NOTE — PROGRESS NOTES
Assessment:     1. Closed traumatic nondisplaced fracture of proximal end of left humerus with routine healing, subsequent encounter    2. Lateral epicondylitis of left elbow        Plan:     Problem List Items Addressed This Visit          Musculoskeletal and Integument    Closed traumatic nondisplaced fracture of proximal end of left humerus - Primary    Relevant Orders    XR shoulder 2+ vw left    Lateral epicondylitis of left elbow    Relevant Orders    Durable Medical Equipment    Ambulatory Referral to Physical Therapy    Findings are consistent with left shoulder proximal humerus fracture and left elbow lateral epicondylitis. Findings and treatment options discussed with patient. X-rays of left shoulder were reviewed and discussed with patient. He states he is doing well overall at this time. Patient should continue with physical therapy for the shoulder. Patient has developed left elbow lateral epicondylitis, new physical therapy referral was given to incorporate treatment for lateral epicondylitis. Patient was given Tennis Elbow brace at today's visit. Patient can perform activity as tolerated, letting any pain be a guide to activity. Follow up in 6-8 weeks. All patient's questions were answered to his satisfaction.  This note is created using dictation transcription.  It may contain typographical errors, grammatical errors, improperly dictated words, background noise and other errors.    Subjective:     Patient ID: Venu Pizarro is a 54 y.o. male.  Chief Complaint:  54 y.o. male presents today for follow up for left shoulder proximal humerus fracture. He states he is doing well overall at this time. He has been continuing physical therapy, with good progress in his range of motion. He is having pain over the lateral epicondyle of the left elbow. He notes the pain is mild and is most aggravated with lifting. He denies any new trauma or injury to the left shoulder.    Allergy:  No Known  Allergies  Medications:  all current active meds have been reviewed  Past Medical History:  Past Medical History:   Diagnosis Date    Anxiety 02/22/2019    Arthritis     B knees and fingers    Asthma 05/21/2019    GERD (gastroesophageal reflux disease) 12/19/2013    Hypertriglyceridemia 07/08/2015    Hypothyroidism     Migraine without aura and without status migrainosus, not intractable 05/21/2019    Psoriasis     Seasonal allergies 05/21/2019    Dr. Sheppard    Vitamin D deficiency      Past Surgical History:  Past Surgical History:   Procedure Laterality Date    CATARACT EXTRACTION, BILATERAL  2017    CHOLECYSTECTOMY  2000    lap    UNDESCENDED TESTICLE EXPLORATION Bilateral     as a child     Family History:  Family History   Problem Relation Age of Onset    Diabetes Mother     Thyroid disease Mother         post thyroidectomy    Stroke Mother     Diabetes type II Mother     Thyroid disease Father         post thyroidectomy    Coronary artery disease Father     No Known Problems Brother     No Known Problems Brother     Colon cancer Neg Hx     Colon polyps Neg Hx      Social History:  Social History     Substance and Sexual Activity   Alcohol Use Not Currently     Social History     Substance and Sexual Activity   Drug Use Never     Social History     Tobacco Use   Smoking Status Never   Smokeless Tobacco Never     Review of Systems   Constitutional:  Negative for chills and fever.   HENT:  Negative for ear pain and sore throat.    Eyes:  Negative for pain and visual disturbance.   Respiratory:  Negative for cough and shortness of breath.    Cardiovascular:  Negative for chest pain and palpitations.   Gastrointestinal:  Negative for abdominal pain and vomiting.   Genitourinary:  Negative for dysuria and hematuria.   Musculoskeletal:  Positive for arthralgias (Left shoulder and elbow). Negative for back pain and neck pain.   Skin:  Negative for color change and rash.   Neurological:  Negative for seizures and  "syncope.   Psychiatric/Behavioral: Negative.     All other systems reviewed and are negative.        Objective:  BP Readings from Last 1 Encounters:   12/11/24 130/62      Wt Readings from Last 1 Encounters:   02/11/25 113 kg (249 lb)      BMI:   Estimated body mass index is 37.86 kg/m² as calculated from the following:    Height as of this encounter: 5' 8\" (1.727 m).    Weight as of this encounter: 113 kg (249 lb).  BSA:   Estimated body surface area is 2.24 meters squared as calculated from the following:    Height as of this encounter: 5' 8\" (1.727 m).    Weight as of this encounter: 113 kg (249 lb).   Physical Exam  Vitals and nursing note reviewed.   Constitutional:       Appearance: Normal appearance. He is well-developed.   HENT:      Head: Normocephalic and atraumatic.      Right Ear: External ear normal.      Left Ear: External ear normal.      Nose: Nose normal.   Eyes:      General: No scleral icterus.     Extraocular Movements: Extraocular movements intact.      Conjunctiva/sclera: Conjunctivae normal.   Pulmonary:      Effort: Pulmonary effort is normal. No respiratory distress.   Musculoskeletal:      Cervical back: Neck supple.      Comments: See Ortho exam   Skin:     General: Skin is warm and dry.   Neurological:      General: No focal deficit present.      Mental Status: He is alert and oriented to person, place, and time.   Psychiatric:         Mood and Affect: Mood normal.         Behavior: Behavior normal.       Left Elbow Exam     Tenderness   The patient is experiencing tenderness in the lateral epicondyle.     Range of Motion   The patient has normal left elbow ROM.    Muscle Strength   The patient has normal left elbow strength.    Tests   Varus: negative  Valgus: negative    Other   Erythema: absent  Scars: absent  Sensation: normal  Pulse: present    Comments:  Pain with resisted wrist extension with elbow extended      Left Shoulder Exam     Tenderness   The patient is experiencing no " tenderness.     Range of Motion   Active abduction:  80   External rotation:  40   Forward flexion:  110     Muscle Strength   Abduction: 5/5   Internal rotation: 5/5   External rotation: 5/5     Tests   Drop arm: negative    Other   Erythema: absent  Scars: absent  Sensation: normal  Pulse: present             I have personally reviewed pertinent films in PACS and my interpretation is x-ray of left shoulder demonstrates healing proximal humerus fracture with good alignment.    Scribe Attestation      I,:  Ashvin Ghosh am acting as a scribe while in the presence of the attending physician.:       I,:  Fabian Boykin MD personally performed the services described in this documentation    as scribed in my presence.:

## 2025-02-14 ENCOUNTER — OFFICE VISIT (OUTPATIENT)
Dept: PHYSICAL THERAPY | Facility: CLINIC | Age: 55
End: 2025-02-14
Payer: COMMERCIAL

## 2025-02-14 DIAGNOSIS — G89.29 CHRONIC PAIN OF RIGHT KNEE: Primary | ICD-10-CM

## 2025-02-14 DIAGNOSIS — M25.562 CHRONIC PAIN OF LEFT KNEE: ICD-10-CM

## 2025-02-14 DIAGNOSIS — G89.29 CHRONIC PAIN OF LEFT KNEE: ICD-10-CM

## 2025-02-14 DIAGNOSIS — M25.561 CHRONIC PAIN OF RIGHT KNEE: Primary | ICD-10-CM

## 2025-02-14 DIAGNOSIS — S42.202D CLOSED TRAUMATIC NONDISPLACED FRACTURE OF PROXIMAL END OF LEFT HUMERUS WITH ROUTINE HEALING, SUBSEQUENT ENCOUNTER: ICD-10-CM

## 2025-02-14 PROCEDURE — 97110 THERAPEUTIC EXERCISES: CPT

## 2025-02-14 PROCEDURE — 97112 NEUROMUSCULAR REEDUCATION: CPT

## 2025-02-14 NOTE — PROGRESS NOTES
"Daily Note     Today's date: 2025  Patient name: Venu Pizarro  : 1970  MRN: [id number]  Referring provider: Lala Chan PA-C  Dx:   Encounter Diagnosis     ICD-10-CM    1. Chronic pain of right knee  M25.561     G89.29       2. Chronic pain of left knee  M25.562     G89.29       3. Closed traumatic nondisplaced fracture of proximal end of left humerus with routine healing, subsequent encounter  S4                      Subjective:  Pt reports his L > R knees is tight this morning.       Objective: See treatment diary below      Assessment: Tolerated treatment well. Patient exhibited good technique with therapeutic exercises. Pt feels more discomfort with manual knee ext.      Plan: Continue per plan of care. Focus on improving ROM in his should and strength in his knees.     Daily Treatment Diary     Precautions: none  CO-MORBIDITIES:  HEP ACCESS CODE: [id number]  FOTO Completed On: (Score:  60 Score Predication:  68 )             POC Expires Reeval for Medicare to be completed  Unit Limit Auth Expiration Date PT/OT/STVisit Limit   3/14/25 By visit n/a N/a 25 30    Completed on visit                    Auth Status DATE    Approved Visit # 8 9 10 11    Remaining 22 21 20 19   MANUAL THERAPY       L shoulder PROM/ MFR 10' 5' 10'    L elbow PROM  5' np    RE-eval 20'      B knee PROM/ MFR   8' 8'                 THERAPEUTIC EXERCISE HEP       Pulleys: flexion for ROM  3' 20x     Pulleys; scap for ROM  3' 20x     UBE   3'/3' 3'/3' 3'/3'   Pendulums; CW, CCW        Bike for LE ROM  NV 5'  5'   Standing HR on slt bd L2  NV 20x 20 20   Standing gastroc stretch on slt bd L2  NV 20\" 3 1' 1'   Machine knee ext BL   25# 20 25# x20 25# 20   Machine knee flex BL   25# 20 25# x20 25# 20   Leg Press BL   105# 20  105# 20   Leg Press SL R/L   55# 20 ea  55# 20 ea   bridges  10x5\"      Quad sets  10x5\"  10x10\" 10\" 10   Supine SLR  5 ea  10 ea 20 EA   S/l hip abd L only  10  10 10 " "  ---------        Walking holding wt at side to promote elbow ext  Hold wt      L elbow flexion AROM/ AAROM   4# 20 pain free     Hammer curls   4# 20 pain free     Supine shld flexion HH AAROM   4# 20     S/l ER        S/l Abd        Supine punches    4# 20     Piriformis stretch L hip    instruct                            NEUROMUSCULAR REEDUCATION         Bicep stretch   hep     Wrist extension stretch   hep     Cross over stretch   20\" 3                                                                                             THERAPEUTIC ACTIVITY        5[facility] placing on shelf flex/scap        Wall push ups        Wall slides on flex/scap                GAIT TRAINING                                        MODALITIES       [facility]                                  "

## 2025-02-18 ENCOUNTER — OFFICE VISIT (OUTPATIENT)
Dept: PHYSICAL THERAPY | Facility: CLINIC | Age: 55
End: 2025-02-18
Payer: COMMERCIAL

## 2025-02-18 DIAGNOSIS — G89.29 CHRONIC PAIN OF RIGHT KNEE: Primary | ICD-10-CM

## 2025-02-18 DIAGNOSIS — M25.562 CHRONIC PAIN OF LEFT KNEE: ICD-10-CM

## 2025-02-18 DIAGNOSIS — G89.29 CHRONIC PAIN OF LEFT KNEE: ICD-10-CM

## 2025-02-18 DIAGNOSIS — S42.202D CLOSED TRAUMATIC NONDISPLACED FRACTURE OF PROXIMAL END OF LEFT HUMERUS WITH ROUTINE HEALING, SUBSEQUENT ENCOUNTER: ICD-10-CM

## 2025-02-18 DIAGNOSIS — M25.561 CHRONIC PAIN OF RIGHT KNEE: Primary | ICD-10-CM

## 2025-02-18 PROCEDURE — 97140 MANUAL THERAPY 1/> REGIONS: CPT

## 2025-02-18 PROCEDURE — 97110 THERAPEUTIC EXERCISES: CPT

## 2025-02-18 NOTE — PROGRESS NOTES
"Daily Note     Today's date: 2025  Patient name: Venu Pizarro  : 1970  MRN: [id number]  Referring provider: Lala Chan PA-C  Dx:   Encounter Diagnosis     ICD-10-CM    1. Chronic pain of right knee  M25.561     G89.29       2. Chronic pain of left knee  M25.562     G89.29       3. Closed traumatic nondisplaced fracture of proximal end of left humerus with routine healing, subsequent encounter  S4                      Subjective: Pt reports he had whole body aches today as well as extreme knee pain.  Reports it took him 45 mins to get OOB this AM.      Objective: See treatment diary below      Assessment: Tolerated treatment fair due to higher levels of pain. Patient demonstrated fatigue post treatment and would benefit from continued PT for cotn'd work on LE s strengthening to support the jt.  Reviewed ex's that may get him moving in the  AM.  Pt shown 90/90 stretch.      Plan: Continue per plan of care.  Progress treatment as tolerated.       Daily Treatment Diary     Precautions: none  CO-MORBIDITIES:  HEP ACCESS CODE: [id number]  FOTO Completed On: (Score:  60 Score Predication:  68 )             POC Expires Reeval for Medicare to be completed  Unit Limit Auth Expiration Date PT/OT/STVisit Limit   3/14/25 By visit n/a N/a 25 30    Completed on visit                    Auth Status DATE    Approved Visit # 8 9 10 11 12    Remaining 22 21 20 19 18   MANUAL THERAPY        L shoulder PROM/ MFR 10' 5' 10'     L elbow PROM  5' np     RE-eval 20'       B knee PROM/ MFR   8' 8' 8'                   THERAPEUTIC EXERCISE HEP        Pulleys: flexion for ROM  3' 20x      Pulleys; scap for ROM  3' 20x      UBE   3'/3' 3'/3' 3'/3' 3'/3'   Pendulums; CW, CCW         Bike for LE ROM  NV 5'  5' 6'   Standing HR on slt bd L2  NV 20x 20 20    Standing gastroc stretch on slt bd L2  NV 20\" 3 1' 1' 1'   Machine knee ext BL   25# 20 25# x20 25# 20 25# 20   Machine knee flex BL   25# " "20 25# x20 25# 20 25# 20   Leg Press BL   105# 20  105# 20 105# 20   Leg Press SL R/L   55# 20 ea  55# 20 ea 55# 20 ea   bridges  10x5\"       Quad sets  10x5\"  10x10\" 10\" 10 10\" 10   Supine SLR  5 ea  10 ea 20 EA 20 EA   S/l hip abd L only  10  10 10 20   90/90 knee flex stretch      10            ---------         Walking holding wt at side to promote elbow ext  Hold wt       L elbow flexion AROM/ AAROM   4# 20 pain free      Hammer curls   4# 20 pain free      Supine shld flexion HH AAROM   4# 20      S/l ER         S/l Abd         Supine punches    4# 20      Piriformis stretch L hip    instruct                                NEUROMUSCULAR REEDUCATION          Bicep stretch   hep      Wrist extension stretch   hep      Cross over stretch   20\" 3                                                                                                         THERAPEUTIC ACTIVITY         5[facility] placing on shelf flex/scap         Wall push ups         Wall slides on flex/scap                  GAIT TRAINING                                             MODALITIES        mhp                                      "

## 2025-02-21 ENCOUNTER — APPOINTMENT (OUTPATIENT)
Dept: PHYSICAL THERAPY | Facility: CLINIC | Age: 55
End: 2025-02-21
Payer: COMMERCIAL

## 2025-02-25 ENCOUNTER — OFFICE VISIT (OUTPATIENT)
Dept: PHYSICAL THERAPY | Facility: CLINIC | Age: 55
End: 2025-02-25
Payer: COMMERCIAL

## 2025-02-25 DIAGNOSIS — M25.561 CHRONIC PAIN OF RIGHT KNEE: Primary | ICD-10-CM

## 2025-02-25 DIAGNOSIS — G89.29 CHRONIC PAIN OF RIGHT KNEE: Primary | ICD-10-CM

## 2025-02-25 DIAGNOSIS — M25.562 CHRONIC PAIN OF LEFT KNEE: ICD-10-CM

## 2025-02-25 DIAGNOSIS — G89.29 CHRONIC PAIN OF LEFT KNEE: ICD-10-CM

## 2025-02-25 DIAGNOSIS — S42.202D CLOSED TRAUMATIC NONDISPLACED FRACTURE OF PROXIMAL END OF LEFT HUMERUS WITH ROUTINE HEALING, SUBSEQUENT ENCOUNTER: ICD-10-CM

## 2025-02-25 PROCEDURE — 97110 THERAPEUTIC EXERCISES: CPT

## 2025-02-25 PROCEDURE — 97112 NEUROMUSCULAR REEDUCATION: CPT

## 2025-02-25 NOTE — PROGRESS NOTES
"Daily Note     Today's date: 2025  Patient name: Venu Pizarro  : 1970  MRN: [id number]  Referring provider: Lala Chan PA-C  Dx:   Encounter Diagnosis     ICD-10-CM    1. Chronic pain of right knee  M25.561     G89.29       2. Chronic pain of left knee  M25.562     G89.29       3. Closed traumatic nondisplaced fracture of proximal end of left humerus with routine healing, subsequent encounter  S4                      Subjective: Pt reports wants to do 50/50 UE & LE, was super sore after lv due to only doing LE.      Objective: See treatment diary below      Assessment: Pt was very sore during UE TE however was able to accomplish each TE task. No complain of pain solely ms soreness and fatigue.       Plan: Continue per plan of care.  Progress treatment as tolerated.       Daily Treatment Diary     Precautions: none  CO-MORBIDITIES:  HEP ACCESS CODE: [id number]  FOTO Completed On: (Score:  60 Score Predication:  68 )             POC Expires Reeval for Medicare to be completed  Unit Limit Auth Expiration Date PT/OT/STVisit Limit   3/14/25 By visit n/a N/a 25 30    Completed on visit                    Auth Status DATE                                                                              Approved Visit # 10 11 12 13    Remaining 20 19 18 17   MANUAL THERAPY       L shoulder PROM/ MFR 10'      L elbow PROM np   8'   RE-eval       B knee PROM/ MFR 8' 8' 8'                  THERAPEUTIC EXERCISE HEP       UBE  3'/3' 3'/3' 3'/3' 3'/3'   Bike for LE ROM   5' 6' 6'   Standing HR on slt bd L2  20 20     Standing gastroc stretch on slt bd L2  1' 1' 1'    Machine knee ext BL  25# x20 25# 20 25# 20 25# 20   Machine knee flex BL  25# x20 25# 20 25# 20 25# 20   Leg Press BL   105# 20 105# 20 105# 20   Leg Press SL R/L   55# 20 ea 55# 20 ea 55# 20   bridges        Quad sets  10x10\" 10\" 10 10\" 10 10\" 10   Supine SLR  10 ea 20 EA 20 EA 20 EA   S/l hip abd L only  10 10 20 20 "   90/90 knee flex stretch    10 np           ---------        Walking holding wt at side to promote elbow ext        L elbow flexion AROM/ AAROM        Hammer curls        Supine shld flexion HH AAROM        S/l ER        S/l Abd        Supine punches         Piriformis stretch L hip  instruct      TB LDP     BTB 20   TB Triceps     BTB 20   Bicep Curls     3# 20   Hammer curls     3# 20           NEUROMUSCULAR REEDUCATION         Bicep stretch        Wrist extension stretch        Cross over stretch                                                                                                THERAPEUTIC ACTIVITY        5[facility] placing on shelf flex/scap        Wall push ups        Wall slides on flex/scap                GAIT TRAINING                                        MODALITIES       [facility]

## 2025-02-28 ENCOUNTER — EVALUATION (OUTPATIENT)
Dept: PHYSICAL THERAPY | Facility: CLINIC | Age: 55
End: 2025-02-28
Payer: COMMERCIAL

## 2025-02-28 ENCOUNTER — TELEPHONE (OUTPATIENT)
Age: 55
End: 2025-02-28

## 2025-02-28 DIAGNOSIS — G89.29 CHRONIC PAIN OF RIGHT KNEE: Primary | ICD-10-CM

## 2025-02-28 DIAGNOSIS — S42.202D CLOSED TRAUMATIC NONDISPLACED FRACTURE OF PROXIMAL END OF LEFT HUMERUS WITH ROUTINE HEALING, SUBSEQUENT ENCOUNTER: ICD-10-CM

## 2025-02-28 DIAGNOSIS — M25.561 CHRONIC PAIN OF RIGHT KNEE: Primary | ICD-10-CM

## 2025-02-28 DIAGNOSIS — M25.562 CHRONIC PAIN OF LEFT KNEE: ICD-10-CM

## 2025-02-28 DIAGNOSIS — G89.29 CHRONIC PAIN OF LEFT KNEE: ICD-10-CM

## 2025-02-28 PROCEDURE — 97110 THERAPEUTIC EXERCISES: CPT | Performed by: PHYSICAL THERAPIST

## 2025-02-28 PROCEDURE — 97140 MANUAL THERAPY 1/> REGIONS: CPT | Performed by: PHYSICAL THERAPIST

## 2025-02-28 NOTE — PROGRESS NOTES
PT Re-Evaluation     Today's date: 2025  Patient name: Venu Pizarro  : 1970  MRN: [id number]  Referring provider: Lala Chan PA-C  Dx:   Encounter Diagnosis     ICD-10-CM    1. Chronic pain of right knee  M25.561     G89.29       2. Chronic pain of left knee  M25.562     G89.29       3. Closed traumatic nondisplaced fracture of proximal end of left humerus with routine healing, subsequent encounter  S4                      Assessment  Impairments: abnormal or restricted ROM, activity intolerance, impaired physical strength, pain with function and poor posture     Assessment details: Since starting skilled PT, pain levels are minimally decreased, L shoulder ROM is slowly improving, B knee ROM has improved with gradual functional progress. Recommend pt continue skilled PT to address these deficits and promote return to maximal functional activities.  Barriers to therapy: High insurance cost  Understanding of Dx/Px/POC: good     Prognosis: good    Goals  STG's ( 3-4 weeks)  1. Pt will be independent in HEP- met  2. Pt will be able to perform dressing and self care activities with greater ease- met  LTG's ( 6- 8 weeks)  1. Improve FOTO score by 8-10 points- partial met  2. Pt will have decreased pain to 2/10 at worst- not met  3. Pt will have improved L shoulder strength by 1/2 grade- partial met  4. Pt will be able to perform household lifting activities- not met    Plan  Patient would benefit from: skilled physical therapy  Planned modality interventions: cryotherapy    Planned therapy interventions: joint mobilization, manual therapy, neuromuscular re-education, strengthening, stretching, therapeutic activities, therapeutic exercise, functional ROM exercises, flexibility and home exercise program    Frequency: 2x week  Duration in weeks: 6  Plan of Care beginning date: 2025  Plan of Care expiration date: 2025  Treatment plan discussed with: PTA and patient        Subjective  "Evaluation    History of Present Illness  Date of onset: 11/17/2024  Mechanism of injury: trauma  Mechanism of injury: I.E; Pt fell in [employer] on 11/17/24, and fell onto his left shoulder and his arm was trapped under his body. X-ray testing showed \" Acute minimally displaced fracture of the humeral surgical neck. Glenohumeral joint alignment is maintained.\"     Pt was immobilized in a sling for 6 weeks. Pt has discontinued his sling use.  Pt is not able to lift his arm. Pt is not able to wash his hair. Pt just started using the computer keyboard for work activities, and needs to bend his body down with eating. Pt sleeps on his back and is currently sleeping on a reclining sofa. Pt is not able to sleep on his L side. Pt has just started to be able to write with his L hand. Pt is driving short, local distances.    1/31/25: Pt reports some days are better than others with eating depending on his elbow pain. Pt is writing better and using the computer keyboard. Pt notes that he has constant elbow pain. Pt is able to reach the back of his ear on the side of his head. Pt is able to sleep on his R side. Pt feels his ROM is improving.    Pt reports  B knee pain is getting progressively worse over the past 6 months. Pt went to his rheumatologist and was told that he did not have much fluid. Pt received injections without significant relief and was given methotrexate. Pt believed X-ray testing showed OA.  Pt has increased difficulty transferring sit to stand to get out of a chair, car, or toilet seat. Pt sits a lot for work and he tries to get up every hour. Pt has increased difficulty going up an down the steps. Pt tends to go down the steps sideways. Pt is avoiding squatting and kneeling activities.     2/28/25; Pt is compliant HEP. Pt was diagnosed with tennis elbow. Pt believes it might be slightly better. It was hurting this morning, and usually improves throughout the day.  Pt feels his shoulder feels about the same, " but does not have much pain. Pt is stiff with his movement.   Pt went to his dermatologist. Pt was told that the shot he was taking for his psoriasis would help his knee. Pt is taking an extra dosage of Benzelex. Pt has noticed less pain when going down the steps and get out of bed. Pt was able to take a walk around the block, it didn't hurt his knee, but had pain in his L hip.    Work: ; works from home  Hobbies: none  Gait: no abnormalities  Patient Goals  Patient goals for therapy: decreased pain, increased strength, independence with ADLs/IADLs and increased motion    Pain  At best pain ratin  At worst pain ratin  Location: L arm/ pectoral region- can radiate down to the elbow;  B knees: 0/10 best  8/10 at worst  Quality: dull ache    Social Support  Lives with: spouse    Employment status: working  Hand dominance: left      Diagnostic Tests  X-ray: abnormal  Treatments  No previous or current treatments        Objective     Neurological Testing     Sensation     Shoulder   Left Shoulder   Intact: light touch    Right Shoulder   Intact: Light touch    Reflexes   Left   Biceps (C5/C6): trace (1+)  Brachioradialis (C6): trace (1+)  Triceps (C7): trace (1+)    Right   Biceps (C5/C6): trace (1+)  Brachioradialis (C6): trace (1+)  Triceps (C7): trace (1+)    Active Range of Motion   Left Shoulder   Flexion: 110 degrees   Abduction: 95 degrees   External rotation 45°: 50 degrees   Internal rotation 45°: 75 degrees     Right Shoulder   Flexion: 142 degrees   Abduction: 125 degrees     Left Elbow   Flexion: 130 degrees   Extension: 0 degrees     Right Elbow   Normal active range of motion  Left Knee   Flexion: 130 degrees with pain  Extension: 0 degrees     Right Knee   Flexion: 130 degrees   Extension: 0 degrees     Additional Active Range of Motion Details  Cervical ROM; limited with mild pain at end ROM SB and rotation  Posture: pt sit with rounded shoulders, moderate forward head and thoracic  khyphosis  Mild TTP L pectoral region  (-) TTP L shoulder   (+) TTP B medial quad and joint line    Passive Range of Motion   Left Shoulder   Flexion: 132 degrees   Abduction: 150 degrees   External rotation 45°: 50 degrees   Internal rotation 45°: 80 degrees   Left Knee   Flexion: 130 degrees WFL and with pain  Extension: 0 degrees     Right Knee   Flexion: 132 degrees   Extension: 0 degrees     Strength/Myotome Testing     Left Shoulder     Planes of Motion   Flexion: 4+   Abduction: 4+ (MMT in available ROM)   External rotation at 0°: 5   Internal rotation at 0°: 5     Right Shoulder   Normal muscle strength    Left Hip   Planes of Motion   Flexion: 4+  External rotation: 4+  Internal rotation: 5    Right Hip   Planes of Motion   Flexion: 4+  External rotation: 4+  Internal rotation: 5        Daily Treatment Diary     Precautions: none  CO-MORBIDITIES: treat shoulder and knees only. Pt can go to OT for elbow. Please focus on shoulder!!!!  HEP ACCESS CODE: [id number]  FOTO Completed On: 1/31(Score:  60  2/28: 56 -decreased  Score Predication:  68 )             POC Expires Reeval for Medicare to be completed  Unit Limit Auth Expiration Date PT/OT/STVisit Limit   4/11/25 By visit n/a N/a 12/31/25 30    Completed on visit                    Auth Status DATE                                                                           2/11 2/14 2/18 2/25 2/28   Approved Visit # 10 11 12 13 14    Remaining 20 19 18 17 16   MANUAL THERAPY        L shoulder PROM/ MFR 10'    8'   L elbow PROM np   8' D/c   RE-eval     8'   B knee PROM/ MFR 8' 8' 8'  8'                   THERAPEUTIC EXERCISE HEP        UBE  3'/3' 3'/3' 3'/3' 3'/3' 3'/3'   Bike for LE ROM   5' 6' 6'    Standing HR on slt bd L2  20 20   20   Standing gastroc stretch on slt bd L2  1' 1' 1'  1'   Machine knee ext BL  25# x20 25# 20 25# 20 25# 20 25# x20   Machine knee flex BL  25# x20 25# 20 25# 20 25# 20 25# x20   Leg Press BL   105# 20 105# 20 105# 20    Leg Press  "SL R/L   55# 20 ea 55# 20 ea 55# 20    bridges         Quad sets  10x10\" 10\" 10 10\" 10 10\" 10    Supine SLR  10 ea 20 EA 20 EA 20 EA 10 ea   S/l hip abd L only  10 10 20 20    90/90 knee flex stretch    10 np             ---------         Supine cane ER AAROM      10x5\"                     Supine shld flexion HH AAROM      2# 10x5\"   S/l ER         S/l Abd         Supine punches          Piriformis stretch L hip  instruct       TB LDP     BTB 20 BTB x20   TB Triceps     BTB 20 BTB x20   Bicep Curls     3# 20 Hold    Hammer curls     3# 20 Hold             NEUROMUSCULAR REEDUCATION          Bicep stretch                  Cross over stretch                                                                                                            THERAPEUTIC ACTIVITY         5[employer] placing on shelf flex/scap         Wall push ups         Wall slides on flex/scap                  GAIT TRAINING                                             MODALITIES        mhp                                      "

## 2025-02-28 NOTE — TELEPHONE ENCOUNTER
[employer] called to see if we've received a medical records request for this patient. I gave them the medical records dept phone number and confirmed the fax was correct. Please advise if needed.

## 2025-03-05 ENCOUNTER — OFFICE VISIT (OUTPATIENT)
Dept: PHYSICAL THERAPY | Facility: CLINIC | Age: 55
End: 2025-03-05
Payer: COMMERCIAL

## 2025-03-05 DIAGNOSIS — G89.29 CHRONIC PAIN OF RIGHT KNEE: ICD-10-CM

## 2025-03-05 DIAGNOSIS — M25.562 CHRONIC PAIN OF LEFT KNEE: ICD-10-CM

## 2025-03-05 DIAGNOSIS — G89.29 CHRONIC PAIN OF LEFT KNEE: ICD-10-CM

## 2025-03-05 DIAGNOSIS — M25.561 CHRONIC PAIN OF RIGHT KNEE: ICD-10-CM

## 2025-03-05 DIAGNOSIS — S42.202D CLOSED TRAUMATIC NONDISPLACED FRACTURE OF PROXIMAL END OF LEFT HUMERUS WITH ROUTINE HEALING, SUBSEQUENT ENCOUNTER: Primary | ICD-10-CM

## 2025-03-05 PROCEDURE — 97110 THERAPEUTIC EXERCISES: CPT | Performed by: PHYSICAL THERAPIST

## 2025-03-05 PROCEDURE — 97140 MANUAL THERAPY 1/> REGIONS: CPT | Performed by: PHYSICAL THERAPIST

## 2025-03-05 NOTE — PROGRESS NOTES
Daily Note     Today's date: 3/5/2025  Patient name: Venu Pizarro  : 1970  MRN: [id number]  Referring provider: Lala Chan PA-C  Dx:   Encounter Diagnosis     ICD-10-CM    1. Closed traumatic nondisplaced fracture of proximal end of left humerus with routine healing, subsequent encounter  S42.       2. Chronic pain of left knee  M25.562     G89.29       3. Chronic pain of right knee  M25.561     G89.29                      Subjective: Patient reports he is feeling ok today.  He reports his shoulder continues to be stiff and not as strong as he would like.  He reports he is consistently performing table slide - flexion and abduction as well as pendulums.        Objective: See treatment diary below      Assessment: Treatment focused on shoulder at the beginning of session.  He had pain at end range with passive ROM - did not have increased pain following stretching - continues to be limited with all directions.  He was challenged with progressed UE strengthening - noted fatigue but no substantial pain or soreness.  He finished session performing LE strengthening on machines - noted no increased pain following.  Skilled PT continues to be required to guide progression of UE mobility and strength to allow him to return to performing all desired functional activities.          Plan: Continue with POC.  Monitor tolerance to progressions.  Follow up with Chastity Cosme on 3/7/25.  Continue progressing shoulder mobility with regular stretching and strength with progressive exercises.       Daily Treatment Diary     Precautions: none  CO-MORBIDITIES:  HEP ACCESS CODE: [id number]  FOTO Completed On: (Score:  60 Score Predication:  68 )             POC Expires Reeval for Medicare to be completed  Unit Limit Auth Expiration Date PT/OT/STVisit Limit   3/14/25 By visit n/a N/a 25 30    Completed on visit                    Auth Status DATE 2/18 2/25 2/28 3/5    Approved Visit # 12 13 14 15     Remaining 18  "17 16 15    MANUAL THERAPY        L shoulder PROM/ MFR   8' Flex, ABD, ER at 45  10 min    L elbow PROM  8' D/c     RE-eval   8'     B knee PROM/ MFR 8'  8'                     THERAPEUTIC EXERCISE HEP        UBE  3'/3' 3'/3' 3'/3' 3'/3'    Bike for LE ROM  6' 6'      Standing HR on slt bd L2    20     Standing gastroc stretch on slt bd L2  1'  1'     Machine knee ext BL  25# 20 25# 20 25# x20 25# 20x    Machine knee flex BL  25# 20 25# 20 25# x20 25# 20x    Leg Press BL  105# 20 105# 20  115# 20x    Leg Press SL R/L  55# 20 ea 55# 20  55# 20x ea    bridges         Quad sets  10\" 10 10\" 10      Supine SLR  20 EA 20 EA 10 ea     S/l hip abd L only  20 20      90/90 knee flex stretch  10 np               ---------         Supine cane ER AAROM    10x5\"                       Supine shld flexion HH AAROM    2# 10x5\"     S/l ER     1# 15x    S/l Abd         Supine punches      6# 20x    Standing Sh' Flex     1# 15x    Standing Sh' Scaption     1# 15x    Standing Sh' ABD     1# 15x    Mod Prone Sh' Row     4# 20x                      Piriformis stretch L hip         TB LDP   BTB 20 BTB x20 BTB  20x    TB Triceps   BTB 20 BTB x20 BTB  20x    Bicep Curls   3# 20 Hold      Hammer curls   3# 20 Hold               NEUROMUSCULAR REEDUCATION          Bicep stretch                  Cross over stretch                                                                                                            THERAPEUTIC ACTIVITY         5[employer] placing on shelf flex/scap         Wall push ups         Wall slides on flex/scap                  GAIT TRAINING                                             MODALITIES        mhp                                       "

## 2025-03-07 ENCOUNTER — OFFICE VISIT (OUTPATIENT)
Dept: PHYSICAL THERAPY | Facility: CLINIC | Age: 55
End: 2025-03-07
Payer: COMMERCIAL

## 2025-03-07 DIAGNOSIS — G89.29 CHRONIC PAIN OF LEFT KNEE: ICD-10-CM

## 2025-03-07 DIAGNOSIS — S42.202D CLOSED TRAUMATIC NONDISPLACED FRACTURE OF PROXIMAL END OF LEFT HUMERUS WITH ROUTINE HEALING, SUBSEQUENT ENCOUNTER: Primary | ICD-10-CM

## 2025-03-07 DIAGNOSIS — M25.562 CHRONIC PAIN OF LEFT KNEE: ICD-10-CM

## 2025-03-07 DIAGNOSIS — G89.29 CHRONIC PAIN OF RIGHT KNEE: ICD-10-CM

## 2025-03-07 DIAGNOSIS — M25.561 CHRONIC PAIN OF RIGHT KNEE: ICD-10-CM

## 2025-03-07 PROCEDURE — 97140 MANUAL THERAPY 1/> REGIONS: CPT

## 2025-03-07 PROCEDURE — 97110 THERAPEUTIC EXERCISES: CPT

## 2025-03-07 NOTE — PROGRESS NOTES
"Daily Note     Today's date: 3/7/2025  Patient name: Venu Pizarro  : 1970  MRN: [id number]  Referring provider: Lala Chan PA-C  Dx:   Encounter Diagnosis     ICD-10-CM    1. Closed traumatic nondisplaced fracture of proximal end of left humerus with routine healing, subsequent encounter  S42.       2. Chronic pain of left knee  M25.562     G89.29       3. Chronic pain of right knee  M25.561     G89.29                      Subjective: Patient reports he is feeling good after lv.      Objective: See treatment diary below      Assessment: Focus on UE during session pt did perform LE Maricel with good tolerance and with progression.         Plan: Continue with POC.  Monitor tolerance to progressions.  Continue progressing shoulder mobility with regular stretching and strength with progressive exercises.       Daily Treatment Diary     Precautions: none  CO-MORBIDITIES:  HEP ACCESS CODE: [id number]  FOTO Completed On: (Score:  60 Score Predication:  68 )             POC Expires Reeval for Medicare to be completed  Unit Limit Auth Expiration Date PT/OT/STVisit Limit   3/14/25 By visit n/a N/a 25 30    Completed on visit                    Auth Status DATE 2/18 2/25 2/28 3/5 3/7   Approved Visit # 12 13 14 15 16    Remaining 18 17 16 15 14   MANUAL THERAPY        L shoulder PROM/ MFR   8' Flex, ABD, ER at 45  10 min 10'   L elbow PROM  8' D/c     RE-eval   8'     B knee PROM/ MFR 8'  8'                     THERAPEUTIC EXERCISE HEP        UBE  3'/3' 3'/3' 3'/3' 3'/3' 3'/3'   Bike for LE ROM  6' 6'      Standing HR on slt bd L2    20     Standing gastroc stretch on slt bd L2  1'  1'     Machine knee ext BL  25# 20 25# 20 25# x20 25# 20x 25# 20x   Machine knee flex BL  25# 20 25# 20 25# x20 25# 20x 25# 20x   Leg Press BL  105# 20 105# 20  115# 20x 115# 20x   Leg Press SL R/L  55# 20 ea 55# 20  55# 20x ea 65# 10x ea   bridges         Quad sets  10\" 10 10\" 10      Supine SLR  20 EA 20 EA 10 ea     S/l hip " "abd L only  20 20      90/90 knee flex stretch  10 np               ---------         Supine cane ER AAROM    10x5\"                       Supine shld flexion HH AAROM    2# 10x5\"  AROM 1# 20x   S/l ER     1# 15x 1# 2x10   S/l Abd         Supine punches      6# 20x 6# 20   Standing Sh' Flex     1# 15x 1# 15x   Standing Sh' Scaption     1# 15x 1# 15x   Standing Sh' ABD     1# 15x 1# 15x   Mod Prone Sh' Row     4# 20x 4# 20x                     Piriformis stretch L hip         TB LDP   BTB 20 BTB x20 BTB  20x BTB 20x   TB Triceps   BTB 20 BTB x20 BTB  20x BTB 20x   Bicep Curls   3# 20 Hold      Hammer curls   3# 20 Hold               NEUROMUSCULAR REEDUCATION          Bicep stretch                  Cross over stretch                                                                                                            THERAPEUTIC ACTIVITY         5[employer] placing on shelf flex/scap         Wall push ups         Wall slides on flex/scap                  GAIT TRAINING                                             MODALITIES        mhp                                       "

## 2025-03-10 ENCOUNTER — OFFICE VISIT (OUTPATIENT)
Dept: PHYSICAL THERAPY | Facility: CLINIC | Age: 55
End: 2025-03-10
Payer: COMMERCIAL

## 2025-03-10 DIAGNOSIS — M25.561 CHRONIC PAIN OF RIGHT KNEE: ICD-10-CM

## 2025-03-10 DIAGNOSIS — M25.562 CHRONIC PAIN OF LEFT KNEE: ICD-10-CM

## 2025-03-10 DIAGNOSIS — G89.29 CHRONIC PAIN OF RIGHT KNEE: ICD-10-CM

## 2025-03-10 DIAGNOSIS — G89.29 CHRONIC PAIN OF LEFT KNEE: ICD-10-CM

## 2025-03-10 DIAGNOSIS — S42.202D CLOSED TRAUMATIC NONDISPLACED FRACTURE OF PROXIMAL END OF LEFT HUMERUS WITH ROUTINE HEALING, SUBSEQUENT ENCOUNTER: Primary | ICD-10-CM

## 2025-03-10 PROCEDURE — 97110 THERAPEUTIC EXERCISES: CPT

## 2025-03-10 PROCEDURE — 97140 MANUAL THERAPY 1/> REGIONS: CPT

## 2025-03-10 NOTE — PROGRESS NOTES
Daily Note     Today's date: 3/10/2025  Patient name: Venu Pizarro  : 1970  MRN: [id number]  Referring provider: Lala Chan PA-C  Dx:   Encounter Diagnosis     ICD-10-CM    1. Closed traumatic nondisplaced fracture of proximal end of left humerus with routine healing, subsequent encounter  S42.       2. Chronic pain of right knee  M25.561     G89.29       3. Chronic pain of left knee  M25.562     G89.29                      Subjective: Patient reports he is feeling fine after lv, especially with the more aggressive stretches, no residual discomfort.       Objective: See treatment diary below      Assessment: Focus on UE during session with good tolerance and with progression, no complain of pain or discomfort with new TE.        Plan: Continue with POC.  Monitor tolerance to progressions.  Continue progressing shoulder mobility with regular stretching and strength with progressive exercises.       Daily Treatment Diary     Precautions: none  CO-MORBIDITIES:  HEP ACCESS CODE: [id number]  FOTO Completed On: (Score:  60 Score Predication:  68 )             POC Expires Reeval for Medicare to be completed  Unit Limit Auth Expiration Date PT/OT/STVisit Limit   25 By visit n/a N/a 25 30    Completed on visit                    Auth Status DATE 2/28 3/5 3/7 3/10   Approved Visit # 14 15 16 17    Remaining 16 15 14 13   MANUAL THERAPY       L shoulder PROM/ MFR 8' Flex, ABD, ER at 45  10 min 10' 10'   L elbow PROM D/c      RE-eval 8'      B knee PROM/ MFR 8'                    THERAPEUTIC EXERCISE HEP       UBE  3'/3' 3'/3' 3'/3' 3'/3'   Bike for LE ROM        Standing HR on slt bd L2  20      Standing gastroc stretch on slt bd L2  1'      Machine knee ext BL  25# x20 25# 20x 25# 20x 35# 20   Machine knee flex BL  25# x20 25# 20x 25# 20x 35# 20   Leg Press BL   115# 20x 115# 20x 115# 20x   Leg Press SL R/L   55# 20x ea 65# 10x ea 65# 10x ea   bridges        Quad sets        Supine SLR  10 ea     "  S/l hip abd L only        90/90 knee flex stretch                ---------        Supine cane ER AAROM  10x5\"                      Supine shld flexion HH AAROM  2# 10x5\"  AROM 1# 20x AROM 1# 20x   S/l ER   1# 15x 1# 2x10 1# 2x10   S/l Abd        Supine punches    6# 20x 6# 20 7.5# 20   Standing Sh' Flex   1# 15x 1# 15x 1# 20x   Standing Sh' Scaption   1# 15x 1# 15x 1# 20x   Standing Sh' ABD   1# 15x 1# 15x 1# 20   Mod Prone Sh' Row   4# 20x 4# 20x 4# 20                   Piriformis stretch L hip        TB LDP  BTB x20 BTB  20x BTB 20x Plum 20   TB Triceps  BTB x20 BTB  20x BTB 20x Plum 20   Bicep Curls  Hold    4# 20   Hammer curls  Hold    4# 20           NEUROMUSCULAR REEDUCATION         Bicep stretch                Cross over stretch                                                                                                THERAPEUTIC ACTIVITY        5[employer] placing on shelf flex/scap        Wall push ups        Wall slides on flex/scap                GAIT TRAINING                                        MODALITIES       p                                     "

## 2025-03-13 ENCOUNTER — RESULTS FOLLOW-UP (OUTPATIENT)
Dept: ENDOCRINOLOGY | Facility: HOSPITAL | Age: 55
End: 2025-03-13

## 2025-03-13 LAB
ALBUMIN SERPL-MCNC: 4.4 G/DL (ref 3.8–4.9)
ALP SERPL-CCNC: 103 IU/L (ref 44–121)
ALT SERPL-CCNC: 17 IU/L (ref 0–44)
AST SERPL-CCNC: 22 IU/L (ref 0–40)
BASOPHILS # BLD AUTO: 0.1 X10E3/UL (ref 0–0.2)
BASOPHILS NFR BLD AUTO: 1 %
BILIRUB SERPL-MCNC: 0.6 MG/DL (ref 0–1.2)
BUN SERPL-MCNC: 16 MG/DL (ref 6–24)
BUN/CREAT SERPL: 19 (ref 9–20)
CALCIUM SERPL-MCNC: 9.4 MG/DL (ref 8.7–10.2)
CHLORIDE SERPL-SCNC: 105 MMOL/L (ref 96–106)
CHOLEST SERPL-MCNC: 132 MG/DL (ref 100–199)
CO2 SERPL-SCNC: 25 MMOL/L (ref 20–29)
CREAT SERPL-MCNC: 0.83 MG/DL (ref 0.76–1.27)
EGFR: 103 ML/MIN/1.73
EOSINOPHIL # BLD AUTO: 0.4 X10E3/UL (ref 0–0.4)
EOSINOPHIL NFR BLD AUTO: 5 %
ERYTHROCYTE [DISTWIDTH] IN BLOOD BY AUTOMATED COUNT: 14.1 % (ref 11.6–15.4)
EST. AVERAGE GLUCOSE BLD GHB EST-MCNC: 120 MG/DL
GLOBULIN SER-MCNC: 2.5 G/DL (ref 1.5–4.5)
GLUCOSE SERPL-MCNC: 107 MG/DL (ref 70–99)
HBA1C MFR BLD: 5.8 % (ref 4.8–5.6)
HCT VFR BLD AUTO: 48.2 % (ref 37.5–51)
HDLC SERPL-MCNC: 49 MG/DL
HGB BLD-MCNC: 16.2 G/DL (ref 13–17.7)
IMM GRANULOCYTES # BLD: 0 X10E3/UL (ref 0–0.1)
IMM GRANULOCYTES NFR BLD: 0 %
LDLC SERPL CALC-MCNC: 62 MG/DL (ref 0–99)
LDLC/HDLC SERPL: 1.3 RATIO (ref 0–3.6)
LYMPHOCYTES # BLD AUTO: 1.7 X10E3/UL (ref 0.7–3.1)
LYMPHOCYTES NFR BLD AUTO: 20 %
MCH RBC QN AUTO: 29.6 PG (ref 26.6–33)
MCHC RBC AUTO-ENTMCNC: 33.6 G/DL (ref 31.5–35.7)
MCV RBC AUTO: 88 FL (ref 79–97)
MONOCYTES # BLD AUTO: 0.7 X10E3/UL (ref 0.1–0.9)
MONOCYTES NFR BLD AUTO: 8 %
NEUTROPHILS # BLD AUTO: 5.7 X10E3/UL (ref 1.4–7)
NEUTROPHILS NFR BLD AUTO: 66 %
PLATELET # BLD AUTO: 294 X10E3/UL (ref 150–450)
POTASSIUM SERPL-SCNC: 5.1 MMOL/L (ref 3.5–5.2)
PROT SERPL-MCNC: 6.9 G/DL (ref 6–8.5)
RBC # BLD AUTO: 5.48 X10E6/UL (ref 4.14–5.8)
SL AMB VLDL CHOLESTEROL CALC: 21 MG/DL (ref 5–40)
SODIUM SERPL-SCNC: 144 MMOL/L (ref 134–144)
T4 FREE SERPL-MCNC: 1.02 NG/DL (ref 0.82–1.77)
TRIGL SERPL-MCNC: 119 MG/DL (ref 0–149)
TSH SERPL DL<=0.005 MIU/L-ACNC: 2.44 UIU/ML (ref 0.45–4.5)
WBC # BLD AUTO: 8.6 X10E3/UL (ref 3.4–10.8)

## 2025-03-14 ENCOUNTER — OFFICE VISIT (OUTPATIENT)
Dept: PHYSICAL THERAPY | Facility: CLINIC | Age: 55
End: 2025-03-14
Payer: COMMERCIAL

## 2025-03-14 DIAGNOSIS — S42.202D CLOSED TRAUMATIC NONDISPLACED FRACTURE OF PROXIMAL END OF LEFT HUMERUS WITH ROUTINE HEALING, SUBSEQUENT ENCOUNTER: Primary | ICD-10-CM

## 2025-03-14 DIAGNOSIS — G89.29 CHRONIC PAIN OF LEFT KNEE: ICD-10-CM

## 2025-03-14 DIAGNOSIS — G89.29 CHRONIC PAIN OF RIGHT KNEE: ICD-10-CM

## 2025-03-14 DIAGNOSIS — M25.562 CHRONIC PAIN OF LEFT KNEE: ICD-10-CM

## 2025-03-14 DIAGNOSIS — M25.561 CHRONIC PAIN OF RIGHT KNEE: ICD-10-CM

## 2025-03-14 PROCEDURE — 97110 THERAPEUTIC EXERCISES: CPT

## 2025-03-14 PROCEDURE — 97140 MANUAL THERAPY 1/> REGIONS: CPT

## 2025-03-14 NOTE — PROGRESS NOTES
Daily Note     Today's date: 3/14/2025  Patient name: Venu Pizarro  : 1970  MRN: [id number]  Referring provider: Lala Chan PA-C  Dx:   Encounter Diagnosis     ICD-10-CM    1. Closed traumatic nondisplaced fracture of proximal end of left humerus with routine healing, subsequent encounter  S42.       2. Chronic pain of right knee  M25.561     G89.29       3. Chronic pain of left knee  M25.562     G89.29                      Subjective: Patient reports knee is still bothering him but his shoulder and elbow is improving.       Objective: See treatment diary below      Assessment: Focus on UE during session per pt with good tolerance and with progression, no complain of pain or discomfort with new TE.        Plan: Continue with POC.  Monitor tolerance to progressions.  Continue progressing shoulder mobility with regular stretching and strength with progressive exercises.       Daily Treatment Diary     Precautions: none  CO-MORBIDITIES:  HEP ACCESS CODE: [id number]  FOTO Completed On: (Score:  60 Score Predication:  68 )             POC Expires Reeval for Medicare to be completed  Unit Limit Auth Expiration Date PT/OT/STVisit Limit   25 By visit n/a N/a 25 30    Completed on visit                    Auth Status DATE 2/28 3/5 3/7 3/10 3/14   Approved Visit # 14 15 16 17 18    Remaining 16 15 14 13 12   MANUAL THERAPY        L shoulder PROM/ MFR 8' Flex, ABD, ER at 45  10 min 10' 10' 10'   L elbow PROM D/c       RE-eval 8'       B knee PROM/ MFR 8'                       THERAPEUTIC EXERCISE HEP        UBE  3'/3' 3'/3' 3'/3' 3'/3' 3'/3'   Bike for LE ROM         Standing HR on slt bd L2  20       Standing gastroc stretch on slt bd L2  1'       Machine knee ext BL  25# x20 25# 20x 25# 20x 35# 20 35# 20   Machine knee flex BL  25# x20 25# 20x 25# 20x 35# 20 35# 20   Leg Press BL   115# 20x 115# 20x 115# 20x 115# 20   Leg Press SL R/L   55# 20x ea 65# 10x ea 65# 10x ea 65# 20   bridges        "  Quad sets         Supine SLR  10 ea       S/l hip abd L only         90/90 knee flex stretch                  ---------         Supine cane ER AAROM  10x5\"                         Supine shld flexion HH AAROM  2# 10x5\"  AROM 1# 20x AROM 1# 20x AROM 1# 20x   S/l ER   1# 15x 1# 2x10 1# 2x10 1# 20x   S/l Abd         Supine punches    6# 20x 6# 20 7.5# 20 7.5# 20   Standing Sh' Flex   1# 15x 1# 15x 1# 20x 1# 20   Standing Sh' Scaption   1# 15x 1# 15x 1# 20x 1# 20   Standing Sh' ABD   1# 15x 1# 15x 1# 20 1# 20   Mod Prone Sh' Row   4# 20x 4# 20x 4# 20 4# 20                     Piriformis stretch L hip         TB LDP  BTB x20 BTB  20x BTB 20x Plum 20 Plum 20   TB Triceps  BTB x20 BTB  20x BTB 20x Plum 20 Plum 20   Bicep Curls  Hold    4# 20 4# 20   Hammer curls  Hold    4# 20 4# 20            NEUROMUSCULAR REEDUCATION          Bicep stretch                  Cross over stretch                                                                                                            THERAPEUTIC ACTIVITY         5[employer] placing on shelf flex/scap         Wall push ups         Wall slides on flex/scap                  GAIT TRAINING                                             MODALITIES        mhp                                       "

## 2025-03-18 ENCOUNTER — OFFICE VISIT (OUTPATIENT)
Dept: PHYSICAL THERAPY | Facility: CLINIC | Age: 55
End: 2025-03-18
Payer: COMMERCIAL

## 2025-03-18 ENCOUNTER — OFFICE VISIT (OUTPATIENT)
Dept: ENDOCRINOLOGY | Facility: HOSPITAL | Age: 55
End: 2025-03-18
Payer: COMMERCIAL

## 2025-03-18 VITALS
WEIGHT: 252 LBS | BODY MASS INDEX: 38.19 KG/M2 | OXYGEN SATURATION: 96 % | HEART RATE: 76 BPM | HEIGHT: 68 IN | SYSTOLIC BLOOD PRESSURE: 138 MMHG | DIASTOLIC BLOOD PRESSURE: 80 MMHG

## 2025-03-18 DIAGNOSIS — E03.9 HYPOTHYROIDISM, UNSPECIFIED TYPE: ICD-10-CM

## 2025-03-18 DIAGNOSIS — Z79.4 TYPE 2 DIABETES MELLITUS WITH HYPERGLYCEMIA, WITH LONG-TERM CURRENT USE OF INSULIN (HCC): Primary | ICD-10-CM

## 2025-03-18 DIAGNOSIS — M25.561 CHRONIC PAIN OF RIGHT KNEE: ICD-10-CM

## 2025-03-18 DIAGNOSIS — E11.65 UNCONTROLLED TYPE 2 DIABETES MELLITUS WITH HYPERGLYCEMIA (HCC): ICD-10-CM

## 2025-03-18 DIAGNOSIS — S42.202D CLOSED TRAUMATIC NONDISPLACED FRACTURE OF PROXIMAL END OF LEFT HUMERUS WITH ROUTINE HEALING, SUBSEQUENT ENCOUNTER: Primary | ICD-10-CM

## 2025-03-18 DIAGNOSIS — E78.1 HYPERTRIGLYCERIDEMIA: ICD-10-CM

## 2025-03-18 DIAGNOSIS — E11.65 TYPE 2 DIABETES MELLITUS WITH HYPERGLYCEMIA, WITH LONG-TERM CURRENT USE OF INSULIN (HCC): Primary | ICD-10-CM

## 2025-03-18 DIAGNOSIS — G89.29 CHRONIC PAIN OF RIGHT KNEE: ICD-10-CM

## 2025-03-18 DIAGNOSIS — I10 ESSENTIAL HYPERTENSION: ICD-10-CM

## 2025-03-18 DIAGNOSIS — E11.42 DIABETIC POLYNEUROPATHY ASSOCIATED WITH TYPE 2 DIABETES MELLITUS (HCC): ICD-10-CM

## 2025-03-18 DIAGNOSIS — G89.29 CHRONIC PAIN OF LEFT KNEE: ICD-10-CM

## 2025-03-18 DIAGNOSIS — E03.9 ACQUIRED HYPOTHYROIDISM: ICD-10-CM

## 2025-03-18 DIAGNOSIS — M25.562 CHRONIC PAIN OF LEFT KNEE: ICD-10-CM

## 2025-03-18 PROCEDURE — 97110 THERAPEUTIC EXERCISES: CPT

## 2025-03-18 PROCEDURE — 97140 MANUAL THERAPY 1/> REGIONS: CPT

## 2025-03-18 PROCEDURE — 99214 OFFICE O/P EST MOD 30 MIN: CPT | Performed by: NURSE PRACTITIONER

## 2025-03-18 RX ORDER — OLMESARTAN MEDOXOMIL 40 MG/1
40 TABLET ORAL DAILY
Qty: 90 TABLET | Refills: 0 | Status: SHIPPED | OUTPATIENT
Start: 2025-03-18

## 2025-03-18 RX ORDER — ATORVASTATIN CALCIUM 10 MG/1
10 TABLET, FILM COATED ORAL DAILY
Qty: 90 TABLET | Refills: 0 | Status: SHIPPED | OUTPATIENT
Start: 2025-03-18

## 2025-03-18 NOTE — PATIENT INSTRUCTIONS
Be mindful of diet.     Stay active and stay hydrated.     Continue current regimen, however we will decrease Semglee to 30 units.     Continue to use the DEX COM.     Increase levothyroxine 100 mcg daily.     Wait at least 30 minutes to eat after Levothyroxine.    Obtain a diabetic eye exam.

## 2025-03-18 NOTE — PROGRESS NOTES
Name: Venu Pizarro      : 1970      MRN: [id number]  Encounter Provider: HAYDEN Perez  Encounter Date: 3/18/2025   Encounter department: [facility]    No chief complaint on file.  :  Assessment & Plan  Type 2 diabetes mellitus with hyperglycemia, with long-term current use of insulin (HCC)    Lab Results   Component Value Date    HGBA1C 5.8 (H) 2025            Uncontrolled type 2 diabetes mellitus with hyperglycemia (HCC)    Lab Results   Component Value Date    HGBA1C 5.8 (H) 2025            Diabetic polyneuropathy associated with type 2 diabetes mellitus (HCC)    Lab Results   Component Value Date    HGBA1C 5.8 (H) 2025            Acquired hypothyroidism         Hypertriglyceridemia         Essential hypertension         Hypothyroidism, unspecified type    Plan:  1. Type 2 diabetes.  Hemoglobin A1c is 5.8 %.  Download of his Dexcom was performed and reviewed with him at the time of the visit.  He will continue the same metformin, Jardiance and Glipizide.  For some of his blood sugars, we will decrease Semglee units.  He will continue to work on diet, exercise, and weight loss.  I have asked him to check his blood sugars twice daily at alternating times and send a record to the office in 2 weeks for review.  I have asked him to follow-up with medical nutrition therapy for help with his diet.  Check hemoglobin A1c prior to next visit.     2. Diabetic neuropathy.  He denies neuropathic symptoms.  Diabetic foot exams are up-to-date.     3. Hypothyroidism.  TSH is normal with a normal free T4.  He will increase his Levothyroxine 100 mcg daily.  Discussed the proper process for taking his levothyroxine.  Recheck his TSH and free T4 in 6 weeks to reassess.     4. Hypertension. Continue current regimen.  Check comprehensive metabolic panel prior to next visit.     5. Hyperlipidemia.   Continue atorvastatin 10 mg daily.  Check fasting  lipid panel prior to next visit.       History of Present Illness   54 y.o. year old male with type 2 diabetes, insulin requiring with neuropathy for about 17 years, hypothyroidism, hypertension, hyperlipidemia for follow-up visit.  He is on oral agents and insulin at home and takes Jardiance 25 mg daily, metformin 1000 mg twice a day, Ozempic 1 mg once a week, glipizide XL 10 mg daily, and Semglee insulin 35 units daily.  His most recent hemoglobin A1c from March 12, 2025 is 5.8.  He denies any polyuria, polydipsia, nocturia, polyphagia, and blurry vision.  He denies numbness or tingling of the feet.  He denies chest pain or shortness of breath.  He denies nephropathy, retinopathy, heart attack, stroke and claudication but does admit to neuropathy.       Hypoglycemic episodes:  at times recently.     The patient's last eye exam was in jan 2021.  The patient's last foot exam was in April 2024.     Blood Sugar/Glucometer/Pump/CGM review: Download of his freestyle leona report from March 5 through March 18, 2025 shows that he is in target range 92% of the time with 7% elevated readings and less than 1% low readings with an average glucose of 127 and glucose variability of 25.4.     He has hyperlipidemia hypertriglyceridemia and takes atorvastatin 10 mg daily.  He denies chest pain or shortness of breath.       He has hypertension and takes olmesartan 20 mg daily.  He denies headache or stroke-like symptoms but can have occasional lightheadedness.     He has hypothyroidism and takes levothyroxine 1000 mcg daily.  His most recent TSH from March 12, 2025 is 2.440 with a free T4 of 1.02. He denies heat or cold intolerance, palpitation, tremors, fatigue.  He denies insomnia, diarrhea or constipation.         Last Eye Exam: 08/14/2023  Last Foot Exam: 04/16/2024  Health Maintenance   Topic Date Due    Diabetic Foot Exam  04/16/2025    Diabetic Eye Exam  08/14/2025           Review of Systems   Constitutional:  Positive for  "fatigue. Negative for chills and fever.   HENT: Negative.  Negative for trouble swallowing and voice change.    Eyes:  Negative for photophobia, pain, discharge, redness, itching and visual disturbance.   Respiratory:  Negative for cough and shortness of breath.    Cardiovascular:  Negative for chest pain and palpitations.   Gastrointestinal:  Negative for abdominal pain, constipation, diarrhea, nausea and vomiting.   Endocrine: Positive for polyuria. Negative for cold intolerance, heat intolerance, polydipsia and polyphagia.   Genitourinary: Negative.    Musculoskeletal:  Positive for arthralgias.   Skin: Negative.    Allergic/Immunologic: Negative.    Neurological:  Negative for dizziness, syncope, light-headedness and headaches.   Hematological: Negative.    Psychiatric/Behavioral: Negative.     All other systems reviewed and are negative.   as per HPI      Objective   Ht 5' 8\" (1.727 m)   Wt 114 kg (252 lb)   BMI 38.32 kg/m²      Body mass index is 38.32 kg/m².  Wt Readings from Last 3 Encounters:   03/18/25 114 kg (252 lb)   02/11/25 113 kg (249 lb)   12/31/24 117 kg (257 lb)     Physical Exam  Vitals reviewed.   Constitutional:       Appearance: He is well-developed. He is obese.   HENT:      Head: Normocephalic and atraumatic.      Nose: Nose normal.   Eyes:      Conjunctiva/sclera: Conjunctivae normal.      Pupils: Pupils are equal, round, and reactive to light.   Cardiovascular:      Rate and Rhythm: Normal rate and regular rhythm.      Heart sounds: Normal heart sounds.   Pulmonary:      Effort: Pulmonary effort is normal.      Breath sounds: Normal breath sounds.   Abdominal:      General: Bowel sounds are normal.      Palpations: Abdomen is soft.   Musculoskeletal:         General: Normal range of motion.      Cervical back: Normal range of motion and neck supple.   Skin:     General: Skin is warm and dry.   Neurological:      Mental Status: He is alert and oriented to person, place, and time. "   Psychiatric:         Behavior: Behavior normal.         Thought Content: Thought content normal.         Judgment: Judgment normal.         Labs:   Lab Results   Component Value Date    HGBA1C 5.8 (H) 03/12/2025    HGBA1C 6.3 (H) 11/29/2024    HGBA1C 6.6 (H) 07/18/2024     Lab Results   Component Value Date    CREATININE 0.83 03/12/2025    CREATININE 0.79 11/29/2024    CREATININE 0.91 07/18/2024    BUN 16 03/12/2025    K 5.1 03/12/2025     03/12/2025    CO2 25 03/12/2025     eGFR   Date Value Ref Range Status   03/12/2025 103 >59 mL/min/1.73 Final     Lab Results   Component Value Date    HDL 49 03/12/2025    TRIG 119 03/12/2025    CHOLHDL 2.8 04/11/2024     Lab Results   Component Value Date    ALT 17 03/12/2025    AST 22 03/12/2025         Discussed with the patient and all questioned fully answered. He will call me if any problems arise.

## 2025-03-18 NOTE — ASSESSMENT & PLAN NOTE
Plan:  1. Type 2 diabetes.  Hemoglobin A1c is 5.8 %.  Download of his Dexcom was performed and reviewed with him at the time of the visit.  He will continue the same metformin, Jardiance and Glipizide.  For some of his blood sugars, we will decrease Semglee units.  He will continue to work on diet, exercise, and weight loss.  I have asked him to check his blood sugars twice daily at alternating times and send a record to the office in 2 weeks for review.  I have asked him to follow-up with medical nutrition therapy for help with his diet.  Check hemoglobin A1c prior to next visit.     2. Diabetic neuropathy.  He denies neuropathic symptoms.  Diabetic foot exams are up-to-date.     3. Hypothyroidism.  TSH is normal with a normal free T4.  He will increase his Levothyroxine 100 mcg daily.  Discussed the proper process for taking his levothyroxine.  Recheck his TSH and free T4 in 6 weeks to reassess.     4. Hypertension. Continue current regimen.  Check comprehensive metabolic panel prior to next visit.     5. Hyperlipidemia.   Continue atorvastatin 10 mg daily.  Check fasting lipid panel prior to next visit.

## 2025-03-18 NOTE — PROGRESS NOTES
Daily Note     Today's date: 3/18/2025  Patient name: Venu Pizarro  : 1970  MRN: [id number]  Referring provider: Lala Chan PA-C  Dx:   Encounter Diagnosis     ICD-10-CM    1. Closed traumatic nondisplaced fracture of proximal end of left humerus with routine healing, subsequent encounter  S42.       2. Chronic pain of right knee  M25.561     G89.29       3. Chronic pain of left knee  M25.562     G89.29                      Subjective: Patient reports feeling better than lv.       Objective: See treatment diary below      Assessment: Continue to focus on UE during session per pt with good tolerance and with progression, no complain of pain or discomfort with new TE.        Plan: Continue with POC.  Monitor tolerance to progressions.  Continue progressing shoulder mobility with regular stretching and strength with progressive exercises.       Daily Treatment Diary     Precautions: none  CO-MORBIDITIES:  HEP ACCESS CODE: [id number]  FOTO Completed On: (Score:  60 Score Predication:  68 )             POC Expires Reeval for Medicare to be completed  Unit Limit Auth Expiration Date PT/OT/STVisit Limit   25 By visit n/a N/a 25 30    Completed on visit                    Auth Status DATE 3/7 3/10 3/14 3/18   Approved Visit # 16 17 18 19    Remaining 14 13 12 11   MANUAL THERAPY       L shoulder PROM/ MFR 10' 10' 10' 10'   L elbow PROM       RE-eval       B knee PROM/ MFR                     THERAPEUTIC EXERCISE HEP       UBE  3'/3' 3'/3' 3'/3' 3'/3'   Bike for LE ROM        Standing HR on slt bd L2        Standing gastroc stretch on slt bd L2        Machine knee ext BL  25# 20x 35# 20 35# 20 35# 20   Machine knee flex BL  25# 20x 35# 20 35# 20 35# 20   Leg Press BL  115# 20x 115# 20x 115# 20 115# 20   Leg Press SL R/L  65# 10x ea 65# 10x ea 65# 20 65 20   bridges        Quad sets        Supine SLR        S/l hip abd L only        90/90 knee flex stretch                ---------        Supine  "cane ER AAROM                        Supine shld flexion HH AAROM  AROM 1# 20x AROM 1# 20x AROM 1# 20x AROM 1# 20x   S/l ER  1# 2x10 1# 2x10 1# 20x 1# 20x   S/l Abd        Supine punches   6# 20 7.5# 20 7.5# 20 7.5# 20   Standing Sh' Flex  1# 15x 1# 20x 1# 20 1# 20   Standing Sh' Scaption  1# 15x 1# 20x 1# 20 1# 20   Standing Sh' ABD  1# 15x 1# 20 1# 20 1# 20   Mod Prone Sh' Row  4# 20x 4# 20 4# 20 5# 20                   Piriformis stretch L hip        TB LDP  BTB 20x Plum 20 Plum 20 Plum 30   TB Triceps  BTB 20x Plum 20 Plum 20 Plum 30   Bicep Curls   4# 20 4# 20 5# 20   Hammer curls   4# 20 4# 20 5# 20           NEUROMUSCULAR REEDUCATION         Bicep stretch                Cross over stretch     20\" 5   IR stretch + strap     20\" 5                                                                                   THERAPEUTIC ACTIVITY        5[employer] placing on shelf flex/scap        Wall push ups     20x   Wall slides on flex/scap                GAIT TRAINING                                        MODALITIES       mhp                                     "

## 2025-03-20 ENCOUNTER — DOCUMENTATION (OUTPATIENT)
Dept: ENDOCRINOLOGY | Facility: HOSPITAL | Age: 55
End: 2025-03-20

## 2025-03-20 NOTE — PROGRESS NOTES
Received request for medical records from [employer] for life insurance application.  Faxed to [facility] on 3-13-25.

## 2025-03-21 ENCOUNTER — APPOINTMENT (OUTPATIENT)
Dept: PHYSICAL THERAPY | Facility: CLINIC | Age: 55
End: 2025-03-21
Payer: COMMERCIAL

## 2025-03-24 ENCOUNTER — TELEPHONE (OUTPATIENT)
Dept: FAMILY MEDICINE CLINIC | Facility: HOSPITAL | Age: 55
End: 2025-03-24

## 2025-03-25 ENCOUNTER — OFFICE VISIT (OUTPATIENT)
Dept: PHYSICAL THERAPY | Facility: CLINIC | Age: 55
End: 2025-03-25
Payer: COMMERCIAL

## 2025-03-25 ENCOUNTER — OFFICE VISIT (OUTPATIENT)
Dept: FAMILY MEDICINE CLINIC | Facility: HOSPITAL | Age: 55
End: 2025-03-25
Payer: COMMERCIAL

## 2025-03-25 VITALS
SYSTOLIC BLOOD PRESSURE: 138 MMHG | WEIGHT: 252 LBS | TEMPERATURE: 97.6 F | OXYGEN SATURATION: 96 % | HEART RATE: 79 BPM | DIASTOLIC BLOOD PRESSURE: 84 MMHG | BODY MASS INDEX: 38.19 KG/M2 | HEIGHT: 68 IN

## 2025-03-25 DIAGNOSIS — I10 ESSENTIAL HYPERTENSION: ICD-10-CM

## 2025-03-25 DIAGNOSIS — Z79.4 TYPE 2 DIABETES MELLITUS WITH HYPERGLYCEMIA, WITH LONG-TERM CURRENT USE OF INSULIN (HCC): Primary | ICD-10-CM

## 2025-03-25 DIAGNOSIS — G89.29 CHRONIC PAIN OF LEFT KNEE: ICD-10-CM

## 2025-03-25 DIAGNOSIS — J45.40 MODERATE PERSISTENT ASTHMA WITHOUT COMPLICATION: ICD-10-CM

## 2025-03-25 DIAGNOSIS — E78.1 HYPERTRIGLYCERIDEMIA: ICD-10-CM

## 2025-03-25 DIAGNOSIS — E11.65 UNCONTROLLED TYPE 2 DIABETES MELLITUS WITH HYPERGLYCEMIA (HCC): ICD-10-CM

## 2025-03-25 DIAGNOSIS — E11.65 TYPE 2 DIABETES MELLITUS WITH HYPERGLYCEMIA, WITH LONG-TERM CURRENT USE OF INSULIN (HCC): Primary | ICD-10-CM

## 2025-03-25 DIAGNOSIS — M25.562 CHRONIC PAIN OF LEFT KNEE: ICD-10-CM

## 2025-03-25 DIAGNOSIS — S42.202S: ICD-10-CM

## 2025-03-25 DIAGNOSIS — S42.202D CLOSED TRAUMATIC NONDISPLACED FRACTURE OF PROXIMAL END OF LEFT HUMERUS WITH ROUTINE HEALING, SUBSEQUENT ENCOUNTER: Primary | ICD-10-CM

## 2025-03-25 DIAGNOSIS — M25.561 CHRONIC PAIN OF RIGHT KNEE: ICD-10-CM

## 2025-03-25 DIAGNOSIS — E55.9 VITAMIN D DEFICIENCY: ICD-10-CM

## 2025-03-25 DIAGNOSIS — E11.42 DIABETIC POLYNEUROPATHY ASSOCIATED WITH TYPE 2 DIABETES MELLITUS (HCC): ICD-10-CM

## 2025-03-25 DIAGNOSIS — F41.9 ANXIETY: ICD-10-CM

## 2025-03-25 DIAGNOSIS — G89.29 CHRONIC PAIN OF RIGHT KNEE: ICD-10-CM

## 2025-03-25 DIAGNOSIS — Z12.5 SCREENING FOR PROSTATE CANCER: ICD-10-CM

## 2025-03-25 DIAGNOSIS — L40.50 PSORIATIC ARTHROPATHY (HCC): ICD-10-CM

## 2025-03-25 PROCEDURE — 97112 NEUROMUSCULAR REEDUCATION: CPT | Performed by: PHYSICAL THERAPIST

## 2025-03-25 PROCEDURE — 99214 OFFICE O/P EST MOD 30 MIN: CPT | Performed by: INTERNAL MEDICINE

## 2025-03-25 PROCEDURE — 97140 MANUAL THERAPY 1/> REGIONS: CPT | Performed by: PHYSICAL THERAPIST

## 2025-03-25 PROCEDURE — 97110 THERAPEUTIC EXERCISES: CPT | Performed by: PHYSICAL THERAPIST

## 2025-03-25 RX ORDER — FLUOXETINE 10 MG/1
TABLET, FILM COATED ORAL
Start: 2025-03-25

## 2025-03-25 NOTE — ASSESSMENT & PLAN NOTE
Lab Results   Component Value Date    HGBA1C 5.8 (H) 03/12/2025   DM foot exam done today, con't good BS control - call with sores/ulcers

## 2025-03-25 NOTE — PROGRESS NOTES
Daily Note     Today's date: 3/25/2025  Patient name: Venu Pizarro  : 1970  MRN: [id number]  Referring provider: Lala Chan PA-C  Dx:   Encounter Diagnosis     ICD-10-CM    1. Closed traumatic nondisplaced fracture of proximal end of left humerus with routine healing, subsequent encounter  S42.       2. Chronic pain of right knee  M25.561     G89.29       3. Chronic pain of left knee  M25.562     G89.29                      Subjective: Pt reports B knees are painful when he sits with his knees at a 45* angle. Pt tries to sit with his knees straighter to relieve the pain. Pt reports he does not have pain in his L shoulder, but he does not have the full motion. Pt notes his elbow pain is decreasing. Pt reports he has joined a gym and feels benefit from doing the bike.      Objective: See treatment diary below      Assessment: Tolerated treatment well. Patient exhibited good technique with therapeutic exercises. Pt continues to have some stiffness at end ROM in flexion and ER of his shoulder. Pt felt some medial knee pain when pedaling on the bike. Therapist issued updated HEP with plum and blue tband and reviewed the most essential ex to perform at home.      Plan: Continue per plan of care. MISTY SHERWOOD.     Daily Treatment Diary     Precautions: none  CO-MORBIDITIES:  HEP ACCESS CODE: [id number]- reviewed and updated 3/25/25  FOTO Completed On: (Score:  60 Score Predication:  68 )             POC Expires Reeval for Medicare to be completed  Unit Limit Auth Expiration Date PT/OT/STVisit Limit   25 By visit n/a N/a 25 30    Completed on visit                    Auth Status DATE 3/7 3/10 3/14 3/18 3/25   Approved Visit # 16 17 18 19 20    Remaining 14 13 12 11    MANUAL THERAPY        L shoulder PROM/ MFR 10' 10' 10' 10' 10'   L elbow PROM        RE-eval        B knee PROM/ MFR                        THERAPEUTIC EXERCISE HEP     Reviewed & updated   UBE  3'/3' 3'/3' 3'/3' 3'/3' 2'/2'   Bike for  "LE ROM      5'   Standing HR on slt bd L2         Standing gastroc stretch on slt bd L2         Machine knee ext BL  25# 20x 35# 20 35# 20 35# 20    Machine knee flex BL  25# 20x 35# 20 35# 20 35# 20    Leg Press BL  115# 20x 115# 20x 115# 20 115# 20    Leg Press SL R/L  65# 10x ea 65# 10x ea 65# 20 65 20    bridges         Quad sets         Supine SLR         S/l hip abd L only         90/90 knee flex stretch                  ---------         Supine cane ER AAROM                           Supine shld flexion HH AAROM  AROM 1# 20x AROM 1# 20x AROM 1# 20x AROM 1# 20x    S/l ER  1# 2x10 1# 2x10 1# 20x 1# 20x    S/l Abd         Supine punches   6# 20 7.5# 20 7.5# 20 7.5# 20    Standing Sh' Flex  1# 15x 1# 20x 1# 20 1# 20 2# x10   Standing Sh' Scaption  1# 15x 1# 20x 1# 20 1# 20 2# x10   Standing Sh' ABD  1# 15x 1# 20 1# 20 1# 20    Mod Prone Sh' Row  4# 20x 4# 20 4# 20 5# 20 5# x20   Wall slides: flex & scap      5x5\" ea            Piriformis stretch L hip         TB LDP  BTB 20x Plum 20 Plum 20 Plum 30 Plum x30   TB Triceps  BTB 20x Plum 20 Plum 20 Plum 30 Plum x30   Bicep Curls   4# 20 4# 20 5# 20    Hammer curls   4# 20 4# 20 5# 20    L shld IR TB      [place] x10   B TB shld ER      BTB x10   NEUROMUSCULAR REEDUCATION          Bicep stretch                  Cross over stretch     20\" 5 20\"x5   IR stretch + strap     20\" 5 20\"x5                                                                                             THERAPEUTIC ACTIVITY         5[employer] placing on shelf flex/scap         Wall push ups     20x    Wall slides on flex/scap                  GAIT TRAINING                                             MODALITIES        mhp                                         "

## 2025-03-25 NOTE — PROGRESS NOTES
Name: Venu Pizarro      : 1970      MRN: [id number]  Encounter Provider: Kimberlee Baker DO  Encounter Date: 3/25/2025   Encounter department: [facility] [address]   :  Assessment & Plan  Type 2 diabetes mellitus with hyperglycemia, with long-term current use of insulin (HCC)    Lab Results   Component Value Date    HGBA1C 5.8 (H) 2025   Dm type 2 with polyneuropathy - controlled with A1c 5.8 -  urged to con't healthy diet and regular exercise and meds/labs/follow up as per Endo, DM foot exam done today, urged to f/u with eye exam ( - 2 yrs), restart ARB and statin, will follow  Orders:    Albumin / creatinine urine ratio; Future    Diabetic polyneuropathy associated with type 2 diabetes mellitus (HCC)    Lab Results   Component Value Date    HGBA1C 5.8 (H) 2025   DM foot exam done today, con't good BS control - call with sores/ulcers         Hypertriglyceridemia  FLP ordered by Endo, restart statin, will follow       Essential hypertension  BP high, restart ARB, re-eval in 3-4 mos, will follow       Moderate persistent asthma without complication  NO recent resp illness, con't Trelegy and f/u as per Pulm, call with resp concerns       Psoriatic arthropathy (HCC)  Pt reportedly  has seen Rheum outside of [facility] - pt unsure of name and no OV note in chart, noted no benefit with MTX so he stopped it, he has f/u coming up - urged to keep an open mind to other tx, will follow       Closed traumatic nondisplaced fracture of proximal end of left humerus, sequela  Doing well with PT, minimal pain but ROM still not back yet -urged to keep up with HEP and f/u as per Ortho       Anxiety  Mood doing better with increase in Fluoxetine to 20 mg daily - con't current regimen, call with new/worse mood, re-eval in 3-4 mos or so  Orders:    FLUoxetine 10 MG tablet; 2 TABLETS DAILY    Vitamin D deficiency  Check levels with next labs - order given, con't current supplement for  "now, will follow  Orders:    Vitamin D 25 hydroxy; Future    Screening for prostate cancer    Orders:    PSA (Reflex To Free) (Serial); Future    Uncontrolled type 2 diabetes mellitus with hyperglycemia (HCC)    Lab Results   Component Value Date    HGBA1C 5.8 (H) 03/12/2025            Uncontrolled type 2 diabetes mellitus with hyperglycemia (HCC)    Lab Results   Component Value Date    HGBA1C 5.8 (H) 03/12/2025                  Depression Screening and Follow-up Plan: Patient was screened for depression during today's encounter. They screened negative with a PHQ-2 score of 0.      Colonoscopy 10/19 - 10 yrs    PSA 7/24 - order given for 2025    BW 3/25 (A1C 5.8)  DM foot exam done in office today  DM eye exam 8/23 - 2 yrs  Ur microalbumin:Cr 7/24    PE done 7/16/24      History of Present Illness   HPI Pt here for follow up appt and BW results    BW results were reviewed with pt in detail: FBS/A1C 107/5.8, rest of CMP/CBC/FLP/TSH were wnl     Def of controlled vs uncontrolled DM was reviewed.  Diet was reviewed - wgt up 8 lbs from our last visit in 2023.  He is taking his DM meds as directed by Endo - OV note from 12/24 and 3/25 was reviewed - he was restarted on Ozempic in Dec and his Semglee was decreased in March.  He is UTD on DM foot exam (4/24) and eye exam (8/23 - 2 yrs).  He notes no numbness/tingling/pain/sores/ulcers with his feet.  He is on statin and an ARB.     Goal FLP was d/w pt in detail.  Diet/exercise reviewed as noted above.  He is taking his Atorvastatin daily as directed w/o Se.  He notes no stroke/TIA symptoms/CP.      BP at upper end of goal today and meds were reviewed and he admits he has been off the Olmesartan for \"years\".  He denies missing doses of meds or SE with the meds.  He does not check his BP outside the office.  He notes no frequent Ha's/dizziness/double vision/CP.     Pt con't to take his Trelegy daily as directed by Pulm.  He notes no significant resp illnesses over winter " "mos. He reports no recent use of rescue inhaler. He denies chronic cough/wheezing/SOB.     Pt with h/o psoriatic arthritis - he saw Dr. Michael for knee pain in Nov 24 and was encouraged to f/u with Rheum. He reports he has seen Rheum outside of [facility]. He reports he had B/L knee injections w/o benefit. He took MTX for a short time but stopped it d/t lack of benefit.     Pt had a L humerus fx in Nov and is still in PT and is following with Dr. Boykin.  He notes no significant residual pain but notes ROM is still not normal.  He notes no more falls.       Review of Systems   Constitutional:  Negative for chills and fever.   HENT:  Negative for congestion and trouble swallowing.    Eyes:  Negative for pain and visual disturbance.   Respiratory:  Negative for cough, shortness of breath and wheezing.    Cardiovascular:  Negative for chest pain, palpitations and leg swelling.   Gastrointestinal:  Negative for abdominal pain, blood in stool, constipation, diarrhea, nausea and vomiting.   Genitourinary:  Negative for difficulty urinating and dysuria.   Musculoskeletal:  Positive for arthralgias. Negative for gait problem.   Skin:  Negative for rash and wound.   Neurological:  Positive for headaches. Negative for dizziness.   Hematological:  Does not bruise/bleed easily.   Psychiatric/Behavioral:  Negative for confusion and dysphoric mood.        Objective   /84 (BP Location: Left arm, Patient Position: Sitting, Cuff Size: Large)   Pulse 79   Temp 97.6 °F (36.4 °C)   Ht 5' 8\" (1.727 m)   Wt 114 kg (252 lb)   SpO2 96%   BMI 38.32 kg/m²      Physical Exam  Vitals and nursing note reviewed.   Constitutional:       General: He is not in acute distress.     Appearance: He is well-developed. He is obese. He is not ill-appearing.   HENT:      Head: Normocephalic and atraumatic.      Right Ear: External ear normal.      Left Ear: External ear normal.   Eyes:      General:         Right eye: No discharge.         Left eye: No " discharge.      Conjunctiva/sclera: Conjunctivae normal.   Neck:      Trachea: No tracheal deviation.   Cardiovascular:      Rate and Rhythm: Normal rate and regular rhythm.      Pulses: no weak pulses.           Dorsalis pedis pulses are 2+ on the right side and 2+ on the left side.      Heart sounds: Normal heart sounds. No murmur heard.  Pulmonary:      Effort: Pulmonary effort is normal. No respiratory distress.      Breath sounds: Normal breath sounds. No wheezing, rhonchi or rales.   Abdominal:      General: There is no distension.      Palpations: Abdomen is soft.      Tenderness: There is no abdominal tenderness. There is no guarding or rebound.   Musculoskeletal:      Cervical back: Neck supple.      Right lower leg: No edema.      Left lower leg: No edema.        Feet:    Feet:      Right foot:      Skin integrity: Callus present. No ulcer, skin breakdown, erythema, warmth or dry skin.      Left foot:      Skin integrity: Callus present. No ulcer, skin breakdown, erythema, warmth or dry skin.   Skin:     General: Skin is warm and dry.      Coloration: Skin is not pale.      Comments: Psoriatic plaques present   Neurological:      General: No focal deficit present.      Mental Status: He is alert. Mental status is at baseline.      Motor: No abnormal muscle tone.      Gait: Gait normal.   Psychiatric:         Behavior: Behavior normal.         Thought Content: Thought content normal.         Judgment: Judgment normal.     Diabetic Foot Exam    Patient's shoes and socks removed.    Right Foot/Ankle   Right Foot Inspection  Skin Exam: skin normal, skin intact, callus and callus. No dry skin, no warmth, no erythema, no maceration, no abnormal color, no pre-ulcer and no ulcer.     Toe Exam: ROM and strength within normal limits.     Sensory   Vibration: intact  Monofilament testing: diminished    Vascular  The right DP pulse is 2+.     Left Foot/Ankle  Left Foot Inspection  Skin Exam: skin normal, skin intact  and callus. No dry skin, no warmth, no erythema, no maceration, normal color, no pre-ulcer and no ulcer.     Toe Exam: ROM and strength within normal limits.     Sensory   Vibration: intact  Monofilament testing: diminished    Vascular  The left DP pulse is 2+.     Assign Risk Category  No deformity present  No loss of protective sensation  No weak pulses  Risk: 0

## 2025-03-25 NOTE — ASSESSMENT & PLAN NOTE
Lab Results   Component Value Date    HGBA1C 5.8 (H) 03/12/2025   Dm type 2 with polyneuropathy - controlled with A1c 5.8 -  urged to con't healthy diet and regular exercise and meds/labs/follow up as per [place], DM foot exam done today, urged to f/u with eye exam (8/23 - 2 yrs), restart ARB and statin, will follow  Orders:    Albumin / creatinine urine ratio; Future

## 2025-03-26 NOTE — ASSESSMENT & PLAN NOTE
Mood doing better with increase in Fluoxetine to 20 mg daily - con't current regimen, call with new/worse mood, re-eval in 3-4 mos or so  Orders:    FLUoxetine 10 MG tablet; 2 TABLETS DAILY

## 2025-03-26 NOTE — ASSESSMENT & PLAN NOTE
Check levels with next labs - order given, con't current supplement for now, will follow  Orders:    Vitamin D 25 hydroxy; Future

## 2025-03-26 NOTE — ASSESSMENT & PLAN NOTE
Pt reportedly  has seen Rheum outside of [facility] - pt unsure of name and no OV note in chart, noted no benefit with MTX so he stopped it, he has f/u coming up - urged to keep an open mind to other tx, will follow

## 2025-03-26 NOTE — ASSESSMENT & PLAN NOTE
Doing well with PT, minimal pain but ROM still not back yet -urged to keep up with HEP and f/u as per Ortho

## 2025-03-27 RX ORDER — PEN NEEDLE, DIABETIC 32GX 5/32"
NEEDLE, DISPOSABLE MISCELLANEOUS
Qty: 90 EACH | Refills: 2 | Status: SHIPPED | OUTPATIENT
Start: 2025-03-27

## 2025-03-28 ENCOUNTER — EVALUATION (OUTPATIENT)
Dept: PHYSICAL THERAPY | Facility: CLINIC | Age: 55
End: 2025-03-28
Payer: COMMERCIAL

## 2025-03-28 DIAGNOSIS — M25.561 CHRONIC PAIN OF RIGHT KNEE: ICD-10-CM

## 2025-03-28 DIAGNOSIS — G89.29 CHRONIC PAIN OF LEFT KNEE: ICD-10-CM

## 2025-03-28 DIAGNOSIS — M25.562 CHRONIC PAIN OF LEFT KNEE: ICD-10-CM

## 2025-03-28 DIAGNOSIS — G89.29 CHRONIC PAIN OF RIGHT KNEE: ICD-10-CM

## 2025-03-28 DIAGNOSIS — S42.202D CLOSED TRAUMATIC NONDISPLACED FRACTURE OF PROXIMAL END OF LEFT HUMERUS WITH ROUTINE HEALING, SUBSEQUENT ENCOUNTER: Primary | ICD-10-CM

## 2025-03-28 PROCEDURE — 97110 THERAPEUTIC EXERCISES: CPT | Performed by: PHYSICAL THERAPIST

## 2025-03-28 PROCEDURE — 97140 MANUAL THERAPY 1/> REGIONS: CPT | Performed by: PHYSICAL THERAPIST

## 2025-03-28 NOTE — PROGRESS NOTES
PT Re-Evaluation     Today's date: 3/28/2025  Patient name: Venu Pizarro  : 1970  MRN: [id number]  Referring provider: Lala Chan PA-C  Dx:   Encounter Diagnosis     ICD-10-CM    1. Closed traumatic nondisplaced fracture of proximal end of left humerus with routine healing, subsequent encounter  S42.       2. Chronic pain of right knee  M25.561     G89.29       3. Chronic pain of left knee  M25.562     G89.29                      Assessment  Impairments: abnormal or restricted ROM, activity intolerance, impaired physical strength, pain with function and poor posture     Assessment details: Since starting skilled PT, pain levels are minimally decreased, L shoulder ROM is slowly improving, B knee ROM has improved with gradual functional progress. Recommend pt continue skilled PT to address these deficits and promote return to maximal functional activities. Will decrease frequency to 1 time per week 2* limited insurance visits.  Barriers to therapy: High insurance cost  Understanding of Dx/Px/POC: good     Prognosis: good    Goals  STG's ( 3-4 weeks)  1. Pt will be independent in HEP- met  2. Pt will be able to perform dressing and self care activities with greater ease- met  LTG's ( 6- 8 weeks)  1. Improve FOTO score by 8-10 points- partial met  2. Pt will have decreased pain to 2/10 at worst- not met  3. Pt will have improved L shoulder strength by 1/2 grade- partial met  4. Pt will be able to perform household lifting activities- not met    Plan  Patient would benefit from: skilled physical therapy  Planned modality interventions: cryotherapy    Planned therapy interventions: joint mobilization, manual therapy, neuromuscular re-education, strengthening, stretching, therapeutic activities, therapeutic exercise, functional ROM exercises, flexibility and home exercise program    Frequency: 1x week  Duration in weeks: 6  Plan of Care beginning date: 3/28/2025  Plan of Care expiration date:  "5/9/2025  Treatment plan discussed with: PTA and patient        Subjective Evaluation    History of Present Illness  Date of onset: 11/17/2024  Mechanism of injury: trauma  Mechanism of injury: I.E; Pt fell in [employer] on 11/17/24, and fell onto his left shoulder and his arm was trapped under his body. X-ray testing showed \" Acute minimally displaced fracture of the humeral surgical neck. Glenohumeral joint alignment is maintained.\"     Pt was immobilized in a sling for 6 weeks. Pt has discontinued his sling use.  Pt is not able to lift his arm. Pt is not able to wash his hair. Pt just started using the computer keyboard for work activities, and needs to bend his body down with eating. Pt sleeps on his back and is currently sleeping on a reclining sofa. Pt is not able to sleep on his L side. Pt has just started to be able to write with his L hand. Pt is driving short, local distances.    1/31/25: Pt reports some days are better than others with eating depending on his elbow pain. Pt is writing better and using the computer keyboard. Pt notes that he has constant elbow pain. Pt is able to reach the back of his ear on the side of his head. Pt is able to sleep on his R side. Pt feels his ROM is improving.    Pt reports  B knee pain is getting progressively worse over the past 6 months. Pt went to his rheumatologist and was told that he did not have much fluid. Pt received injections without significant relief and was given methotrexate. Pt believed X-ray testing showed OA.  Pt has increased difficulty transferring sit to stand to get out of a chair, car, or toilet seat. Pt sits a lot for work and he tries to get up every hour. Pt has increased difficulty going up an down the steps. Pt tends to go down the steps sideways. Pt is avoiding squatting and kneeling activities.     2/28/25; Pt is compliant HEP. Pt was diagnosed with tennis elbow. Pt believes it might be slightly better. It was hurting this morning, and usually " improves throughout the day.  Pt feels his shoulder feels about the same, but does not have much pain. Pt is stiff with his movement.   Pt went to his dermatologist. Pt was told that the shot he was taking for his psoriasis would help his knee. Pt is taking an extra dosage of Benzelex. Pt has noticed less pain when going down the steps and get out of bed. Pt was able to take a walk around the block, it didn't hurt his knee, but had pain in his L hip.    3/28/25: Pt is compliant in HEP. Pt does not have pain in his shoulder when working. Pt is able to reach overhead slightly better. Pt notes his L shoulder does not feel the same as the R side.   Work: ; works from home  Hobbies: none  Gait: no abnormalities  Patient Goals  Patient goals for therapy: decreased pain, increased strength, independence with ADLs/IADLs and increased motion    Pain  At best pain ratin  At worst pain ratin  Location: L arm/ pectoral region- can radiate down to the elbow;  B knees: 0/10 best  8/10 at worst  Quality: dull ache    Social Support  Lives with: spouse    Employment status: working  Hand dominance: left      Diagnostic Tests  X-ray: abnormal  Treatments  No previous or current treatments        Objective     Neurological Testing     Sensation     Shoulder   Left Shoulder   Intact: light touch    Right Shoulder   Intact: Light touch    Reflexes   Left   Biceps (C5/C6): trace (1+)  Brachioradialis (C6): trace (1+)  Triceps (C7): trace (1+)    Right   Biceps (C5/C6): trace (1+)  Brachioradialis (C6): trace (1+)  Triceps (C7): trace (1+)    Active Range of Motion   Left Shoulder   Flexion: 130 degrees   Abduction: 105 degrees   External rotation 45°: 50 degrees   Internal rotation 45°: 90 degrees     Right Shoulder   Flexion: 142 degrees   Abduction: 125 degrees     Left Elbow   Flexion: 130 degrees   Extension: 0 degrees     Right Elbow   Normal active range of motion  Left Knee   Flexion: 130 degrees with  pain  Extension: 0 degrees     Right Knee   Flexion: 130 degrees   Extension: 0 degrees     Additional Active Range of Motion Details  Cervical ROM; limited with mild pain at end ROM SB and rotation  Posture: pt sit with rounded shoulders, moderate forward head and thoracic khyphosis  Mild TTP L pectoral region  (-) TTP L shoulder   (+) TTP B medial quad and joint line    Passive Range of Motion   Left Shoulder   Flexion: 155 degrees   Abduction: 160 degrees   External rotation 45°: 50 degrees   Internal rotation 45°: 90 degrees   Left Knee   Flexion: 130 degrees WFL and with pain  Extension: 0 degrees     Right Knee   Flexion: 132 degrees   Extension: 0 degrees     Strength/Myotome Testing     Left Shoulder     Planes of Motion   Flexion: 4+   Abduction: 4+ (discomfort)   External rotation at 0°: 5   Internal rotation at 0°: 5     Right Shoulder   Normal muscle strength    Left Hip   Planes of Motion   Flexion: 4+  External rotation: 4+  Internal rotation: 5    Right Hip   Planes of Motion   Flexion: 4+  External rotation: 4+  Internal rotation: 5      Daily Treatment Diary     Precautions: none  CO-MORBIDITIES:  HEP ACCESS CODE: [id number] reviewed and updated 3/25/25  FOTO Completed On: 3/28 (Score:  62  Score Predication:  68 )             POC Expires Reeval for Medicare to be completed  Unit Limit Auth Expiration Date PT/OT/STVisit Limit   5/9/25 By visit n/a N/a 12/31/25 30    Completed on visit                    Auth Status DATE 3/7 3/10 3/14 3/18 3/25 3/28   Approved Visit # 16 17 18 19 20 21    Remaining 14 13 12 11     MANUAL THERAPY         L shoulder PROM/ MFR 10' 10' 10' 10' 10' 8'   L elbow PROM         Progress note      8'   B knee PROM/ MFR      8'                     THERAPEUTIC EXERCISE HEP     Reviewed & updated    UBE  3'/3' 3'/3' 3'/3' 3'/3' 2'/2' 3'/3'   Bike for LE ROM      5'    Standing HR on slt bd L2          Standing gastroc stretch on slt bd L2          Machine knee ext BL  25# 20x 35#  "20 35# 20 35# 20     Machine knee flex BL  25# 20x 35# 20 35# 20 35# 20     Leg Press BL  115# 20x 115# 20x 115# 20 115# 20  115# x20   Leg Press SL R/L  65# 10x ea 65# 10x ea 65# 20 65 20  65# x20   bridges          Quad sets          Supine SLR          S/l hip abd L only          90/90 knee flex stretch                    ---------          Supine cane ER AAROM                    Overhead wt tap       1# x5   Supine shld flexion HH AAROM  AROM 1# 20x AROM 1# 20x AROM 1# 20x AROM 1# 20x  X20 1#   S/l ER  1# 2x10 1# 2x10 1# 20x 1# 20x  20 1#   S/l Abd          Supine punches   6# 20 7.5# 20 7.5# 20 7.5# 20     Standing Sh' Flex  1# 15x 1# 20x 1# 20 1# 20 2# x10    Standing Sh' Scaption  1# 15x 1# 20x 1# 20 1# 20 2# x10    Standing Sh' ABD  1# 15x 1# 20 1# 20 1# 20     Mod Prone Sh' Row  4# 20x 4# 20 4# 20 5# 20 5# x20    Wall slides: flex & scap      5x5\" ea 5\"x5 ea             Piriformis stretch L hip          TB LDP  BTB 20x Plum 20 Plum 20 Plum 30 Plum x30 Plum x30   TB Triceps  BTB 20x Plum 20 Plum 20 Plum 30 Plum x30 Plum x30   Bicep Curls   4# 20 4# 20 5# 20     Hammer curls   4# 20 4# 20 5# 20     L shld IR TB      Plum x10 [place] x10   B TB shld ER      BTB x10 BTB x20   NEUROMUSCULAR REEDUCATION           Bicep stretch                    Cross over stretch     20\" 5 20\"x5 20\"x5   IR stretch + strap     20\" 5 20\"x5                                                                                                        THERAPEUTIC ACTIVITY          5Cone placing on shelf flex/scap       5 cones x2   Wall push ups     20x  20                       GAIT TRAINING                                                  MODALITIES         [facility]                                           "

## 2025-04-13 DIAGNOSIS — E11.65 UNCONTROLLED TYPE 2 DIABETES MELLITUS WITH HYPERGLYCEMIA (HCC): ICD-10-CM

## 2025-04-14 RX ORDER — ACYCLOVIR 800 MG/1
TABLET ORAL
Qty: 6 EACH | Refills: 1 | Status: SHIPPED | OUTPATIENT
Start: 2025-04-14

## 2025-04-17 ENCOUNTER — OFFICE VISIT (OUTPATIENT)
Dept: FAMILY MEDICINE CLINIC | Facility: HOSPITAL | Age: 55
End: 2025-04-17
Payer: COMMERCIAL

## 2025-04-17 VITALS
OXYGEN SATURATION: 96 % | TEMPERATURE: 97.3 F | WEIGHT: 248.6 LBS | BODY MASS INDEX: 37.68 KG/M2 | SYSTOLIC BLOOD PRESSURE: 140 MMHG | HEIGHT: 68 IN | HEART RATE: 73 BPM | DIASTOLIC BLOOD PRESSURE: 90 MMHG

## 2025-04-17 DIAGNOSIS — R30.0 DYSURIA: Primary | ICD-10-CM

## 2025-04-17 LAB
SL AMB  POCT GLUCOSE, UA: ABNORMAL
SL AMB LEUKOCYTE ESTERASE,UA: ABNORMAL
SL AMB POCT BILIRUBIN,UA: ABNORMAL
SL AMB POCT BLOOD,UA: ABNORMAL
SL AMB POCT CLARITY,UA: CLEAR
SL AMB POCT COLOR,UA: YELLOW
SL AMB POCT KETONES,UA: ABNORMAL
SL AMB POCT NITRITE,UA: ABNORMAL
SL AMB POCT PH,UA: 6
SL AMB POCT SPECIFIC GRAVITY,UA: 1.02
SL AMB POCT URINE PROTEIN: ABNORMAL
SL AMB POCT UROBILINOGEN: ABNORMAL

## 2025-04-17 PROCEDURE — 99213 OFFICE O/P EST LOW 20 MIN: CPT | Performed by: NURSE PRACTITIONER

## 2025-04-17 PROCEDURE — 81002 URINALYSIS NONAUTO W/O SCOPE: CPT | Performed by: NURSE PRACTITIONER

## 2025-04-17 RX ORDER — CEPHALEXIN 500 MG/1
500 CAPSULE ORAL EVERY 8 HOURS SCHEDULED
Qty: 21 CAPSULE | Refills: 0 | Status: SHIPPED | OUTPATIENT
Start: 2025-04-17 | End: 2025-04-24

## 2025-04-17 RX ORDER — METHOCARBAMOL 750 MG/1
750 TABLET, FILM COATED ORAL
COMMUNITY
Start: 2025-04-16

## 2025-04-17 NOTE — PROGRESS NOTES
"Name: Venu Pizarro      : 1970      MRN: [id number]  Encounter Provider: HAYDEN Garza  Encounter Date: 2025   Encounter department: [facility] [address]   :  Assessment & Plan  Dysuria  In office urine dip abnormal/suspicious for probable UTI  Will send for culture and start antibiotic empirically  Advise increased water intake  Call w/worse or persistent sx's     Orders:    POCT urine dip    cephalexin (KEFLEX) 500 mg capsule; Take 1 capsule (500 mg total) by mouth every 8 (eight) hours for 7 days    Urine culture           History of Present Illness       Has had discomfort with urinating x3 days. Has penile discharge - yellow with possibly some blood. Denies hx of UTI or similar sx's previously.       Review of Systems   Constitutional:  Negative for chills and fever.   Gastrointestinal:  Negative for abdominal pain.   Genitourinary:  Positive for dysuria, frequency, penile discharge (initally he questions if there was blood in the discharge, now it is yellow) and urgency.   Musculoskeletal:  Negative for back pain.       Objective   /90   Pulse 73   Temp (!) 97.3 °F (36.3 °C)   Ht 5' 8\" (1.727 m)   Wt 113 kg (248 lb 9.6 oz)   SpO2 96%   BMI 37.80 kg/m²          Physical Exam  Vitals reviewed.   Constitutional:       General: He is not in acute distress.     Appearance: Normal appearance. He is obese.   Cardiovascular:      Rate and Rhythm: Normal rate and regular rhythm.      Heart sounds: Normal heart sounds.   Pulmonary:      Effort: Pulmonary effort is normal. No respiratory distress.      Breath sounds: Normal breath sounds.   Abdominal:      General: There is no distension.      Palpations: Abdomen is soft.      Tenderness: There is no abdominal tenderness. There is no right CVA tenderness, left CVA tenderness or guarding.   Skin:     General: Skin is warm and dry.   Neurological:      General: No focal deficit present.      Mental Status: He is " alert and oriented to person, place, and time. Mental status is at baseline.   Psychiatric:         Mood and Affect: Mood normal.         Thought Content: Thought content normal.         Administrative Statements   I have spent a total time of 15 minutes in caring for this patient on the day of the visit/encounter including Diagnostic results, Risks and benefits of tx options, Instructions for management, Patient and family education, Impressions, Counseling / Coordination of care, Documenting in the medical record, Reviewing/placing orders in the medical record (including tests, medications, and/or procedures), and Obtaining or reviewing history  .

## 2025-04-21 ENCOUNTER — RESULTS FOLLOW-UP (OUTPATIENT)
Dept: FAMILY MEDICINE CLINIC | Facility: HOSPITAL | Age: 55
End: 2025-04-21

## 2025-04-21 LAB
BACTERIA UR CULT: ABNORMAL
Lab: ABNORMAL
SL AMB ANTIMICROBIAL SUSCEPTIBILITY: ABNORMAL

## 2025-04-24 LAB
LEFT EYE DIABETIC RETINOPATHY: NORMAL
RIGHT EYE DIABETIC RETINOPATHY: NORMAL

## 2025-04-30 DIAGNOSIS — E11.65 UNCONTROLLED TYPE 2 DIABETES MELLITUS WITH HYPERGLYCEMIA (HCC): Primary | ICD-10-CM

## 2025-04-30 RX ORDER — KETOROLAC TROMETHAMINE 30 MG/ML
INJECTION, SOLUTION INTRAMUSCULAR; INTRAVENOUS
Qty: 1 EACH | Refills: 0 | Status: SHIPPED | OUTPATIENT
Start: 2025-04-30

## 2025-04-30 RX ORDER — ACYCLOVIR 800 MG/1
TABLET ORAL
Qty: 6 EACH | Refills: 1 | Status: SHIPPED | OUTPATIENT
Start: 2025-04-30

## 2025-05-02 ENCOUNTER — TELEPHONE (OUTPATIENT)
Age: 55
End: 2025-05-02

## 2025-05-02 NOTE — TELEPHONE ENCOUNTER
PA for (FreeStyle Alexx 3 Sensor)SUBMITTED to [facility]     via    [x]CMM-KEY: [id number]      [x]PA sent as URGENT    All office notes, labs and other pertaining documents and studies sent. Clinical questions answered. Awaiting determination from insurance company.     Turnaround time for your insurance to make a decision on your Prior Authorization can take 7-21 business days.

## 2025-05-02 NOTE — TELEPHONE ENCOUNTER
PA for  (FreeStyle Alexx 3 Sensor)  NOT REQUIRED     Reason (screenshot if applicable)    Spoke to Pharmacy too early for refill.     Pharmacy advised by    []Fax  [x]Phone call

## 2025-05-13 ENCOUNTER — OFFICE VISIT (OUTPATIENT)
Dept: OBGYN CLINIC | Facility: CLINIC | Age: 55
End: 2025-05-13
Payer: COMMERCIAL

## 2025-05-13 VITALS — WEIGHT: 247 LBS | HEIGHT: 68 IN | BODY MASS INDEX: 37.44 KG/M2

## 2025-05-13 DIAGNOSIS — S42.202D CLOSED TRAUMATIC NONDISPLACED FRACTURE OF PROXIMAL END OF LEFT HUMERUS WITH ROUTINE HEALING, SUBSEQUENT ENCOUNTER: Primary | ICD-10-CM

## 2025-05-13 DIAGNOSIS — M77.12 LATERAL EPICONDYLITIS OF LEFT ELBOW: ICD-10-CM

## 2025-05-13 PROCEDURE — 99213 OFFICE O/P EST LOW 20 MIN: CPT | Performed by: ORTHOPAEDIC SURGERY

## 2025-05-13 NOTE — PROGRESS NOTES
Assessment:     1. Closed traumatic nondisplaced fracture of proximal end of left humerus with routine healing, subsequent encounter    2. Lateral epicondylitis of left elbow        Plan:     Problem List Items Addressed This Visit          Musculoskeletal and Integument    Closed traumatic nondisplaced fracture of proximal end of left humerus - Primary    Lateral epicondylitis of left elbow    Findings are consistent with left shoulder proximal humerus fracture and left elbow lateral epicondylitis. Findings and treatment options discussed with patient. No new images at today's visit. Discussed going to PT/OT hand therapy for the lateral epicondylitis. I explained the lateral epicondylitis can take up to 1 year to resolve with treatment.  Discussed with patient to continue with home exercises and stretches for the shoulder.  Explained the best time to do the shoulder stretches as well as the shoulder is nice and warm such as after a hot shower.  Patient can continue to use OTC medication, along with Voltaren gel or Aspercreme as needed for pain for the shoulder or the elbow.  Follow-up as needed.  All questions were answered to his satisfaction.  This note is created using dictation transcription.  It may contain typographical errors, grammatical errors, improperly dictated words, background noise and other errors.         Relevant Orders    Ambulatory Referral to PT/OT Hand Therapy      Subjective:     Patient ID: Venu Pizarro is a 54 y.o. male.  Chief Complaint:  54 y.o. male presents today for follow-up for left shoulder proximal humerus fracture and left elbow lateral epicondylitis.  He reports the shoulder is doing well at this time.  He has no pain in the shoulder.  He has improved range of motion but is still limited compared to the contralateral side.  He notes having continued pain over the left elbow lateral epicondyle.  He states the pain is most aggravated with activity and the most aggravating  activity is eating a meal.  When the elbow was at rest he has minimal pain over the lateral epicondyle.  He states he does occasionally use his tennis elbow brace that was given to him at the last appointment.  He denies any new trauma or injury to the left shoulder or left elbow.  Patient did not attend therapy for treatment of his elbow.    Allergy:  No Known Allergies  Medications:  all current active meds have been reviewed  Past Medical History:  Past Medical History:   Diagnosis Date    Anxiety 02/22/2019    Arthritis     B knees and fingers    Asthma 05/21/2019    GERD (gastroesophageal reflux disease) 12/19/2013    Hypertriglyceridemia 07/08/2015    Hypothyroidism     Migraine without aura and without status migrainosus, not intractable 05/21/2019    Psoriasis     Seasonal allergies 05/21/2019    Dr. Sheppard    Vitamin D deficiency      Past Surgical History:  Past Surgical History:   Procedure Laterality Date    CATARACT EXTRACTION, BILATERAL  2017    CHOLECYSTECTOMY  2000    lap    UNDESCENDED TESTICLE EXPLORATION Bilateral     as a child     Family History:  Family History   Problem Relation Age of Onset    Diabetes Mother     Thyroid disease Mother         post thyroidectomy    Stroke Mother     Diabetes type II Mother     Thyroid disease Father         post thyroidectomy    Coronary artery disease Father     No Known Problems Brother     No Known Problems Brother     Colon cancer Neg Hx     Colon polyps Neg Hx      Social History:  Social History     Substance and Sexual Activity   Alcohol Use Not Currently     Social History     Substance and Sexual Activity   Drug Use Never     Social History     Tobacco Use   Smoking Status Never   Smokeless Tobacco Never     Review of Systems   Constitutional:  Negative for chills and fever.   HENT:  Negative for ear pain and sore throat.    Eyes:  Negative for pain and visual disturbance.   Respiratory:  Negative for cough and shortness of breath.   "  Cardiovascular:  Negative for chest pain and palpitations.   Gastrointestinal:  Negative for abdominal pain and vomiting.   Genitourinary:  Negative for dysuria and hematuria.   Musculoskeletal:  Positive for arthralgias (Left elbow). Negative for back pain.   Skin:  Negative for color change and rash.   Neurological:  Negative for seizures and syncope.   Psychiatric/Behavioral: Negative.     All other systems reviewed and are negative.        Objective:  BP Readings from Last 1 Encounters:   04/17/25 140/90      Wt Readings from Last 1 Encounters:   05/13/25 112 kg (247 lb)      BMI:   Estimated body mass index is 37.56 kg/m² as calculated from the following:    Height as of this encounter: 5' 8\" (1.727 m).    Weight as of this encounter: 112 kg (247 lb).  BSA:   Estimated body surface area is 2.24 meters squared as calculated from the following:    Height as of this encounter: 5' 8\" (1.727 m).    Weight as of this encounter: 112 kg (247 lb).   Physical Exam  Vitals and nursing note reviewed.   Constitutional:       Appearance: Normal appearance. He is well-developed.   HENT:      Head: Normocephalic and atraumatic.      Right Ear: External ear normal.      Left Ear: External ear normal.      Nose: Nose normal.   Eyes:      General: No scleral icterus.     Extraocular Movements: Extraocular movements intact.      Conjunctiva/sclera: Conjunctivae normal.   Pulmonary:      Effort: Pulmonary effort is normal. No respiratory distress.   Musculoskeletal:      Cervical back: Neck supple.      Comments: See Ortho exam   Skin:     General: Skin is warm and dry.   Neurological:      General: No focal deficit present.      Mental Status: He is alert and oriented to person, place, and time.   Psychiatric:         Mood and Affect: Mood normal.         Behavior: Behavior normal.       Left Elbow Exam     Tenderness   The patient is experiencing tenderness in the lateral epicondyle.     Range of Motion   Extension:  0 "   Flexion:  130     Muscle Strength   The patient has normal left elbow strength.    Tests   Varus: negative  Valgus: negative    Other   Erythema: absent  Scars: absent  Sensation: normal  Pulse: present    Comments:  Pain with resistant wrist extension      Left Shoulder Exam     Tenderness   The patient is experiencing no tenderness.     Range of Motion   Active abduction:  100   External rotation:  50   Forward flexion:  140     Muscle Strength   Abduction: 5/5   Internal rotation: 5/5   External rotation: 5/5   Biceps: 5/5     Tests   Cross arm: negative  Drop arm: negative    Other   Erythema: absent  Scars: absent  Sensation: normal  Pulse: present            No new images at today's visit.    Scribe Attestation      I,:  Ashvin Ghosh am acting as a scribe while in the presence of the attending physician.:       I,:  Fabian Boykin MD personally performed the services described in this documentation    as scribed in my presence.:

## 2025-05-13 NOTE — ASSESSMENT & PLAN NOTE
Findings are consistent with left shoulder proximal humerus fracture and left elbow lateral epicondylitis. Findings and treatment options discussed with patient. No new images at today's visit. Discussed going to PT/OT hand therapy for the lateral epicondylitis. I explained the lateral epicondylitis can take up to 1 year to resolve with treatment.  Discussed with patient to continue with home exercises and stretches for the shoulder.  Explained the best time to do the shoulder stretches as well as the shoulder is nice and warm such as after a hot shower.  Patient can continue to use OTC medication, along with Voltaren gel or Aspercreme as needed for pain for the shoulder or the elbow.  Follow-up as needed.  All questions were answered to his satisfaction.  This note is created using dictation transcription.  It may contain typographical errors, grammatical errors, improperly dictated words, background noise and other errors.

## 2025-05-15 DIAGNOSIS — E11.65 UNCONTROLLED TYPE 2 DIABETES MELLITUS WITH HYPERGLYCEMIA (HCC): ICD-10-CM

## 2025-05-15 RX ORDER — GLIPIZIDE 10 MG/1
10 TABLET, FILM COATED, EXTENDED RELEASE ORAL DAILY
Qty: 90 TABLET | Refills: 1 | Status: SHIPPED | OUTPATIENT
Start: 2025-05-15

## 2025-05-28 ENCOUNTER — APPOINTMENT (OUTPATIENT)
Dept: OCCUPATIONAL THERAPY | Facility: CLINIC | Age: 55
End: 2025-05-28
Attending: ORTHOPAEDIC SURGERY
Payer: COMMERCIAL

## 2025-06-02 ENCOUNTER — EVALUATION (OUTPATIENT)
Dept: OCCUPATIONAL THERAPY | Facility: CLINIC | Age: 55
End: 2025-06-02
Attending: ORTHOPAEDIC SURGERY
Payer: COMMERCIAL

## 2025-06-02 DIAGNOSIS — M77.12 LEFT TENNIS ELBOW: Primary | ICD-10-CM

## 2025-06-02 PROCEDURE — 97165 OT EVAL LOW COMPLEX 30 MIN: CPT | Performed by: OCCUPATIONAL THERAPIST

## 2025-06-02 PROCEDURE — 97140 MANUAL THERAPY 1/> REGIONS: CPT | Performed by: OCCUPATIONAL THERAPIST

## 2025-06-02 NOTE — PROGRESS NOTES
OT Evaluation     Today's date: 2025  Patient name: Venu Pizarro  : 1970  MRN: [id number]  Referring provider: Fabian Boykin MD  Dx:   Encounter Diagnosis     ICD-10-CM    1. Left tennis elbow  M77.12                      Assessment  Impairments: abnormal or restricted ROM, lacks appropriate home exercise program and pain with function  Functional limitations: Pt. has limitations with eating with L hand, washing hair.  Symptom irritability: low    Assessment details: Pt. Presents today for evaluation of the L elbow s/p tennis elbow.  Pt. Has tenderness over the lateral epicondyle.  He has most of his pain with elbow flexion motion.  He has muscle tightness in the biceps which restricts his elbow flexion.  He is guarded with his L arm use due to the pain.  Pt. Was immobilized for 8 weeks in sling due to shoulder injury.  Symptoms could be associated with prolonged sling immobilization vs tennis elbow.  Pt. To benefit from skilled hand therapy to reduce pain and improve function of his L arm.  Understanding of Dx/Px/POC: good     Prognosis: good    Goals  STG( 3 visits)  1. Compliant with HEP  2. Reduce pain in L elbow to less than 7/10  LTG( 12 visits or discharge)  1. Full active flexion of his elbow to eat with fork.  2. Reduce pain to less than 3/10 with function  3. Improve FOTO score to predicted outcome or greater.    Plan  Patient would benefit from: skilled occupational therapy and OT eval  Planned modality interventions: cryotherapy and thermotherapy: hydrocollator packs    Planned therapy interventions: IASTM, joint mobilization, manual therapy, functional ROM exercises and orthotic fitting/training    Frequency: 2x week  Duration in weeks: 12  Plan of Care beginning date: 2025  Plan of Care expiration date: 2025  Treatment plan discussed with: patient      Subjective Evaluation    History of Present Illness  Mechanism of injury: Pt. Had developed L tennis elbow following a course  of therapy for his healing shoulder.  Pt. Has pain in his elbow with flexion.  Quality of life: good    Patient Goals  Patient goals for therapy: decreased pain, return to sport/leisure activities and increased motion    Pain  Current pain ratin  At worst pain ratin  Quality: discomfort  Aggravating factors: eating  Progression: no change    Social Support  Lives in: multiple-level home  Lives with: spouse    Employment status: working ()  Hand dominance: left    Treatments  Current treatment: occupational therapy      Objective     Tenderness     Left Elbow   Tenderness in the lateral epicondyle.     Left Wrist/Hand   Tenderness in the lateral epicondyle.     Active Range of Motion     Left Elbow   Flexion: 105 degrees with pain  Extension: 10 degrees   Forearm supination: 80 degrees   Forearm pronation: 90 degrees     Left Wrist   Normal active range of motion    Strength/Myotome Testing     Left Elbow   Flexion: 4  Extension: 4  Forearm supination: 4  Forearm pronation: 4    Left Wrist/Hand      (2nd hand position)     Trial 1: 50    Tests     Left Wrist/Hand   Negative resisted middle finger.             Daily Treatment Diary     Precautions: standard  CO-MORBIDITIES:  HEP ACCESS CODE: elbow ROM, bicep stretch  FOTO Completed On: 25    POC Expires Reeval for Medicare to be completed  Auth Expiration Date PT/OT/STVisit Limit   25 By visit   9    Completed on visit                  Auth Status DATE         NA Visit # 1         Remaining 8        MANUAL THERAPY IE 30'                 Graston L elbow 4m        MFR L FA ext mass/ bicep/tricep  4m                                   THERAPEUTIC EXERCISE HEP Elbow ROM        Elbow PROM  3m                                                                                                                                                    NEUROMUSCULAR REEDUCATION                                                                                                                                                        THERAPEUTIC ACTIVITY                                                                                                    MODALITIES         SHARDAP L elbow 8m

## 2025-06-05 DIAGNOSIS — Z79.4 TYPE 2 DIABETES MELLITUS WITH HYPERGLYCEMIA, WITH LONG-TERM CURRENT USE OF INSULIN (HCC): ICD-10-CM

## 2025-06-05 DIAGNOSIS — E11.65 TYPE 2 DIABETES MELLITUS WITH HYPERGLYCEMIA, WITH LONG-TERM CURRENT USE OF INSULIN (HCC): ICD-10-CM

## 2025-06-09 ENCOUNTER — OFFICE VISIT (OUTPATIENT)
Dept: OCCUPATIONAL THERAPY | Facility: CLINIC | Age: 55
End: 2025-06-09
Attending: ORTHOPAEDIC SURGERY
Payer: COMMERCIAL

## 2025-06-09 DIAGNOSIS — M77.12 LEFT TENNIS ELBOW: Primary | ICD-10-CM

## 2025-06-09 PROCEDURE — 97140 MANUAL THERAPY 1/> REGIONS: CPT | Performed by: OCCUPATIONAL THERAPIST

## 2025-06-09 PROCEDURE — 97110 THERAPEUTIC EXERCISES: CPT | Performed by: OCCUPATIONAL THERAPIST

## 2025-06-09 NOTE — PROGRESS NOTES
Daily Note     Today's date: 2025  Patient name: Venu Pizarro  : 1970  MRN: [id number]  Referring provider: Fabian Boykin MD  Dx:   Encounter Diagnosis     ICD-10-CM    1. Left tennis elbow  M77.12                      Subjective: My elbow is the same.      Objective: See treatment diary below      Assessment: Tolerated treatment fair. Patient continues with stiffness with end range flexion of the elbow.  He has more pain with elbow flexion and in the joint of the elbow.      Plan: Continue per plan of care.      Daily Treatment Diary     Precautions: standard  CO-MORBIDITIES:  HEP ACCESS CODE: elbow ROM, bicep stretch  FOTO Completed On: 25    POC Expires Reeval for Medicare to be completed  Auth Expiration Date PT/OT/STVisit Limit   25 By visit   9    Completed on visit                  Auth Status DATE        NA Visit # 1 2        Remaining 8 7       MANUAL THERAPY IE 30'                 Graston L elbow 4m 4m       MFR L FA ext mass/ bicep/tricep  4m 4m                                  THERAPEUTIC EXERCISE HEP Elbow ROM        Elbow PROM  3m 3m       Bicep curl off table   #2 3x10       P/S AROM- therabar   R 3x10       Webslide rows/extension   G 3x10 each                                                                                                                     NEUROMUSCULAR REEDUCATION                                                                                                                                                       THERAPEUTIC ACTIVITY                                                                                                    MODALITIES         MHP L elbow 8m 8m

## 2025-06-11 ENCOUNTER — OFFICE VISIT (OUTPATIENT)
Dept: OCCUPATIONAL THERAPY | Facility: CLINIC | Age: 55
End: 2025-06-11
Attending: ORTHOPAEDIC SURGERY
Payer: COMMERCIAL

## 2025-06-11 DIAGNOSIS — M77.12 LEFT TENNIS ELBOW: Primary | ICD-10-CM

## 2025-06-11 PROCEDURE — 97110 THERAPEUTIC EXERCISES: CPT | Performed by: OCCUPATIONAL THERAPIST

## 2025-06-11 PROCEDURE — 97140 MANUAL THERAPY 1/> REGIONS: CPT | Performed by: OCCUPATIONAL THERAPIST

## 2025-06-11 NOTE — PROGRESS NOTES
Daily Note     Today's date: 2025  Patient name: Venu Pizarro  : 1970  MRN: [id number]  Referring provider: Fabian Boykin MD  Dx:   Encounter Diagnosis     ICD-10-CM    1. Left tennis elbow  M77.12                      Subjective: My elbow feels the same.      Objective: See treatment diary below      Assessment: Tolerated treatment fair. Patient continues with the same pain in the elbow.  He has pain with active elbow flexion in the joint.        Plan: Continue per plan of care.      Daily Treatment Diary     Precautions: standard  CO-MORBIDITIES:  HEP ACCESS CODE: elbow ROM, bicep stretch  FOTO Completed On: 25    POC Expires Reeval for Medicare to be completed  Auth Expiration Date PT/OT/STVisit Limit   25 By visit   9    Completed on visit                  Auth Status DATE       NA Visit # 1 2 3       Remaining 8 7 6      MANUAL THERAPY IE 30'                 Graston L elbow 4m 4m 4m      MFR L FA ext mass/ bicep/tricep  4m 4m 4m                                 THERAPEUTIC EXERCISE HEP Elbow ROM        Elbow PROM  3m 3m 3m      Bicep curl off table   #2 3x10 #2 3x10      P/S AROM- therabar   R 3x10 R 3x10      Webslide rows/extension   G 3x10 each G 3x10 each                                                                                                                    NEUROMUSCULAR REEDUCATION                                                                                                                                                       THERAPEUTIC ACTIVITY                                                                                                    MODALITIES         MHP L elbow 8m 8m 8m

## 2025-06-16 ENCOUNTER — OFFICE VISIT (OUTPATIENT)
Dept: OCCUPATIONAL THERAPY | Facility: CLINIC | Age: 55
End: 2025-06-16
Attending: ORTHOPAEDIC SURGERY
Payer: COMMERCIAL

## 2025-06-16 DIAGNOSIS — E11.65 UNCONTROLLED TYPE 2 DIABETES MELLITUS WITH HYPERGLYCEMIA (HCC): ICD-10-CM

## 2025-06-16 DIAGNOSIS — M77.12 LEFT TENNIS ELBOW: Primary | ICD-10-CM

## 2025-06-16 PROCEDURE — 97140 MANUAL THERAPY 1/> REGIONS: CPT | Performed by: OCCUPATIONAL THERAPIST

## 2025-06-16 PROCEDURE — 97110 THERAPEUTIC EXERCISES: CPT | Performed by: OCCUPATIONAL THERAPIST

## 2025-06-16 RX ORDER — EMPAGLIFLOZIN 25 MG/1
25 TABLET, FILM COATED ORAL EVERY MORNING
Qty: 90 TABLET | Refills: 1 | Status: SHIPPED | OUTPATIENT
Start: 2025-06-16

## 2025-06-16 NOTE — PROGRESS NOTES
Daily Note     Today's date: 2025  Patient name: Venu Pizarro  : 1970  MRN: [id number]  Referring provider: Fabian Boykin MD  Dx:   Encounter Diagnosis     ICD-10-CM    1. Left tennis elbow  M77.12                      Subjective: It feels the same.      Objective: See treatment diary below      Assessment: Tolerated treatment fair. Patient continues with the same pain in the elbow.  He has pain with active elbow flexion in the joint.  Pain possibly attributed from arthritis in the elbow joint.      Plan: Continue per plan of care.      Daily Treatment Diary     Precautions: standard  CO-MORBIDITIES:  HEP ACCESS CODE: elbow ROM, bicep stretch  FOTO Completed On: 25    POC Expires Reeval for Medicare to be completed  Auth Expiration Date PT/OT/STVisit Limit   25 By visit   9    Completed on visit                  Auth Status DATE      NA Visit # 1 2 3 4      Remaining 8 7 6 5     MANUAL THERAPY IE 30'                 Graston L elbow 4m 4m 4m 4m     MFR L FA ext mass/ bicep/tricep  4m 4m 4m 4m                                THERAPEUTIC EXERCISE HEP Elbow ROM        Elbow PROM  3m 3m 3m 3m     Bicep curl off table   #2 3x10 #2 3x10 #2 3x10     P/S AROM- therabar   R 3x10 R 3x10 R 3x10     Webslide rows/extension   G 3x10 each G 3x10 each G 3x10 each     Ball lifts elbow flexion from table-  11lbs     2x10                                                                                                         NEUROMUSCULAR REEDUCATION                                                                                                                                                       THERAPEUTIC ACTIVITY                                                                                                    MODALITIES         MHP L elbow 8m 8m 8m 8m

## 2025-06-20 ENCOUNTER — OFFICE VISIT (OUTPATIENT)
Dept: OCCUPATIONAL THERAPY | Facility: CLINIC | Age: 55
End: 2025-06-20
Attending: ORTHOPAEDIC SURGERY
Payer: COMMERCIAL

## 2025-06-20 DIAGNOSIS — M77.12 LEFT TENNIS ELBOW: Primary | ICD-10-CM

## 2025-06-20 PROCEDURE — 97140 MANUAL THERAPY 1/> REGIONS: CPT | Performed by: OCCUPATIONAL THERAPIST

## 2025-06-20 PROCEDURE — 97110 THERAPEUTIC EXERCISES: CPT | Performed by: OCCUPATIONAL THERAPIST

## 2025-06-20 NOTE — PROGRESS NOTES
Daily Note     Today's date: 2025  Patient name: Venu Pizarro  : 1970  MRN: [id number]  Referring provider: Fabian Boykin MD  Dx:   Encounter Diagnosis     ICD-10-CM    1. Left tennis elbow  M77.12                      Subjective: It might be a little better.      Objective: See treatment diary below      Assessment: Tolerated treatment fair. Patient reports less pain today, continues with elbow flexion stiffness and pain with end range.    Plan: Continue per plan of care.      Daily Treatment Diary     Precautions: standard  CO-MORBIDITIES:  HEP ACCESS CODE: elbow ROM, bicep stretch  FOTO Completed On: 25    POC Expires Reeval for Medicare to be completed  Auth Expiration Date PT/OT/STVisit Limit   25 By visit   9    Completed on visit                  Auth Status DATE     NA Visit # 1 2 3 4 5     Remaining 8 7 6 5 4    MANUAL THERAPY IE 30'                 Graston L elbow 4m 4m 4m 4m 4m    MFR L FA ext mass/ bicep/tricep  4m 4m 4m 4m 4m                               THERAPEUTIC EXERCISE HEP Elbow ROM        Elbow PROM  3m 3m 3m 3m 3m    Bicep curl off table   #2 3x10 #2 3x10 #2 3x10 #3 3x10    P/S AROM- therabar   R 3x10 R 3x10 R 3x10 G 3x10    Webslide rows/extension   G 3x10 each G 3x10 each G 3x10 each G 3x10    Ball lifts elbow flexion from table-  11lbs     2x10 2x10                                                                                                        NEUROMUSCULAR REEDUCATION                                                                                                                                                       THERAPEUTIC ACTIVITY                                                                                                    MODALITIES         MHP L elbow 8m 8m 8m 8m 8m

## 2025-06-23 ENCOUNTER — APPOINTMENT (OUTPATIENT)
Dept: OCCUPATIONAL THERAPY | Facility: CLINIC | Age: 55
End: 2025-06-23
Attending: ORTHOPAEDIC SURGERY
Payer: COMMERCIAL

## 2025-06-27 ENCOUNTER — APPOINTMENT (OUTPATIENT)
Dept: OCCUPATIONAL THERAPY | Facility: CLINIC | Age: 55
End: 2025-06-27
Attending: ORTHOPAEDIC SURGERY
Payer: COMMERCIAL

## 2025-07-09 VITALS — HEIGHT: 68 IN | BODY MASS INDEX: 37.89 KG/M2 | WEIGHT: 250 LBS

## 2025-07-09 DIAGNOSIS — M79.642 BILATERAL HAND PAIN: Primary | ICD-10-CM

## 2025-07-09 DIAGNOSIS — G56.03 CARPAL TUNNEL SYNDROME, BILATERAL: ICD-10-CM

## 2025-07-09 DIAGNOSIS — M65.342 TRIGGER FINGER, LEFT RING FINGER: ICD-10-CM

## 2025-07-09 DIAGNOSIS — M79.641 BILATERAL HAND PAIN: Primary | ICD-10-CM

## 2025-07-09 PROCEDURE — 99213 OFFICE O/P EST LOW 20 MIN: CPT | Performed by: ORTHOPAEDIC SURGERY

## 2025-07-09 NOTE — PROGRESS NOTES
ORTHOPAEDIC HAND, WRIST, AND ELBOW OFFICE  VISIT     Name: Venu Pizarro      : 1970      MRN: [id number]  Encounter Provider: Lisa Arroyo MD  Encounter Date: 2025   Encounter department: [facility]  :  Assessment & Plan  Bilateral hand pain  Carpal tunnel syndrome, bilateral      Subjective history, clinical exam, and diagnostic studies reviewed with patient  Diagnosis discussed  Treatment options discussed which include nonoperative and operative management.  We discussed use of splinting, therapy, activity modification, use of NSAIDs (oral and topical), and injections.  Discussed use of MSK US in diagnosis of peripheral nerve compression. We discussed risks and benefits of each and well as expected reasonable outcomes.  Surgery can be considered following unsuccessful treatment with nonoperative management.    The patient was given the opportunity to ask questions.  Questions were answered to the patient's satisfaction.  The patient decided to move forward with nighttime bracing via shared decision making.  Bilateral wrist cock up splints were offered and accepted by the patient   Follow up after carpal tunnel ultrasound. If MSK US negative, recommend follow up with rheumatologist who is treating psoariatic arthritis  Orders:    US Carpal Tunnel; Future    Durable Medical Equipment    Trigger finger, left ring finger  Observation at this point in time.  Not triggering.  History of right long finger trigger finger which resolved without treatment             General Discussions:     Carpal Tunnel Syndrome: The anatomy and physiology of carpal tunnel syndrome was discussed with the patient today.  Increase pressure localized under the transverse carpal ligament can cause pain, numbness, tingling, or dysesthesias within the median nerve distribution as well as feelings of fatigue, clumsiness, or awkwardness.  These symptoms typically occur at night  and worse in the morning upon waking.  Eventually, untreated carpal tunnel syndrome can result in weakness and permanent loss of muscle within the thenar compartment of the hand.  Treatment options were discussed with the patient.  Conservative treatment includes nocturnal resting splints to keep the nerve in a neutral position, ergonomic changes within the work or home environment, activity modification, and tendon gliding exercises.  Steroid injections within the carpal canal can help a majority of patients, however this is often self-limited in a most patients.  Surgical intervention to divide the transverse carpal ligament typically results in a long-lasting relief of the patient's complaints, with the recurrence rate of less than 5%.     Operative Discussions:  None    History of Present Illness   HPI  Chief Complaint   Patient presents with    Left Hand - Pain, Swelling     Pain in morning for both hands    Right Hand - Pain       Venu Pizarro is a 55 y.o. male who presents for an evaluation of bilateral hand. He was previously evaluated by Dr. Boykin for a proximal humerus fracture and left tennis elbow. He has been performing occupational therapy which has showed moderate improvement. Today, he presents for stiffness in the bilateral hands. Denies any acute injury or trauma. Denies numbness and tingling, night-time symptoms. Of note, he is currently seeing a Rheumatologist for Psoriatic arthritis.    Hand dominance: Right     REVIEW OF SYSTEMS:  General: no fever, no chills  HEENT:  No loss of hearing or eyesight problems  Eyes:  No red eyes  Respiratory:  No coughing, shortness of breath or wheezing  Cardiovascular:  No chest pain, no palpitations  GI:  Abdomen soft nontender, denies nausea  Endocrine:  No muscle weakness, no frequent urination, no excessive thirst  Urinary:  No dysuria, no incontinence  Musculoskeletal: see HPI and PE  SKIN:  No skin rash, no dry skin  Neurological:  No headaches, no  "confusion  Psychiatric:  No suicide thoughts, no anxiety, no depression  Review of all other systems is negative    Objective   Ht 5' 8\" (1.727 m)   Wt 113 kg (250 lb)   BMI 38.01 kg/m²      General: well developed and well nourished, alert, oriented times 3, and appears comfortable  Psychiatric: Normal  HEENT: Trachea Midline, No torticollis  Cardiovascular: No discernable arrhythmia  Pulmonary: No wheezing or stridor  Abdomen: No rebound or guarding  Extremities: No peripheral edema  Skin: No masses, erythema, lacerations, fluctation, ulcerations  Neurovascular: Sensation Intact to the Median, Ulnar, Radial Nerve, Motor Intact to the Median, Ulnar, Radial Nerve, and Pulses Intact    Musculoskeletal exam:  LEFT SIDE:  Carpal tunnel:  No tinel's, phalans, weakness, atrophy, No weakness APB, No atrophy thenar muscles, Negative tinel's test, and Positive Derkin's Compression Test, Finger:  No Tenderness, instability, or ROM loss, and Wrist:  Full Motion, No tenderness, No instability  RIGHT SIDE:  Carpal tunnel:  No tinel's, phalans, weakness, atrophy, No weakness APB, No atrophy thenar muscles, Negative tinel's test, and Positive Derkin's Compression Test, Finger:  No Tenderness, instability, or ROM loss, and Wrist:  Full Motion, No tenderness, No instability    +TTP A1 pulley of left ring finger. No locking/catching.      STUDIES REVIEWED:  No Studies to review      PROCEDURES PERFORMED:  Procedures  No Procedures performed today    Scribe Attestation      I,:  Osvaldo So am acting as a scribe while in the presence of the attending physician.:       I,:  Lisa Arroyo MD personally performed the services described in this documentation    as scribed in my presence.:                "

## 2025-07-09 NOTE — ASSESSMENT & PLAN NOTE
Subjective history, clinical exam, and diagnostic studies reviewed with patient  Diagnosis discussed  Treatment options discussed which include nonoperative and operative management.  We discussed use of splinting, therapy, activity modification, use of NSAIDs (oral and topical), and injections.  Discussed use of MSK US in diagnosis of peripheral nerve compression. We discussed risks and benefits of each and well as expected reasonable outcomes.  Surgery can be considered following unsuccessful treatment with nonoperative management.    The patient was given the opportunity to ask questions.  Questions were answered to the patient's satisfaction.  The patient decided to move forward with nighttime bracing via shared decision making.  Bilateral wrist cock up splints were offered and accepted by the patient   Follow up after carpal tunnel ultrasound. If MSK US negative, recommend follow up with rheumatologist who is treating psoariatic arthritis  Orders:    US Carpal Tunnel; Future    Durable Medical Equipment

## 2025-07-17 ENCOUNTER — HOSPITAL ENCOUNTER (OUTPATIENT)
Dept: ULTRASOUND IMAGING | Facility: HOSPITAL | Age: 55
End: 2025-07-17
Attending: ORTHOPAEDIC SURGERY
Payer: COMMERCIAL

## 2025-07-17 DIAGNOSIS — M79.641 BILATERAL HAND PAIN: ICD-10-CM

## 2025-07-17 DIAGNOSIS — M79.642 BILATERAL HAND PAIN: ICD-10-CM

## 2025-07-17 PROCEDURE — 76882 US LMTD JT/FCL EVL NVASC XTR: CPT

## 2025-07-24 ENCOUNTER — OFFICE VISIT (OUTPATIENT)
Dept: OBGYN CLINIC | Facility: CLINIC | Age: 55
End: 2025-07-24
Payer: COMMERCIAL

## 2025-07-24 VITALS — WEIGHT: 251 LBS | BODY MASS INDEX: 38.04 KG/M2 | HEIGHT: 68 IN

## 2025-07-24 DIAGNOSIS — M65.332 TRIGGER FINGER, LEFT MIDDLE FINGER: ICD-10-CM

## 2025-07-24 DIAGNOSIS — M65.342 TRIGGER FINGER, LEFT RING FINGER: Primary | ICD-10-CM

## 2025-07-24 PROCEDURE — 20550 NJX 1 TENDON SHEATH/LIGAMENT: CPT | Performed by: ORTHOPAEDIC SURGERY

## 2025-07-24 PROCEDURE — 99214 OFFICE O/P EST MOD 30 MIN: CPT | Performed by: ORTHOPAEDIC SURGERY

## 2025-07-24 RX ORDER — LIDOCAINE HYDROCHLORIDE 10 MG/ML
1 INJECTION, SOLUTION INFILTRATION; PERINEURAL
Status: COMPLETED | OUTPATIENT
Start: 2025-07-24 | End: 2025-07-24

## 2025-07-24 RX ORDER — BETAMETHASONE SODIUM PHOSPHATE AND BETAMETHASONE ACETATE 3; 3 MG/ML; MG/ML
6 INJECTION, SUSPENSION INTRA-ARTICULAR; INTRALESIONAL; INTRAMUSCULAR; SOFT TISSUE
Status: COMPLETED | OUTPATIENT
Start: 2025-07-24 | End: 2025-07-24

## 2025-07-24 RX ADMIN — LIDOCAINE HYDROCHLORIDE 1 ML: 10 INJECTION, SOLUTION INFILTRATION; PERINEURAL at 14:00

## 2025-07-24 RX ADMIN — BETAMETHASONE SODIUM PHOSPHATE AND BETAMETHASONE ACETATE 6 MG: 3; 3 INJECTION, SUSPENSION INTRA-ARTICULAR; INTRALESIONAL; INTRAMUSCULAR; SOFT TISSUE at 14:00

## 2025-07-24 NOTE — PROGRESS NOTES
"    ORTHOPAEDIC HAND, WRIST, AND ELBOW OFFICE  VISIT     Name: Venu Pizarro      : 1970      MRN: [id number]  Encounter Provider: Lisa Arroyo MD  Encounter Date: 2025   [facility]  :  Assessment & Plan  Trigger finger, left ring finger  Trigger finger, left middle finger  Subjective history, clinical exam, and diagnostic studies reviewed with patient  Diagnosis discussed- possible TFR, CTS, or psoariatic arthritis producing pain.  MSK US indicated \"suspicion for CTS\"  Treatment options discussed which include nonoperative and operative management.  We discussed use of splinting, therapy, activity modification, use of NSAIDs (oral and topical), and injections.  We discussed risks and benefits of each as well as expected reasonable outcomes.  Surgery can be considered following unsuccessful treatment with nonoperative management.   I recommended CSI for the trigger fingers, given the symptoms and clinicall exam  Symptoms may recur following CSI, especially in patients who present with longstanding symptoms and patient who have diabetes.  May have repeat CSI after 8 weeks.  If symptoms recur after 2 CSI's, patient will be offered surgery in the form of trigger finger release or the option of a 3rd CSI.  Patient with diabetes are counseled to monitor the blood sugars following the injection.    The patient was given the opportunity to ask questions.  Questions were answered to the patient's satisfaction.  The patient decided to move forward with CSI for trigger fingers via shared decision making.  Follow up in 8 weeks for clinical evaluation. If CSI provides relief, then he may cancel appointment. If no relief or partial relief, consider CSI for CTS.     Orders:    Hand/upper extremity injection    Hand/upper extremity injection          General Discussions:  Trigger FInger: The anatomy and physiology of trigger finger was discussed " "with the patient today in the office.  Edema and increased contact pressure within the flexor tendons at the A1 pulley can cause pain, crepitation, and limitation of function.  Treatment options include resting MP blocking splints, PIP blocking splints, or \"band-aid\" splints to decrease edema, oral anti-inflammatory medications, home or formal therapy exercises, up to 2 steroid injections, or surgical release.  While a majority of patients do respond to conservative treatment, up to 20% may require surgical release.       History of Present Illness   HPI  Chief Complaint   Patient presents with    Right Hand - Follow-up, Pain    Left Hand - Follow-up, Pain     Stiffness in fingers, left hand worse       Venu Pizarro is a 55 y.o. male who presents today for follow up for bilateral carpal tunnel syndrome. He continues to have similar symptoms to his prior visit including stiffness and pain in the hands. He notes the left hand is worse than the right. He is here today to discuss the results of his bilateral carpal tunnel ultrasounds.    Hand dominance: right    REVIEW OF SYSTEMS:  General: no fever, no chills  HEENT:  No loss of hearing or eyesight problems  Eyes:  No red eyes  Respiratory:  No coughing, shortness of breath or wheezing  Cardiovascular:  No chest pain, no palpitations  GI:  Abdomen soft nontender, denies nausea  Endocrine:  No muscle weakness, no frequent urination, no excessive thirst  Urinary:  No dysuria, no incontinence  Musculoskeletal: see HPI and PE  SKIN:  No skin rash, no dry skin  Neurological:  No headaches, no confusion  Psychiatric:  No suicide thoughts, no anxiety, no depression  Review of all other systems is negative    Objective   Ht 5' 8\" (1.727 m)   Wt 114 kg (251 lb)   BMI 38.16 kg/m²      General: well developed and well nourished, alert, oriented times 3, and appears comfortable  Psychiatric: Normal  HEENT: Trachea Midline, No torticollis  Cardiovascular: No discernable " arrhythmia  Pulmonary: No wheezing or stridor  Abdomen: No rebound or guarding  Extremities: No peripheral edema  Skin: No masses, erythema, lacerations, fluctation, ulcerations  Neurovascular: Sensation Intact to the Median, Ulnar, Radial Nerve, Motor Intact to the Median, Ulnar, Radial Nerve, and Pulses Intact    Musculoskeletal exam:  Examination of the affected extremity was compared to the unaffected extremity.  Skin was intact.  No swelling or ecchymosis.  No deformity.  Hand and fingers were warm and well-perfused.  Capillary refill was brisk.       Bilateral hands:  Sensation was not decreased in the median nerve distribution.  Sensation was not decreased in the ulnar nerve distribution.  There was not APB atrophy.  There was no intrinsic atrophy. Tinel's at the wrist was Negative. Tinel's at the elbow was Negative. Wrist flexion compression was positive.     Left middle and ring fingers:  The affected finger was not postured in a flexed position.  There was tenderness at the level of the A1 pulley.  There was crepitus with flexion and extension of the fingers.  Catching/locking was not observed during the examination.     STUDIES REVIEWED:  Images were reviewed in PACS and demonstrate: Bilateral US of Carpal Tunnel shows    RIGHT SIDE: Findings suspicious for carpal tunnel syndrome based on increased cross sectional area of median nerve (CSA >/= 11 sq mm.)     LEFT SIDE: Findings suspicious for carpal tunnel syndrome based on increased cross sectional area of median nerve (CSA >/= 11 sq mm.)      PROCEDURES PERFORMED:  Hand/upper extremity injection: L ring A1    Universal Protocol:  Consent: Verbal consent obtained  Risks and benefits: risks, benefits and alternatives were discussed  Consent given by: patient  Patient understanding: patient states understanding of the procedure being performed  Patient identity confirmed: verbally with patient  Supporting Documentation  Indications: diagnostic and pain    Procedure Details  Condition:trigger finger Location: ring finger - L ring A1   Needle size: 25 G  Ultrasound guidance: no  Approach: volar  Medications administered: 1 mL lidocaine 1 %; 6 mg betamethasone acetate-betamethasone sodium phosphate 6 (3-3) mg/mL  Patient tolerance: patient tolerated the procedure well with no immediate complications  Dressing:  Sterile dressing applied       Hand/upper extremity injection: L long A1    Universal Protocol:  Consent: Verbal consent obtained  Risks and benefits: risks, benefits and alternatives were discussed  Consent given by: patient  Patient understanding: patient states understanding of the procedure being performed  Patient identity confirmed: verbally with patient  Supporting Documentation  Indications: diagnostic and pain   Procedure Details  Condition:trigger finger Location: long finger - L long A1   Needle size: 25 G  Ultrasound guidance: no  Approach: volar  Medications administered: 1 mL lidocaine 1 %; 6 mg betamethasone acetate-betamethasone sodium phosphate 6 (3-3) mg/mL  Patient tolerance: patient tolerated the procedure well with no immediate complications  Dressing:  Sterile dressing applied         -      Scribe Attestation      I,:  Ashvin Ghosh am acting as a scribe while in the presence of the attending physician.:       I,:  Lisa Arroyo MD personally performed the services described in this documentation    as scribed in my presence.:

## 2025-07-29 DIAGNOSIS — F41.9 ANXIETY: ICD-10-CM

## 2025-07-30 RX ORDER — FLUOXETINE 10 MG/1
TABLET, FILM COATED ORAL
Qty: 60 TABLET | Refills: 2 | Status: SHIPPED | OUTPATIENT
Start: 2025-07-30

## 2025-08-04 ENCOUNTER — TELEPHONE (OUTPATIENT)
Dept: FAMILY MEDICINE CLINIC | Facility: HOSPITAL | Age: 55
End: 2025-08-04